# Patient Record
Sex: FEMALE | Race: WHITE | NOT HISPANIC OR LATINO | Employment: OTHER | URBAN - METROPOLITAN AREA
[De-identification: names, ages, dates, MRNs, and addresses within clinical notes are randomized per-mention and may not be internally consistent; named-entity substitution may affect disease eponyms.]

---

## 2021-01-13 ENCOUNTER — HOSPITAL ENCOUNTER (EMERGENCY)
Facility: CLINIC | Age: 61
Discharge: HOME | End: 2021-01-13
Attending: EMERGENCY MEDICINE | Admitting: EMERGENCY MEDICINE
Payer: MEDICARE

## 2021-01-13 VITALS
HEIGHT: 62 IN | RESPIRATION RATE: 15 BRPM | HEART RATE: 95 BPM | OXYGEN SATURATION: 97 % | WEIGHT: 180 LBS | BODY MASS INDEX: 33.13 KG/M2 | SYSTOLIC BLOOD PRESSURE: 119 MMHG | DIASTOLIC BLOOD PRESSURE: 113 MMHG

## 2021-01-13 DIAGNOSIS — M54.12 CERVICAL RADICULOPATHY: ICD-10-CM

## 2021-01-13 DIAGNOSIS — G89.29 OTHER CHRONIC PAIN: ICD-10-CM

## 2021-01-13 DIAGNOSIS — M47.22 OSTEOARTHRITIS OF SPINE WITH RADICULOPATHY, CERVICAL REGION: ICD-10-CM

## 2021-01-13 PROCEDURE — 99283 EMERGENCY DEPT VISIT LOW MDM: CPT | Performed by: EMERGENCY MEDICINE

## 2021-01-13 PROCEDURE — 99284 EMERGENCY DEPT VISIT MOD MDM: CPT | Performed by: EMERGENCY MEDICINE

## 2021-01-13 ASSESSMENT — MIFFLIN-ST. JEOR: SCORE: 1349.72

## 2021-01-13 NOTE — DISCHARGE INSTRUCTIONS
Orders been placed to restart physical therapy.  This should include traction.  The order was placed electronically.  Physical therapy staff should be contacting you shortly to arrange for your first appointment    Increase Neurontin to 300 mg 3 times daily    Arrange for clinic follow-up in 2 weeks

## 2021-01-13 NOTE — ED AVS SNAPSHOT
[facility] Emergency Dept  [address] [place] [address]  Phone: [phone]  Fax: [phone]                                    Audrey Ricks   MRN: [id number]    Department: [facility] Emergency Dept   Date of Visit: 1/13/2021           After Visit Summary Signature Page    I have received my discharge instructions, and my questions have been answered. I have discussed any challenges I see with this plan with the nurse or doctor.    ..........................................................................................................................................  Patient/Patient Representative Signature      ..........................................................................................................................................  Patient Representative Print Name and Relationship to Patient    ..................................................               ................................................  Date                                   Time    ..........................................................................................................................................  Reviewed by Signature/Title    ...................................................              ..............................................  Date                                               Time          22EPIC Rev 08/18

## 2021-01-13 NOTE — ED TRIAGE NOTES
Has been using neurontin since December but not helping 2-100mg tablets 3x a day.    Left shoulder/neck pain from chronic disk issues and recently moved in 11/2020 and has been lifting heavy things and over doing things and flared her disc issues up.  Recently moved from [facility]. Did take neurontin in the past and recently restarted. Does have tingling and pain radiating from the left neck down to the finger tips

## 2021-01-14 NOTE — ED PROVIDER NOTES
"  History     Chief Complaint   Patient presents with     Shoulder Pain     HPI     Audrey Ricks is a 60 year old female who presents with worsening symptoms of cervical radiculopathy.  Patient reports that she has degenerative arthritis in her cervical spine for last few years has been followed by a chronic pain specialist through [facility] in [facility].  Her last MRI available for review dates back to 2016.  She had osteophytic formations at the C4-5, C5-6, C6-7 was causing foraminal stenosis.  The patient has benefited by physical therapy in the past with traction when she has acute flares of her radicular symptoms are going to the left periscapular area.  She also was noted over the last 2 years that if she tucks her chin down in the flexed position and rotates her cervical spine to the right it will alleviate the radicular symptoms.  She has recently moved from [place] to the [facility] and is looking for a new primary care doctor.  She is here today to discuss her dosing of Neurontin and to receive authorization to restart physical therapy.      Allergies:  No Known Allergies    Problem List:    There are no active problems to display for this patient.       Past Medical History:    No past medical history on file.    Past Surgical History:    No past surgical history on file.    Family History:    No family history on file.    Social History:  Marital Status:  Single [1]  Social History     Tobacco Use     Smoking status: Not on file   Substance Use Topics     Alcohol use: Not on file     Drug use: Not on file        Medications:    No current outpatient medications on file.        Review of Systems   All other systems reviewed and are negative.      Physical Exam   BP: (!) 119/113  Pulse: 95  Resp: 15  Height: 157.5 cm (5' 2\")  Weight: 81.6 kg (180 lb)  SpO2: 97 %      Physical Exam  Vitals signs and nursing note reviewed.   Constitutional:       Appearance: Normal appearance.   HENT: "      Head: Normocephalic and atraumatic.   Eyes:      Conjunctiva/sclera: Conjunctivae normal.   Neck:      Comments: Patient shows some restriction in extension and rotation of the left.  Prefers to find her point of comfort by flexion of cervical spine slightly rotated to the right.  Cardiovascular:      Rate and Rhythm: Normal rate.   Pulmonary:      Effort: Pulmonary effort is normal.   Neurological:      Mental Status: She is alert.     Patient equal grasp strength bilateral.  She had no muscle atrophy and weakness in either arm.  DTRs were symmetric    ED Course        Procedures                       Assessments & Plan (with Medical Decision Making)  60-year-old female.  Recent move from [facility] to [place].  Looking for primary care doctor.  Was advised on to how to adjust her Neurontin dosing today.  She has degenerative change in the cervical spine at C 4-5, C5-C6, C6-C7.  We discussed Neurontin.  Since she is having increasing symptoms we elected to increase her Neurontin from 200 mg 3 times daily to 300 mg 3 times daily.  She may also use over-the-counter ibuprofen or naproxen.  Referred to physical therapy given that she has benefited from traction the past.  She has never tried home cervical traction.     I have reviewed the nursing notes.    I have reviewed the findings, diagnosis, plan and need for follow up with the patient.      There are no discharge medications for this patient.      Final diagnoses:   Cervical radiculopathy   Osteoarthritis of spine with radiculopathy, cervical region   Other chronic pain       1/13/2021   [facility] EMERGENCY DEPT     Mariano Montoya,   01/14/21 0734

## 2021-01-15 ENCOUNTER — HOSPITAL ENCOUNTER (OUTPATIENT)
Dept: PHYSICAL THERAPY | Facility: CLINIC | Age: 61
Setting detail: THERAPIES SERIES
End: 2021-01-15
Attending: EMERGENCY MEDICINE
Payer: MEDICARE

## 2021-01-15 DIAGNOSIS — M54.12 CERVICAL RADICULOPATHY: ICD-10-CM

## 2021-01-15 DIAGNOSIS — G89.29 OTHER CHRONIC PAIN: ICD-10-CM

## 2021-01-15 DIAGNOSIS — M47.22 OSTEOARTHRITIS OF SPINE WITH RADICULOPATHY, CERVICAL REGION: ICD-10-CM

## 2021-01-15 PROCEDURE — 97110 THERAPEUTIC EXERCISES: CPT | Mod: GP | Performed by: PHYSICAL THERAPIST

## 2021-01-15 PROCEDURE — 97161 PT EVAL LOW COMPLEX 20 MIN: CPT | Mod: GP | Performed by: PHYSICAL THERAPIST

## 2021-01-15 NOTE — PROGRESS NOTES
[facility]          OUTPATIENT PHYSICAL THERAPY ORTHOPEDIC EVALUATION  PLAN OF TREATMENT FOR OUTPATIENT REHABILITATION  (COMPLETE FOR INITIAL CLAIMS ONLY)  Patient's Last Name, First Name, M.I.  YOB: 1960  Audrey Ricks    Provider s Name:  [facility]   Medical Record No.  [id number]   Start of Care Date:  01/15/21   Onset Date:  11/01/20   Type:     _X__PT   ___OT   ___SLP Medical Diagnosis:  Cervical Radiculopathy M54.12, OA of spine with radiculopathy cervical region M47.22, chronic pain G89.29     PT Diagnosis:  Decreased cervical ROM, Decreased strength and endurance in upper quarter, altered neurodynamics in L arm, and poor posture consistent.   Visits from SOC:  1      _________________________________________________________________________________  Plan of Treatment/Functional Goals:  joint mobilization, manual therapy, neuromuscular re-education, strengthening, stretching     Cryotherapy, Hot packs, Traction     Goals  Goal Identifier: Driving  Goal Description: Patient will be able to  Turn head to check blind spots and elevate arm to steering wheel  while driving  20 minutes with shoulder pain and neck pain <2/10 in order to drive to the grocery store and appointments.   Target Date: 04/15/21    Goal Identifier: Washing hair   Goal Description: Pt will be able to flex shoulders to 130 degrees for greater than 30 seconds with no increase in symptoms in order perform grooming ADLs   Target Date: 04/15/21    Goal Identifier: Cooking   Goal Description: Pt will be able to cook to 30 minutes with no increase neck or arm symptoms in order to prepare her and her spouse meals.   Target Date: 04/15/21                 Therapy Frequency:  2 times/Week  Predicted Duration of Therapy Intervention:  4    Neha Posadas, PT                 I CERTIFY THE NEED FOR THESE SERVICES FURNISHED UNDER        THIS PLAN OF TREATMENT AND WHILE UNDER MY CARE      (Physician co-signature of this document indicates review and certification of the therapy plan).                       Certification Date From:  01/15/21   Certification Date To:  04/15/21    Referring Provider:  Mariano Montoya, DO     Initial Assessment        See Epic Evaluation Start of Care Date: 01/15/21

## 2021-01-15 NOTE — PROGRESS NOTES
"   01/15/21 3903   General Information   Type of Visit Initial OP Ortho PT Evaluation   Start of Care Date 01/15/21   Referring Physician Mariano Montoya, DO    Patient/Family Goals Statement PT states she \"wants to decrease pain meds and neurontin\"     Orders Evaluate and Treat   Date of Order 01/13/21   Certification Required? Yes   Medical Diagnosis Cervical Radiculopathy M54.12, OA of spine with radiculopathy cervical region M47.22, chronic pain G89.29   Surgical/Medical history reviewed Yes   Precautions/Limitations no known precautions/limitations   Weight-Bearing Status - LUE full weight-bearing   Weight-Bearing Status - RUE full weight-bearing   Weight-Bearing Status - LLE full weight-bearing   Weight-Bearing Status - RLE full weight-bearing       Present No   Body Part(s)   Body Part(s) Cervical Spine   Presentation and Etiology   Pertinent history of current problem (include personal factors and/or comorbidities that impact the POC) Pt reports she went to the ED on 1/13/21 with increase in neck and left arm pain that increased after packing and moving 2 months prior.  Per ED report last MRI in 2016 shows ostephytic formations at C4-C5, C5-C6, C6-C7 causing foraminal stenosis. Pt reports she has had similar symptoms intermittently for past 10 years with decreased symptoms for past few years. Pt reports neck, L shoulder and periscapular pain that decreases with rest, lying down, and cervical flexion and right rotation and increases with shoulder flexion, lifting more than 4 lbs, driving, washing her hair, cooking, using her cellphone and sitting. Pt reports Neurontin greatly decreases pain greatly but makes her feel \"floaty\". Pt reports having decreased symptoms with previous PT especially with traction. PMH remarkable for HTN and prior hospitalization for SVT. Medications include metaprolol and Neurontin.    Impairments A. Pain;D. Decreased ROM;E. Decreased flexibility;F. " Decreased strength and endurance   Functional Limitations perform activities of daily living;perform desired leisure / sports activities   Symptom Location Pt reports pain in L posterior neck, L shoulder/arm. inferior to L inferior angle of scapula   How/Where did it occur From Degenerative Joint Disease   Onset date of current episode/exacerbation 11/01/20   Chronicity Chronic   Pain rating (0-10 point scale) Denies pain  (denies pain any pain currently )   Pain quality A. Sharp;B. Dull;C. Aching  (searing )   Frequency of pain/symptoms B. Intermittent   Pain/symptoms are: The same all the time   Pain/symptoms exacerbated by D. Carrying;C. Lifting;H. Overhead reach;G. Certain positions;K. Home tasks;J. ADL   Pain/symptoms eased by C. Rest;E. Changing positions;F. Certain positions;I. OTC medication(s)  (Cervical flexion and R rotation )   Progression of symptoms since onset: Improved  (improved due to neurontin)   Prior Level of Function   Prior Level of Function-Mobility IND   Prior Level of Function-ADLs IND   Functional Level Prior Comment Pt reports being active including swimming and yoga   Current Level of Function   Current Community Support Family/friend caregiver   Patient role/employment history F. Retired   [facility]   Home/community accessibility no concerns   Current equipment-Gait/Locomotion None   Current equipment-ADL None   Cervical Spine   Observation NAD   Integumentary  Grossly intact with no noted impairments   Posture Forward head posture and protracted shoulders    Cervical Flexion ROM 40   Cervical Extension ROM 48   Cervical Right Side Bending ROM 30   Cervical Left Side Bending ROM 28   Cervical Right Rotation ROM 53   Cervical Left Rotation ROM 57   Thoracic Flexion ROM WFL   Thoracic Extension ROM 60% decreased compared to functional norms   Thoracic Right Side Bending ROM WFL   Thoracic Left Sidebending ROM  WFL    Thoracic Right Rotation WFL   Thoracic Left  Rotation WFL   Shoulder AROM Screen 90 degrees bilateral flexion with pain    Shoulder Abd (C5) Strength 5/5 B    Shoulder ER (C5, C6) Strength 4/5 B    Shoulder IR (C5, C6) Strength 4/5 B    Elbow Flexion (C5, C6) Strength 4+/5 B    Elbow Extension (C7) Strength 5/5    Shoulder/Wrist/Hand Strength Comments  26kg on R,  18KG on L R hand dominance   Spurling Test positive on L   Cervical Distraction Test positive    Segmental Mobility-Cervical Stiff at C4-5 especially but all through out C spine   Palpation Tender to palpation in bilateral upper trapezius and levator scapulae   UE Neural Tension UE Neural Tension Tests   ULTT I (Median and Anterior Interosseous) Positive  (siffness on L in wrist)   Planned Therapy Interventions   Planned Therapy Interventions joint mobilization;manual therapy;neuromuscular re-education;strengthening;stretching   Planned Modality Interventions   Planned Modality Interventions Cryotherapy;Hot packs;Traction   Clinical Impression   Criteria for Skilled Therapeutic Interventions Met yes, treatment indicated   PT Diagnosis Decreased cervical ROM, Decreased strength and endurance in upper quarter, altered neurodynamics in L arm, and poor posture consistent.   Influenced by the following impairments Decreased cervical AROM and PROM, Decreased strength and endurance in upper quarter, and protracted shoulders and forward head posture    Functional limitations due to impairments Difficulty cooking, washing hair, driving, carrying objects >4lbs    Clinical Presentation Stable/Uncomplicated   Clinical Presentation Rationale Assessment and Clinical opinion    Clinical Decision Making (Complexity) Low complexity   Therapy Frequency 2 times/Week   Predicted Duration of Therapy Intervention (days/wks) 4   Risk & Benefits of therapy have been explained Yes   Patient, Family & other staff in agreement with plan of care Yes   Clinical Impression Comments PPW Decreased cervical ROM, Decreased  strength and endurance in upper quarter, altered neurodynamics in L arm, and poor posture consistent with cervical radiculopathy. Pt will benefit from skilled PT for instruction in HEP for cervical stabilization, manual techniques and modalities as need.     ORTHO GOALS   PT Ortho Eval Goals 1;2;3   Ortho Goal 1   Goal Identifier Driving   Goal Description Patient will be able to  Turn head to check blind spots and elevate arm to steering wheel  while driving  20 minutes with shoulder pain and neck pain <2/10 in order to drive to the grocery store and appointments.    Target Date 04/15/21   Ortho Goal 2   Goal Identifier Washing hair    Goal Description Pt will be able to flex shoulders to 130 degrees for greater than 30 seconds with no increase in symptoms in order perform grooming ADLs    Target Date 04/15/21   Ortho Goal 3   Goal Identifier Cooking    Goal Description Pt will be able to cook to 30 minutes with no increase neck or arm symptoms in order to prepare her and her spouse meals.    Target Date 04/15/21   Total Evaluation Time   PT Alyssa Low Complexity Minutes (51686) 50

## 2021-02-05 ENCOUNTER — HOSPITAL ENCOUNTER (OUTPATIENT)
Dept: PHYSICAL THERAPY | Facility: CLINIC | Age: 61
Setting detail: THERAPIES SERIES
End: 2021-02-05
Attending: EMERGENCY MEDICINE
Payer: MEDICARE

## 2021-02-05 PROCEDURE — 97110 THERAPEUTIC EXERCISES: CPT | Mod: GP | Performed by: PHYSICAL THERAPIST

## 2021-02-08 ENCOUNTER — HOSPITAL ENCOUNTER (OUTPATIENT)
Dept: PHYSICAL THERAPY | Facility: CLINIC | Age: 61
Setting detail: THERAPIES SERIES
End: 2021-02-08
Attending: EMERGENCY MEDICINE
Payer: MEDICARE

## 2021-02-08 PROCEDURE — 97110 THERAPEUTIC EXERCISES: CPT | Mod: GP | Performed by: PHYSICAL THERAPIST

## 2021-02-10 NOTE — PROGRESS NOTES
"  Damian Ventura is a 61 year old who presents for the following health issues    HPI       ED/UC Followup:    Facility:  [facility]  Date of visit: 1/13/21  Reason for visit: cervical radiculopathy, osteoarthritis  Current Status: doing better     Patient presents today for ER follow-up and medication recheck. Patient is new to the area from [place]. She tells me today that she had a recurrance of cervical neck pain with radicular symptoms. She reports she lived with this pain for 3 years before anyone could give her a diagnosis. She reports PT has been very beneficial in the past along with Gabapentin as needed. She reports she is doing better today. She was very upset when the pain recurred but is relieved it is starting to feel better.     She has diagnosis of Bipolar disorder as well. She reports that she was doing well without medication but states that the move and recurrance of pain had her feeling very teary so she restarted Lamictal 25 mg about 3 days ago. Can all ready tell this has helped with her moods.     Blood pressure has been elevated today as well as in the ER. She has been taking Metoprolol as prescribed. Has noticed readings elevated at home. Otherwise feeling well. Denies headaches, vision changes, chest pain or shortness of breath.     Review of Systems   Constitutional, HEENT, cardiovascular, pulmonary, GI, , musculoskeletal, neuro, skin, endocrine and psych systems are negative, except as otherwise noted.      Objective    BP (!) 164/110   Pulse 80   Temp 97.9  F (36.6  C) (Temporal)   Resp 16   Ht 1.575 m (5' 2\")   Wt 87.1 kg (192 lb)   LMP 01/14/2021   SpO2 96%   BMI 35.12 kg/m    Body mass index is 35.12 kg/m .  Physical Exam   GENERAL: healthy, alert and no distress  EYES: Eyes grossly normal to inspection, PERRL and conjunctivae and sclerae normal  HENT: ear canals and TM's normal, nose and mouth without ulcers or lesions  NECK: no adenopathy, no asymmetry, masses, or " scars and thyroid normal to palpation  RESP: lungs clear to auscultation - no rales, rhonchi or wheezes  CV: regular rate and rhythm, normal S1 S2, no S3 or S4, no murmur, click or rub, no peripheral edema and peripheral pulses strong  ABDOMEN: soft, nontender, no hepatosplenomegaly, no masses and bowel sounds normal  NEURO: Normal strength and tone, mentation intact and speech normal  PSYCH: mentation appears normal, affect normal/bright        Assessment & Plan     Essential hypertension  Patient with elevated blood pressure despite taking 25 mg daily. Will have patient increase this to 50 mg daily. She will follow-up in 2-4 weeks for a blood pressure check. Has been making dietary changes.   - metoprolol succinate ER (TOPROL-XL) 25 MG 24 hr tablet; Take 2 tablets (50 mg) by mouth daily    Cervical radiculopathy at C7  Symptoms are improving. She reports Gabapentin has been helpful for pain and will be starting PT.    Bipolar 2 disorder (H)  Patient recently started Lamictal 25 mg daily and this has been helpful. Will continue at current dose and notify me if symptoms become more bothersome. No refills needed at this time.     Morbid obesity (H)  Encouraged ongoing diet and exercise modifications.       The patient indicates understanding of these issues and agrees with the plan.    Alie De La Cruz PA-C  [facility]

## 2021-02-11 ENCOUNTER — OFFICE VISIT (OUTPATIENT)
Dept: FAMILY MEDICINE | Facility: CLINIC | Age: 61
End: 2021-02-11
Payer: MEDICARE

## 2021-02-11 VITALS
TEMPERATURE: 97.9 F | WEIGHT: 192 LBS | DIASTOLIC BLOOD PRESSURE: 110 MMHG | OXYGEN SATURATION: 96 % | HEIGHT: 62 IN | RESPIRATION RATE: 16 BRPM | HEART RATE: 80 BPM | BODY MASS INDEX: 35.33 KG/M2 | SYSTOLIC BLOOD PRESSURE: 164 MMHG

## 2021-02-11 DIAGNOSIS — E66.01 MORBID OBESITY (H): ICD-10-CM

## 2021-02-11 DIAGNOSIS — F31.81 BIPOLAR 2 DISORDER (H): ICD-10-CM

## 2021-02-11 DIAGNOSIS — M54.12 CERVICAL RADICULOPATHY AT C7: ICD-10-CM

## 2021-02-11 DIAGNOSIS — I10 ESSENTIAL HYPERTENSION: Primary | ICD-10-CM

## 2021-02-11 PROCEDURE — 99204 OFFICE O/P NEW MOD 45 MIN: CPT | Performed by: PHYSICIAN ASSISTANT

## 2021-02-11 RX ORDER — LAMOTRIGINE 25 MG/1
25 TABLET ORAL
COMMUNITY
Start: 2020-07-13 | End: 2021-04-05

## 2021-02-11 RX ORDER — METOPROLOL SUCCINATE 25 MG/1
25 TABLET, EXTENDED RELEASE ORAL
COMMUNITY
Start: 2020-12-12 | End: 2021-02-11

## 2021-02-11 RX ORDER — GABAPENTIN 100 MG/1
CAPSULE ORAL
COMMUNITY
Start: 2020-12-28 | End: 2022-06-30

## 2021-02-11 RX ORDER — METRONIDAZOLE 10 MG/G
GEL TOPICAL
COMMUNITY
Start: 2020-06-16 | End: 2021-08-12

## 2021-02-11 RX ORDER — ZOLPIDEM TARTRATE 5 MG/1
TABLET ORAL
COMMUNITY
Start: 2020-09-30 | End: 2021-03-29

## 2021-02-11 RX ORDER — TRIAMCINOLONE ACETONIDE 1 MG/G
CREAM TOPICAL
COMMUNITY
Start: 2020-09-30 | End: 2021-04-28

## 2021-02-11 RX ORDER — ESTRADIOL/NORETHINDRONE ACETATE TRANSDERMAL SYSTEM .05; .14 MG/D; MG/D
1 PATCH, EXTENDED RELEASE TRANSDERMAL
COMMUNITY
Start: 2020-04-16 | End: 2021-04-05

## 2021-02-11 RX ORDER — METOPROLOL SUCCINATE 25 MG/1
50 TABLET, EXTENDED RELEASE ORAL DAILY
Qty: 60 TABLET | Refills: 0
Start: 2021-02-11 | End: 2021-03-11

## 2021-02-11 SDOH — HEALTH STABILITY: MENTAL HEALTH: HOW MANY STANDARD DRINKS CONTAINING ALCOHOL DO YOU HAVE ON A TYPICAL DAY?: NOT ASKED

## 2021-02-11 SDOH — HEALTH STABILITY: MENTAL HEALTH: HOW OFTEN DO YOU HAVE 6 OR MORE DRINKS ON ONE OCCASION?: NOT ASKED

## 2021-02-11 SDOH — HEALTH STABILITY: MENTAL HEALTH: HOW OFTEN DO YOU HAVE A DRINK CONTAINING ALCOHOL?: NOT ASKED

## 2021-02-11 ASSESSMENT — MIFFLIN-ST. JEOR: SCORE: 1399.16

## 2021-02-16 PROBLEM — E66.01 MORBID OBESITY (H): Status: ACTIVE | Noted: 2021-02-16

## 2021-03-01 ENCOUNTER — HOSPITAL ENCOUNTER (OUTPATIENT)
Dept: PHYSICAL THERAPY | Facility: CLINIC | Age: 61
Setting detail: THERAPIES SERIES
End: 2021-03-01
Attending: EMERGENCY MEDICINE
Payer: MEDICARE

## 2021-03-01 PROCEDURE — 97110 THERAPEUTIC EXERCISES: CPT | Mod: GP | Performed by: PHYSICAL THERAPIST

## 2021-03-04 NOTE — PATIENT INSTRUCTIONS
Preventive Health Recommendations  Female Ages 50 - 64    Yearly exam: See your health care provider every year in order to  o Review health changes.   o Discuss preventive care.    o Review your medicines if your doctor has prescribed any.      Get a Pap test every three years (unless you have an abnormal result and your provider advises testing more often).    If you get Pap tests with HPV test, you only need to test every 5 years, unless you have an abnormal result.     You do not need a Pap test if your uterus was removed (hysterectomy) and you have not had cancer.    You should be tested each year for STDs (sexually transmitted diseases) if you're at risk.     Have a mammogram every 1 to 2 years.    Have a colonoscopy at age 50, or have a yearly FIT test (stool test). These exams screen for colon cancer.      Have a cholesterol test every 5 years, or more often if advised.    Have a diabetes test (fasting glucose) every three years. If you are at risk for diabetes, you should have this test more often.     If you are at risk for osteoporosis (brittle bone disease), think about having a bone density scan (DEXA).    Shots: Get a flu shot each year. Get a tetanus shot every 10 years.    Nutrition:     Eat at least 5 servings of fruits and vegetables each day.    Eat whole-grain bread, whole-wheat pasta and brown rice instead of white grains and rice.    Get adequate Calcium and Vitamin D.     Lifestyle    Exercise at least 150 minutes a week (30 minutes a day, 5 days a week). This will help you control your weight and prevent disease.    Limit alcohol to one drink per day.    No smoking.     Wear sunscreen to prevent skin cancer.     See your dentist every six months for an exam and cleaning.    See your eye doctor every 1 to 2 years.    Patient Education   Personalized Prevention Plan  You are due for the preventive services outlined below.  Your care team is available to assist you in scheduling these  services.  If you have already completed any of these items, please share that information with your care team to update in your medical record.  Health Maintenance Due   Topic Date Due     Mammogram  Never done     Colorectal Cancer Screening  Never done     Annual Wellness Visit  Never done

## 2021-03-07 ENCOUNTER — HEALTH MAINTENANCE LETTER (OUTPATIENT)
Age: 61
End: 2021-03-07

## 2021-03-08 DIAGNOSIS — I10 ESSENTIAL HYPERTENSION: ICD-10-CM

## 2021-03-08 DIAGNOSIS — Z13.220 LIPID SCREENING: ICD-10-CM

## 2021-03-08 LAB
ANION GAP SERPL CALCULATED.3IONS-SCNC: 5 MMOL/L (ref 3–14)
BUN SERPL-MCNC: 17 MG/DL (ref 7–30)
CALCIUM SERPL-MCNC: 8.7 MG/DL (ref 8.5–10.1)
CHLORIDE SERPL-SCNC: 108 MMOL/L (ref 94–109)
CHOLEST SERPL-MCNC: 227 MG/DL
CO2 SERPL-SCNC: 27 MMOL/L (ref 20–32)
CREAT SERPL-MCNC: 0.99 MG/DL (ref 0.52–1.04)
GFR SERPL CREATININE-BSD FRML MDRD: 62 ML/MIN/{1.73_M2}
GLUCOSE SERPL-MCNC: 89 MG/DL (ref 70–99)
HDLC SERPL-MCNC: 53 MG/DL
LDLC SERPL CALC-MCNC: 153 MG/DL
NONHDLC SERPL-MCNC: 174 MG/DL
POTASSIUM SERPL-SCNC: 4.1 MMOL/L (ref 3.4–5.3)
SODIUM SERPL-SCNC: 140 MMOL/L (ref 133–144)
TRIGL SERPL-MCNC: 103 MG/DL

## 2021-03-08 PROCEDURE — 80048 BASIC METABOLIC PNL TOTAL CA: CPT | Performed by: PHYSICIAN ASSISTANT

## 2021-03-08 PROCEDURE — 36415 COLL VENOUS BLD VENIPUNCTURE: CPT | Performed by: PHYSICIAN ASSISTANT

## 2021-03-08 PROCEDURE — 80061 LIPID PANEL: CPT | Performed by: PHYSICIAN ASSISTANT

## 2021-03-09 ENCOUNTER — HOSPITAL ENCOUNTER (OUTPATIENT)
Dept: PHYSICAL THERAPY | Facility: CLINIC | Age: 61
Setting detail: THERAPIES SERIES
End: 2021-03-09
Attending: EMERGENCY MEDICINE
Payer: MEDICARE

## 2021-03-09 PROCEDURE — 97110 THERAPEUTIC EXERCISES: CPT | Mod: GP

## 2021-03-11 ENCOUNTER — OFFICE VISIT (OUTPATIENT)
Dept: FAMILY MEDICINE | Facility: CLINIC | Age: 61
End: 2021-03-11
Payer: MEDICARE

## 2021-03-11 VITALS
RESPIRATION RATE: 16 BRPM | DIASTOLIC BLOOD PRESSURE: 94 MMHG | BODY MASS INDEX: 34.41 KG/M2 | WEIGHT: 187 LBS | TEMPERATURE: 98.7 F | HEIGHT: 62 IN | HEART RATE: 84 BPM | SYSTOLIC BLOOD PRESSURE: 162 MMHG

## 2021-03-11 DIAGNOSIS — Z00.00 ROUTINE GENERAL MEDICAL EXAMINATION AT A HEALTH CARE FACILITY: Primary | ICD-10-CM

## 2021-03-11 DIAGNOSIS — Z12.31 VISIT FOR SCREENING MAMMOGRAM: ICD-10-CM

## 2021-03-11 DIAGNOSIS — F31.81 BIPOLAR 2 DISORDER (H): ICD-10-CM

## 2021-03-11 DIAGNOSIS — E78.5 HYPERLIPIDEMIA LDL GOAL <130: ICD-10-CM

## 2021-03-11 DIAGNOSIS — I10 ESSENTIAL HYPERTENSION: ICD-10-CM

## 2021-03-11 PROCEDURE — 99396 PREV VISIT EST AGE 40-64: CPT | Performed by: PHYSICIAN ASSISTANT

## 2021-03-11 PROCEDURE — 99213 OFFICE O/P EST LOW 20 MIN: CPT | Mod: 25 | Performed by: PHYSICIAN ASSISTANT

## 2021-03-11 RX ORDER — METOPROLOL SUCCINATE 25 MG/1
25 TABLET, EXTENDED RELEASE ORAL DAILY
Qty: 90 TABLET | Refills: 1 | Status: SHIPPED | OUTPATIENT
Start: 2021-03-11 | End: 2022-04-27

## 2021-03-11 ASSESSMENT — ENCOUNTER SYMPTOMS
PALPITATIONS: 1
FEVER: 0
SORE THROAT: 0
CONSTIPATION: 0
DIARRHEA: 0
COUGH: 0
FREQUENCY: 0
SHORTNESS OF BREATH: 1
ABDOMINAL PAIN: 0
HEADACHES: 0
ARTHRALGIAS: 0
MYALGIAS: 0
NAUSEA: 0
EYE PAIN: 0
HEARTBURN: 1
CHILLS: 0
PARESTHESIAS: 1
JOINT SWELLING: 0
DIZZINESS: 0
DYSURIA: 0
BREAST MASS: 0
WEAKNESS: 0
NERVOUS/ANXIOUS: 1
HEMATURIA: 0
HEMATOCHEZIA: 0

## 2021-03-11 ASSESSMENT — ACTIVITIES OF DAILY LIVING (ADL)
CURRENT_FUNCTION: PREPARING MEALS REQUIRES ASSISTANCE
CURRENT_FUNCTION: LAUNDRY REQUIRES ASSISTANCE

## 2021-03-11 ASSESSMENT — MIFFLIN-ST. JEOR: SCORE: 1376.48

## 2021-03-18 PROBLEM — E78.5 HYPERLIPIDEMIA LDL GOAL <130: Status: ACTIVE | Noted: 2021-03-18

## 2021-04-05 ENCOUNTER — HOSPITAL ENCOUNTER (OUTPATIENT)
Dept: MAMMOGRAPHY | Facility: CLINIC | Age: 61
Discharge: HOME | End: 2021-04-05
Attending: PHYSICIAN ASSISTANT | Admitting: PHYSICIAN ASSISTANT
Payer: MEDICARE

## 2021-04-05 DIAGNOSIS — N95.1 MENOPAUSAL SYNDROME (HOT FLASHES): Primary | ICD-10-CM

## 2021-04-05 DIAGNOSIS — Z12.31 VISIT FOR SCREENING MAMMOGRAM: ICD-10-CM

## 2021-04-05 PROCEDURE — 77067 SCR MAMMO BI INCL CAD: CPT

## 2021-04-06 RX ORDER — ESTRADIOL/NORETHINDRONE ACETATE TRANSDERMAL SYSTEM .05; .14 MG/D; MG/D
1 PATCH, EXTENDED RELEASE TRANSDERMAL
Qty: 24 PATCH | Refills: 3 | Status: SHIPPED | OUTPATIENT
Start: 2021-04-08 | End: 2022-04-27

## 2021-04-06 NOTE — TELEPHONE ENCOUNTER
Requested Prescriptions   Pending Prescriptions Disp Refills    estradiol-norethindrone (COMBIPATCH) 0.05-0.14 MG/DAY bi-weekly patch 24 patch 3     Sig: Place 1 patch onto the skin       There is no refill protocol information for this order        Last Written Prescription Date:  Historical   Last Fill Quantity: ,  # refills:    Last office visit: 3/11/2021 with prescribing provider:  3/11/2021 Jono   Future Office Visit:          Routing refill request to provider for review/approval because:  Medication is reported/historical        Jaqueline Ly RN  [facility]

## 2021-04-23 NOTE — PROGRESS NOTES
"  Damian Ventura is a 61 year old who presents for the following health issues    HPI     Rash on the crease of left inner thigh x 3 months  Was flat in the beginning now has become bumpy  Getting worse    Patient presents today for evaluation of a rash in her left inner thigh/groin area. She reports at first this was very small. It has more recently started to spread and looks more bumpy. It is not itchy or painful.     Review of Systems   Constitutional, HEENT, cardiovascular, pulmonary, gi and gu systems are negative, except as otherwise noted.      Objective    BP (!) 130/96   Pulse 80   Temp 97.6  F (36.4  C) (Temporal)   Resp 16   Ht 1.588 m (5' 2.5\")   Wt 83.9 kg (185 lb)   LMP 01/01/2021 (LMP Unknown)   BMI 33.30 kg/m    Body mass index is 33.3 kg/m .  Physical Exam   GENERAL: healthy, alert and no distress  SKIN: erythematous rash with central clearing and a few satellite lesions in the left groin fold consistent with candida  PSYCH: mentation appears normal, affect normal/bright      Assessment & Plan     Candidiasis of skin  Discussed with patient that rash consistent with yeast. Encouraged patient to keep the affected area clean and dry. She will apply Nystatin twice daily as needed. Patient will follow-up in clinic if new symptoms develop or current symptoms fail to improve.  - nystatin (MYCOSTATIN) 711951 UNIT/GM external cream; Apply topically 2 times daily    Eczema, unspecified type  Refill given today for as needed use.   - triamcinolone (KENALOG) 0.1 % external cream; Apply topically 2 times daily as needed for irritation    The patient indicates understanding of these issues and agrees with the plan.    GALO Fowler [facility]  "

## 2021-04-28 ENCOUNTER — OFFICE VISIT (OUTPATIENT)
Dept: FAMILY MEDICINE | Facility: CLINIC | Age: 61
End: 2021-04-28
Payer: MEDICARE

## 2021-04-28 VITALS
DIASTOLIC BLOOD PRESSURE: 96 MMHG | BODY MASS INDEX: 32.78 KG/M2 | HEART RATE: 80 BPM | WEIGHT: 185 LBS | HEIGHT: 63 IN | RESPIRATION RATE: 16 BRPM | TEMPERATURE: 97.6 F | SYSTOLIC BLOOD PRESSURE: 130 MMHG

## 2021-04-28 DIAGNOSIS — B37.2 CANDIDIASIS OF SKIN: Primary | ICD-10-CM

## 2021-04-28 DIAGNOSIS — L30.9 ECZEMA, UNSPECIFIED TYPE: ICD-10-CM

## 2021-04-28 PROCEDURE — 99213 OFFICE O/P EST LOW 20 MIN: CPT | Performed by: PHYSICIAN ASSISTANT

## 2021-04-28 RX ORDER — TRIAMCINOLONE ACETONIDE 1 MG/G
CREAM TOPICAL 2 TIMES DAILY PRN
Qty: 30 G | Refills: 1 | Status: SHIPPED | OUTPATIENT
Start: 2021-04-28 | End: 2023-05-05

## 2021-04-28 RX ORDER — NYSTATIN 100000 U/G
CREAM TOPICAL 2 TIMES DAILY
Qty: 30 G | Refills: 1 | Status: SHIPPED | OUTPATIENT
Start: 2021-04-28 | End: 2022-06-30

## 2021-04-28 ASSESSMENT — MIFFLIN-ST. JEOR: SCORE: 1375.34

## 2021-07-19 ENCOUNTER — MYC MEDICAL ADVICE (OUTPATIENT)
Dept: FAMILY MEDICINE | Facility: CLINIC | Age: 61
End: 2021-07-19

## 2021-07-19 NOTE — TELEPHONE ENCOUNTER
My Chart message sent to patient to schedule visit with Alie De La Cruz when she returns to the clinic next week.    Dacia Gupta RN

## 2021-09-09 ENCOUNTER — MYC MEDICAL ADVICE (OUTPATIENT)
Dept: FAMILY MEDICINE | Facility: CLINIC | Age: 61
End: 2021-09-09

## 2021-09-09 NOTE — TELEPHONE ENCOUNTER
Please recommend patient send in E-visit with pictures if able through her [employer] lida. She otherwise does have a visit scheduled tomorrow.    Alie De La Cruz PA-C

## 2021-09-09 NOTE — TELEPHONE ENCOUNTER
Called pt and she is going to try to down load pics.  Otherwise she stated she might go to the ED.  It is getting worse.  Denies any trouble breathing. KH

## 2021-09-13 DIAGNOSIS — F31.81 BIPOLAR 2 DISORDER (H): ICD-10-CM

## 2021-09-14 RX ORDER — ZOLPIDEM TARTRATE 5 MG/1
TABLET ORAL
Qty: 30 TABLET | Refills: 1 | Status: SHIPPED | OUTPATIENT
Start: 2021-09-14 | End: 2022-03-09

## 2021-09-29 ENCOUNTER — OFFICE VISIT (OUTPATIENT)
Dept: FAMILY MEDICINE | Facility: CLINIC | Age: 61
End: 2021-09-29
Payer: MEDICARE

## 2021-09-29 VITALS
HEIGHT: 62 IN | DIASTOLIC BLOOD PRESSURE: 72 MMHG | TEMPERATURE: 98.2 F | OXYGEN SATURATION: 98 % | RESPIRATION RATE: 18 BRPM | WEIGHT: 179 LBS | BODY MASS INDEX: 32.94 KG/M2 | SYSTOLIC BLOOD PRESSURE: 134 MMHG | HEART RATE: 110 BPM

## 2021-09-29 DIAGNOSIS — F31.81 BIPOLAR 2 DISORDER (H): Primary | ICD-10-CM

## 2021-09-29 DIAGNOSIS — K21.9 GASTROESOPHAGEAL REFLUX DISEASE WITHOUT ESOPHAGITIS: ICD-10-CM

## 2021-09-29 DIAGNOSIS — Z23 ENCOUNTER FOR IMMUNIZATION: ICD-10-CM

## 2021-09-29 PROCEDURE — 90686 IIV4 VACC NO PRSV 0.5 ML IM: CPT | Performed by: PHYSICIAN ASSISTANT

## 2021-09-29 PROCEDURE — G0008 ADMIN INFLUENZA VIRUS VAC: HCPCS | Performed by: PHYSICIAN ASSISTANT

## 2021-09-29 PROCEDURE — 99214 OFFICE O/P EST MOD 30 MIN: CPT | Mod: 25 | Performed by: PHYSICIAN ASSISTANT

## 2021-09-29 RX ORDER — BUSPIRONE HYDROCHLORIDE 5 MG/1
5 TABLET ORAL 3 TIMES DAILY
Qty: 90 TABLET | Refills: 0 | Status: SHIPPED | OUTPATIENT
Start: 2021-09-29 | End: 2021-12-31

## 2021-09-29 ASSESSMENT — PAIN SCALES - GENERAL: PAINLEVEL: NO PAIN (0)

## 2021-09-29 ASSESSMENT — MIFFLIN-ST. JEOR: SCORE: 1340.19

## 2021-09-29 NOTE — PROGRESS NOTES
"  Damian Ventura is a 61 year old who presents for the following health issues    HPI     Medication Followup / refills     Taking Medication as prescribed: yes    Side Effects:  None    Medication Helping Symptoms:  yes     Patient presents today for follow-up of moods. She has known bipolar 2 disorder. She reports she overall had been doing really well. She reports that not long ago she went on a camping trip with her . She reports they got set up and she wanted to go check things out. He said he was just going to run into town and be right back. They did not have cell service. She reports he was gone 1.5 hours. For some reason this really set her off. She was having a severe panic attack that lasted several hours. She could not settle herself down. She reports now she feels panicked anytime she has to be alone. She did restart with therapy and this has been helpful. Wonders about using something as needed for the anxiety.     Review of Systems   Constitutional, HEENT, cardiovascular, pulmonary, GI, , musculoskeletal, neuro, skin, endocrine and psych systems are negative, except as otherwise noted.      Objective    /72   Pulse 110   Temp 98.2  F (36.8  C) (Temporal)   Resp 18   Ht 1.575 m (5' 2\")   Wt 81.2 kg (179 lb)   LMP 01/29/2021   SpO2 98%   Breastfeeding No   BMI 32.74 kg/m    Body mass index is 32.74 kg/m .  Physical Exam   GENERAL: healthy, alert and no distress  HENT: ear canals and TM's normal, nose and mouth without ulcers or lesions  NECK: no adenopathy, no asymmetry, masses, or scars and thyroid normal to palpation  RESP: lungs clear to auscultation - no rales, rhonchi or wheezes  CV: regular rate and rhythm, normal S1 S2, no S3 or S4, no murmur, click or rub, no peripheral edema and peripheral pulses strong  SKIN: no suspicious lesions or rashes  PSYCH: mentation appears normal, affect normal/bright, tearful, anxious, judgement and insight intact and appearance well " balbina    Assessment & Plan     Bipolar 2 disorder (H)  Discussed moods with patient today. She has been working with a therapist which has been helpful. Would like to try something for her anxiety. Will start Buspar as below. Appropriate use and side effects discussed. Close follow-up encouraged.   - busPIRone (BUSPAR) 5 MG tablet; Take 1 tablet (5 mg) by mouth 3 times daily For Anxiety    Gastroesophageal reflux disease without esophagitis  - omeprazole (PRILOSEC) 20 MG DR capsule; TAKE 1 CAPSULE BY MOUTH ONE TIME A DAY. TAKES AS NEEDED FOR HEARTBURN    Encounter for immunization  - FLU VACCINE, 3 YRS +, IM (FLUZONE)    The patient indicates understanding of these issues and agrees with the plan.    Alie De La Cruz PA-C  [facility]

## 2021-10-11 NOTE — PROGRESS NOTES
Outpatient Physical Therapy Discharge Note     Patient: Audrey Ricks  : 1960    Beginning/End Dates of Reporting Period:  1/15/21 to 3/9/21    Referring Provider: Mariano Montoya DO     Therapy Diagnosis: Decreased cervical ROM, Decreased strength and endurance in upper quarter, altered neurodynamics in L arm, and poor posture consistent with cervical radiculopathy.     Client Self Report: Pt. states her sx are still there but not all the time. Pt. states she notices her pain the most whenever she turns her head to the left. Pt. states her arm movements don't seem to affect her as much as her neck. Pt. states her new goal is to be able to carry her new kayak.    Objective Measurements:      Objective Measure: SPADI   Details: 25.53        Goals:  Goal Identifier Driving   Goal Description Patient will be able to turn head to check blind spots and elevate arm to steering wheel  while driving  20 minutes with shoulder pain and neck pain <2/10 in order to drive to the grocery store and appointments.    Target Date 04/15/21   Date Met      Progress (detail required for progress note):       Goal Identifier Washing hair    Goal Description Pt will be able to flex shoulders to 130 degrees for greater than 30 seconds with no increase in symptoms in order perform grooming ADLs    Target Date 04/15/21   Date Met      Progress (detail required for progress note):       Goal Identifier Cooking    Goal Description Pt will be able to cook to 30 minutes with no increase neck or arm symptoms in order to prepare her and her spouse meals.    Target Date 04/15/21   Date Met      Progress (detail required for progress note):         Plan:  Discharge from therapy.    Discharge:    Reason for Discharge: Patient chooses to discontinue therapy.    Equipment Issued: none    Discharge Plan: Patient to continue home program.

## 2021-12-03 ENCOUNTER — IMMUNIZATION (OUTPATIENT)
Dept: FAMILY MEDICINE | Facility: CLINIC | Age: 61
End: 2021-12-03
Payer: MEDICARE

## 2021-12-03 PROCEDURE — 91306 COVID-19,PF,MODERNA (18+ YRS BOOSTER .25ML): CPT

## 2021-12-03 PROCEDURE — 0064A COVID-19,PF,MODERNA (18+ YRS BOOSTER .25ML): CPT

## 2021-12-31 DIAGNOSIS — F31.81 BIPOLAR 2 DISORDER (H): ICD-10-CM

## 2021-12-31 RX ORDER — BUSPIRONE HYDROCHLORIDE 5 MG/1
5 TABLET ORAL 3 TIMES DAILY
Qty: 90 TABLET | Refills: 0 | Status: SHIPPED | OUTPATIENT
Start: 2021-12-31 | End: 2022-03-09

## 2022-01-10 ENCOUNTER — TELEPHONE (OUTPATIENT)
Dept: FAMILY MEDICINE | Facility: CLINIC | Age: 62
End: 2022-01-10
Payer: MEDICARE

## 2022-01-10 DIAGNOSIS — G89.29 CHRONIC PAIN OF BOTH KNEES: Primary | ICD-10-CM

## 2022-01-10 DIAGNOSIS — M25.561 CHRONIC PAIN OF BOTH KNEES: Primary | ICD-10-CM

## 2022-01-10 DIAGNOSIS — M25.562 CHRONIC PAIN OF BOTH KNEES: Primary | ICD-10-CM

## 2022-01-10 DIAGNOSIS — M25.551 HIP PAIN, RIGHT: ICD-10-CM

## 2022-01-10 NOTE — TELEPHONE ENCOUNTER
Patient is wanting to go to physical therapy for bilateral knee and right hip pain.  She states that this is chronic, from previous injury and now has started working again and having increased pain flare ups.    Can an order be placed for her to start this as soon as possible?     Patient wanted a referral prior to an appointment with you, appointment scheduled for February 3rd, 2022.    Libertad Lindsey XRO/

## 2022-01-14 ENCOUNTER — HOSPITAL ENCOUNTER (OUTPATIENT)
Dept: PHYSICAL THERAPY | Facility: CLINIC | Age: 62
Setting detail: THERAPIES SERIES
End: 2022-01-14
Attending: PHYSICIAN ASSISTANT
Payer: MEDICARE

## 2022-01-14 DIAGNOSIS — M25.561 CHRONIC PAIN OF BOTH KNEES: ICD-10-CM

## 2022-01-14 DIAGNOSIS — G89.29 CHRONIC PAIN OF BOTH KNEES: ICD-10-CM

## 2022-01-14 DIAGNOSIS — M25.551 HIP PAIN, RIGHT: ICD-10-CM

## 2022-01-14 DIAGNOSIS — M25.562 CHRONIC PAIN OF BOTH KNEES: ICD-10-CM

## 2022-01-14 PROCEDURE — 97112 NEUROMUSCULAR REEDUCATION: CPT | Mod: GP | Performed by: PHYSICAL THERAPIST

## 2022-01-14 PROCEDURE — 97110 THERAPEUTIC EXERCISES: CPT | Mod: GP | Performed by: PHYSICAL THERAPIST

## 2022-01-14 PROCEDURE — 97161 PT EVAL LOW COMPLEX 20 MIN: CPT | Mod: GP | Performed by: PHYSICAL THERAPIST

## 2022-01-14 NOTE — PROGRESS NOTES
01/14/22 1400   General Information   Type of Visit Initial OP Ortho PT Evaluation   Start of Care Date 01/14/22   Referring Physician Alie De La Cruz PA-C     Patient/Family Goals Statement understand the knee and hip pains   Orders Evaluate and Treat   Date of Order 01/10/22   Certification Required? Yes   Medical Diagnosis Chronic pain of both knees; Hip pain, right   Surgical/Medical history reviewed Yes   Precautions/Limitations no known precautions/limitations   Weight-Bearing Status - LLE full weight-bearing   Weight-Bearing Status - RLE full weight-bearing   Body Part(s)   Body Part(s) Knee;Hip   Presentation and Etiology   Pertinent history of current problem (include personal factors and/or comorbidities that impact the POC) Pt reports she has always been able to walk, hike and garden with no hip, knee or leg pain.  She got a job at [employer] that did not invovle her UE (to rpevent problems with her pain up there) in Oct 2022 and after about 2 weeks she had bilat knee and hip pain.  She had the last 5 days off and it got so much better.  She even stopped the neurontin and it keeps getting better.  She is fearful of pain that might come back if she goes back to work tomorrow.  She watches the self checkout area.  Pain when lying on right hip (SI) and when knees are not in straight alignment.  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  I think it was all gone because I wouldn't have gotten the job.  It seems like it would catch and then something in it would flip some of the times.  Also a h/o of going down bleachers and something would catch.     Impairments A. Pain;H. Impaired gait   Functional Limitations perform activities of daily living;perform required work activities;perform desired leisure / sports activities   Symptom Location ant bilat around each patellae; right SI area indicated.    How/Where did it occur  "From insidious onset   Onset date of current episode/exacerbation 01/10/22   Pain quality A. Sharp   Frequency of pain/symptoms B. Intermittent  (\"very random\")   Pain/symptoms exacerbated by G. Certain positions   Pain/symptoms eased by F. Certain positions   Prior Level of Function   Prior Level of Function-Mobility independent   Prior Level of Function-ADLs independent   Functional Level Prior Comment swims 'very regularly\"   Current Level of Function   Patient role/employment history A. 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 No   Have you fallen and had an injury in the past year? No   Is patient a fall risk? No   Abuse Screen (yes response referral indicated)   Feels Unsafe at Home or Work/School no   Feels Threatened by Someone no   Does Anyone Try to Keep You From Having Contact with Others or Doing Things Outside Your Home? no   Physical Signs of Abuse Present no   Functional Scales   Functional Scales Other   Other Scales  LEFS 31/80   Knee Objective Findings   Side (if bilateral, select both right and left) Right;Left   Posture holding knees together moderately with tensio in adductors/IR   Gait/Locomotion no AD, mildy tense in adductors and hip trnaslation   Anterior Drawer Test WNL   Posterior Drawer Test WNL   Varus Stress Test normal/mild softness bilat   Valgus Stress Test firm endfeel bilat--WNL   Shahnaz's Test negative bilat   Apley's Test negative bilat   Apprehension Test bilat quads already tight and difficulty with patella mobiltization in any direction--only able to get mild mild first trials in any direction--veronica sup-inf   Right Knee Extension AROM full   Right Knee Flexion AROM full   R VMO Strength bulk soft past patella   Left Knee Extension AROM full   Left Knee Flexion AROM full   L VMO Strength bulk " soft past patella   Hip Objective Findings   Side (if bilateral, select both right and left) Right   Scour Test neg   Palpation bilat gluts equal tension, pelvis level   Right Hip Flexion PROM full   Right Hip Adduction PROM full   Right Hip ER PROM half+   Right Hip IR PROM half+   Planned Therapy Interventions   Planned Therapy Interventions neuromuscular re-education;ROM;strengthening;manual therapy;stretching   Planned Modality Interventions   Planned Modality Interventions Comments possible US to hip or knee mm to prep for better release as needed, but not expected   Clinical Impression   Criteria for Skilled Therapeutic Interventions Met yes, treatment indicated   PT Diagnosis mm tension/guarding   Influenced by the following impairments pain in knees and R hip   Functional limitations due to impairments standing, walking on concrete   Clinical Presentation Stable/Uncomplicated   Clinical Decision Making (Complexity) Low complexity   Therapy Frequency 1 time/week   Predicted Duration of Therapy Intervention (days/wks) 6 weeks   Risk & Benefits of therapy have been explained Yes   Patient, Family & other staff in agreement with plan of care Yes   Education Assessment   Preferred Learning Style Listening   Barriers to Learning No barriers   ORTHO GOALS   PT Ortho Eval Goals 1;2;3   Ortho Goal 1   Goal Identifier walking   Goal Description April will be able to walk during work activities without increased knee or hip pain more than 2 points.    Target Date 02/25/22   Ortho Goal 2   Goal Identifier standing   Goal Description April will be able to stand for an hour for kitchen and work activities without knee and hip pain.    Target Date 02/25/22   Ortho Goal 3   Goal Identifier pain control   Goal Description April has control of her knee pain to function normally with work and home.   Target Date 02/25/22   Total Evaluation Time   PT Alyssa, Low Complexity Minutes (06021) 30   Therapy Certification    Certification date from 01/14/22   Certification date to 03/18/22   Medical Diagnosis Chronic pain of both knees; Hip pain, right      No

## 2022-01-14 NOTE — PROGRESS NOTES
[facility]    OUTPATIENT PHYSICAL THERAPY ORTHOPEDIC EVALUATION  PLAN OF TREATMENT FOR OUTPATIENT REHABILITATION  (COMPLETE FOR INITIAL CLAIMS ONLY)  Patient's Last Name, First Name, M.I.  YOB: 1960  Audrey Ricks    Provider s Name:  [facility]   Medical Record No.  [id number]   Start of Care Date:  01/14/22   Onset Date:  01/10/22   Type:     _X__PT   ___OT   ___SLP Medical Diagnosis:  Chronic pain of both knees; Hip pain, right     PT Diagnosis:  mm tension/guarding   Visits from SOC:  1      _________________________________________________________________________________  Plan of Treatment/Functional Goals:  neuromuscular re-education,ROM,strengthening,manual therapy,stretching        possible US to hip or knee mm to prep for better release as needed, but not expected  Goals  Goal Identifier: walking  Goal Description: April will be able to walk during work activities without increased knee or hip pain more than 2 points.   Target Date: 02/25/22    Goal Identifier: standing  Goal Description: April will be able to stand for an hour for kitchen and work activities without knee and hip pain.   Target Date: 02/25/22    Goal Identifier: pain control  Goal Description: April has control of her knee pain to function normally with work and home.  Target Date: 02/25/22       Therapy Frequency:  1 time/week  Predicted Duration of Therapy Intervention:  6 weeks    Libertad Garcia PT                 I CERTIFY THE NEED FOR THESE SERVICES FURNISHED UNDER        THIS PLAN OF TREATMENT AND WHILE UNDER MY CARE     (Physician co-signature of this document indicates review and certification of the therapy plan).                       Certification Date From:  01/14/22   Certification Date To:  03/18/22    Referring Provider:  Alie De La Cruz PA-C      Initial  Assessment        See Epic Evaluation Start of Care Date: 01/14/22

## 2022-01-20 ENCOUNTER — HOSPITAL ENCOUNTER (OUTPATIENT)
Dept: PHYSICAL THERAPY | Facility: CLINIC | Age: 62
Setting detail: THERAPIES SERIES
End: 2022-01-20
Attending: PHYSICIAN ASSISTANT
Payer: MEDICARE

## 2022-01-20 PROCEDURE — 97110 THERAPEUTIC EXERCISES: CPT | Mod: GP | Performed by: PHYSICAL THERAPIST

## 2022-01-20 PROCEDURE — 97140 MANUAL THERAPY 1/> REGIONS: CPT | Mod: GP | Performed by: PHYSICAL THERAPIST

## 2022-01-28 ENCOUNTER — HOSPITAL ENCOUNTER (OUTPATIENT)
Dept: PHYSICAL THERAPY | Facility: CLINIC | Age: 62
Setting detail: THERAPIES SERIES
End: 2022-01-28
Attending: PHYSICIAN ASSISTANT
Payer: MEDICARE

## 2022-01-28 PROCEDURE — 97112 NEUROMUSCULAR REEDUCATION: CPT | Mod: GP | Performed by: PHYSICAL THERAPIST

## 2022-01-28 PROCEDURE — 97110 THERAPEUTIC EXERCISES: CPT | Mod: GP | Performed by: PHYSICAL THERAPIST

## 2022-01-28 PROCEDURE — 97140 MANUAL THERAPY 1/> REGIONS: CPT | Mod: GP | Performed by: PHYSICAL THERAPIST

## 2022-02-04 DIAGNOSIS — L71.9 ROSACEA: ICD-10-CM

## 2022-02-07 RX ORDER — METRONIDAZOLE 10 MG/G
GEL TOPICAL DAILY
Qty: 60 G | Refills: 3 | Status: SHIPPED | OUTPATIENT
Start: 2022-02-07 | End: 2023-02-15

## 2022-02-07 NOTE — TELEPHONE ENCOUNTER
Metrogel    Prescription approved per [facility] Refill Protocol.  Dacia Gupta RN

## 2022-02-21 ENCOUNTER — HOSPITAL ENCOUNTER (EMERGENCY)
Facility: CLINIC | Age: 62
Discharge: HOME | End: 2022-02-21
Attending: EMERGENCY MEDICINE | Admitting: EMERGENCY MEDICINE
Payer: MEDICARE

## 2022-02-21 VITALS
TEMPERATURE: 97.9 F | SYSTOLIC BLOOD PRESSURE: 144 MMHG | BODY MASS INDEX: 32.92 KG/M2 | WEIGHT: 180 LBS | OXYGEN SATURATION: 91 % | DIASTOLIC BLOOD PRESSURE: 106 MMHG | RESPIRATION RATE: 18 BRPM | HEART RATE: 97 BPM

## 2022-02-21 DIAGNOSIS — E87.6 HYPOKALEMIA: ICD-10-CM

## 2022-02-21 DIAGNOSIS — I47.10 SVT (SUPRAVENTRICULAR TACHYCARDIA) (H): ICD-10-CM

## 2022-02-21 LAB
ANION GAP SERPL CALCULATED.3IONS-SCNC: 9 MMOL/L (ref 3–14)
BASOPHILS # BLD AUTO: 0.1 10E3/UL (ref 0–0.2)
BASOPHILS NFR BLD AUTO: 1 %
BUN SERPL-MCNC: 14 MG/DL (ref 7–30)
CALCIUM SERPL-MCNC: 9 MG/DL (ref 8.5–10.1)
CHLORIDE BLD-SCNC: 103 MMOL/L (ref 94–109)
CO2 SERPL-SCNC: 27 MMOL/L (ref 20–32)
CREAT SERPL-MCNC: 0.93 MG/DL (ref 0.52–1.04)
EOSINOPHIL # BLD AUTO: 0.1 10E3/UL (ref 0–0.7)
EOSINOPHIL NFR BLD AUTO: 1 %
ERYTHROCYTE [DISTWIDTH] IN BLOOD BY AUTOMATED COUNT: 13.4 % (ref 10–15)
GFR SERPL CREATININE-BSD FRML MDRD: 70 ML/MIN/1.73M2
GLUCOSE BLD-MCNC: 127 MG/DL (ref 70–99)
HCT VFR BLD AUTO: 41.3 % (ref 35–47)
HGB BLD-MCNC: 14.2 G/DL (ref 11.7–15.7)
HOLD SPECIMEN: NORMAL
IMM GRANULOCYTES # BLD: 0 10E3/UL
IMM GRANULOCYTES NFR BLD: 0 %
LYMPHOCYTES # BLD AUTO: 4.3 10E3/UL (ref 0.8–5.3)
LYMPHOCYTES NFR BLD AUTO: 43 %
MCH RBC QN AUTO: 31 PG (ref 26.5–33)
MCHC RBC AUTO-ENTMCNC: 34.4 G/DL (ref 31.5–36.5)
MCV RBC AUTO: 90 FL (ref 78–100)
MONOCYTES # BLD AUTO: 0.8 10E3/UL (ref 0–1.3)
MONOCYTES NFR BLD AUTO: 9 %
NEUTROPHILS # BLD AUTO: 4.5 10E3/UL (ref 1.6–8.3)
NEUTROPHILS NFR BLD AUTO: 46 %
NRBC # BLD AUTO: 0 10E3/UL
NRBC BLD AUTO-RTO: 0 /100
PLATELET # BLD AUTO: 294 10E3/UL (ref 150–450)
POTASSIUM BLD-SCNC: 3.1 MMOL/L (ref 3.4–5.3)
RBC # BLD AUTO: 4.58 10E6/UL (ref 3.8–5.2)
SODIUM SERPL-SCNC: 139 MMOL/L (ref 133–144)
WBC # BLD AUTO: 9.8 10E3/UL (ref 4–11)

## 2022-02-21 PROCEDURE — 99284 EMERGENCY DEPT VISIT MOD MDM: CPT | Mod: 25

## 2022-02-21 PROCEDURE — 96374 THER/PROPH/DIAG INJ IV PUSH: CPT

## 2022-02-21 PROCEDURE — 93010 ELECTROCARDIOGRAM REPORT: CPT | Performed by: EMERGENCY MEDICINE

## 2022-02-21 PROCEDURE — 250N000011 HC RX IP 250 OP 636

## 2022-02-21 PROCEDURE — 250N000011 HC RX IP 250 OP 636: Performed by: EMERGENCY MEDICINE

## 2022-02-21 PROCEDURE — 85025 COMPLETE CBC W/AUTO DIFF WBC: CPT | Performed by: EMERGENCY MEDICINE

## 2022-02-21 PROCEDURE — 36415 COLL VENOUS BLD VENIPUNCTURE: CPT | Performed by: EMERGENCY MEDICINE

## 2022-02-21 PROCEDURE — 258N000003 HC RX IP 258 OP 636: Performed by: EMERGENCY MEDICINE

## 2022-02-21 PROCEDURE — 96361 HYDRATE IV INFUSION ADD-ON: CPT

## 2022-02-21 PROCEDURE — 93005 ELECTROCARDIOGRAM TRACING: CPT

## 2022-02-21 PROCEDURE — 82310 ASSAY OF CALCIUM: CPT | Performed by: EMERGENCY MEDICINE

## 2022-02-21 PROCEDURE — 99284 EMERGENCY DEPT VISIT MOD MDM: CPT | Mod: 25 | Performed by: EMERGENCY MEDICINE

## 2022-02-21 RX ORDER — ADENOSINE 3 MG/ML
6 INJECTION, SOLUTION INTRAVENOUS ONCE
Status: COMPLETED | OUTPATIENT
Start: 2022-02-21 | End: 2022-02-21

## 2022-02-21 RX ORDER — ADENOSINE 3 MG/ML
INJECTION, SOLUTION INTRAVENOUS
Status: DISCONTINUED
Start: 2022-02-21 | End: 2022-02-21 | Stop reason: HOSPADM

## 2022-02-21 RX ORDER — ADENOSINE 3 MG/ML
INJECTION, SOLUTION INTRAVENOUS
Status: COMPLETED
Start: 2022-02-21 | End: 2022-02-21

## 2022-02-21 RX ORDER — POTASSIUM CHLORIDE 1500 MG/1
20 TABLET, EXTENDED RELEASE ORAL 2 TIMES DAILY
Qty: 20 TABLET | Refills: 0 | Status: SHIPPED | OUTPATIENT
Start: 2022-02-21 | End: 2022-03-03

## 2022-02-21 RX ADMIN — SODIUM CHLORIDE 1000 ML: 9 INJECTION, SOLUTION INTRAVENOUS at 16:17

## 2022-02-21 RX ADMIN — ADENOSINE 6 MG: 3 INJECTION, SOLUTION INTRAVENOUS at 16:15

## 2022-02-21 RX ADMIN — ADENOSINE 6 MG: 3 INJECTION, SOLUTION INTRAVENOUS at 16:18

## 2022-02-21 NOTE — ED PROVIDER NOTES
History     Chief Complaint   Patient presents with     Irregular Heart Beat     HPI  Audrey Ricks is a 62 year old female who presents with palpitations.  This began 25 minutes ago.  She feels her heart racing.  She has had this before and seen cardiology for SVT.  She has metoprolol 5 mg XL but only takes this as needed and has not taken it recently.  No recent illness.  She tried walking around at home as this will sometimes break the cycle.  This happens about twice a year.    Allergies:  Allergies   Allergen Reactions     Amlodipine Shortness Of Breath and Swelling       Problem List:    Patient Active Problem List    Diagnosis Date Noted     Hyperlipidemia LDL goal <130 03/18/2021     Priority: Medium     Morbid obesity (H) 02/16/2021     Priority: Medium     BMI 33.0-33.9,adult 01/14/2020     Priority: Medium     Cervical radiculopathy at C7 05/18/2016     Priority: Medium     Glaucoma 08/19/2015     Priority: Medium     Bipolar 2 disorder (H) 01/12/2012     Priority: Medium     IMO Update 10/11       GERD (gastroesophageal reflux disease) 01/12/2012     Priority: Medium        Past Medical History:    History reviewed. No pertinent past medical history.    Past Surgical History:    History reviewed. No pertinent surgical history.    Family History:    History reviewed. No pertinent family history.    Social History:  Marital Status:  Single [1]  Social History     Tobacco Use     Smoking status: Never Smoker     Smokeless tobacco: Never Used   Substance Use Topics     Alcohol use: Yes     Drug use: Never        Medications:    potassium chloride ER (KLOR-CON M) 20 MEQ CR tablet  busPIRone (BUSPAR) 5 MG tablet  estradiol-norethindrone (COMBIPATCH) 0.05-0.14 MG/DAY bi-weekly patch  gabapentin (NEURONTIN) 100 MG capsule  lamoTRIgine (LAMICTAL) 25 MG tablet  metoprolol succinate ER (TOPROL-XL) 25 MG 24 hr tablet  metroNIDAZOLE (METROGEL) 1 % external gel  nystatin (MYCOSTATIN) 576450 UNIT/GM external  cream  omeprazole (PRILOSEC) 20 MG DR capsule  triamcinolone (KENALOG) 0.1 % external cream  zolpidem (AMBIEN) 5 MG tablet          Review of Systems  All other systems are reviewed and are negative    Physical Exam   BP: (!) 173/142  Pulse: (!) 191  Temp: 97.9  F (36.6  C)  Resp: 18  Weight: 81.6 kg (180 lb)  SpO2: 99 %      Physical Exam  Vitals and nursing note reviewed.   Constitutional:       General: She is not in acute distress.     Appearance: She is well-developed. She is not diaphoretic.   HENT:      Head: Normocephalic and atraumatic.   Eyes:      General: No scleral icterus.     Pupils: Pupils are equal, round, and reactive to light.   Cardiovascular:      Rate and Rhythm: Regular rhythm. Tachycardia present.      Heart sounds: Normal heart sounds. No murmur heard.  Pulmonary:      Effort: No respiratory distress.      Breath sounds: No stridor. No wheezing or rales.   Abdominal:      Palpations: Abdomen is soft.      Tenderness: There is no abdominal tenderness.   Musculoskeletal:         General: No tenderness.      Cervical back: Normal range of motion and neck supple.   Skin:     General: Skin is warm and dry.      Coloration: Skin is not pale.      Findings: No erythema or rash.   Neurological:      Mental Status: She is alert.         ED Course                 Procedures         EKG consistent with supraventricular tachycardia 187 bpm.  Some ST depression noted laterally.  Interpreted by myself.  Repeat EKG at 1635 reveals sinus tachycardia at 107 bpm.  No significant ST segment or T wave changes noted.  Interpreted by myself    Critical Care time:  was 30 minutes for this patient excluding procedures.               Results for orders placed or performed during the hospital encounter of 02/21/22 (from the past 24 hour(s))   Basic metabolic panel   Result Value Ref Range    Sodium 139 133 - 144 mmol/L    Potassium 3.1 (L) 3.4 - 5.3 mmol/L    Chloride 103 94 - 109 mmol/L    Carbon Dioxide (CO2) 27 20  - 32 mmol/L    Anion Gap 9 3 - 14 mmol/L    Urea Nitrogen 14 7 - 30 mg/dL    Creatinine 0.93 0.52 - 1.04 mg/dL    Calcium 9.0 8.5 - 10.1 mg/dL    Glucose 127 (H) 70 - 99 mg/dL    GFR Estimate 70 >60 mL/min/1.73m2   CBC with platelets differential    Narrative    The following orders were created for panel order CBC with platelets differential.  Procedure                               Abnormality         Status                     ---------                               -----------         ------                     CBC with platelets and d...[[id number]]                      Final result                 Please view results for these tests on the individual orders.   CBC with platelets and differential   Result Value Ref Range    WBC Count 9.8 4.0 - 11.0 10e3/uL    RBC Count 4.58 3.80 - 5.20 10e6/uL    Hemoglobin 14.2 11.7 - 15.7 g/dL    Hematocrit 41.3 35.0 - 47.0 %    MCV 90 78 - 100 fL    MCH 31.0 26.5 - 33.0 pg    MCHC 34.4 31.5 - 36.5 g/dL    RDW 13.4 10.0 - 15.0 %    Platelet Count 294 150 - 450 10e3/uL    % Neutrophils 46 %    % Lymphocytes 43 %    % Monocytes 9 %    % Eosinophils 1 %    % Basophils 1 %    % Immature Granulocytes 0 %    NRBCs per 100 WBC 0 <1 /100    Absolute Neutrophils 4.5 1.6 - 8.3 10e3/uL    Absolute Lymphocytes 4.3 0.8 - 5.3 10e3/uL    Absolute Monocytes 0.8 0.0 - 1.3 10e3/uL    Absolute Eosinophils 0.1 0.0 - 0.7 10e3/uL    Absolute Basophils 0.1 0.0 - 0.2 10e3/uL    Absolute Immature Granulocytes 0.0 <=0.4 10e3/uL    Absolute NRBCs 0.0 10e3/uL   Extra Tube ([facility] Draw)    Narrative    The following orders were created for panel order Extra Tube ([facility] Draw).  Procedure                               Abnormality         Status                     ---------                               -----------         ------                     Extra Blue Top Tube[[id number]]                              In process                   Please view results for these tests on the individual orders.        Medications   0.9% sodium chloride BOLUS (1,000 mLs Intravenous New Bag 2/21/22 1617)   adenosine (ADENOCARD) injection 6 mg (6 mg Intravenous Given 2/21/22 1618)   adenosine (ADENOCARD) injection 6 mg (6 mg Intravenous Given 2/21/22 1615)       Assessments & Plan (with Medical Decision Making)  62-year-old female with recurrent SVT who presented tachycardic at 190 bpm.  This broke with 2 doses of adenosine.  Symptoms resolved.  Mild hypokalemia noted.  Have recommended potassium twice a day.  Also, previously she had been prescribed metoprolol XL 25 mg daily.  Have recommended she resume this and see how she does with symptoms.  In the past she felt a little sluggish and blood pressure became low.  She is going to resume this and see how things go.  Follow-up in clinic next week     I have reviewed the nursing notes.    I have reviewed the findings, diagnosis, plan and need for follow up with the patient.       New Prescriptions    POTASSIUM CHLORIDE ER (KLOR-CON M) 20 MEQ CR TABLET    Take 1 tablet (20 mEq) by mouth 2 times daily for 10 days       Final diagnoses:   SVT (supraventricular tachycardia) (H)   Hypokalemia       2/21/2022   [facility] EMERGENCY DEPT     Luis Angel Barney MD  02/21/22 [id number]

## 2022-02-21 NOTE — DISCHARGE INSTRUCTIONS
Recommend potassium, 20 mEq twice a day for the next 10 days.  Also recommend resuming your metoprolol until seen in follow-up.

## 2022-03-09 DIAGNOSIS — F31.81 BIPOLAR 2 DISORDER (H): ICD-10-CM

## 2022-03-09 RX ORDER — ZOLPIDEM TARTRATE 5 MG/1
TABLET ORAL
Qty: 30 TABLET | Refills: 1 | Status: SHIPPED | OUTPATIENT
Start: 2022-03-09 | End: 2022-04-27

## 2022-03-09 RX ORDER — BUSPIRONE HYDROCHLORIDE 5 MG/1
5 TABLET ORAL 3 TIMES DAILY
Qty: 90 TABLET | Refills: 0 | Status: ON HOLD | OUTPATIENT
Start: 2022-03-09 | End: 2022-08-07

## 2022-03-09 NOTE — TELEPHONE ENCOUNTER
"Pending Prescriptions:                       Disp   Refills    zolpidem (AMBIEN) 5 MG tablet [Pharmacy Me*30 tab*1        Sig: TAKE 1/2 TABLET BY MOUTH AT BEDTIME AS NEEDED FOR           SLEEP.    busPIRone (BUSPAR) 5 MG tablet [Pharmacy M*90 tab*0        Sig: TAKE 1 TABLET (5 MG) BY MOUTH 3 TIMES DAILY FOR           ANXIETY    Routing refill request to provider for review/approval because:  Drug not on the [facility] refill protocol   A break in medication    Requested Prescriptions   Pending Prescriptions Disp Refills    zolpidem (AMBIEN) 5 MG tablet [Pharmacy Med Name: ZOLPIDEM TARTRATE 5MG TABS] 30 tablet 1     Sig: TAKE 1/2 TABLET BY MOUTH AT BEDTIME AS NEEDED FOR SLEEP.        There is no refill protocol information for this order        busPIRone (BUSPAR) 5 MG tablet [Pharmacy Med Name: BUSPIRONE HCL 5MG TABS] 90 tablet 0     Sig: TAKE 1 TABLET (5 MG) BY MOUTH 3 TIMES DAILY FOR ANXIETY        Atypical Antidepressants Protocol Passed - 3/9/2022 12:01 PM        Passed - Recent (12 mo) or future (30 days) visit within the authorizing provider's specialty     Patient has had an office visit with the authorizing provider or a provider within the authorizing providers department within the previous 12 mos or has a future within next 30 days. See \"Patient Info\" tab in inbasket, or \"Choose Columns\" in Meds & Orders section of the refill encounter.              Passed - Medication active on med list        Passed - Patient is age 18 or older        Passed - No active pregnancy on record        Passed - No positive pregnancy test in past 12 mos                  "

## 2022-04-27 ENCOUNTER — OFFICE VISIT (OUTPATIENT)
Dept: FAMILY MEDICINE | Facility: CLINIC | Age: 62
End: 2022-04-27
Payer: MEDICARE

## 2022-04-27 VITALS
OXYGEN SATURATION: 99 % | BODY MASS INDEX: 34.57 KG/M2 | TEMPERATURE: 98.2 F | SYSTOLIC BLOOD PRESSURE: 144 MMHG | WEIGHT: 189 LBS | DIASTOLIC BLOOD PRESSURE: 100 MMHG | HEART RATE: 103 BPM

## 2022-04-27 DIAGNOSIS — F31.81 BIPOLAR 2 DISORDER (H): ICD-10-CM

## 2022-04-27 DIAGNOSIS — E87.6 HYPOKALEMIA: ICD-10-CM

## 2022-04-27 DIAGNOSIS — E66.01 MORBID OBESITY (H): ICD-10-CM

## 2022-04-27 DIAGNOSIS — Z13.220 LIPID SCREENING: ICD-10-CM

## 2022-04-27 DIAGNOSIS — K21.9 GASTROESOPHAGEAL REFLUX DISEASE WITHOUT ESOPHAGITIS: ICD-10-CM

## 2022-04-27 DIAGNOSIS — I47.10 SVT (SUPRAVENTRICULAR TACHYCARDIA) (H): ICD-10-CM

## 2022-04-27 DIAGNOSIS — Z12.11 SCREEN FOR COLON CANCER: ICD-10-CM

## 2022-04-27 DIAGNOSIS — I10 ESSENTIAL HYPERTENSION: Primary | ICD-10-CM

## 2022-04-27 DIAGNOSIS — N95.1 MENOPAUSAL SYNDROME (HOT FLASHES): ICD-10-CM

## 2022-04-27 LAB
ANION GAP SERPL CALCULATED.3IONS-SCNC: 6 MMOL/L (ref 3–14)
BUN SERPL-MCNC: 12 MG/DL (ref 7–30)
CALCIUM SERPL-MCNC: 8.5 MG/DL (ref 8.5–10.1)
CHLORIDE BLD-SCNC: 107 MMOL/L (ref 94–109)
CHOLEST SERPL-MCNC: 237 MG/DL
CO2 SERPL-SCNC: 27 MMOL/L (ref 20–32)
CREAT SERPL-MCNC: 0.99 MG/DL (ref 0.52–1.04)
FASTING STATUS PATIENT QL REPORTED: NO
GFR SERPL CREATININE-BSD FRML MDRD: 65 ML/MIN/1.73M2
GLUCOSE BLD-MCNC: 110 MG/DL (ref 70–99)
HDLC SERPL-MCNC: 43 MG/DL
LDLC SERPL CALC-MCNC: 148 MG/DL
NONHDLC SERPL-MCNC: 194 MG/DL
POTASSIUM BLD-SCNC: 3.6 MMOL/L (ref 3.4–5.3)
SODIUM SERPL-SCNC: 140 MMOL/L (ref 133–144)
TRIGL SERPL-MCNC: 228 MG/DL

## 2022-04-27 PROCEDURE — 80048 BASIC METABOLIC PNL TOTAL CA: CPT | Performed by: PHYSICIAN ASSISTANT

## 2022-04-27 PROCEDURE — 36415 COLL VENOUS BLD VENIPUNCTURE: CPT | Performed by: PHYSICIAN ASSISTANT

## 2022-04-27 PROCEDURE — 99214 OFFICE O/P EST MOD 30 MIN: CPT | Performed by: PHYSICIAN ASSISTANT

## 2022-04-27 PROCEDURE — 80061 LIPID PANEL: CPT | Performed by: PHYSICIAN ASSISTANT

## 2022-04-27 RX ORDER — ZOLPIDEM TARTRATE 5 MG/1
5 TABLET ORAL
Qty: 30 TABLET | Refills: 1 | Status: SHIPPED | OUTPATIENT
Start: 2022-04-27 | End: 2022-07-26

## 2022-04-27 RX ORDER — ESTRADIOL/NORETHINDRONE ACETATE TRANSDERMAL SYSTEM .05; .14 MG/D; MG/D
1 PATCH, EXTENDED RELEASE TRANSDERMAL
Qty: 24 PATCH | Refills: 3 | Status: SHIPPED | OUTPATIENT
Start: 2022-04-28 | End: 2023-04-25

## 2022-04-27 RX ORDER — METOPROLOL SUCCINATE 25 MG/1
25 TABLET, EXTENDED RELEASE ORAL DAILY
Qty: 90 TABLET | Refills: 1 | Status: SHIPPED | OUTPATIENT
Start: 2022-04-27 | End: 2022-06-30

## 2022-04-27 ASSESSMENT — PAIN SCALES - GENERAL: PAINLEVEL: MODERATE PAIN (4)

## 2022-04-27 NOTE — PROGRESS NOTES
Damian Ventura is a 62 year old who presents for the following health issues    HPI     ED/UC Followup:    Facility:  [facility]  Date of visit: 02/21/2022  Reason for visit: SVT (supraventricular tachycardia) (H) Hypokalemia  Current Status: Has had 2 heart racing episodes     Patient presents today for ER follow-up regarding SVT. This something patient has had since at least 2016. She does have Metoprolol for this but cannot tolerate taking this daily. She tried taking it for 2 weeks and rotated timing but just felt way too sedated regardless. She prefers to keep this on an as needed basis. Blood pressure is still a bit elevated today. She reports this is something she has tried numerous medications for. She was seen by cardiology and reports having every test done and they said things are fine with her heart. She knows if she works on diet and exercise changes this improves. She reports that pain and stress are the most common culprits for her SVT to occur. Thankfully this has only occurred sporadically.     Has been having pain in her hip and low back. Was seen by PT for this. This did help. Continues to go to the gym and swim routinely and this helps. She does have some sciatica off and on. Discussed we may want to consider further imaging if this persists.     Moods have overall been quite stable. Takes 1/2 Buspar once daily and this helps for anxiety levels.     Review of Systems   Constitutional, HEENT, cardiovascular, pulmonary, GI, , musculoskeletal, neuro, skin, endocrine and psych systems are negative, except as otherwise noted.      Objective    BP (!) 144/100   Pulse 103   Temp 98.2  F (36.8  C) (Temporal)   Wt 85.7 kg (189 lb)   SpO2 99%   BMI 34.57 kg/m    Body mass index is 34.57 kg/m .  Physical Exam   GENERAL: healthy, alert and no distress  HENT: ear canals and TM's normal, nose and mouth without ulcers or lesions  NECK: no adenopathy, no asymmetry, masses, or scars  and thyroid normal to palpation  RESP: lungs clear to auscultation - no rales, rhonchi or wheezes  CV: regular rate and rhythm, normal S1 S2, no S3 or S4, no murmur, click or rub, no peripheral edema and peripheral pulses strong  MS: no gross musculoskeletal defects noted, no edema  SKIN: no suspicious lesions or rashes  PSYCH: mentation appears normal, affect normal/bright      Assessment & Plan     Essential hypertension  Blood pressure is still a bit elevated but patient does not wish to try any new medications and takes her Metoprolol only as needed for SVT as this makes her feel much too tired. She would like to continue working on diet and exercise modifications. Knows this is something she can manage on her own without medications. Risks associated with chronically elevated blood pressure again reviewed today.  - Basic metabolic panel  (Ca, Cl, CO2, Creat, Gluc, K, Na, BUN); Future  - metoprolol succinate ER (TOPROL-XL) 25 MG 24 hr tablet; Take 1 tablet (25 mg) by mouth daily  - Basic metabolic panel  (Ca, Cl, CO2, Creat, Gluc, K, Na, BUN)    Bipolar 2 disorder (H)  Overall this has been stable.   - zolpidem (AMBIEN) 5 MG tablet; Take 1 tablet (5 mg) by mouth nightly as needed for sleep    SVT (supraventricular tachycardia) (H)  Patient uses Metoprolol on an as needed basis for this. Pain and stress tend to be the cause when symptoms occur. Does not wish to see cardiology or have a daily medication.   - Basic metabolic panel  (Ca, Cl, CO2, Creat, Gluc, K, Na, BUN); Future  - Basic metabolic panel  (Ca, Cl, CO2, Creat, Gluc, K, Na, BUN)    Hypokalemia  Due for recheck.   - Basic metabolic panel  (Ca, Cl, CO2, Creat, Gluc, K, Na, BUN); Future  - Basic metabolic panel  (Ca, Cl, CO2, Creat, Gluc, K, Na, BUN)    Screen for colon cancer  - Fecal colorectal cancer screen (FIT); Future  - Fecal colorectal cancer screen (FIT)    Morbid obesity (H)  Encouraged ongoing diet and exercise modifications.     Menopausal  syndrome (hot flashes)  - estradiol-norethindrone (COMBIPATCH) 0.05-0.14 MG/DAY bi-weekly patch; Place 1 patch onto the skin twice a week    Gastroesophageal reflux disease without esophagitis  - omeprazole (PRILOSEC) 20 MG DR capsule; TAKE 1 CAPSULE BY MOUTH ONE TIME A DAY. TAKES AS NEEDED FOR HEARTBURN    Lipid screening  - Lipid panel reflex to direct LDL Fasting; Future  - Lipid panel reflex to direct LDL Fasting    The patient indicates understanding of these issues and agrees with the plan.    GALO Fowler [facility]

## 2022-04-28 PROCEDURE — 82274 ASSAY TEST FOR BLOOD FECAL: CPT | Performed by: PHYSICIAN ASSISTANT

## 2022-04-30 LAB — HEMOCCULT STL QL IA: NEGATIVE

## 2022-05-02 ENCOUNTER — NURSE TRIAGE (OUTPATIENT)
Dept: NURSING | Facility: CLINIC | Age: 62
End: 2022-05-02
Payer: COMMERCIAL

## 2022-05-02 NOTE — TELEPHONE ENCOUNTER
Patient is called and informed of this message.  Patient understands and agrees to this plan.  Closing this encounter.    MIESHA MooreN, RN

## 2022-05-02 NOTE — TELEPHONE ENCOUNTER
Provider Response to 2nd Level Triage Request    I have reviewed the RN documentation. My recommendation is:  Refer to ED given more progressive symptoms -- especially with elevated blood pressure readings.    Alie De La Cruz PA-C

## 2022-05-02 NOTE — TELEPHONE ENCOUNTER
"Nurse Triage SBAR    Is this a 2nd Level Triage? YES, LICENSED PRACTITIONER REVIEW IS REQUIRED    Situation: Patient complaining of dizziness and trouble with balance beginning yesterday (Sunday)  Consent: not needed    Background: Patient with new onset waves of dizziness, trouble with balance.      Assessment:     - Patient reports she was slow to wake up yesterday, reported it was \"hard to wake up\". She stated it was similar today, but maybe slightly better. She did wake up on her own, but reported difficulty.   - New dizziness, \"waves of dizziness\" and trouble with balance beginning yesterday. At times, she felt a rush of nausea for just a few seconds.   - Patient struggled to move around normally and reported dizziness even upon resting.   - Patient stated even \"moving my eyes\" causes dizziness.   - Dizziness and difficulty with balance continues today. Pt states it might be slightly better, but difficult to tell so early in the day.   - Patient stated she takes her Metoprolol on an \"as needed\" basis. She stated she took it for 3 days (Wednesday, Thurs, Friday), and stopped taking it on Saturday night.   - Patients blood pressure yesterday 180/120, then 140/111 later in the day. She reported her heart rate was 72.  - Pt confirmed she was recently in ED for SVT, but denied her heart was racing now or yesterday.   - She did not check BP today and patient was in the car with her  at time of call (unable to check her BP).   - Denies headache, but did report that she felt \"pressure\" near the back of her head yesterday. Reported pressure by left temple today.       Protocol Recommended Disposition: Go to ED/UCC Now (Or to office with PCP approval)      Recommendation: Told patient a note will be routed to her physician for second level triage. Advised patient to call back if any new or worsening symptoms. Patient verbalized understanding and agrees with plan.      Routed to provider     Patient CALL BACK " NUMBER: [phone].   OK to leave detailed message: YES    Does the patient meet one of the following criteria for ADS visit consideration? 16+ years old, with an [facility] PCP     TIP  Providers, please consider if this condition is appropriate for management at one of our Acute and Diagnostic Services sites.     If patient is a good candidate, please use dotphrase <dot>triageresponse and select Refer to ADS to document.      Roym Herrera RN, BSN Nurse Triage Advisor 7:28 AM 5/2/2022    Reason for Disposition    Spinning or tilting sensation (vertigo) present now and one or more stroke risk factors (i.e., hypertension, diabetes, prior stroke/TIA, heart attack, age over 60) (Exception: prior physician evaluation for this AND no different/worse than usual)     Feels a wave that sends her off balance, tilting to one side or backward.    Additional Information    Negative: Shock suspected (e.g., cold/pale/clammy skin, too weak to stand, low BP, rapid pulse)    Negative: Difficult to awaken or acting confused (e.g., disoriented, slurred speech)    Negative: Fainted, and still feels dizzy afterwards    Negative: Severe difficulty breathing (e.g., struggling for each breath, speaks in single words)    Negative: Overdose (accidental or intentional) of medications    Negative: Heart beating < 50 beats per minute OR > 140 beats per minute    Negative: New neurologic deficit that is present now: * Weakness of the face, arm, or leg on one side of the body * Numbness of the face, arm, or leg on one side of the body * Loss of speech or garbled speech    Negative: Sounds like a life-threatening emergency to the triager    Negative: Chest pain    Negative: Rectal bleeding, bloody stool, or tarry-black stool    Negative: Vomiting is the main symptom    Negative: Diarrhea is the main symptom    Negative: Headache is the main symptom    Negative: Heat exhaustion suspected (i.e., dehydration from heat exposure)    Negative:  Patient states that he/she is having an anxiety/panic attack    Negative: SEVERE dizziness (e.g., unable to stand, requires support to walk, feels like passing out now)    Negative: SEVERE headache or neck pain    Protocols used: DIZZINESS-A-OH    COVID 19 Nurse Triage Plan/Patient Instructions    Please be aware that novel coronavirus (COVID-19) may be circulating in the community. If you develop symptoms such as fever, cough, or SOB or if you have concerns about the presence of another infection including coronavirus (COVID-19), please contact your health care provider or visit [email or web address].     Disposition/Instructions    In-Person Visit with provider recommended. Reference Visit Selection Guide.    Thank you for taking steps to prevent the spread of this virus.  o Limit your contact with others.  o Wear a simple mask to cover your cough.  o Wash your hands well and often.    Resources    [facility]: About COVID-19: [email or web address]    CDC: What to Do If You're Sick: www.cdc.gov/coronavirus/2019-ncov/about/steps-when-sick.html    CDC: Ending Home Isolation: www.cdc.gov/coronavirus/2019-ncov/hcp/disposition-in-home-patients.html     CDC: Caring for Someone: www.cdc.gov/coronavirus/2019-ncov/if-you-are-sick/care-for-someone.html     [facility]: Interim Guidance for Hospital Discharge to Home: www.health.[facility].[place].us/diseases/coronavirus/hcp/hospdischarge.pdf    [facility] clinical trials (COVID-19 research studies): [email or web address].[facility]/n-clinical-trials     Below are the COVID-19 hotlines at the [place] Department of Health ([facility]). Interpreters are available.   o For health questions: Call 935-357-0223 or 1-502.807.1441 (7 a.m. to 7 p.m.)  o For questions about schools and childcare: Call 210-763-4527 or 1-435.662.3810 (7 a.m. to 7 p.m.)

## 2022-05-22 ENCOUNTER — HEALTH MAINTENANCE LETTER (OUTPATIENT)
Age: 62
End: 2022-05-22

## 2022-06-27 DIAGNOSIS — M25.561 CHRONIC PAIN OF BOTH KNEES: ICD-10-CM

## 2022-06-27 DIAGNOSIS — G89.29 CHRONIC PAIN OF BOTH KNEES: ICD-10-CM

## 2022-06-27 DIAGNOSIS — M25.562 CHRONIC PAIN OF BOTH KNEES: ICD-10-CM

## 2022-06-27 RX ORDER — NAPROXEN 500 MG/1
500 TABLET ORAL 2 TIMES DAILY WITH MEALS
Qty: 60 TABLET | Refills: 1 | Status: SHIPPED | OUTPATIENT
Start: 2022-06-27 | End: 2022-08-01

## 2022-06-30 ENCOUNTER — OFFICE VISIT (OUTPATIENT)
Dept: CARDIOLOGY | Facility: CLINIC | Age: 62
End: 2022-06-30
Payer: MEDICARE

## 2022-06-30 VITALS
DIASTOLIC BLOOD PRESSURE: 118 MMHG | OXYGEN SATURATION: 97 % | HEART RATE: 91 BPM | HEIGHT: 62 IN | BODY MASS INDEX: 35.41 KG/M2 | SYSTOLIC BLOOD PRESSURE: 155 MMHG | WEIGHT: 192.4 LBS

## 2022-06-30 DIAGNOSIS — I10 ESSENTIAL HYPERTENSION: ICD-10-CM

## 2022-06-30 DIAGNOSIS — I47.10 SVT (SUPRAVENTRICULAR TACHYCARDIA) (H): ICD-10-CM

## 2022-06-30 PROCEDURE — 99204 OFFICE O/P NEW MOD 45 MIN: CPT | Performed by: INTERNAL MEDICINE

## 2022-06-30 NOTE — PROGRESS NOTES
Service Date: 06/30/2022    HISTORY OF PRESENT ILLNESS:  I had the opportunity to see Ms. Audrey Ricks in Cardiology Clinic today at [facility] in [place] for consultation regarding recurrent episodes of SVT.    Ms. Ricks is a 62-year-old woman, who had an episode of SVT more than 10 years ago requiring emergency room evaluation and treatment.  She believes that she was probably treated with adenosine at that time, which converted the rhythm back to normal.  Since then, she has had intermittent episodes of palpitations and racing heartbeat spells briefly, but no prolonged episodes requiring emergency room evaluation and treatment until 02/2022.  At that time, she was suffering with severe low back pain and sciatica, when she developed prolonged episode of rapid heart racing.  She came into the Emergency Room here in [place] and an ECG showed supraventricular tachycardia with a heart rate of 191 beats per minute.  She was treated with adenosine, initially 6 mg, which failed, and then 12 mg, which converted the patient back to sinus rhythm promptly.  She was prescribed metoprolol XL 25 mg a day to use continuously to suppress further episodes of this arrhythmia.  However, over the next several weeks of taking it, she became severely lethargic and a bit short of breath.  She eventually discontinued it and felt better.  However, she has continued to have some brief rapid heart beating spells from time to time, different from her normal heart rate variation.  She has not required any further Emergency Room visits.    She believes her blood pressure is well-controlled at home, typically in the 130s over 80s.  However, I see a number of blood pressure readings here in the clinical settings, which are high, ranging from 130/96 to 162/94.  She has had a CT scan for coronary calcification performed in the past, which showed a calcium score of 0.  For that reason, her mild to moderate dyslipidemia has  not been treated with medical therapy.  She has a normal basic metabolic panel, normal hemoglobin, normal platelets and a normal ECG when she is in sinus rhythm.    PHYSICAL EXAMINATION:  Her blood pressure was 155/118, heart rate 91 and weight 192 pounds.  Her lungs are clear.  Heart rhythm is regular.  She has no cardiac murmurs or carotid bruits.    IMPRESSIONS:  Ms. Audrey Ricks is a 62-year-old woman with episodes of palpitations with rapid heart beating, including 2 episodes of documented SVT requiring treatment with adenosine in the Emergency Room.  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  She may be having a number of brief episodes of SVT as well that have not been documented.    We talked about the options for treatment of this arrhythmia.  I offered her the opportunity to undergo ablation of SVT with a low risk of complications and a high rate of success.  We talked about the possibility of bleeding, infection, pacemaker and cardiac perforation requiring emergency surgery during this procedure.    I also mentioned the alternative treatment of medical therapy including calcium channel blocker, treatment with diltiazem for arrhythmia suppression.  She does not tolerate metoprolol due to severe fatigue.    She will consider these options.  I set her up for an Electrophysiology consultation in a few weeks to continue discussion regarding ablation if she wishes to pursue that.  At this time, I did not start her on medical therapy, but if she has persistent hypertension, she may benefit from using diltiazem for that reason as well.    She will contact me if she wishes to pursue any of these options I have recommended for treatment.    Jose aRfael Cuellar MD    cc:  Alie De [place] [address]    Jose Rafael Cuellar MD, [facility]        D: 06/30/2022   T: 06/30/2022   MT: al    Name:     AUDREY RICKS  MRN:      [id number]         Account:      [id number]   :      1960           Service Date: 2022       Document: [id number]

## 2022-06-30 NOTE — LETTER
6/30/2022    Physician No Ref-Primary  No address on file    RE: Audrey LIV Ricks       Dear Colleague,     I had the pleasure of seeing Audrey LIV Ricks in the [facility].  HPI and Plan:   See dictation    Today's clinic visit entailed:  Review of the result(s) of each unique test - ECG, bmp, flp, alt  The following tests were independently interpreted by me as noted in my documentation: ECG  Ordering of each unique test  Prescription drug management  30 minutes spent on the date of the encounter doing chart review, review of test results, interpretation of tests, patient visit and documentation   Provider  Link to [facility] Help Grid     The level of medical decision making during this visit was of moderate complexity.      Orders Placed This Encounter   Procedures     Follow-Up with Cardiology EP       Orders Placed This Encounter   Medications     medical cannabis (Patient's own supply)     Sig: See Admin Instructions (The purpose of this order is to document that the patient reports taking medical cannabis.  This is not a prescription, and is not used to certify that the patient has a qualifying medical condition.)       Medications Discontinued During This Encounter   Medication Reason     gabapentin (NEURONTIN) 100 MG capsule Therapy completed     nystatin (MYCOSTATIN) 365644 UNIT/GM external cream Therapy completed     metoprolol succinate ER (TOPROL-XL) 25 MG 24 hr tablet          Encounter Diagnoses   Name Primary?     Essential hypertension      SVT (supraventricular tachycardia) (H)        CURRENT MEDICATIONS:  Current Outpatient Medications   Medication Sig Dispense Refill     estradiol-norethindrone (COMBIPATCH) 0.05-0.14 MG/DAY bi-weekly patch Place 1 patch onto the skin twice a week 24 patch 3     medical cannabis (Patient's own supply) See Admin Instructions (The purpose of this order is to document that the patient reports taking medical cannabis.  This is not a prescription, and is not  used to certify that the patient has a qualifying medical condition.)       metroNIDAZOLE (METROGEL) 1 % external gel APPLY TOPICALLY DAILY 60 g 3     naproxen (NAPROSYN) 500 MG tablet TAKE 1 TABLET (500 MG) BY MOUTH 2 TIMES DAILY (WITH MEALS) 60 tablet 1     omeprazole (PRILOSEC) 20 MG DR capsule TAKE 1 CAPSULE BY MOUTH ONE TIME A DAY. TAKES AS NEEDED FOR HEARTBURN 90 capsule 3     triamcinolone (KENALOG) 0.1 % external cream Apply topically 2 times daily as needed for irritation 30 g 1     zolpidem (AMBIEN) 5 MG tablet Take 1 tablet (5 mg) by mouth nightly as needed for sleep 30 tablet 1     busPIRone (BUSPAR) 5 MG tablet TAKE 1 TABLET (5 MG) BY MOUTH 3 TIMES DAILY FOR ANXIETY (Patient not taking: Reported on 6/30/2022) 90 tablet 0       ALLERGIES     Allergies   Allergen Reactions     Amlodipine Shortness Of Breath and Swelling       PAST MEDICAL HISTORY:  Past Medical History:   Diagnosis Date     Depressive disorder 2011     Hypertension        PAST SURGICAL HISTORY:  No past surgical history on file.    FAMILY HISTORY:  Family History   Problem Relation Age of Onset     Diabetes Mother        SOCIAL HISTORY:  Social History     Socioeconomic History     Marital status: Single     Spouse name: None     Number of children: None     Years of education: None     Highest education level: None   Tobacco Use     Smoking status: Never Smoker     Smokeless tobacco: Never Used   Substance and Sexual Activity     Alcohol use: Yes     Drug use: Never   Other Topics Concern     Parent/sibling w/ CABG, MI or angioplasty before 65F 55M? No       Review of Systems:  Skin:          Eyes:         ENT:         Respiratory:  Negative shortness of breath;dyspnea on exertion;cough     Cardiovascular:  Negative;chest pain;syncope or near-syncope Positive for;palpitations;edema;dizziness;lightheadedness    Gastroenterology:        Genitourinary:         Musculoskeletal:         Neurologic:  Negative numbness or tingling of  "hands;numbness or tingling of feet    Psychiatric:         Heme/Lymph/Imm:  Positive for allergies    Endocrine:           Physical Exam:  Vitals: BP (!) 155/118 (BP Location: Left arm, Patient Position: Sitting, Cuff Size: Adult Large)   Pulse 91   Ht 1.575 m (5' 2\")   Wt 87.3 kg (192 lb 6.4 oz)   SpO2 97%   BMI 35.19 kg/m      Constitutional:           Skin:             Head:           Eyes:           Lymph:      ENT:           Neck:           Respiratory:            Cardiac:                                                           GI:           Extremities and Muscular Skeletal:                 Neurological:           Psych:           CC  Referred Self, MD  No address on file                  Thank you for allowing me to participate in the care of your patient.      Sincerely,     LIDIA CA MD     [facility]  cc:   Mercedes Cheng MD  No address on file        "

## 2022-06-30 NOTE — PATIENT INSTRUCTIONS
Consider medication treatment with Diltiazem  mg daily    Versus    Ablation for SVT, specifically AV Node Re-entry tachycardia (AVNRT)

## 2022-06-30 NOTE — LETTER
Date:July 1, 2022      Patient was self referred, no letter generated. Do not send.        [facility] Health Information

## 2022-06-30 NOTE — PROGRESS NOTES
HPI and Plan:   See dictation    Today's clinic visit entailed:  Review of the result(s) of each unique test - ECG, bmp, flp, alt  The following tests were independently interpreted by me as noted in my documentation: ECG  Ordering of each unique test  Prescription drug management  30 minutes spent on the date of the encounter doing chart review, review of test results, interpretation of tests, patient visit and documentation   Provider  Link to [facility] Help Grid     The level of medical decision making during this visit was of moderate complexity.      Orders Placed This Encounter   Procedures     Follow-Up with Cardiology EP       Orders Placed This Encounter   Medications     medical cannabis (Patient's own supply)     Sig: See Admin Instructions (The purpose of this order is to document that the patient reports taking medical cannabis.  This is not a prescription, and is not used to certify that the patient has a qualifying medical condition.)       Medications Discontinued During This Encounter   Medication Reason     gabapentin (NEURONTIN) 100 MG capsule Therapy completed     nystatin (MYCOSTATIN) 667166 UNIT/GM external cream Therapy completed     metoprolol succinate ER (TOPROL-XL) 25 MG 24 hr tablet          Encounter Diagnoses   Name Primary?     Essential hypertension      SVT (supraventricular tachycardia) (H)        CURRENT MEDICATIONS:  Current Outpatient Medications   Medication Sig Dispense Refill     estradiol-norethindrone (COMBIPATCH) 0.05-0.14 MG/DAY bi-weekly patch Place 1 patch onto the skin twice a week 24 patch 3     medical cannabis (Patient's own supply) See Admin Instructions (The purpose of this order is to document that the patient reports taking medical cannabis.  This is not a prescription, and is not used to certify that the patient has a qualifying medical condition.)       metroNIDAZOLE (METROGEL) 1 % external gel APPLY TOPICALLY DAILY 60 g 3     naproxen (NAPROSYN) 500 MG tablet TAKE  1 TABLET (500 MG) BY MOUTH 2 TIMES DAILY (WITH MEALS) 60 tablet 1     omeprazole (PRILOSEC) 20 MG DR capsule TAKE 1 CAPSULE BY MOUTH ONE TIME A DAY. TAKES AS NEEDED FOR HEARTBURN 90 capsule 3     triamcinolone (KENALOG) 0.1 % external cream Apply topically 2 times daily as needed for irritation 30 g 1     zolpidem (AMBIEN) 5 MG tablet Take 1 tablet (5 mg) by mouth nightly as needed for sleep 30 tablet 1     busPIRone (BUSPAR) 5 MG tablet TAKE 1 TABLET (5 MG) BY MOUTH 3 TIMES DAILY FOR ANXIETY (Patient not taking: Reported on 6/30/2022) 90 tablet 0       ALLERGIES     Allergies   Allergen Reactions     Amlodipine Shortness Of Breath and Swelling       PAST MEDICAL HISTORY:  Past Medical History:   Diagnosis Date     Depressive disorder 2011     Hypertension        PAST SURGICAL HISTORY:  No past surgical history on file.    FAMILY HISTORY:  Family History   Problem Relation Age of Onset     Diabetes Mother        SOCIAL HISTORY:  Social History     Socioeconomic History     Marital status: Single     Spouse name: None     Number of children: None     Years of education: None     Highest education level: None   Tobacco Use     Smoking status: Never Smoker     Smokeless tobacco: Never Used   Substance and Sexual Activity     Alcohol use: Yes     Drug use: Never   Other Topics Concern     Parent/sibling w/ CABG, MI or angioplasty before 65F 55M? No       Review of Systems:  Skin:          Eyes:         ENT:         Respiratory:  Negative shortness of breath;dyspnea on exertion;cough     Cardiovascular:  Negative;chest pain;syncope or near-syncope Positive for;palpitations;edema;dizziness;lightheadedness    Gastroenterology:        Genitourinary:         Musculoskeletal:         Neurologic:  Negative numbness or tingling of hands;numbness or tingling of feet    Psychiatric:         Heme/Lymph/Imm:  Positive for allergies    Endocrine:           Physical Exam:  Vitals: BP (!) 155/118 (BP Location: Left arm, Patient  "Position: Sitting, Cuff Size: Adult Large)   Pulse 91   Ht 1.575 m (5' 2\")   Wt 87.3 kg (192 lb 6.4 oz)   SpO2 97%   BMI 35.19 kg/m      Constitutional:           Skin:             Head:           Eyes:           Lymph:      ENT:           Neck:           Respiratory:            Cardiac:                                                           GI:           Extremities and Muscular Skeletal:                 Neurological:           Psych:           CC  Referred Self, MD  No address on file              "

## 2022-07-06 ENCOUNTER — TELEPHONE (OUTPATIENT)
Dept: FAMILY MEDICINE | Facility: CLINIC | Age: 62
End: 2022-07-06

## 2022-07-06 NOTE — TELEPHONE ENCOUNTER
Reason for Call:  Form, our goal is to have forms completed with 72 hours, however, some forms may require a visit or additional information.    Type of letter, form or note:  Insurance Reward    Who is the form from?: Insurance comp    Where did the form come from: Patient or family brought in       What clinic location was the form placed at?: [facility]     Where the form was placed: [employer] Box/Folder    What number is listed as a contact on the form?: [phone]       Additional comments: Its ok to leave a voicemail if needed.    Call taken on 7/6/2022 at 11:29 AM by Emily Cowden

## 2022-07-19 ENCOUNTER — MYC MEDICAL ADVICE (OUTPATIENT)
Dept: CARDIOLOGY | Facility: CLINIC | Age: 62
End: 2022-07-19

## 2022-07-19 DIAGNOSIS — I47.10 SVT (SUPRAVENTRICULAR TACHYCARDIA) (H): Primary | ICD-10-CM

## 2022-07-19 RX ORDER — DILTIAZEM HYDROCHLORIDE 120 MG/1
120 CAPSULE, COATED, EXTENDED RELEASE ORAL DAILY
Qty: 90 CAPSULE | Refills: 3 | Status: CANCELLED | OUTPATIENT
Start: 2022-07-19

## 2022-07-19 NOTE — TELEPHONE ENCOUNTER
"Pt is requesting rx for diltiazem  mg daily.    Dr. Cuellar documented recommended dose on pt's AVS if pt chooses to go medication route.    Noted pt has \"allergy\" to amlodipine listed in her chart. Pt is reporting she would like to try diltiazem anyway to see if she tolerates it. States she has upcoming minor surgery and would like to suppress her racing heart symptoms.     Sent Rx. Routed to Dr. Cuellar for final sign off/approval for diltiazem  mg capsule daily given reaction to amlodipine. Pt has EP follow up arranged on 8/18/22.  Mirta Crabtree RN on 7/19/2022 at 3:07 PM        Noted amlodipine allergy listed as of 1/13/2020 in pt's chart per [facility] [place] in Care Everywhere:    ALLERGIES: SHE REPORTS HISTORY OF INTOLERANCE TO AMLODIPINE, PRINIVIL AND HYDROCHLOROTHIAZIDE. It is unclear if she is actually allergic to these latter 2 medicines.     Amlodipine reaction: shortness of breath, significant swelling        "

## 2022-07-25 DIAGNOSIS — F31.81 BIPOLAR 2 DISORDER (H): ICD-10-CM

## 2022-07-26 RX ORDER — ZOLPIDEM TARTRATE 5 MG/1
TABLET ORAL
Qty: 30 TABLET | Refills: 1 | Status: SHIPPED | OUTPATIENT
Start: 2022-07-26 | End: 2023-05-05

## 2022-08-01 ENCOUNTER — MYC MEDICAL ADVICE (OUTPATIENT)
Dept: FAMILY MEDICINE | Facility: CLINIC | Age: 62
End: 2022-08-01

## 2022-08-01 DIAGNOSIS — M25.562 CHRONIC PAIN OF BOTH KNEES: ICD-10-CM

## 2022-08-01 DIAGNOSIS — G89.29 CHRONIC PAIN OF BOTH KNEES: ICD-10-CM

## 2022-08-01 DIAGNOSIS — M25.561 CHRONIC PAIN OF BOTH KNEES: ICD-10-CM

## 2022-08-01 RX ORDER — NAPROXEN 500 MG/1
500 TABLET ORAL 2 TIMES DAILY WITH MEALS
Qty: 60 TABLET | Refills: 1 | Status: SHIPPED | OUTPATIENT
Start: 2022-08-01 | End: 2023-03-08

## 2022-08-06 ENCOUNTER — HOSPITAL ENCOUNTER (EMERGENCY)
Facility: CLINIC | Age: 62
Discharge: ANOTHER HOSPITAL | End: 2022-08-06
Attending: EMERGENCY MEDICINE | Admitting: EMERGENCY MEDICINE
Payer: MEDICARE

## 2022-08-06 ENCOUNTER — HOSPITAL ENCOUNTER (INPATIENT)
Facility: CLINIC | Age: 62
LOS: 2 days | Discharge: HOME | DRG: 281 | End: 2022-08-08
Attending: HOSPITALIST | Admitting: HOSPITALIST
Payer: MEDICARE

## 2022-08-06 VITALS
SYSTOLIC BLOOD PRESSURE: 128 MMHG | DIASTOLIC BLOOD PRESSURE: 90 MMHG | TEMPERATURE: 97.7 F | HEART RATE: 67 BPM | OXYGEN SATURATION: 94 % | WEIGHT: 189.1 LBS | RESPIRATION RATE: 12 BRPM | BODY MASS INDEX: 34.8 KG/M2 | HEIGHT: 62 IN

## 2022-08-06 DIAGNOSIS — I21.4 NSTEMI (NON-ST ELEVATED MYOCARDIAL INFARCTION) (H): ICD-10-CM

## 2022-08-06 DIAGNOSIS — I47.10 SVT (SUPRAVENTRICULAR TACHYCARDIA) (H): Primary | ICD-10-CM

## 2022-08-06 LAB
ANION GAP SERPL CALCULATED.3IONS-SCNC: 5 MMOL/L (ref 3–14)
BASOPHILS # BLD AUTO: 0.1 10E3/UL (ref 0–0.2)
BASOPHILS NFR BLD AUTO: 1 %
BUN SERPL-MCNC: 18 MG/DL (ref 7–30)
CALCIUM SERPL-MCNC: 9.1 MG/DL (ref 8.5–10.1)
CHLORIDE BLD-SCNC: 109 MMOL/L (ref 94–109)
CO2 SERPL-SCNC: 24 MMOL/L (ref 20–32)
CREAT SERPL-MCNC: 1.18 MG/DL (ref 0.52–1.04)
EOSINOPHIL # BLD AUTO: 0.2 10E3/UL (ref 0–0.7)
EOSINOPHIL NFR BLD AUTO: 2 %
ERYTHROCYTE [DISTWIDTH] IN BLOOD BY AUTOMATED COUNT: 14.4 % (ref 10–15)
GFR SERPL CREATININE-BSD FRML MDRD: 53 ML/MIN/1.73M2
GLUCOSE BLD-MCNC: 168 MG/DL (ref 70–99)
HCT VFR BLD AUTO: 41.4 % (ref 35–47)
HGB BLD-MCNC: 13.2 G/DL (ref 11.7–15.7)
IMM GRANULOCYTES # BLD: 0 10E3/UL
IMM GRANULOCYTES NFR BLD: 0 %
LYMPHOCYTES # BLD AUTO: 3.5 10E3/UL (ref 0.8–5.3)
LYMPHOCYTES NFR BLD AUTO: 35 %
MAGNESIUM SERPL-MCNC: 2.3 MG/DL (ref 1.6–2.3)
MCH RBC QN AUTO: 27.9 PG (ref 26.5–33)
MCHC RBC AUTO-ENTMCNC: 31.9 G/DL (ref 31.5–36.5)
MCV RBC AUTO: 88 FL (ref 78–100)
MONOCYTES # BLD AUTO: 0.8 10E3/UL (ref 0–1.3)
MONOCYTES NFR BLD AUTO: 8 %
NEUTROPHILS # BLD AUTO: 5.6 10E3/UL (ref 1.6–8.3)
NEUTROPHILS NFR BLD AUTO: 54 %
NRBC # BLD AUTO: 0 10E3/UL
NRBC BLD AUTO-RTO: 0 /100
PLATELET # BLD AUTO: 377 10E3/UL (ref 150–450)
POTASSIUM BLD-SCNC: 4.3 MMOL/L (ref 3.4–5.3)
RBC # BLD AUTO: 4.73 10E6/UL (ref 3.8–5.2)
SARS-COV-2 RNA RESP QL NAA+PROBE: NEGATIVE
SODIUM SERPL-SCNC: 138 MMOL/L (ref 133–144)
TROPONIN I SERPL HS-MCNC: 287 NG/L
TROPONIN I SERPL HS-MCNC: 650 NG/L
TROPONIN I SERPL HS-MCNC: 78 NG/L
WBC # BLD AUTO: 10.3 10E3/UL (ref 4–11)

## 2022-08-06 PROCEDURE — C9803 HOPD COVID-19 SPEC COLLECT: HCPCS | Performed by: EMERGENCY MEDICINE

## 2022-08-06 PROCEDURE — 210N000002 HC R&B HEART CARE

## 2022-08-06 PROCEDURE — 36415 COLL VENOUS BLD VENIPUNCTURE: CPT | Performed by: EMERGENCY MEDICINE

## 2022-08-06 PROCEDURE — 96375 TX/PRO/DX INJ NEW DRUG ADDON: CPT | Performed by: EMERGENCY MEDICINE

## 2022-08-06 PROCEDURE — 96374 THER/PROPH/DIAG INJ IV PUSH: CPT | Performed by: EMERGENCY MEDICINE

## 2022-08-06 PROCEDURE — 87635 SARS-COV-2 COVID-19 AMP PRB: CPT | Performed by: EMERGENCY MEDICINE

## 2022-08-06 PROCEDURE — 80048 BASIC METABOLIC PNL TOTAL CA: CPT | Performed by: EMERGENCY MEDICINE

## 2022-08-06 PROCEDURE — 99223 1ST HOSP IP/OBS HIGH 75: CPT | Mod: AI | Performed by: HOSPITALIST

## 2022-08-06 PROCEDURE — 250N000011 HC RX IP 250 OP 636

## 2022-08-06 PROCEDURE — 250N000011 HC RX IP 250 OP 636: Performed by: EMERGENCY MEDICINE

## 2022-08-06 PROCEDURE — 83735 ASSAY OF MAGNESIUM: CPT | Performed by: EMERGENCY MEDICINE

## 2022-08-06 PROCEDURE — 99285 EMERGENCY DEPT VISIT HI MDM: CPT | Performed by: EMERGENCY MEDICINE

## 2022-08-06 PROCEDURE — 250N000013 HC RX MED GY IP 250 OP 250 PS 637: Performed by: EMERGENCY MEDICINE

## 2022-08-06 PROCEDURE — 99207 PR MOONLIGHTING INDICATOR: CPT | Performed by: HOSPITALIST

## 2022-08-06 PROCEDURE — 99285 EMERGENCY DEPT VISIT HI MDM: CPT | Mod: 25 | Performed by: EMERGENCY MEDICINE

## 2022-08-06 PROCEDURE — 93005 ELECTROCARDIOGRAM TRACING: CPT | Performed by: EMERGENCY MEDICINE

## 2022-08-06 PROCEDURE — 36415 COLL VENOUS BLD VENIPUNCTURE: CPT | Mod: 59 | Performed by: EMERGENCY MEDICINE

## 2022-08-06 PROCEDURE — 85025 COMPLETE CBC W/AUTO DIFF WBC: CPT | Performed by: EMERGENCY MEDICINE

## 2022-08-06 PROCEDURE — 84484 ASSAY OF TROPONIN QUANT: CPT | Performed by: EMERGENCY MEDICINE

## 2022-08-06 RX ORDER — ASPIRIN 81 MG/1
325 TABLET, CHEWABLE ORAL ONCE
Status: DISCONTINUED | OUTPATIENT
Start: 2022-08-06 | End: 2022-08-06 | Stop reason: CLARIF

## 2022-08-06 RX ORDER — HEPARIN SODIUM 10000 [USP'U]/100ML
0-5000 INJECTION, SOLUTION INTRAVENOUS CONTINUOUS
Status: DISCONTINUED | OUTPATIENT
Start: 2022-08-07 | End: 2022-08-08 | Stop reason: HOSPADM

## 2022-08-06 RX ORDER — ONDANSETRON 4 MG/1
4 TABLET, ORALLY DISINTEGRATING ORAL EVERY 6 HOURS PRN
Status: DISCONTINUED | OUTPATIENT
Start: 2022-08-06 | End: 2022-08-08 | Stop reason: HOSPADM

## 2022-08-06 RX ORDER — BISACODYL 10 MG
10 SUPPOSITORY, RECTAL RECTAL DAILY PRN
Status: DISCONTINUED | OUTPATIENT
Start: 2022-08-06 | End: 2022-08-08 | Stop reason: HOSPADM

## 2022-08-06 RX ORDER — ASPIRIN 81 MG/1
81 TABLET, CHEWABLE ORAL DAILY
Status: DISCONTINUED | OUTPATIENT
Start: 2022-08-07 | End: 2022-08-08

## 2022-08-06 RX ORDER — PROCHLORPERAZINE MALEATE 5 MG
10 TABLET ORAL EVERY 6 HOURS PRN
Status: DISCONTINUED | OUTPATIENT
Start: 2022-08-06 | End: 2022-08-08 | Stop reason: HOSPADM

## 2022-08-06 RX ORDER — ONDANSETRON 2 MG/ML
4 INJECTION INTRAMUSCULAR; INTRAVENOUS EVERY 6 HOURS PRN
Status: DISCONTINUED | OUTPATIENT
Start: 2022-08-06 | End: 2022-08-08 | Stop reason: HOSPADM

## 2022-08-06 RX ORDER — ADENOSINE 3 MG/ML
12 INJECTION, SOLUTION INTRAVENOUS ONCE
Status: COMPLETED | OUTPATIENT
Start: 2022-08-06 | End: 2022-08-06

## 2022-08-06 RX ORDER — HEPARIN SODIUM 10000 [USP'U]/100ML
0-5000 INJECTION, SOLUTION INTRAVENOUS CONTINUOUS
Status: DISCONTINUED | OUTPATIENT
Start: 2022-08-06 | End: 2022-08-06 | Stop reason: HOSPADM

## 2022-08-06 RX ORDER — ACETAMINOPHEN 650 MG/1
650 SUPPOSITORY RECTAL EVERY 6 HOURS PRN
Status: DISCONTINUED | OUTPATIENT
Start: 2022-08-06 | End: 2022-08-08 | Stop reason: HOSPADM

## 2022-08-06 RX ORDER — AMOXICILLIN 250 MG
1 CAPSULE ORAL 2 TIMES DAILY PRN
Status: DISCONTINUED | OUTPATIENT
Start: 2022-08-06 | End: 2022-08-08 | Stop reason: HOSPADM

## 2022-08-06 RX ORDER — ACETAMINOPHEN 325 MG/1
650 TABLET ORAL EVERY 6 HOURS PRN
Status: DISCONTINUED | OUTPATIENT
Start: 2022-08-06 | End: 2022-08-08 | Stop reason: HOSPADM

## 2022-08-06 RX ORDER — AMOXICILLIN 250 MG
2 CAPSULE ORAL 2 TIMES DAILY PRN
Status: DISCONTINUED | OUTPATIENT
Start: 2022-08-06 | End: 2022-08-08 | Stop reason: HOSPADM

## 2022-08-06 RX ORDER — ASPIRIN 81 MG/1
324 TABLET, CHEWABLE ORAL ONCE
Status: COMPLETED | OUTPATIENT
Start: 2022-08-06 | End: 2022-08-06

## 2022-08-06 RX ORDER — PROCHLORPERAZINE 25 MG
25 SUPPOSITORY, RECTAL RECTAL EVERY 12 HOURS PRN
Status: DISCONTINUED | OUTPATIENT
Start: 2022-08-06 | End: 2022-08-08 | Stop reason: HOSPADM

## 2022-08-06 RX ORDER — ATORVASTATIN CALCIUM 40 MG/1
40 TABLET, FILM COATED ORAL DAILY
Status: DISCONTINUED | OUTPATIENT
Start: 2022-08-07 | End: 2022-08-08

## 2022-08-06 RX ORDER — LIDOCAINE 40 MG/G
CREAM TOPICAL
Status: DISCONTINUED | OUTPATIENT
Start: 2022-08-06 | End: 2022-08-08 | Stop reason: HOSPADM

## 2022-08-06 RX ORDER — POLYETHYLENE GLYCOL 3350 17 G/17G
17 POWDER, FOR SOLUTION ORAL DAILY PRN
Status: DISCONTINUED | OUTPATIENT
Start: 2022-08-06 | End: 2022-08-08 | Stop reason: HOSPADM

## 2022-08-06 RX ADMIN — HEPARIN SODIUM 1050 UNITS/HR: 10000 INJECTION, SOLUTION INTRAVENOUS at 18:17

## 2022-08-06 RX ADMIN — ADENOSINE 12 MG: 3 INJECTION, SOLUTION INTRAVENOUS at 10:59

## 2022-08-06 RX ADMIN — ASPIRIN 81 MG 324 MG: 81 TABLET ORAL at 18:17

## 2022-08-06 ASSESSMENT — ENCOUNTER SYMPTOMS
LIGHT-HEADEDNESS: 0
DIARRHEA: 0
CHILLS: 0
PALPITATIONS: 1
FATIGUE: 0
HEADACHES: 0
NAUSEA: 0
FEVER: 0
CHEST TIGHTNESS: 1
APPETITE CHANGE: 0
SHORTNESS OF BREATH: 1
DYSURIA: 0
WHEEZING: 0
ABDOMINAL PAIN: 0
VOMITING: 0
STRIDOR: 0

## 2022-08-06 NOTE — ED PROVIDER NOTES
History     Chief Complaint   Patient presents with     Chest Pain     HPI  Audrey Ricks is a 62 year old female who has a history significant for SVT arriving today to the emergency room due to concern of chest pain and racing heart.  Patient reports she was in her normal state of health this morning eating breakfast when she noted abrupt onset of rapid heart rate associated with chest pain and difficulty breathing.  Similar symptoms in the past have been diagnosed with SVT.  She has had this x2.  He has followed up with cardiology.  Previously she is attempted amlodipine and metoprolol but did not tolerate these medications.  She is on no medication at this time to suppress or minimize dysrhythmia.  She has attempted to follow-up with an EP physician in 2 months.  Patient reports no recent medical illness such as fever, chills, nausea, vomiting, cough.  No recent dysuria, melena or hematochezia.  No other recent medication change.  Patient reports no significant alcohol or caffeine intake.    Allergies:  Allergies   Allergen Reactions     Amlodipine Shortness Of Breath and Swelling       Problem List:    Patient Active Problem List    Diagnosis Date Noted     Hyperlipidemia LDL goal <130 03/18/2021     Priority: Medium     Morbid obesity (H) 02/16/2021     Priority: Medium     BMI 33.0-33.9,adult 01/14/2020     Priority: Medium     Cervical radiculopathy at C7 05/18/2016     Priority: Medium     Glaucoma 08/19/2015     Priority: Medium     Bipolar 2 disorder (H) 01/12/2012     Priority: Medium     IMO Update 10/11       GERD (gastroesophageal reflux disease) 01/12/2012     Priority: Medium        Past Medical History:    Past Medical History:   Diagnosis Date     Depressive disorder 2011     Hypertension        Past Surgical History:    No past surgical history on file.    Family History:    Family History   Problem Relation Age of Onset     Diabetes Mother        Social History:  Marital Status:  Single  "[1]  Social History     Tobacco Use     Smoking status: Never Smoker     Smokeless tobacco: Never Used   Substance Use Topics     Alcohol use: Yes     Drug use: Never        Medications:    busPIRone (BUSPAR) 5 MG tablet  estradiol-norethindrone (COMBIPATCH) 0.05-0.14 MG/DAY bi-weekly patch  medical cannabis (Patient's own supply)  metroNIDAZOLE (METROGEL) 1 % external gel  naproxen (NAPROSYN) 500 MG tablet  omeprazole (PRILOSEC) 20 MG DR capsule  triamcinolone (KENALOG) 0.1 % external cream  zolpidem (AMBIEN) 5 MG tablet          Review of Systems   Constitutional: Negative for appetite change, chills, fatigue and fever.   HENT: Negative.    Respiratory: Positive for chest tightness and shortness of breath. Negative for wheezing and stridor.    Cardiovascular: Positive for chest pain and palpitations. Negative for leg swelling.   Gastrointestinal: Negative for abdominal pain, diarrhea, nausea and vomiting.   Genitourinary: Negative.  Negative for dysuria.   Skin: Negative for rash.   Neurological: Negative for light-headedness and headaches.   All other systems reviewed and are negative.      Physical Exam   BP: 108/76  Pulse: (!) 192  Temp: 97.7  F (36.5  C)  Resp: 22  Height: 157.5 cm (5' 2\")  Weight: 85.8 kg (189 lb 1.6 oz)  SpO2: 100 %      Physical Exam  Vitals and nursing note reviewed.   Constitutional:       General: She is in acute distress.      Appearance: Normal appearance. She is not ill-appearing, toxic-appearing or diaphoretic.   HENT:      Head: Normocephalic and atraumatic.      Right Ear: External ear normal.      Left Ear: External ear normal.      Nose: Nose normal. No congestion.      Mouth/Throat:      Mouth: Mucous membranes are moist.      Pharynx: Oropharynx is clear. No oropharyngeal exudate.   Eyes:      Extraocular Movements: Extraocular movements intact.      Conjunctiva/sclera: Conjunctivae normal.      Pupils: Pupils are equal, round, and reactive to light.   Cardiovascular:      Rate " and Rhythm: Tachycardia present.      Pulses: Normal pulses.      Heart sounds: Normal heart sounds. No murmur heard.    No friction rub.   Pulmonary:      Effort: Pulmonary effort is normal. No respiratory distress.      Breath sounds: No stridor. No wheezing, rhonchi or rales.   Abdominal:      General: Abdomen is flat. There is no distension.      Tenderness: There is no abdominal tenderness. There is no guarding.   Musculoskeletal:         General: No deformity or signs of injury. Normal range of motion.      Cervical back: Normal range of motion.   Skin:     General: Skin is warm.      Capillary Refill: Capillary refill takes less than 2 seconds.      Coloration: Skin is not pale.      Findings: No bruising or erythema.   Neurological:      General: No focal deficit present.      Mental Status: She is alert.      Cranial Nerves: No cranial nerve deficit.      Motor: No weakness.   Psychiatric:         Mood and Affect: Mood is anxious.         Behavior: Behavior normal.         ED Course             Procedures           Results for orders placed or performed during the hospital encounter of 08/06/22 (from the past 24 hour(s))   CBC with Platelets & Differential    Narrative    The following orders were created for panel order CBC with Platelets & Differential.  Procedure                               Abnormality         Status                     ---------                               -----------         ------                     CBC with platelets and d...[[id number]]                      Final result                 Please view results for these tests on the individual orders.   Basic metabolic panel   Result Value Ref Range    Sodium 138 133 - 144 mmol/L    Potassium 4.3 3.4 - 5.3 mmol/L    Chloride 109 94 - 109 mmol/L    Carbon Dioxide (CO2) 24 20 - 32 mmol/L    Anion Gap 5 3 - 14 mmol/L    Urea Nitrogen 18 7 - 30 mg/dL    Creatinine 1.18 (H) 0.52 - 1.04 mg/dL    Calcium 9.1 8.5 - 10.1 mg/dL    Glucose 168  (H) 70 - 99 mg/dL    GFR Estimate 53 (L) >60 mL/min/1.73m2   Troponin I   Result Value Ref Range    Troponin I High Sensitivity 78 (H) <54 ng/L   CBC with platelets and differential   Result Value Ref Range    WBC Count 10.3 4.0 - 11.0 10e3/uL    RBC Count 4.73 3.80 - 5.20 10e6/uL    Hemoglobin 13.2 11.7 - 15.7 g/dL    Hematocrit 41.4 35.0 - 47.0 %    MCV 88 78 - 100 fL    MCH 27.9 26.5 - 33.0 pg    MCHC 31.9 31.5 - 36.5 g/dL    RDW 14.4 10.0 - 15.0 %    Platelet Count 377 150 - 450 10e3/uL    % Neutrophils 54 %    % Lymphocytes 35 %    % Monocytes 8 %    % Eosinophils 2 %    % Basophils 1 %    % Immature Granulocytes 0 %    NRBCs per 100 WBC 0 <1 /100    Absolute Neutrophils 5.6 1.6 - 8.3 10e3/uL    Absolute Lymphocytes 3.5 0.8 - 5.3 10e3/uL    Absolute Monocytes 0.8 0.0 - 1.3 10e3/uL    Absolute Eosinophils 0.2 0.0 - 0.7 10e3/uL    Absolute Basophils 0.1 0.0 - 0.2 10e3/uL    Absolute Immature Granulocytes 0.0 <=0.4 10e3/uL    Absolute NRBCs 0.0 10e3/uL   Magnesium   Result Value Ref Range    Magnesium 2.3 1.6 - 2.3 mg/dL   Troponin I (now)   Result Value Ref Range    Troponin I High Sensitivity 287 (HH) <54 ng/L   Troponin I   Result Value Ref Range    Troponin I High Sensitivity 650 (HH) <54 ng/L       Medications   heparin 25,000 units in 0.45% NaCl 250 mL ANTICOAGULANT infusion (1,050 Units/hr Intravenous New Bag 8/6/22 1817)   adenosine (ADENOCARD) injection 12 mg (12 mg Intravenous Given 8/6/22 1059)   heparin loading dose for LOW INTENSITY TREATMENT * Give BEFORE starting heparin infusion (5,150 Units Intravenous Given 8/6/22 1816)   aspirin (ASA) chewable tablet 324 mg (324 mg Oral Given 8/6/22 1817)       Assessments & Plan (with Medical Decision Making)     I have reviewed the nursing notes.    I have reviewed the findings, diagnosis, plan and need for follow up with the patient.    Audrey PECK Rambo is a 62 year old female who has a history significant for SVT arriving today to the emergency room due to  concern of chest pain and racing heart.  On arrival to the emergency department this patient is noted be alert.  She appears quite uncomfortable.  Placed on a monitor and stat EKG obtained.  On my read this demonstrates a regular narrow rapid rhythm consistent with SVT.  ST depression noted predominantly in precordial leads consistent with subendocardial injury or strain.  This patient would benefit from immediate IV access I did attempt vagal maneuvers at bedside without success.  She was given adenosine 12 mg x 1 with successful conversion to normal sinus rhythm.  Patient's symptoms have resolved.  Triage laboratory studies were ordered including troponin.  My clinical suspicion for ACS is very low.  Troponin slightly elevated.  I would plan for delta level.    Delta troponin modestly elevated.  I still suspect this is secondary to rapid, tacky dysrhythmia for over 1 hour.  She may have underlying disease with lower suspicion for ACS.  However case discussed with cardiology with question of need for heparinization and acute admission for possible NSTEMI.  Patient has had no symptoms since time of cardioversion.  Recommendation provided per cardiology was for repeat troponin if same or downtrending outpatient follow-up in clinic with her cardiologist for consideration of addition of stress testing.    We did follow-up with a third troponin now approximately 7 hours after onset of symptoms.  This has continued to trend upwards.  I do not know if we can safely continue to attribute this to transient SVT versus underlying ischemia leading to dysrhythmia.  I would plan to heparinize her and initiate aspirin therapy.  She has not had any prior cardiac work-up in the past and likely would benefit from at minimum heparin, trend admission with consideration of stress testing versus intervention.    New Prescriptions    No medications on file       Final diagnoses:   NSTEMI (non-ST elevated myocardial infarction) (H)        8/6/2022   [facility] EMERGENCY DEPT     Francisco Hercules MD  08/06/22 2441

## 2022-08-06 NOTE — ED TRIAGE NOTES
Started having chest pain around  this am has a hx of SVT     Triage Assessment     Row Name 08/06/22 1049       Triage Assessment (Adult)    Airway WDL WDL       Respiratory WDL    Respiratory WDL WDL       Skin Circulation/Temperature WDL    Skin Circulation/Temperature WDL WDL       Cardiac WDL    Cardiac WDL chest pain;rhythm    Cardiac Rhythm tachycardic       Chest Pain Assessment    Chest Pain Location midsternal;anterior chest, right       Peripheral/Neurovascular WDL    Peripheral Neurovascular WDL WDL       Cognitive/Neuro/Behavioral WDL    Cognitive/Neuro/Behavioral WDL WDL

## 2022-08-07 ENCOUNTER — APPOINTMENT (OUTPATIENT)
Dept: CARDIOLOGY | Facility: CLINIC | Age: 62
DRG: 281 | End: 2022-08-07
Attending: PHYSICIAN ASSISTANT
Payer: MEDICARE

## 2022-08-07 LAB
ANION GAP SERPL CALCULATED.3IONS-SCNC: 4 MMOL/L (ref 3–14)
BUN SERPL-MCNC: 17 MG/DL (ref 7–30)
CALCIUM SERPL-MCNC: 8.3 MG/DL (ref 8.5–10.1)
CHLORIDE BLD-SCNC: 109 MMOL/L (ref 94–109)
CHOLEST SERPL-MCNC: 194 MG/DL
CO2 SERPL-SCNC: 26 MMOL/L (ref 20–32)
CREAT SERPL-MCNC: 1 MG/DL (ref 0.52–1.04)
GFR SERPL CREATININE-BSD FRML MDRD: 64 ML/MIN/1.73M2
GLUCOSE BLD-MCNC: 94 MG/DL (ref 70–99)
HBA1C MFR BLD: 5.9 % (ref 0–5.6)
HDLC SERPL-MCNC: 39 MG/DL
LDLC SERPL CALC-MCNC: 113 MG/DL
LVEF ECHO: NORMAL
NONHDLC SERPL-MCNC: 155 MG/DL
POTASSIUM BLD-SCNC: 3.5 MMOL/L (ref 3.4–5.3)
SODIUM SERPL-SCNC: 139 MMOL/L (ref 133–144)
TRIGL SERPL-MCNC: 212 MG/DL
TROPONIN I SERPL HS-MCNC: 337 NG/L
TROPONIN I SERPL HS-MCNC: 403 NG/L
TROPONIN I SERPL HS-MCNC: 438 NG/L
TSH SERPL DL<=0.005 MIU/L-ACNC: 3.15 MU/L (ref 0.4–4)
UFH PPP CHRO-ACNC: 0.18 IU/ML
UFH PPP CHRO-ACNC: 0.49 IU/ML
UFH PPP CHRO-ACNC: 0.52 IU/ML

## 2022-08-07 PROCEDURE — 210N000002 HC R&B HEART CARE

## 2022-08-07 PROCEDURE — 80061 LIPID PANEL: CPT | Performed by: INTERNAL MEDICINE

## 2022-08-07 PROCEDURE — 250N000013 HC RX MED GY IP 250 OP 250 PS 637: Performed by: PHYSICIAN ASSISTANT

## 2022-08-07 PROCEDURE — 82310 ASSAY OF CALCIUM: CPT | Performed by: PHYSICIAN ASSISTANT

## 2022-08-07 PROCEDURE — 93306 TTE W/DOPPLER COMPLETE: CPT | Mod: 26 | Performed by: INTERNAL MEDICINE

## 2022-08-07 PROCEDURE — 250N000013 HC RX MED GY IP 250 OP 250 PS 637: Performed by: INTERNAL MEDICINE

## 2022-08-07 PROCEDURE — 99233 SBSQ HOSP IP/OBS HIGH 50: CPT | Performed by: INTERNAL MEDICINE

## 2022-08-07 PROCEDURE — 250N000013 HC RX MED GY IP 250 OP 250 PS 637: Performed by: HOSPITALIST

## 2022-08-07 PROCEDURE — 36415 COLL VENOUS BLD VENIPUNCTURE: CPT | Performed by: HOSPITALIST

## 2022-08-07 PROCEDURE — 36415 COLL VENOUS BLD VENIPUNCTURE: CPT | Performed by: PHYSICIAN ASSISTANT

## 2022-08-07 PROCEDURE — 85520 HEPARIN ASSAY: CPT | Performed by: PHYSICIAN ASSISTANT

## 2022-08-07 PROCEDURE — 250N000011 HC RX IP 250 OP 636: Performed by: PHYSICIAN ASSISTANT

## 2022-08-07 PROCEDURE — 99222 1ST HOSP IP/OBS MODERATE 55: CPT | Performed by: STUDENT IN AN ORGANIZED HEALTH CARE EDUCATION/TRAINING PROGRAM

## 2022-08-07 PROCEDURE — 84484 ASSAY OF TROPONIN QUANT: CPT | Performed by: HOSPITALIST

## 2022-08-07 PROCEDURE — 85520 HEPARIN ASSAY: CPT | Performed by: HOSPITALIST

## 2022-08-07 PROCEDURE — 84443 ASSAY THYROID STIM HORMONE: CPT | Performed by: INTERNAL MEDICINE

## 2022-08-07 PROCEDURE — 83036 HEMOGLOBIN GLYCOSYLATED A1C: CPT | Performed by: PHYSICIAN ASSISTANT

## 2022-08-07 PROCEDURE — 250N000013 HC RX MED GY IP 250 OP 250 PS 637: Performed by: STUDENT IN AN ORGANIZED HEALTH CARE EDUCATION/TRAINING PROGRAM

## 2022-08-07 PROCEDURE — 93306 TTE W/DOPPLER COMPLETE: CPT

## 2022-08-07 RX ORDER — METRONIDAZOLE 7.5 MG/G
GEL TOPICAL DAILY
Status: DISCONTINUED | OUTPATIENT
Start: 2022-08-07 | End: 2022-08-07

## 2022-08-07 RX ORDER — POTASSIUM CHLORIDE 1500 MG/1
20 TABLET, EXTENDED RELEASE ORAL ONCE
Status: COMPLETED | OUTPATIENT
Start: 2022-08-07 | End: 2022-08-07

## 2022-08-07 RX ORDER — BISOPROLOL FUMARATE 5 MG/1
2.5 TABLET, FILM COATED ORAL DAILY
Status: DISCONTINUED | OUTPATIENT
Start: 2022-08-07 | End: 2022-08-08 | Stop reason: HOSPADM

## 2022-08-07 RX ORDER — ZOLPIDEM TARTRATE 5 MG/1
5 TABLET ORAL
Status: DISCONTINUED | OUTPATIENT
Start: 2022-08-07 | End: 2022-08-08 | Stop reason: HOSPADM

## 2022-08-07 RX ORDER — METRONIDAZOLE 10 MG/G
GEL TOPICAL DAILY
Status: DISCONTINUED | OUTPATIENT
Start: 2022-08-07 | End: 2022-08-08 | Stop reason: HOSPADM

## 2022-08-07 RX ORDER — PANTOPRAZOLE SODIUM 40 MG/1
40 TABLET, DELAYED RELEASE ORAL
Status: DISCONTINUED | OUTPATIENT
Start: 2022-08-07 | End: 2022-08-08 | Stop reason: HOSPADM

## 2022-08-07 RX ORDER — LISINOPRIL 2.5 MG/1
2.5 TABLET ORAL DAILY
Status: DISCONTINUED | OUTPATIENT
Start: 2022-08-07 | End: 2022-08-08

## 2022-08-07 RX ADMIN — PANTOPRAZOLE SODIUM 40 MG: 40 TABLET, DELAYED RELEASE ORAL at 12:05

## 2022-08-07 RX ADMIN — BISOPROLOL FUMARATE 2.5 MG: 5 TABLET ORAL at 13:07

## 2022-08-07 RX ADMIN — ASPIRIN 81 MG CHEWABLE TABLET 81 MG: 81 TABLET CHEWABLE at 12:05

## 2022-08-07 RX ADMIN — ATORVASTATIN CALCIUM 40 MG: 40 TABLET, FILM COATED ORAL at 12:05

## 2022-08-07 RX ADMIN — HEPARIN SODIUM 850 UNITS/HR: 10000 INJECTION, SOLUTION INTRAVENOUS at 13:04

## 2022-08-07 RX ADMIN — ZOLPIDEM TARTRATE 5 MG: 5 TABLET ORAL at 02:27

## 2022-08-07 RX ADMIN — LISINOPRIL 2.5 MG: 2.5 TABLET ORAL at 12:05

## 2022-08-07 RX ADMIN — POTASSIUM CHLORIDE 20 MEQ: 1500 TABLET, EXTENDED RELEASE ORAL at 12:07

## 2022-08-07 RX ADMIN — ZOLPIDEM TARTRATE 5 MG: 5 TABLET ORAL at 23:40

## 2022-08-07 ASSESSMENT — ACTIVITIES OF DAILY LIVING (ADL)
ADLS_ACUITY_SCORE: 18
ADLS_ACUITY_SCORE: 35
ADLS_ACUITY_SCORE: 18

## 2022-08-07 NOTE — PHARMACY-ADMISSION MEDICATION HISTORY
Pharmacy Medication History  Admission medication history interview status for the 8/6/2022  admission is complete. See EPIC admission navigator for prior to admission medications     Location of Interview: Patient room  Medication history sources: Patient, Surescripts and Care Everywhere    Significant changes made to the medication list:  Added coQ10, omega-3, calcium, B complex, turmeric, folate, potassium  Removed buspirone 5 mg TID (prescribed and filled 3/2022 #90ds with no refills, patient stated trying this medication but found it ineffective, stopped taking >1 month ago)   Specified dosing/product of cannabis     In the past week, patient estimated taking medication this percent of the time: greater than 90%    Additional medication history information:   Patient is reliable historian with names of medications - did not know doses of OTC products. Patient states recently, naproxen use has decreased, and she finds she only needs evening dose scheduled, has been two weeks since needing daytime dose for sciatica pain.     Medication reconciliation completed by provider prior to medication history? partially    Time spent in this activity: 20 minutes    Prior to Admission medications    Medication Sig Last Dose Taking? Auth Provider Long Term End Date   B Complex Vitamins (VITAMIN-B COMPLEX PO) Take 1 tablet by mouth daily Unknown tablet strength 8/5/2022 at AM Yes Unknown, Entered By History     CALCIUM PO Take 1 tablet by mouth daily Unknown tablet strength 8/5/2022 at AM Yes Unknown, Entered By History     Coenzyme Q10 (CO Q 10 PO) Take 1 capsule by mouth daily Unknown capsule strength 8/5/2022 at AM Yes Unknown, Entered By History     estradiol-norethindrone (COMBIPATCH) 0.05-0.14 MG/DAY bi-weekly patch Place 1 patch onto the skin twice a week 8/4/2022 at applied, remains currently applied Yes Alie De La Cruz PA-C Yes    Folic Acid (FOLATE PO) Take 1 tablet by mouth daily Unknown tablet strength 8/5/2022 at  AM Yes Unknown, Entered By History     medical cannabis (Patient's own supply) See Admin Instructions (The purpose of this order is to document that the patient reports taking medical cannabis.  This is not a prescription, and is not used to certify that the patient has a qualifying medical condition.)    Patient uses an extended release liquid formulation overnight for sleep; takes if experiencing sciatica pain (does not take if taking Ambien PRN for sleep) Past Week at PRN Yes Reported, Patient     metroNIDAZOLE (METROGEL) 1 % external gel APPLY TOPICALLY DAILY 8/6/2022 at AM Yes Alie De La Cruz PA-C     naproxen (NAPROSYN) 500 MG tablet Take 1 tablet (500 mg) by mouth 2 times daily (with meals) 8/5/2022 at PM Yes Alie De La Cruz PA-C     OMEGA-3 FATTY ACIDS PO Take 1 capsule by mouth daily Unknown capsule strength 8/5/2022 at AM Yes Unknown, Entered By History     omeprazole (PRILOSEC) 20 MG DR capsule TAKE 1 CAPSULE BY MOUTH ONE TIME A DAY. TAKES AS NEEDED FOR HEARTBURN Past Month at 2 weeks ago Yes Alie De La Cruz PA-C     POTASSIUM PO Take 1 tablet by mouth daily Unknown tablet strength 8/5/2022 at AM Yes Unknown, Entered By History     triamcinolone (KENALOG) 0.1 % external cream Apply topically 2 times daily as needed for irritation Past Week at PRN Yes Alie De La Cruz PA-C     TURMERIC PO Take 1 tablet by mouth daily Unknown tablet strength 8/5/2022 at AM Yes Unknown, Entered By History     zolpidem (AMBIEN) 5 MG tablet TAKE ONE TABLET BY MOUTH NIGHTLY AS NEEDED FOR SLEEP 8/6/2022 at overnight Yes Alie De La Cruz PA-C         The information provided in this note is only as accurate as the sources available at the time of update(s)   Shy Sims, RonalD

## 2022-08-07 NOTE — PROGRESS NOTES
Date: August 7, 2022  Shift: [id number]  Name: Audrey Ricks  Age: 62 year old  YOB: 1960  Admit Date: 8/6/2022    Reason for Admission: NSTEMI (non-ST elevated myocardial infarction) (H) [I21.4]     Cognitive/Mentation: A/Ox4. Cooperative, pleasant  Neuros/CMS: WDL    VS: VSS on RA - BP's elevated upon admission and throughout beginning of shift, BP's improving, last 128/82  Cardiac: No chest pain, tele NSR  GI: WDL  : WDL  Pulmonary: RA - LS clear  Pain: No pain reported     Lines/Drains: PIVx1 - heparin dx  Skin: WDL  Activity: Independent    Diet: Low saturated fat; NPO at midnight    Discharge: pending - plan for angio tomorrow

## 2022-08-07 NOTE — H&P
"[facility]    History and Physical - Hospitalist Servuce       Date of Admission:  8/6/2022    Assessment & Plan   Audrey Ricks is a 62 year old female with pmhx GERD, bipolar 2 disorder, obesity, HLD, and hx SVT who presents as direct adm after presenting with chest pain and palpitations found to have SVT (since converted) and NSTEMI. Transferred to [facility] from [facility] for consideration of coronary angiogram.    1. NSTEMI. Initial troponin mildly elevated and thought likely demand ischemia from acute SVT, however significant delta trending troponin from 78 -> 287-> 650.  - Monitor tele, I&Os, daily weights.  - Cont heparin drip.  - Cont ASA.  - Obtain echo.  - Trend troponin  - Appreciate cardiology consult; defer next step in evaluation to their discretion.  - A1C, lipids were done in April - see below.  - Hold off on beta blocker d/t hx intolerance and ACE-I with mild bump in Cr and variable blood pressure prior to transfer    2. Acute recurrent SVT. Converted to NSR after adenosine 12mg x 1. Failed trial metoprolol succinate 25mg d/t severe lethargy and SOB, with symptoms resolving after discontinuing the medication. She tells me she intermittently has been able to stop her SVT with a PRN dose of metoprolol but that was not possible tonight.  - Check TSH; appreciate cardiology input    3. Increased renal insuffiencey.  Baseline Cr around 1. Cr on adm 1.18.  - Recheck in AM.    4. HLD.  Lipids 4/2022:   HDL 43 .  Not on statin as prior CT CA 2019 showed calcium score of 0.    5. Bipolar 2 disorder.  - PTA Buspar and PRN Abmien.    6. GERD.  - PTA PPI.    7. Anxiety.    Asymptomatic COVID-19. Negative PCR 8/6.    Clinically Significant Risk Factors Present on Admission                    # Obesity: Estimated body mass index is 34.45 kg/m  as calculated from the following:    Height as of this encounter: 1.575 m (5' 2\").    Weight as of this encounter: 85.4 kg (188 lb " 5.3 oz).        DVT Prophylaxis: heparin drip  Code Status: Full Code    Disposition: Expected discharge in 2-3 days once cardiac evaluation completed.    Nehemias Martinez MD, PhD  Hospitalist Service    Primary Care Physician   Physician No Ref-Primary    Chief Complaint   NSTEMI    History is obtained from the patient and chart review.    History of Present Illness   Audrey Ricks is a 62 year old female with pmhx GERD, bipolar 2 disorder, obesity, HLD, and recurrent SVT who presents as direct adm after presenting with chest pain and palpitations found to have SVT (since converted with adenosine 12mg x 1) and NSTEMI with rising troponin despite cessation of arrhythmia. Transferred to [facility] for consideration of coronary angiogram.    Evaluated at bedside. Patient tells me that she has been having intermittent symptoms of SVT for several years now. She has had 4 episodes in her life she had to present to the ER for adenosine but intermittenly has been able to control them with vagal maneuvers. She notes that she went into SVT this morning and today it was associated with chest pressure that was relieved when she had it broke in the ER. She did not get this chest pressure with her usual workouts including swimming. She notes that she has an outpatient consult for ablation but it is later this month. In the ER she was found to have elevated troponin prior to transfer and was started on heparin. She has no symptoms now aside from feeling restless. No fevers, chills, chest pain, SOB, abdominal pain, nausea, vomiting, or diarrhea.    PAST MEDICAL HISTORY  Past Medical History:   Diagnosis Date     Depressive disorder 2011     Hypertension      NSTEMI (non-ST elevated myocardial infarction) (H) 8/6/2022       PAST SURGICAL HISTORY  Labioplasty.  Blephroplasty.    HOME MEDICATIONS  Prior to Admission medications    Medication Sig Last Dose Taking? Auth Provider Long Term End Date   busPIRone (BUSPAR) 5 MG tablet TAKE 1  "TABLET (5 MG) BY MOUTH 3 TIMES DAILY FOR ANXIETY  Patient not taking: Reported on 6/30/2022   Alie De La Cruz PA-C Yes    estradiol-norethindrone (COMBIPATCH) 0.05-0.14 MG/DAY bi-weekly patch Place 1 patch onto the skin twice a week   Alie De La Cruz PA-C Yes    medical cannabis (Patient's own supply) See Admin Instructions (The purpose of this order is to document that the patient reports taking medical cannabis.  This is not a prescription, and is not used to certify that the patient has a qualifying medical condition.)   Reported, Patient     metroNIDAZOLE (METROGEL) 1 % external gel APPLY TOPICALLY DAILY   Alie De La Cruz PA-C     naproxen (NAPROSYN) 500 MG tablet Take 1 tablet (500 mg) by mouth 2 times daily (with meals)   Alie De La Cruz PA-C     omeprazole (PRILOSEC) 20 MG DR capsule TAKE 1 CAPSULE BY MOUTH ONE TIME A DAY. TAKES AS NEEDED FOR HEARTBURN   Alie De La Cruz PA-C     triamcinolone (KENALOG) 0.1 % external cream Apply topically 2 times daily as needed for irritation   Alie De La Cruz PA-C     zolpidem (AMBIEN) 5 MG tablet TAKE ONE TABLET BY MOUTH NIGHTLY AS NEEDED FOR SLEEP   Alie De La Cruz PA-C         ALLERGIES  Allergies   Allergen Reactions     Amlodipine Shortness Of Breath and Swelling       SOCIAL HISTORY  Former smoker, with less than 5 pack year history and quit in 1980.    FAMILY HISTORY  Mother: CV disease, diabetes.  Father: HTN, kidney disease.    REVIEW OF SYSTEMS  A 10 point ROS was negative other than the symptoms noted above in the HPI.    Physical Exam   Nursing Notes Reviewed.  BP (!) 169/119 (BP Location: Right arm)   Pulse 84   Temp 98.7  F (37.1  C) (Oral)   Resp 16   Ht 1.575 m (5' 2\")   Wt 85.4 kg (188 lb 5.3 oz)   SpO2 96%   BMI 34.45 kg/m       General Appearance: Well appearing, no distress  Eyes: JESSICA, EOMI  HEENT: NC, AT. MMM.   Respiratory: CTAB, normal work of breathing  Cardiovascular: Regular Rate and Rhythm, normal S1, S2. No murmurs, rubs, gallops  GI: " Soft, non-tender, non-distended  Lymph/Hematologic: No asymetric swelling, edema. No bruising.  Genitourinary: Deferred  Skin: No rashes, lesions, wounds.  Musculoskeletal: Warm, well perfused  Neurologic: AOx4, CNII-XII intact.  Psychiatric: Euthymic. Mood appropriate.     Data   Data reviewed today:  I reviewed all medications, new labs and imaging results over the last 24 hours. I personally reviewed the EKG tracing showing SVT with ST depression in V3-V6.  Recent Labs   Lab 08/06/22  1049   WBC 10.3   HGB 13.2   MCV 88         POTASSIUM 4.3   CHLORIDE 109   CO2 24   BUN 18   CR 1.18*   ANIONGAP 5   YAJAIRA 9.1   *       Imaging:  No results found for this or any previous visit (from the past 24 hour(s)).

## 2022-08-07 NOTE — PLAN OF CARE
Goal Outcome Evaluation:  Overnight admission transfer from [facility] ED with iv heparin 1050 units/hr to [facility].  A&O x4, VSS ex elevated BP, MD aware.  on RA.Tele SR. Trending trop 78->287->650->438, MD aware. Ambulates independently to bathroom. Denies SOB/CP. Plans for Echo  & cardiology consult.

## 2022-08-07 NOTE — CONSULTS
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 Ricks is a pleasant 62 years old female with known history of SVT with prior presentation to the emergency department in February 2022 with recurrent SVT requiring adenosine.  She was recently seen as an outpatient in June of this year and ablation was discussed.  Did not tolerate beta-blocker in the past.  She is now presenting with chest pain and palpitation.  She initially presented to [facility] from [facility] and was found to have SVT with a heart rate of 188 which converted after adenosine 12 mg x 1.  Her initial troponin was 78 -> 287-> 650 > 403.  He was transferred to [facility] for consideration of angiogram.  She was initiated on heparin drip.    Patient is currently chest pain-free, no indication for urgent angiography. Presentation could be demand 2/2 SVT, however, reports chest pain is worse this time and lasted longer, with degree of troponin elevation, needs ischemic evaluation.     Plan  -Continue IV heparin  -TTE  -Aspirin 81, high intensity statin with Lipitor 40  -She previously had intolerance to Toprol specifically, can consider alternative agent bisoprolol 2.5 mg daily  -Her creatinine is at baseline, remains hypertensive we will initiate low-dose lisinopril 2.5 mg  -N.p.o. after midnight for coronary angiogram in the morning  -Outpatient follow-up with EP for consideration of ablation, has follow up on the 18th of August.     Thank you for allowing our team to participate in the care of Audrey Ricks. Please do not hesitate to page me with any questions or concerns.    Rosalina Díaz MD on 8/7/2022 at 11:27 AM  Cardiology Fellow  PGY-[id number]    Voice recognition software utilized.          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    From a cardiovascular standpoint, she has known history of SVT with prior presentation to the emergency department in February 2022 with recurrent SVT requiring adenosine.  She was recently seen as an outpatient in June of this year and ablation was discussed.  Did not tolerate beta-blocker in the past.  She is now presenting with chest pain and palpitation.  She initially presented to [facility] from [facility] and was found to have SVT with a heart rate of 188 which converted after adenosine 12 mg x 1.  Her initial troponin was 78 -> 287-> 650 > 403.  He was transferred to [facility] for consideration of angiogram.  She was initiated on heparin drip.    She is currently chest pain-free, resting comfortably in bed.         Past Medical History:   I have reviewed this patient's past medical history  The ASCVD Risk score (Diamond RENEA Jr., et al., 2013) failed to calculate for the following reasons:    The patient has a prior MI or stroke diagnosis  Past Medical History:   Diagnosis Date     Depressive disorder 2011     Hypertension      NSTEMI (non-ST elevated myocardial infarction) (H) 8/6/2022           Past Surgical History:   I have reviewed this patient's past surgical history   No past surgical history on file.          Social History:   I have reviewed this patient's social history  Social History     Tobacco Use     Smoking status: Never Smoker     Smokeless tobacco: Never Used   Substance Use Topics     Alcohol use: Yes             Family History:   I have reviewed this patient's family history  Family History   Problem Relation Age of Onset     Diabetes Mother              Allergies:   I have reviewed this  patient's allergy history  Allergies   Allergen Reactions     Amlodipine Shortness Of Breath and Swelling             Medications reviewed:   Prior to admission medications:  Prior to Admission medications    Medication Sig Start Date End Date Taking? Authorizing Provider   B Complex Vitamins (VITAMIN-B COMPLEX PO) Take 1 tablet by mouth daily Unknown tablet strength   Yes Unknown, Entered By History   CALCIUM PO Take 1 tablet by mouth daily Unknown tablet strength   Yes Unknown, Entered By History   Coenzyme Q10 (CO Q 10 PO) Take 1 capsule by mouth daily Unknown capsule strength   Yes Unknown, Entered By History   estradiol-norethindrone (COMBIPATCH) 0.05-0.14 MG/DAY bi-weekly patch Place 1 patch onto the skin twice a week 4/28/22  Yes Alie De La Cruz PA-C   Folic Acid (FOLATE PO) Take 1 tablet by mouth daily Unknown tablet strength   Yes Unknown, Entered By History   medical cannabis (Patient's own supply) See Admin Instructions (The purpose of this order is to document that the patient reports taking medical cannabis.  This is not a prescription, and is not used to certify that the patient has a qualifying medical condition.)    Patient uses an extended release liquid formulation overnight for sleep; takes if experiencing sciatica pain (does not take if taking Ambien PRN for sleep)   Yes Reported, Patient   metroNIDAZOLE (METROGEL) 1 % external gel APPLY TOPICALLY DAILY 2/7/22  Yes Alie De La Cruz PA-C   naproxen (NAPROSYN) 500 MG tablet Take 1 tablet (500 mg) by mouth 2 times daily (with meals) 8/1/22  Yes Alie De La Cruz PA-C   OMEGA-3 FATTY ACIDS PO Take 1 capsule by mouth daily Unknown capsule strength   Yes Unknown, Entered By History   omeprazole (PRILOSEC) 20 MG DR capsule TAKE 1 CAPSULE BY MOUTH ONE TIME A DAY. TAKES AS NEEDED FOR HEARTBURN 4/27/22  Yes Alie De La Cruz PA-C   POTASSIUM PO Take 1 tablet by mouth daily Unknown tablet strength   Yes Unknown, Entered By History   triamcinolone (KENALOG) 0.1 %  external cream Apply topically 2 times daily as needed for irritation 4/28/21  Yes Alie De La Cruz PA-C   TURMERIC PO Take 1 tablet by mouth daily Unknown tablet strength   Yes Unknown, Entered By History   zolpidem (AMBIEN) 5 MG tablet TAKE ONE TABLET BY MOUTH NIGHTLY AS NEEDED FOR SLEEP 7/26/22  Yes Alie De La Cruz PA-C      Current medications:  Current Facility-Administered Medications Ordered in Epic   Medication Dose Route Frequency Last Rate Last Admin     acetaminophen (TYLENOL) tablet 650 mg  650 mg Oral Q6H PRN        Or     acetaminophen (TYLENOL) Suppository 650 mg  650 mg Rectal Q6H PRN         aspirin (ASA) chewable tablet 81 mg  81 mg Oral Daily         atorvastatin (LIPITOR) tablet 40 mg  40 mg Oral Daily         bisacodyl (DULCOLAX) suppository 10 mg  10 mg Rectal Daily PRN         heparin 25,000 units in 0.45% NaCl 250 mL ANTICOAGULANT infusion  0-5,000 Units/hr Intravenous Continuous 10.5 mL/hr at 08/07/22 0739 1,050 Units/hr at 08/07/22 0739     HOLD: nitroGLYcerin IF   Does not apply HOLD         lidocaine (LMX4) cream   Topical Q1H PRN         lidocaine 1 % 0.1-1 mL  0.1-1 mL Other Q1H PRN         medication instruction   Does not apply Continuous PRN         melatonin tablet 1 mg  1 mg Oral At Bedtime PRN         ondansetron (ZOFRAN ODT) ODT tab 4 mg  4 mg Oral Q6H PRN        Or     ondansetron (ZOFRAN) injection 4 mg  4 mg Intravenous Q6H PRN         pantoprazole (PROTONIX) EC tablet 40 mg  40 mg Oral QAM AC         Patient is already receiving anticoagulation with heparin, enoxaparin (LOVENOX), warfarin (COUMADIN)  or other anticoagulant medication   Does not apply Continuous PRN         polyethylene glycol (MIRALAX) Packet 17 g  17 g Oral Daily PRN         potassium chloride ER (KLOR-CON M) CR tablet 20 mEq  20 mEq Oral Once         prochlorperazine (COMPAZINE) injection 10 mg  10 mg Intravenous Q6H PRN        Or     prochlorperazine (COMPAZINE) tablet 10 mg  10 mg Oral Q6H PRN        Or      prochlorperazine (COMPAZINE) suppository 25 mg  25 mg Rectal Q12H PRN         Reason ACE/ARB/ARNI order not selected   Other DOES NOT GO TO MAR         Reason beta blocker not prescribed   Does not apply DOES NOT GO TO MAR         senna-docusate (SENOKOT-S/PERICOLACE) 8.6-50 MG per tablet 1 tablet  1 tablet Oral BID PRN        Or     senna-docusate (SENOKOT-S/PERICOLACE) 8.6-50 MG per tablet 2 tablet  2 tablet Oral BID PRN         sodium chloride (PF) 0.9% PF flush 3 mL  3 mL Intracatheter Q8H         sodium chloride (PF) 0.9% PF flush 3 mL  3 mL Intracatheter q1 min prn         zolpidem (AMBIEN) tablet 5 mg  5 mg Oral At Bedtime PRN   5 mg at 22 0227     No current [facility]-ordered outpatient medications on file.             Review of Systems:   A comprehensive 12 system review of systems was carried out.  Pertinent positives and negatives are noted above. Otherwise negative for contributory information.         Physical Exam:   Vital signs were personally reviewed:  Temperatures:  Current - Temp: 98.6  F (37  C); Max - Temp  Av.7  F (37.1  C)  Min: 98.6  F (37  C)  Max: 98.7  F (37.1  C)  Respiration range: Resp  Av.6  Min: 8  Max: 21  Pulse range: Pulse  Av.2  Min: 67  Max: 92  Blood pressure range: Systolic (24hrs), Av , Min:106 , Max:169   ; Diastolic (24hrs), Av, Min:89, Max:119    Pulse oximetry range: SpO2  Av.7 %  Min: 92 %  Max: 100 %    Intake/Output Summary (Last 24 hours) at 2022 1119  Last data filed at 2022 0700  Gross per 24 hour   Intake 66 ml   Output --   Net 66 ml     188 lbs 5.28 oz  Body mass index is 34.45 kg/m .   Body surface area is 1.93 meters squared.    Constitutional: appears stated age, in no apparent distress, appears to be well nourished  Head: normocephalic, atraumatic  Neck: supple, trachea midline, no bruit bilaterally   Pulmonary: clear to auscultation bilaterally, no wheezes, no rales, no increased work of breathing  Cardiovascular: JVP  normal, regular rate, regular rhythm, normal S1 and S2, no S3, S4, no murmur appreciated, no lower extremity edema  Gastrointestinal: no guarding, non-rigid   Neurologic: awake, alert, moves all extremities  Skin: no jaundice, warm on limited exam  Psychiatric: affect is normal, answers questions appropriately, oriented to self and place         Laboratory tests:   Laboratory tests personally reviewed:   CMP  Recent Labs   Lab 08/07/22  0601 08/06/22  1049    138   POTASSIUM 3.5 4.3   CHLORIDE 109 109   CO2 26 24   ANIONGAP 4 5   GLC 94 168*   BUN 17 18   CR 1.00 1.18*   GFRESTIMATED 64 53*   YAJAIRA 8.3* 9.1   MAG  --  2.3     CBC  Recent Labs   Lab 08/06/22  1049   WBC 10.3   RBC 4.73   HGB 13.2   HCT 41.4   MCV 88   MCH 27.9   MCHC 31.9   RDW 14.4        INRNo lab results found in last 7 days.  No results found for: TROPI, TROPONIN, TROPR, TROPN  Recent Labs   Lab Test 08/07/22  0601 04/27/22  1056   CHOL 194 237*   HDL 39* 43*   * 148*   TRIG 212* 228*     Lab Results   Component Value Date    A1C 5.9 08/07/2022     TSH   Date Value Ref Range Status   08/07/2022 3.15 0.40 - 4.00 mU/L Final            Imaging and Additional Data:   Additional data personally reviewed:  ECG 8/6/2022, AVNRT      AST/ALT 97/50 ALT: 184 slightly trending. GI consult?  2/18 Per primary/CCU team

## 2022-08-07 NOTE — PROGRESS NOTES
[facility]    Hospitalist Progress Note    Assessment & Plan   Audrey Ricks is a 62 year old female with pmhx GERD, bipolar 2 disorder, obesity, HLD, and hx SVT who presents as direct adm after presenting with chest pain and palpitations found to have SVT (since converted) and NSTEMI. Transferred to [facility] from [facility] for consideration of coronary angiogram.     1. NSTEMI. Initial troponin mildly elevated and thought likely demand ischemia from acute SVT, however significant delta trending troponin from 78 -> 287-> 650.  Troponin 287--> 650--> 403.   Nl bmp, nl vitrals. Cbc with platelet count nl  Lipids: LdL 113, , HDL 39  HbA1C 5.9%  Feels well. No cardiopulmonary sxs when not having SVT  Was scheduled to have svt ablation with [facility] cardiology soon   No complaints this am. Nl exam.   nstemi type II secondary to demand ischemia from svt vs ACS        Plan;   - Monitor tele, I&Os, daily weights.  - Cont heparin drip.  - Cont ASA.  - Obtain echo. ? Role for stress testing at some point  -cardiology consult; defer next step in evaluation to their discretion.  - Hold off on beta blocker d/t hx intolerance and ACE-I with mild bump in Cr and variable blood pressure prior to transfer  -likely can stop heparin, wait on cardiology consult     2. Acute recurrent SVT. Converted to NSR after adenosine 12mg x 1. Failed trial metoprolol succinate 25mg d/t severe lethargy and SOB, with symptoms resolving after discontinuing the medication. She tells me she intermittently has been able to stop her SVT with a PRN dose of metoprolol but that was not possible tonight.  - Check TSH; appreciate cardiology input  -likely ablation this hospital stay. Npo for now     3. Increased renal insuffiencey.  Baseline Cr around 1. Cr on adm 1.18.  Nl bmp today.      4. HLD.  Lipids 4/2022:   HDL 43 .  Not on statin as prior CT CA 2019 showed calcium score of 0.  See am lipids from today  "above     5. Bipolar 2 disorder.  - PTA Buspar and PRN Abmien.     6. GERD.  - PTA PPI.     7. Anxiety.    8. PreDM  HbA1C 5.9% this stay. Needs pcp follow up for recheck in 3-6 months, nutrtion consult      Asymptomatic COVID-19. Negative PCR 8/6.           Clinically Significant Risk Factors Present on Admission                       # Obesity: Estimated body mass index is 34.45 kg/m  as calculated from the following:    Height as of this encounter: 1.575 m (5' 2\").    Weight as of this encounter: 85.4 kg (188 lb 5.3 oz).        DVT Prophylaxis: heparin drip  Code Status: Full Code     Disposition: Expected discharge in 2-3 days once cardiac evaluation completed.         Dann Shetty MD, MD  Text Page  (7am to 6pm)  Interval History   troponins downtrending  Feels fine. No sob or cp  No hx of cardiopulmonary sxs when not in svt.   No focal neuro sxs.       -Data reviewed today: I reviewed all new labs and imaging results over the last 24 hours. I personally reviewed labs and ekg last 24 hours.     Physical Exam   Temp: 98.7  F (37.1  C) Temp src: Oral BP: (!) 169/119 Pulse: 84   Resp: 16 SpO2: 96 % O2 Device: None (Room air)    Vitals:    08/06/22 2353   Weight: 85.4 kg (188 lb 5.3 oz)     Vital Signs with Ranges  Temp:  [97.7  F (36.5  C)-98.7  F (37.1  C)] 98.7  F (37.1  C)  Pulse:  [] 84  Resp:  [8-22] 16  BP: (106-169)/() 169/119  SpO2:  [92 %-100 %] 96 %  No intake/output data recorded.    Constitutional: In bed, nad  Respiratory: CTAB  Cardiovascular: RRR no r/g/m  GI: soft, nt, nd  Skin/Integumen: no rash or edema  Neuro; nl speech and mentation  Psych:nl affect       Medications     heparin 1,050 Units/hr (08/07/22 0020)     - MEDICATION INSTRUCTIONS -       - MEDICATION INSTRUCTIONS -       ACE/ARB/ARNI NOT PRESCRIBED       BETA BLOCKER NOT PRESCRIBED         aspirin  81 mg Oral Daily     atorvastatin  40 mg Oral Daily     omeprazole  20 mg Oral QAM AC     potassium chloride  20 mEq Oral " Once     sodium chloride (PF)  3 mL Intracatheter Q8H       Data   Recent Labs   Lab 08/07/22  0601 08/06/22  1049   WBC  --  10.3   HGB  --  13.2   MCV  --  88   PLT  --  377    138   POTASSIUM 3.5 4.3   CHLORIDE 109 109   CO2 26 24   BUN 17 18   CR 1.00 1.18*   ANIONGAP 4 5   YAJAIRA 8.3* 9.1   GLC 94 168*       Imaging:   No results found for this or any previous visit (from the past 24 hour(s)).

## 2022-08-08 ENCOUNTER — APPOINTMENT (OUTPATIENT)
Dept: CARDIOLOGY | Facility: CLINIC | Age: 62
DRG: 281 | End: 2022-08-08
Attending: PHYSICIAN ASSISTANT
Payer: MEDICARE

## 2022-08-08 VITALS
TEMPERATURE: 98.1 F | RESPIRATION RATE: 20 BRPM | BODY MASS INDEX: 34.34 KG/M2 | SYSTOLIC BLOOD PRESSURE: 110 MMHG | HEART RATE: 72 BPM | HEIGHT: 62 IN | OXYGEN SATURATION: 100 % | WEIGHT: 186.6 LBS | DIASTOLIC BLOOD PRESSURE: 79 MMHG

## 2022-08-08 LAB
CREAT SERPL-MCNC: 1.14 MG/DL (ref 0.52–1.04)
GFR SERPL CREATININE-BSD FRML MDRD: 55 ML/MIN/1.73M2
UFH PPP CHRO-ACNC: 0.43 IU/ML
UFH PPP CHRO-ACNC: 0.46 IU/ML

## 2022-08-08 PROCEDURE — 250N000013 HC RX MED GY IP 250 OP 250 PS 637: Performed by: PHYSICIAN ASSISTANT

## 2022-08-08 PROCEDURE — 99239 HOSP IP/OBS DSCHRG MGMT >30: CPT | Performed by: HOSPITALIST

## 2022-08-08 PROCEDURE — 85520 HEPARIN ASSAY: CPT | Performed by: HOSPITALIST

## 2022-08-08 PROCEDURE — 250N000013 HC RX MED GY IP 250 OP 250 PS 637: Performed by: STUDENT IN AN ORGANIZED HEALTH CARE EDUCATION/TRAINING PROGRAM

## 2022-08-08 PROCEDURE — 250N000011 HC RX IP 250 OP 636: Performed by: HOSPITALIST

## 2022-08-08 PROCEDURE — 75574 CT ANGIO HRT W/3D IMAGE: CPT | Mod: MG

## 2022-08-08 PROCEDURE — 36415 COLL VENOUS BLD VENIPUNCTURE: CPT | Performed by: HOSPITALIST

## 2022-08-08 PROCEDURE — 82565 ASSAY OF CREATININE: CPT | Performed by: PHYSICIAN ASSISTANT

## 2022-08-08 PROCEDURE — 99233 SBSQ HOSP IP/OBS HIGH 50: CPT | Mod: 25 | Performed by: INTERNAL MEDICINE

## 2022-08-08 PROCEDURE — G1010 CDSM STANSON: HCPCS | Performed by: INTERNAL MEDICINE

## 2022-08-08 PROCEDURE — 250N000013 HC RX MED GY IP 250 OP 250 PS 637: Performed by: HOSPITALIST

## 2022-08-08 PROCEDURE — B2111ZZ FLUOROSCOPY OF MULTIPLE CORONARY ARTERIES USING LOW OSMOLAR CONTRAST: ICD-10-PCS | Performed by: INTERNAL MEDICINE

## 2022-08-08 PROCEDURE — G1010 CDSM STANSON: HCPCS

## 2022-08-08 PROCEDURE — 75574 CT ANGIO HRT W/3D IMAGE: CPT | Mod: 26 | Performed by: INTERNAL MEDICINE

## 2022-08-08 PROCEDURE — 36415 COLL VENOUS BLD VENIPUNCTURE: CPT | Performed by: PHYSICIAN ASSISTANT

## 2022-08-08 RX ORDER — METOPROLOL TARTRATE 50 MG
50-100 TABLET ORAL
Status: DISCONTINUED | OUTPATIENT
Start: 2022-08-08 | End: 2022-08-08 | Stop reason: HOSPADM

## 2022-08-08 RX ORDER — NITROGLYCERIN 0.4 MG/1
0.4 TABLET SUBLINGUAL
Status: DISCONTINUED | OUTPATIENT
Start: 2022-08-08 | End: 2022-08-08 | Stop reason: HOSPADM

## 2022-08-08 RX ORDER — BISOPROLOL FUMARATE 5 MG/1
2.5 TABLET, FILM COATED ORAL DAILY
Qty: 30 TABLET | Refills: 0 | Status: SHIPPED | OUTPATIENT
Start: 2022-08-09 | End: 2023-05-05

## 2022-08-08 RX ORDER — IOPAMIDOL 755 MG/ML
110 INJECTION, SOLUTION INTRAVASCULAR ONCE
Status: COMPLETED | OUTPATIENT
Start: 2022-08-08 | End: 2022-08-08

## 2022-08-08 RX ADMIN — ATORVASTATIN CALCIUM 40 MG: 40 TABLET, FILM COATED ORAL at 09:59

## 2022-08-08 RX ADMIN — ASPIRIN 81 MG CHEWABLE TABLET 81 MG: 81 TABLET CHEWABLE at 09:59

## 2022-08-08 RX ADMIN — PANTOPRAZOLE SODIUM 40 MG: 40 TABLET, DELAYED RELEASE ORAL at 09:59

## 2022-08-08 RX ADMIN — IOPAMIDOL 110 ML: 755 INJECTION, SOLUTION INTRAVENOUS at 13:05

## 2022-08-08 RX ADMIN — METRONIDAZOLE: 10 GEL TOPICAL at 09:56

## 2022-08-08 RX ADMIN — NITROGLYCERIN 0.4 MG: 0.4 TABLET SUBLINGUAL at 12:47

## 2022-08-08 RX ADMIN — BISOPROLOL FUMARATE 2.5 MG: 5 TABLET ORAL at 09:59

## 2022-08-08 ASSESSMENT — ACTIVITIES OF DAILY LIVING (ADL)
ADLS_ACUITY_SCORE: 18

## 2022-08-08 NOTE — PROGRESS NOTES
[facility]  Cardiology Progress Note    Outpatient cardiologist: Dr. Cuellar  Date of Service (when I saw the patient): 08/08/2022  Summary: Audrey Ricks is a 62 year old female with history of SVT who was admitted on 8/6/2022 with SVT and chest discomfort.  Interval History   No further chest pain since admission. She is very active typically riding her bike, doing yoga, and walking without any symptoms of chest discomfort or shortness of breath. Recently, she has noticed more frequent palpitations (1x week) although she tells me she is typically able to breath through these episodes with improvement.  Assessment & Plan   1.  Recurrent SVT. Hx SVT, likely AVNRT.   -  Treated with Adenosine in the ED with conversion to NSR.   -  Has been evaluated as an outpatient with EP consultation scheduled on 8/18/22 to discuss ablation.   2.  Chest pain, mild troponin elevation. Developed chest pain during SVT episode. No further pain since admission and no anginal symptoms PTA.   -  Troponin initially 78, peaked at 650.    -  Started on IV heparin, aspirin, and statin.  -  Lipid panel with , , HDL 39, total cholesterol 194.    Plan:  1.  Discussed options for further ischemic evaluation today given her elevated troponin - will proceed with CT coronary angiogram today.  2.  Started on low dose Bisoprolol daily given intolerance to Metoprolol (fatigue).  3.  Low dose lisinopril started yesterday for hypertension - BPs are well controlled this morning. Will stop and monitor.   4.  EP follow up as scheduled to discuss ablation given recurrent SVT.  5.  Further recommendations pending results of her CT coronary angiogram.     Addendum:  CT coronary angiogram with calcium score of 0 and no detectable stenosis or plaque. Okay to discharge from a cardiology perspective with close outpatient follow up. Please call with questions.     Oanh Coy PA-C, GALO    Physical Exam   Temp: 98.1  F  (36.7  C) Temp src: Oral BP: 110/79 Pulse: 72   Resp: 20 SpO2: 100 % O2 Device: None (Room air)    Vitals:    22 2353 22 0544   Weight: 85.4 kg (188 lb 5.3 oz) 84.6 kg (186 lb 9.6 oz)     Vital Signs with Ranges  Temp:  [98.1  F (36.7  C)-99  F (37.2  C)] 98.1  F (36.7  C)  Pulse:  [60-72] 72  Resp:  [16-20] 20  BP: (110-133)/(79-93) 110/79  SpO2:  [95 %-100 %] 100 %   0700 -  0659  In: 546 [P.O.:486; I.V.:60]  Out: -   Net: 546  Constitutional: NAD.   Respiratory: CTAB.   Cardiovascular: RRR, s1s2, no murmur  GI: soft, BS+  Skin: warm, no rashes  Musculoskeletal: Moving all extremities  Neurologic: Alert, oriented x 3  Neuropsychiatric: Normal affect   Data   Results for orders placed or performed during the hospital encounter of 22 (from the past 24 hour(s))   Troponin I (STAT q4h x3)   Result Value Ref Range    Troponin I High Sensitivity 337 (HH) <54 ng/L   Heparin Unfractionated Anti Xa Level   Result Value Ref Range    Anti Xa Unfractionated Heparin 0.52 For Reference Range, See Comment IU/mL    Narrative    Therapeutic Range: UFH: 0.25-0.50 IU/mL for low intensity dosing,  0.30-0.70 IU/mL for high intensity dosing DVT and PE.  This test is not validated for other direct factor X inhibitors (e.g. rivaroxaban, apixaban, edoxaban, betrixaban, fondaparinux) and should not be used for monitoring of other medications.   Echocardiogram Complete   Result Value Ref Range    LVEF  55-60%     Narrative    [id number]  [id number]  [id number]  [id number]^BARTHELL^TONO^EDWARD FIGUEROA     [facility]  Echocardiography Laboratory  [address] [place] [address]     Name: JULISA GUPTA  MRN: [id number]  : 1960  Study Date: 2022 01:41 PM  Age: 62 yrs  Gender: Female  Patient Location: [facility]  Reason For Study: Chest Pain, Chest Tightness, Chest Pressure, Paroxsysmal SVT  Ordering Physician: BARTHELL, JOANNA KERSTEN ULMEN  Referring Physician: NONE  Performed By:  Nicolasa Quezada RDCS     BSA: 1.9 m2  Height: 62 in  Weight: 188 lb  HR: 72  BP: 128/90 mmHg  ______________________________________________________________________________  Procedure  Complete Portable Echo Adult.  ______________________________________________________________________________  Interpretation Summary     Left ventricular size, global systolic function, and wall motion are normal,  estimated LVEF 55-60%.  Right ventricular global function is normal.  No significant valvular abnormalities.     There are no prior studies available for comparison.  ______________________________________________________________________________  Left Ventricle  The left ventricle is normal in size. There is normal left ventricular wall  thickness. Left ventricular systolic function is normal. The visual ejection  fraction is 55-60%. Left ventricular diastolic function is normal. No regional  wall motion abnormalities noted.     Right Ventricle  The right ventricle is normal in structure, function and size.     Atria  Normal left atrial size. Right atrial size is normal. Cannot exclude small  interatrial shunt on color Doppler interrogation of the interatrial septum.     Mitral Valve  There is mild mitral annular calcification. There is trace mitral  regurgitation.     Tricuspid Valve  The tricuspid valve is not well visualized, but is grossly normal. There is  trace tricuspid regurgitation.     Aortic Valve  There is mild trileaflet aortic sclerosis. No aortic regurgitation is present.  No hemodynamically significant valvular aortic stenosis.     Pulmonic Valve  The pulmonic valve is not well seen, but is grossly normal.     Vessels  The aortic root is normal size. Normal size ascending aorta. The inferior vena  cava was normal in size with preserved respiratory variability.     Pericardium  There is no pericardial effusion. Prominent pericardial/epicardial fat  present.      ______________________________________________________________________________  MMode/2D Measurements & Calculations  IVSd: 1.1 cm  LVIDd: 3.7 cm  LVIDs: 1.8 cm  LVPWd: 1.1 cm  FS: 51.5 %  LV mass(C)d: 128.4 grams  LV mass(C)dI: 69.0 grams/m2     Ao root diam: 2.9 cm  LA dimension: 3.4 cm  asc Aorta Diam: 3.2 cm  LA/Ao: 1.2  LA Volume (BP): 29.3 ml  LA Volume Index (BP): 15.8 ml/m2  RWT: 0.62     Doppler Measurements & Calculations  MV E max derrick: 61.1 cm/sec  MV A max derrick: 99.9 cm/sec  MV E/A: 0.61  MV dec time: 0.25 sec     PA acc time: 0.12 sec  E/E' av.3  Lateral E/e': 7.8  Medial E/e': 10.7     ______________________________________________________________________________  Report approved by: Leroy Nova 2022 03:41 PM         Heparin Unfractionated Anti Xa Level   Result Value Ref Range    Anti Xa Unfractionated Heparin 0.18 For Reference Range, See Comment IU/mL    Narrative    Therapeutic Range: UFH: 0.25-0.50 IU/mL for low intensity dosing,  0.30-0.70 IU/mL for high intensity dosing DVT and PE.  This test is not validated for other direct factor X inhibitors (e.g. rivaroxaban, apixaban, edoxaban, betrixaban, fondaparinux) and should not be used for monitoring of other medications.   Heparin Unfractionated Anti Xa Level   Result Value Ref Range    Anti Xa Unfractionated Heparin 0.43 For Reference Range, See Comment IU/mL    Narrative    Therapeutic Range: UFH: 0.25-0.50 IU/mL for low intensity dosing,  0.30-0.70 IU/mL for high intensity dosing DVT and PE.  This test is not validated for other direct factor X inhibitors (e.g. rivaroxaban, apixaban, edoxaban, betrixaban, fondaparinux) and should not be used for monitoring of other medications.       Medications     heparin 1,000 Units/hr (22)     - MEDICATION INSTRUCTIONS -       - MEDICATION INSTRUCTIONS -       ACE/ARB/ARNI NOT PRESCRIBED       BETA BLOCKER NOT PRESCRIBED         aspirin  81 mg Oral Daily     atorvastatin  40 mg Oral  Daily     bisoprolol  2.5 mg Oral Daily     metroNIDAZOLE   Topical Daily     pantoprazole  40 mg Oral QAM AC     sodium chloride (PF)  3 mL Intracatheter Q8H

## 2022-08-08 NOTE — PROGRESS NOTES
[facility]  Hospitalist Progress Note    When I evaluated patient: 08/08/2022    Assessment & Plan   Audrey Ricks is a 62 year old female with pmhx GERD, bipolar 2 disorder, obesity, HLD, and hx SVT who presented as direct admission after presenting with chest pain and palpitations, found to have SVT (since converted) and elevated troponin. Transferred to [facility] from [facility] for cardiology evaluation and consideration of coronary angiogram.     NSTEMI. Initial troponin mildly elevated and thought likely demand ischemia from acute SVT, however significant delta trending troponin from 78 upto 650 and then trended down.  VSS, CBC BMP unremarkable.  - Lipids: LdL 113, , HDL 39. HbA1C 5.9%  -Remains on heparin drip  -Cardiology consulted, plan is coronary angiogram today.  -Continue aspirin.  On Lipitor 40 Mg daily  -TTE shows normal LVEF and no wall motion abnormality.   -Patient is intolerant to beta-blocker, reports when her blood pressure comes down to 60s-70s, she feels fatigued.  Bisoprolol and lisinopril was restarted desisted, monitoring off lisinopril now.     Recurrent SVT. Converted to NSR after adenosine 12mg x 1. Failed trial metoprolol succinate 25mg d/t severe lethargy and SOB, with symptoms resolving after discontinuing the medication. She stated she intermittently has been able to stop her SVT with a PRN dose of metoprolol but that was not possible tonight.  -TSH normal  -Echo with normal LVEF  -Bisoprolol per cardiology  -Has follow-up with EP (Dr Wolfe) for ablation 8/18      Hyperlipidemia  Lipids 4/2022:   HDL 43 .  Not on statin as prior CT CA 2019 showed calcium score of 0.  -On statin     Bipolar 2 disorder  Anxiety.  - PTA Buspar and PRN Abmien continued.     GERD.  - PTA PPI.     PreDM  HbA1C 5.9% this stay. Needs pcp follow up for recheck in 3-6 months, nutrtion consult       COVID-19 PCR Negative PCR 8/6.                       # Obesity:  "Estimated body mass index is 34.45 kg/m  as calculated from the following:    Height as of this encounter: 1.575 m (5' 2\").    Weight as of this encounter: 85.4 kg (188 lb 5.3 oz).        DVT Prophylaxis: heparin drip  Code Status: Full Code     Disposition: Expected discharge: pending coronary angiogram and finding, likely later today or tomorrow.  Care plan discussed with patient and nursing staff.         Vandana Sutton MD    Hospitalist    Interval History      Patient denies any chest pain or palpitation.  No dizziness.  No acute issues reported by nursing staff.      -Data reviewed today: I reviewed all new labs and imaging results over the last 24 hours. I personally reviewed labs and ekg last 24 hours.     Physical Exam   Temp: 98.1  F (36.7  C) Temp src: Oral BP: 110/79 Pulse: 72   Resp: 20 SpO2: 100 % O2 Device: None (Room air)    Vitals:    08/06/22 2353 08/08/22 0544   Weight: 85.4 kg (188 lb 5.3 oz) 84.6 kg (186 lb 9.6 oz)     Vital Signs with Ranges  Temp:  [98.1  F (36.7  C)-99  F (37.2  C)] 98.1  F (36.7  C)  Pulse:  [60-72] 72  Resp:  [16-20] 20  BP: (110-133)/(79-93) 110/79  SpO2:  [95 %-100 %] 100 %  I/O last 3 completed shifts:  In: 486 [P.O.:486]  Out: -     General: AAOx3, pleasant, appears comfortable.  HEENT: PERRLA EOMI. Mucosa moist.   Lungs: Bilateral equal air entry. Clear to auscultation, normal work of breathing.   CVS: S1S2 regular, no tachycardia or murmur.   Abdomen: Soft, NT, ND. BS heard.  MSK: No edema or deformities.  Neuro: AAOX3. CN 2-12 normal. Strength symmetrical.  Skin: No rash.             Medications     heparin 1,000 Units/hr (08/07/22 2042)     - MEDICATION INSTRUCTIONS -       - MEDICATION INSTRUCTIONS -       ACE/ARB/ARNI NOT PRESCRIBED       BETA BLOCKER NOT PRESCRIBED         aspirin  81 mg Oral Daily     atorvastatin  40 mg Oral Daily     bisoprolol  2.5 mg Oral Daily     estradiol-norethindrone   Transdermal Q8H [facility]     estradiol-norethindrone  1 patch " Transdermal Once per day on      metroNIDAZOLE   Topical Daily     pantoprazole  40 mg Oral QAM AC     sodium chloride (PF)  10 mL Intravenous Once     sodium chloride (PF)  3 mL Intracatheter Q8H       Data   Recent Labs   Lab 22  0601 22  1049   WBC  --  10.3   HGB  --  13.2   MCV  --  88   PLT  --  377    138   POTASSIUM 3.5 4.3   CHLORIDE 109 109   CO2 26 24   BUN 17 18   CR 1.00 1.18*   ANIONGAP 4 5   YAJAIRA 8.3* 9.1   GLC 94 168*       Imaging:   Recent Results (from the past 24 hour(s))   Echocardiogram Complete   Result Value    LVEF  55-60%    [facility]    [id number]  [id number]  [id number]  [id number]^BARTHELL^TONO^EDWARD FIGUEROA     [facility]  Echocardiography Laboratory  [address] [place] [id number]     Name: JULISA GUPTA  MRN: [id number]  : 1960  Study Date: 2022 01:41 PM  Age: 62 yrs  Gender: Female  Patient Location: [facility]  Reason For Study: Chest Pain, Chest Tightness, Chest Pressure, Paroxsysmal SVT  Ordering Physician: BARTHELL, JOANNA KERSTEN ULMEN  Referring Physician: NONE  Performed By: Nicolasa Quezada RDCS     BSA: 1.9 m2  Height: 62 in  Weight: 188 lb  HR: 72  BP: 128/90 mmHg  ______________________________________________________________________________  Procedure  Complete Portable Echo Adult.  ______________________________________________________________________________  Interpretation Summary     Left ventricular size, global systolic function, and wall motion are normal,  estimated LVEF 55-60%.  Right ventricular global function is normal.  No significant valvular abnormalities.     There are no prior studies available for comparison.  ______________________________________________________________________________  Left Ventricle  The left ventricle is normal in size. There is normal left ventricular wall  thickness. Left ventricular systolic function is normal. The visual ejection  fraction is 55-60%. Left ventricular  diastolic function is normal. No regional  wall motion abnormalities noted.     Right Ventricle  The right ventricle is normal in structure, function and size.     Atria  Normal left atrial size. Right atrial size is normal. Cannot exclude small  interatrial shunt on color Doppler interrogation of the interatrial septum.     Mitral Valve  There is mild mitral annular calcification. There is trace mitral  regurgitation.     Tricuspid Valve  The tricuspid valve is not well visualized, but is grossly normal. There is  trace tricuspid regurgitation.     Aortic Valve  There is mild trileaflet aortic sclerosis. No aortic regurgitation is present.  No hemodynamically significant valvular aortic stenosis.     Pulmonic Valve  The pulmonic valve is not well seen, but is grossly normal.     Vessels  The aortic root is normal size. Normal size ascending aorta. The inferior vena  cava was normal in size with preserved respiratory variability.     Pericardium  There is no pericardial effusion. Prominent pericardial/epicardial fat  present.     ______________________________________________________________________________  MMode/2D Measurements & Calculations  IVSd: 1.1 cm  LVIDd: 3.7 cm  LVIDs: 1.8 cm  LVPWd: 1.1 cm  FS: 51.5 %  LV mass(C)d: 128.4 grams  LV mass(C)dI: 69.0 grams/m2     Ao root diam: 2.9 cm  LA dimension: 3.4 cm  asc Aorta Diam: 3.2 cm  LA/Ao: 1.2  LA Volume (BP): 29.3 ml  LA Volume Index (BP): 15.8 ml/m2  RWT: 0.62     Doppler Measurements & Calculations  MV E max derrick: 61.1 cm/sec  MV A max derrick: 99.9 cm/sec  MV E/A: 0.61  MV dec time: 0.25 sec     PA acc time: 0.12 sec  E/E' av.3  Lateral E/e': 7.8  Medial E/e': 10.7     ______________________________________________________________________________  Report approved by: Leroy Nova 2022 03:41 PM

## 2022-08-08 NOTE — PLAN OF CARE
Goal Outcome Evaluation:   A&O x4, VSS  on RA. Tele SR. Heparin infusing at 1000 units/hr. Slept well overnight after receiving Ambien. NPO since midnight. Denies any pain. Ambulates independently. Plans for cor angio today.

## 2022-08-08 NOTE — PROGRESS NOTES
A&Ox4. VS stable on RA. Tele sinus alysia in the 50s-low60s. Ind in room. Denies chest pain/SOB. CTA completed this afternoon 8/8, discharge pending results. Plan for outpt ablation 8/18 w/ follow-up.

## 2022-08-08 NOTE — DISCHARGE SUMMARY
[facility]  Hospitalist Discharge Summary      Date of Admission:  8/6/2022  Date of Discharge:  8/8/2022  Discharging Provider: Vandana Sutton MD  Discharge Service: Hospitalist Service    Discharge Diagnoses     Please refer to hospital course below    Follow-ups Needed After Discharge   Follow-up Appointments     Follow-up and recommended labs and tests       Follow up with primary care provider, Physician No Ref-Primary, within 7   days to evaluate medication change and for hospital follow- up.  The   following labs/tests are recommended: BMP in a week.               Unresulted Labs Ordered in the Past 30 Days of this Admission     No orders found from 7/7/2022 to 8/7/2022.      These results will be followed up by none    Discharge Disposition   Discharged to home  Condition at discharge: Stable      Hospital Course   Audrey Ricks is a 62 year old female with pmhx GERD, bipolar 2 disorder, obesity, HLD, and hx SVT who presented as direct admission after presenting with chest pain and palpitations, found to have SVT (since converted) and elevated troponin. Transferred to [facility] from [facility] for cardiology evaluation and consideration of coronary angiogram.     NSTEMI. Initial troponin mildly elevated and thought likely demand ischemia from acute SVT, however significant delta trending troponin from 78 upto 650 and then trended down.  VSS, CBC BMP unremarkable.  - Lipids: LdL 113, , HDL 39. HbA1C 5.9%  - Placed on ASA and heparin drip and Cardiology consulted, plan is CT coronary completed, showed calcium score 0 and no stenosis so no further recommendation per cardiology. No ASA.   -  Lipitor 40 Mg daily started on admission, not continued at discharge as no CAD.   -TTE shows normal LVEF and no wall motion abnormality.   -Patient is intolerant to beta-blocker, reports when her blood pressure comes down to 60s-70s, she feels fatigued.  Bisoprolol and lisinopril was restarted.  "Per cardiology recs, continuing bisoprolol at discharge.      Recurrent SVT. Converted to NSR after adenosine 12mg x 1. Failed trial metoprolol succinate 25mg d/t severe lethargy and SOB, with symptoms resolving after discontinuing the medication. She stated she intermittently has been able to stop her SVT with a PRN dose of metoprolol but that was not possible tonight.  -TSH normal  -Echo with normal LVEF  -Bisoprolol per cardiology  -Has follow-up with EP (Dr Wolfe) for ablation 8/18      Hyperlipidemia  Lipids 4/2022:   HDL 43 .  Not on statin as prior CT CA 2019 showed calcium score of 0, and same this stay, and so statin not continued.       Bipolar 2 disorder  Anxiety.  - PTA Buspar and PRN Abmien continued.     GERD.  - PTA PPI.     PreDM  HbA1C 5.9% this stay. Needs pcp follow up for recheck in 3-6 months, nutrtion consult       COVID-19 PCR Negative PCR 8/6.       CKD stage 2  Cr 1.1. received contrast for CTA. Recommend BMP at follow up              # Obesity: Estimated body mass index is 34.45 kg/m  as calculated from the following:    Height as of this encounter: 1.575 m (5' 2\").    Weight as of this encounter: 85.4 kg (188 lb 5.3 oz).        Vandana Sutton MD    Hospitalist      Consultations This Hospital Stay   CARDIAC REHAB IP CONSULT  CARDIOLOGY IP CONSULT  PHARMACY IP CONSULT  PHARMACY IP CONSULT  SMOKING CESSATION PROGRAM IP CONSULT    Code Status   Full Code    Time Spent on this Encounter   I, Vandana Sutton MD, personally saw the patient today and spent greater than 30 minutes discharging this patient.       Vandana Sutton MD  [facility]  [facility] [address]  [facility] [address]  Phone: [phone]  ______________________________________________________________________    Physical Exam   Vital Signs: Temp: 98.1  F (36.7  C) Temp src: Oral BP: 110/79 Pulse: 72   Resp: 20 SpO2: 100 % O2 Device: None (Room air)    Weight: 186 " lbs 9.6 oz    General: AAOx3, appears comfortable.  HEENT: PERRLA EOMI. Mucosa moist.   Lungs: Bilateral equal air entry. Clear to auscultation, normal work of breathing.   CVS: S1S2 regular, no tachycardia or murmur.   Abdomen: Soft, NT, ND. BS heard.  MSK: No edema or deformities.  Neuro: AAOX3. CN 2-12 normal. Strength symmetrical.  Skin: No rash.          Primary Care Physician   Physician No Ref-Primary    Discharge Orders      Reason for your hospital stay    SVT     Follow-up and recommended labs and tests     Follow up with primary care provider, Physician No Ref-Primary, within 7 days to evaluate medication change and for hospital follow- up.  The following labs/tests are recommended: BMP in a week.     Activity    Your activity upon discharge: activity as tolerated     Diet    Follow this diet upon discharge: Orders Placed This Encounter      Regular Diet Adult       Significant Results and Procedures   Most Recent 3 CBC's:Recent Labs   Lab Test 08/06/22  1049 02/21/22  1616   WBC 10.3 9.8   HGB 13.2 14.2   MCV 88 90    294     Most Recent 3 BMP's:Recent Labs   Lab Test 08/08/22  1148 08/07/22  0601 08/06/22  1049 04/27/22  1056   NA  --  139 138 140   POTASSIUM  --  3.5 4.3 3.6   CHLORIDE  --  109 109 107   CO2  --  26 24 27   BUN  --  17 18 12   CR 1.14* 1.00 1.18* 0.99   ANIONGAP  --  4 5 6   YAJAIRA  --  8.3* 9.1 8.5   GLC  --  94 168* 110*     Most Recent 3 Troponin's:No lab results found.  Most Recent 3 BNP's:No lab results found.  Most Recent 6 Bacteria Isolates From Any Culture (See EPIC Reports for Culture Details):No lab results found.  Most Recent TSH and T4:Recent Labs   Lab Test 08/07/22  0601   TSH 3.15     Most Recent 6 glucoses:Recent Labs   Lab Test 08/07/22  0601 08/06/22  1049 04/27/22  1056 02/21/22  1615 03/08/21  0955   GLC 94 168* 110* 127* 89     Most Recent Urinalysis:No lab results found.,   Results for orders placed or performed during the hospital encounter of 08/06/22    Radiologist Consult For Cardiology    Narrative    RADIOLOGIST CONSULT FOR CARDIOLOGY 2022 1:05 PM    HISTORY: Tachycardia, elevated troponin.    COMPARISON: None.      Impression    IMPRESSION: 4 mm pulmonary nodule left lower lobe, image 47.  Visualized lungs are otherwise clear.    REFERENCE:  Guidelines for Management of Incidental Pulmonary Nodules Detected on  CT Images: From the Fleischner Society 2017.   Guidelines apply to incidental nodules in patients who are 35 years or  older.  Guidelines do not apply to lung cancer screening, patients with  immunosuppression, or patients with known primary cancer.    SINGLE NODULE  Nodule size <6 mm  Low-risk patients: No follow-up needed.  High-risk patients: Optional follow-up at 12 months.   Echocardiogram Complete     Value    LVEF  55-60%    Narrative    [id number]  [id number]  [id number]  [id number]^BARTHELL^TONO^EDWARD FIGUEROA     [facility]  Echocardiography Laboratory  [address] [place] [address]     Name: JULISA GUPTA  MRN: [id number]  : 1960  Study Date: 2022 01:41 PM  Age: 62 yrs  Gender: Female  Patient Location: [facility]  Reason For Study: Chest Pain, Chest Tightness, Chest Pressure, Paroxsysmal SVT  Ordering Physician: BARTHELL, JOANNA KERSTEN ULMEN  Referring Physician: NONE  Performed By: Nicolasa Quezada RDCS     BSA: 1.9 m2  Height: 62 in  Weight: 188 lb  HR: 72  BP: 128/90 mmHg  ______________________________________________________________________________  Procedure  Complete Portable Echo Adult.  ______________________________________________________________________________  Interpretation Summary     Left ventricular size, global systolic function, and wall motion are normal,  estimated LVEF 55-60%.  Right ventricular global function is normal.  No significant valvular abnormalities.     There are no prior studies available for  comparison.  ______________________________________________________________________________  Left Ventricle  The left ventricle is normal in size. There is normal left ventricular wall  thickness. Left ventricular systolic function is normal. The visual ejection  fraction is 55-60%. Left ventricular diastolic function is normal. No regional  wall motion abnormalities noted.     Right Ventricle  The right ventricle is normal in structure, function and size.     Atria  Normal left atrial size. Right atrial size is normal. Cannot exclude small  interatrial shunt on color Doppler interrogation of the interatrial septum.     Mitral Valve  There is mild mitral annular calcification. There is trace mitral  regurgitation.     Tricuspid Valve  The tricuspid valve is not well visualized, but is grossly normal. There is  trace tricuspid regurgitation.     Aortic Valve  There is mild trileaflet aortic sclerosis. No aortic regurgitation is present.  No hemodynamically significant valvular aortic stenosis.     Pulmonic Valve  The pulmonic valve is not well seen, but is grossly normal.     Vessels  The aortic root is normal size. Normal size ascending aorta. The inferior vena  cava was normal in size with preserved respiratory variability.     Pericardium  There is no pericardial effusion. Prominent pericardial/epicardial fat  present.     ______________________________________________________________________________  MMode/2D Measurements & Calculations  IVSd: 1.1 cm  LVIDd: 3.7 cm  LVIDs: 1.8 cm  LVPWd: 1.1 cm  FS: 51.5 %  LV mass(C)d: 128.4 grams  LV mass(C)dI: 69.0 grams/m2     Ao root diam: 2.9 cm  LA dimension: 3.4 cm  asc Aorta Diam: 3.2 cm  LA/Ao: 1.2  LA Volume (BP): 29.3 ml  LA Volume Index (BP): 15.8 ml/m2  RWT: 0.62     Doppler Measurements & Calculations  MV E max derrick: 61.1 cm/sec  MV A max derrick: 99.9 cm/sec  MV E/A: 0.61  MV dec time: 0.25 sec     PA acc time: 0.12 sec  E/E' av.3  Lateral E/e': 7.8  Medial E/e':  10.7     ______________________________________________________________________________  Report approved by: Leroy Nova 2022 03:41 PM         CTA  ANGIOGRAM CORONARY ARTERY    Narrative    Procedure: CTA ANGIOGRAM CORONARY ARTERY   Examination Date: 2022 1:05 PM     Indication:  Positive troponin.     Clinical Information: Resident troponin   Ordering Physician: Oanh Coy     Overall quality of the study: Good.     PROCEDURE:The patient was positioned in the scanner gantry and an IV  was started using an 18 gauge IV in the right antecubital fossa.   Utilizing 110 cc  Isovue 370, wasted 0 cc, multi-slice computed  tomography was performed with a Siemens Dual Source Flash scanner  without incident. Beta-blockers were required to optimize heart rate,  patient was given Metoprolol 0 mg Oral, Metoprolol 0 mg  IV. The  patient was given pre-medication of sublingual Nitrostat 0.4 mg prior  to scanning. Coronary artery calcium score was performed using the  Flash scanner protocol. CTA was performed in the sequential mode at a  heart rate of 54 bpm with 100 kVp. Images were reconstructed and  analyzed on a Indigo[employer] workstation. Scan protocol was optimized to  minimize radiation exposure. The total radiation exposure including  calcium score was calculated to be 213 DLP, and 2.9 mSv.      Impression    IMPRESSION:    1. Total Agatston score 0.     2. Normal coronary anatomy with no detectable stenosis or plaque.    3.  Please review Radiology report for incidental noncardiac findings  that  will follow separately.        FINDINGS:    CORONARY CALCIUM SCORE: The total Agatston calcium score is 0, Left  main: 0, left anterior descendin,  circumflex: 0, right coronary  artery: 0.     CORONARY CT ANGIOGRAPHY    DOMINANCE: Right dominant system.     LEFT MAIN: The left main arises normally from the left coronary cusp  and is widely patent without any stenosis or plaque.    LEFT ANTERIOR DESCENDING: The left  anterior descending and its major  diagonal branches are widely patent without any detectable stenosis or  plaque.      CIRCUMFLEX: The circumflex and its major branches are widely patent  without any detectable stenosis or plaque.    RIGHT CORONARY ARTERY: The right coronary artery and its major  branches are widely patent without any detectable stenosis or plaque.    ADDITIONAL FINDINGS:     The aortic root is normal in dimension measuring 3.11 x 2.94 cm. The  aortic valve is trileaflet. The ascending aorta at the level of the  right pulmonary artery is normal dimension measuring 3.20 x 3.28 cm.    Normal pulmonary venous anatomy with all four pulmonary veins draining  into the left atrium.      There is no left ventricular mass or thrombus. The visualized left  atrial appendage appears to be free of thrombus.    Normal pericardial thickness. There is no pericardial effusion.    The proximal pulmonary arteries are not well opacified.    Please review Radiology report for incidental noncardiac findings that  will follow separately.    GAYLE WEISS MD         SYSTEM ID:  [id number]       Discharge Medications   Current Discharge Medication List      START taking these medications    Details   bisoprolol (ZEBETA) 5 MG tablet Take 0.5 tablets (2.5 mg) by mouth daily  Qty: 30 tablet, Refills: 0    Comments: Future refills by PCP  Physician  Associated Diagnoses: SVT (supraventricular tachycardia) (H)         CONTINUE these medications which have NOT CHANGED    Details   B Complex Vitamins (VITAMIN-B COMPLEX PO) Take 1 tablet by mouth daily Unknown tablet strength      CALCIUM PO Take 1 tablet by mouth daily Unknown tablet strength      Coenzyme Q10 (CO Q 10 PO) Take 1 capsule by mouth daily Unknown capsule strength      estradiol-norethindrone (COMBIPATCH) 0.05-0.14 MG/DAY bi-weekly patch Place 1 patch onto the skin twice a week  Qty: 24 patch, Refills: 3    Associated Diagnoses: Menopausal syndrome (hot  flashes)      Folic Acid (FOLATE PO) Take 1 tablet by mouth daily Unknown tablet strength      medical cannabis (Patient's own supply) See Admin Instructions (The purpose of this order is to document that the patient reports taking medical cannabis.  This is not a prescription, and is not used to certify that the patient has a qualifying medical condition.)    Patient uses an extended release liquid formulation overnight for sleep; takes if experiencing sciatica pain (does not take if taking Ambien PRN for sleep)      metroNIDAZOLE (METROGEL) 1 % external gel APPLY TOPICALLY DAILY  Qty: 60 g, Refills: 3    Associated Diagnoses: Rosacea      naproxen (NAPROSYN) 500 MG tablet Take 1 tablet (500 mg) by mouth 2 times daily (with meals)  Qty: 60 tablet, Refills: 1    Associated Diagnoses: Chronic pain of both knees      OMEGA-3 FATTY ACIDS PO Take 1 capsule by mouth daily Unknown capsule strength      omeprazole (PRILOSEC) 20 MG DR capsule TAKE 1 CAPSULE BY MOUTH ONE TIME A DAY. TAKES AS NEEDED FOR HEARTBURN  Qty: 90 capsule, Refills: 3    Associated Diagnoses: Gastroesophageal reflux disease without esophagitis      POTASSIUM PO Take 1 tablet by mouth daily Unknown tablet strength      triamcinolone (KENALOG) 0.1 % external cream Apply topically 2 times daily as needed for irritation  Qty: 30 g, Refills: 1    Associated Diagnoses: Eczema, unspecified type      TURMERIC PO Take 1 tablet by mouth daily Unknown tablet strength      zolpidem (AMBIEN) 5 MG tablet TAKE ONE TABLET BY MOUTH NIGHTLY AS NEEDED FOR SLEEP  Qty: 30 tablet, Refills: 1    Associated Diagnoses: Bipolar 2 disorder (H)           Allergies   Allergies   Allergen Reactions     Amlodipine Shortness Of Breath and Swelling

## 2022-08-09 ENCOUNTER — PATIENT OUTREACH (OUTPATIENT)
Dept: CARE COORDINATION | Facility: CLINIC | Age: 62
End: 2022-08-09

## 2022-08-09 DIAGNOSIS — Z71.89 OTHER SPECIFIED COUNSELING: ICD-10-CM

## 2022-08-09 NOTE — PROGRESS NOTES
"Clinic Care Coordination Contact  [facility]: Post-Discharge Note  SITUATION                                                      Admission:    Admission Date: 08/06/22   Reason for Admission: NTEMI transfer from outside hospital  Discharge:   Discharge Date: 08/08/22  Discharge Diagnosis: NSTEMI    BACKGROUND                                                      Per hospital discharge summary and inpatient provider notes:    62 year old female with pmhx GERD, bipolar 2 disorder, obesity, HLD, and hx SVT who presented as direct admission after presenting with chest pain and palpitations, found to have SVT (since converted) and elevated troponin. Transferred to [facility] from [facility] for cardiology evaluation and consideration of coronary angiogram.    ASSESSMENT      Enrollment  Primary Care Care Coordination Status: Not a Candidate    Discharge Assessment  How are you doing now that you are home?: \"Great- I took a nice long walk today and I am having no issues at all\".  How are your symptoms? (Red Flag symptoms escalate to triage hotline per guidelines): Improved  Do you feel your condition is stable enough to be safe at home until your provider visit?: Yes  Does the patient have their discharge instructions? : Yes  Does the patient have questions regarding their discharge instructions? : Yes (see comment) (I reviewed her need to repeat the BMP test in one week. She will arrange it with her primary.)  Were you started on any new medications or were there changes to any of your previous medications? : No  Does the patient have all of their medications?: Yes  Do you have questions regarding any of your medications? : No  Do you have all of your needed medical supplies or equipment (DME)?  (i.e. oxygen tank, CPAP, cane, etc.):  (n/a)  Discharge follow-up appointment scheduled within 14 calendar days? : No (She will message PCP today on [employer] for appointment)  Is patient agreeable to assistance with scheduling? : " No                PLAN                                                      Outpatient Plan:      Primary, within 7   days to evaluate medication change and for hospital follow- up.  The following labs/tests are recommended: BMP in a week.         Future Appointments   Date Time Provider Department Center   8/18/2022 10:30 AM Donna Wolef MD [facility]         For any urgent concerns, please contact our 24 hour nurse triage line: [phone] ([phone])         Kezia Randolph RN

## 2022-08-10 ENCOUNTER — TELEPHONE (OUTPATIENT)
Dept: CARDIOLOGY | Facility: CLINIC | Age: 62
End: 2022-08-10

## 2022-08-10 NOTE — TELEPHONE ENCOUNTER
Patient was directly admitted to [facility] on 8/6/22, from [facility], after presenting with chest pain and SVT. Converted to NSR after Adenosine given in ED. Transferred to [facility] as continued to have episodes of SVT after converted and elevated troponin.    PMH: GERD, bipolar 2 disorder, obesity, HLD, and hx SVT who presented as direct admission after presenting with chest pain and palpitations.    Echo showed EF of 55% without WMA's.    8/8/22: CTA showed calcium score of 0 and no stenosis so no further recommendation per cardiology.     Pt was started on Zebeta at time of discharge. Plan for follow up with Dr. Wolfe for SVT ablation.    Writer attempted to call patient to discuss any post hospital d/c questions she may have, review medication changes, and confirm f/u appts, but no answer. VM left to call back with any non emergent cardiac related or medication questions.     RN left reminder for patient that she has an OV scheduled on 8/18/22 at 1030 with Dr. Wolfe at our [facility]. Patient advised to call clinic with any cardiac related questions or concerns prior to this lida't. YESENIA Sharma RN.

## 2022-08-18 ENCOUNTER — OFFICE VISIT (OUTPATIENT)
Dept: CARDIOLOGY | Facility: CLINIC | Age: 62
End: 2022-08-18
Attending: INTERNAL MEDICINE
Payer: MEDICARE

## 2022-08-18 ENCOUNTER — LAB (OUTPATIENT)
Dept: LAB | Facility: CLINIC | Age: 62
End: 2022-08-18
Payer: MEDICARE

## 2022-08-18 VITALS
SYSTOLIC BLOOD PRESSURE: 128 MMHG | BODY MASS INDEX: 35.7 KG/M2 | OXYGEN SATURATION: 96 % | DIASTOLIC BLOOD PRESSURE: 88 MMHG | HEART RATE: 76 BPM | WEIGHT: 194 LBS | HEIGHT: 62 IN

## 2022-08-18 DIAGNOSIS — I10 ESSENTIAL HYPERTENSION: ICD-10-CM

## 2022-08-18 DIAGNOSIS — I47.10 SVT (SUPRAVENTRICULAR TACHYCARDIA) (H): ICD-10-CM

## 2022-08-18 LAB
ANION GAP SERPL CALCULATED.3IONS-SCNC: 3 MMOL/L (ref 3–14)
BUN SERPL-MCNC: 20 MG/DL (ref 7–30)
CALCIUM SERPL-MCNC: 8.5 MG/DL (ref 8.5–10.1)
CHLORIDE BLD-SCNC: 110 MMOL/L (ref 94–109)
CO2 SERPL-SCNC: 27 MMOL/L (ref 20–32)
CREAT SERPL-MCNC: 1.05 MG/DL (ref 0.52–1.04)
GFR SERPL CREATININE-BSD FRML MDRD: 61 ML/MIN/1.73M2
GLUCOSE BLD-MCNC: 90 MG/DL (ref 70–99)
POTASSIUM BLD-SCNC: 4.4 MMOL/L (ref 3.4–5.3)
SODIUM SERPL-SCNC: 140 MMOL/L (ref 133–144)

## 2022-08-18 PROCEDURE — 36415 COLL VENOUS BLD VENIPUNCTURE: CPT

## 2022-08-18 PROCEDURE — 80048 BASIC METABOLIC PNL TOTAL CA: CPT

## 2022-08-18 PROCEDURE — 99214 OFFICE O/P EST MOD 30 MIN: CPT | Performed by: INTERNAL MEDICINE

## 2022-08-18 NOTE — LETTER
8/18/2022    Alie De La Cruz PA-C  [address] [place] [address]    RE: Audrey Ricks       Dear Colleague,     I had the pleasure of seeing Audrey Ricks in the [facility].  HPI and Plan:   See dictation        Orders Placed This Encounter   Procedures     Case Request EP: Ablation Supraventricular Tachycardia       No orders of the defined types were placed in this encounter.      There are no discontinued medications.      Encounter Diagnosis   Name Primary?     SVT (supraventricular tachycardia) (H)        CURRENT MEDICATIONS:  Current Outpatient Medications   Medication Sig Dispense Refill     B Complex Vitamins (VITAMIN-B COMPLEX PO) Take 1 tablet by mouth daily Unknown tablet strength       bisoprolol (ZEBETA) 5 MG tablet Take 0.5 tablets (2.5 mg) by mouth daily 30 tablet 0     CALCIUM PO Take 1 tablet by mouth daily Unknown tablet strength       Coenzyme Q10 (CO Q 10 PO) Take 1 capsule by mouth daily Unknown capsule strength       estradiol-norethindrone (COMBIPATCH) 0.05-0.14 MG/DAY bi-weekly patch Place 1 patch onto the skin twice a week 24 patch 3     Folic Acid (FOLATE PO) Take 1 tablet by mouth daily Unknown tablet strength       medical cannabis (Patient's own supply) See Admin Instructions (The purpose of this order is to document that the patient reports taking medical cannabis.  This is not a prescription, and is not used to certify that the patient has a qualifying medical condition.)    Patient uses an extended release liquid formulation overnight for sleep; takes if experiencing sciatica pain (does not take if taking Ambien PRN for sleep)       metroNIDAZOLE (METROGEL) 1 % external gel APPLY TOPICALLY DAILY 60 g 3     naproxen (NAPROSYN) 500 MG tablet Take 1 tablet (500 mg) by mouth 2 times daily (with meals) 60 tablet 1     OMEGA-3 FATTY ACIDS PO Take 1 capsule by mouth daily Unknown capsule strength       omeprazole (PRILOSEC) 20 MG DR capsule TAKE 1 CAPSULE BY  "MOUTH ONE TIME A DAY. TAKES AS NEEDED FOR HEARTBURN 90 capsule 3     POTASSIUM PO Take 1 tablet by mouth daily Unknown tablet strength       triamcinolone (KENALOG) 0.1 % external cream Apply topically 2 times daily as needed for irritation 30 g 1     TURMERIC PO Take 1 tablet by mouth daily Unknown tablet strength       zolpidem (AMBIEN) 5 MG tablet TAKE ONE TABLET BY MOUTH NIGHTLY AS NEEDED FOR SLEEP 30 tablet 1       ALLERGIES     Allergies   Allergen Reactions     Amlodipine Shortness Of Breath and Swelling       PAST MEDICAL HISTORY:  Past Medical History:   Diagnosis Date     Depressive disorder 2011     Hypertension      NSTEMI (non-ST elevated myocardial infarction) (H) 08/06/2022     SVT (supraventricular tachycardia) (H)        PAST SURGICAL HISTORY:  No past surgical history on file.    FAMILY HISTORY:  Family History   Problem Relation Age of Onset     Diabetes Mother        SOCIAL HISTORY:  Social History     Socioeconomic History     Marital status: Single   Tobacco Use     Smoking status: Never Smoker     Smokeless tobacco: Never Used   Substance and Sexual Activity     Alcohol use: Yes     Drug use: Never   Other Topics Concern     Parent/sibling w/ CABG, MI or angioplasty before 65F 55M? No       Review of Systems:  Skin:  Negative       Eyes:  Negative      ENT:  Negative      Respiratory:  Positive for dyspnea on exertion     Cardiovascular:  Negative for;palpitations;chest pain Positive for;fatigue;edema;lightheadedness;dizziness    Gastroenterology: Negative      Genitourinary:  Negative      Musculoskeletal:  Negative      Neurologic:  Positive for numbness or tingling of hands right hand  Psychiatric:  Negative      Heme/Lymph/Imm:  Positive for allergies    Endocrine:  Negative        Physical Exam:  Vitals: /88 (BP Location: Left arm, Patient Position: Sitting, Cuff Size: Adult Regular)   Pulse 76   Ht 1.575 m (5' 2\")   Wt 88 kg (194 lb)   SpO2 96%   BMI 35.48 kg/m  "     Constitutional:  cooperative, alert and oriented, well developed, well nourished, in no acute distress        Skin:  warm and dry to the touch, no apparent skin lesions or masses noted          Head:  normocephalic, no masses or lesions        Eyes:  pupils equal and round, conjunctivae and lids unremarkable, sclera white, no xanthalasma, EOMS intact, no nystagmus        Lymph:No Cervical lymphadenopathy present     ENT:  no pallor or cyanosis, dentition good        Neck:  carotid pulses are full and equal bilaterally, JVP normal, no carotid bruit        Respiratory:  normal breath sounds, clear to auscultation, normal A-P diameter, normal symmetry, normal respiratory excursion, no use of accessory muscles         Cardiac: regular rhythm, normal S1/S2, no S3 or S4, apical impulse not displaced, no murmurs, gallops or rubs                                                         GI:  abdomen soft, non-tender, BS normoactive, no mass, no HSM, no bruits        Extremities and Muscular Skeletal:  no deformities, clubbing, cyanosis, erythema observed              Neurological:  no gross motor deficits        Psych:  Alert and Oriented x 3        CC  Jose Rafael Cuellar MD  [address]  [place] [address]                Service Date: 08/18/2022    HISTORY OF PRESENT ILLNESS:  I saw Ms. Ricks for evaluation of recurrent SVT.  She is a 62-year-old white female who had the first episode of SVT about 10 years ago.  Over the years, she has had minor episodes that could stop spontaneously.  However, this year, she has had 2 spells that required ER visits.  The first one was in February and the second one was in August.  Each time she required adenosine for termination.  There is no apparent trigger of her symptoms.  She felt very symptomatic with chest pressure, shortness of breath, dizziness and extremely rapid heart racing.    PAST MEDICAL HISTORY:  Remarkable for hypertension, depression.    In August when she was  in the ER for SVT, there was troponin elevation.    Her echocardiography reported normal cardiac structure and function.    PHYSICAL EXAMINATION:    VITAL SIGNS:  Blood pressure was 128/88, heart rate 76 beats per minute, body weight 194 pounds.  HEENT:  The eyes and ENT were unremarkable.  LUNGS:  Clear.  CARDIAC:  Rhythm was regular and the heart sounds were normal without murmur.  ABDOMEN:  Moderate obesity.  EXTREMITIES:  There is no pedal edema.    I reviewed her EKG tracing of SVT.  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    ASSESSMENT AND RECOMMENDATIONS:  Ms. Gupta is a 62-year-old white female with recurrent symptomatic SVT.  She has no apparent structural heart disease.    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  The patient has side effect of fatigue from metoprolol and she does not feel well on current Zebeta.  I have recommended EP study and ablation for SVT.  The risks and benefits of the EP study and ablation were explained to the patient and her  today.  They expressed understanding and consented for the procedure.  She is asked to stop Zebeta 3 days prior to the EP procedure.  We will consider different high blood pressure medications if it is necessary after the ablation.    cc:  Alie De La Cruz PA-C  [facility]  [address]  [place] [address]    Donna Wolfe MD        D: 2022   T: 2022   MT: al    Name:     JULISA GUPTA  MRN:      [id number]        Account:      [id number]   :      1960           Service Date: 2022       Document: [id number]      Thank you for allowing me to participate in the care of your patient.      Sincerely,     Donna Wolfe MD     [facility]  cc:   Jose Rafael Cuellar MD  [address]  [place] [address]

## 2022-08-18 NOTE — NURSING NOTE
SVT Ablation   Scheduled: TBD - message sent to Audrey Cortez  Location: [place]  Check-in time: TBD  Procedure time: TBD    Prep instructions were reviewed with patient in clinic. Patient had no questions.    See instructions below    NPO after midnight, the night before the procedure.     Patient is to hold her bisoprolol 3 days prior to procedure.    All other medications can be taken on the morning of the procedure (with small sips of water).    Patient is aware they will need a ride home, and a person to stay with her for 24 hours after the procedure.     Patient aware she will need to take a home COVID test 1-2 days prior to procedure.    Clarissa Goldstein RN

## 2022-08-18 NOTE — PROGRESS NOTES
Service Date: 08/18/2022    HISTORY OF PRESENT ILLNESS:  I saw Ms. Ricks for evaluation of recurrent SVT.  She is a 62-year-old white female who had the first episode of SVT about 10 years ago.  Over the years, she has had minor episodes that could stop spontaneously.  However, this year, she has had 2 spells that required ER visits.  The first one was in February and the second one was in August.  Each time she required adenosine for termination.  There is no apparent trigger of her symptoms.  She felt very symptomatic with chest pressure, shortness of breath, dizziness and extremely rapid heart racing.    PAST MEDICAL HISTORY:  Remarkable for hypertension, depression.    In August when she was in the ER for SVT, there was troponin elevation.    Her echocardiography reported normal cardiac structure and function.    PHYSICAL EXAMINATION:    VITAL SIGNS:  Blood pressure was 128/88, heart rate 76 beats per minute, body weight 194 pounds.  HEENT:  The eyes and ENT were unremarkable.  LUNGS:  Clear.  CARDIAC:  Rhythm was regular and the heart sounds were normal without murmur.  ABDOMEN:  Moderate obesity.  EXTREMITIES:  There is no pedal edema.    I reviewed her EKG tracing of SVT.  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    ASSESSMENT AND RECOMMENDATIONS:  Ms. Ricks is a 62-year-old white female with recurrent symptomatic SVT.  She has no apparent structural heart disease.    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  The patient has side effect of fatigue from metoprolol and she does not feel well on current Zebeta.  I have recommended EP study and ablation for SVT.  The risks and benefits of the EP study and ablation were explained to the patient and her  today.  They expressed understanding and consented for the procedure.  She is asked to stop Zebeta 3 days prior to the EP procedure.  We will consider different high blood pressure medications if it is necessary after the  ablation.    cc:  Alie De La Cruz PA-C  [facility]  [address]  [place] [address]    Donna Wolfe MD        D: 2022   T: 2022   MT: al    Name:     JULISA GUPTA  MRN:      [id number]        Account:      [id number]   :      1960           Service Date: 2022       Document: [id number]

## 2022-08-18 NOTE — PROGRESS NOTES
HPI and Plan:   See dictation        Orders Placed This Encounter   Procedures     Case Request EP: Ablation Supraventricular Tachycardia       No orders of the defined types were placed in this encounter.      There are no discontinued medications.      Encounter Diagnosis   Name Primary?     SVT (supraventricular tachycardia) (H)        CURRENT MEDICATIONS:  Current Outpatient Medications   Medication Sig Dispense Refill     B Complex Vitamins (VITAMIN-B COMPLEX PO) Take 1 tablet by mouth daily Unknown tablet strength       bisoprolol (ZEBETA) 5 MG tablet Take 0.5 tablets (2.5 mg) by mouth daily 30 tablet 0     CALCIUM PO Take 1 tablet by mouth daily Unknown tablet strength       Coenzyme Q10 (CO Q 10 PO) Take 1 capsule by mouth daily Unknown capsule strength       estradiol-norethindrone (COMBIPATCH) 0.05-0.14 MG/DAY bi-weekly patch Place 1 patch onto the skin twice a week 24 patch 3     Folic Acid (FOLATE PO) Take 1 tablet by mouth daily Unknown tablet strength       medical cannabis (Patient's own supply) See Admin Instructions (The purpose of this order is to document that the patient reports taking medical cannabis.  This is not a prescription, and is not used to certify that the patient has a qualifying medical condition.)    Patient uses an extended release liquid formulation overnight for sleep; takes if experiencing sciatica pain (does not take if taking Ambien PRN for sleep)       metroNIDAZOLE (METROGEL) 1 % external gel APPLY TOPICALLY DAILY 60 g 3     naproxen (NAPROSYN) 500 MG tablet Take 1 tablet (500 mg) by mouth 2 times daily (with meals) 60 tablet 1     OMEGA-3 FATTY ACIDS PO Take 1 capsule by mouth daily Unknown capsule strength       omeprazole (PRILOSEC) 20 MG DR capsule TAKE 1 CAPSULE BY MOUTH ONE TIME A DAY. TAKES AS NEEDED FOR HEARTBURN 90 capsule 3     POTASSIUM PO Take 1 tablet by mouth daily Unknown tablet strength       triamcinolone (KENALOG) 0.1 % external cream Apply topically 2  "times daily as needed for irritation 30 g 1     TURMERIC PO Take 1 tablet by mouth daily Unknown tablet strength       zolpidem (AMBIEN) 5 MG tablet TAKE ONE TABLET BY MOUTH NIGHTLY AS NEEDED FOR SLEEP 30 tablet 1       ALLERGIES     Allergies   Allergen Reactions     Amlodipine Shortness Of Breath and Swelling       PAST MEDICAL HISTORY:  Past Medical History:   Diagnosis Date     Depressive disorder 2011     Hypertension      NSTEMI (non-ST elevated myocardial infarction) (H) 08/06/2022     SVT (supraventricular tachycardia) (H)        PAST SURGICAL HISTORY:  No past surgical history on file.    FAMILY HISTORY:  Family History   Problem Relation Age of Onset     Diabetes Mother        SOCIAL HISTORY:  Social History     Socioeconomic History     Marital status: Single   Tobacco Use     Smoking status: Never Smoker     Smokeless tobacco: Never Used   Substance and Sexual Activity     Alcohol use: Yes     Drug use: Never   Other Topics Concern     Parent/sibling w/ CABG, MI or angioplasty before 65F 55M? No       Review of Systems:  Skin:  Negative       Eyes:  Negative      ENT:  Negative      Respiratory:  Positive for dyspnea on exertion     Cardiovascular:  Negative for;palpitations;chest pain Positive for;fatigue;edema;lightheadedness;dizziness    Gastroenterology: Negative      Genitourinary:  Negative      Musculoskeletal:  Negative      Neurologic:  Positive for numbness or tingling of hands right hand  Psychiatric:  Negative      Heme/Lymph/Imm:  Positive for allergies    Endocrine:  Negative        Physical Exam:  Vitals: /88 (BP Location: Left arm, Patient Position: Sitting, Cuff Size: Adult Regular)   Pulse 76   Ht 1.575 m (5' 2\")   Wt 88 kg (194 lb)   SpO2 96%   BMI 35.48 kg/m      Constitutional:  cooperative, alert and oriented, well developed, well nourished, in no acute distress        Skin:  warm and dry to the touch, no apparent skin lesions or masses noted          Head:  " normocephalic, no masses or lesions        Eyes:  pupils equal and round, conjunctivae and lids unremarkable, sclera white, no xanthalasma, EOMS intact, no nystagmus        Lymph:No Cervical lymphadenopathy present     ENT:  no pallor or cyanosis, dentition good        Neck:  carotid pulses are full and equal bilaterally, JVP normal, no carotid bruit        Respiratory:  normal breath sounds, clear to auscultation, normal A-P diameter, normal symmetry, normal respiratory excursion, no use of accessory muscles         Cardiac: regular rhythm, normal S1/S2, no S3 or S4, apical impulse not displaced, no murmurs, gallops or rubs                                                         GI:  abdomen soft, non-tender, BS normoactive, no mass, no HSM, no bruits        Extremities and Muscular Skeletal:  no deformities, clubbing, cyanosis, erythema observed              Neurological:  no gross motor deficits        Psych:  Alert and Oriented x 3        CC  Jose Rafael Cuellar MD  [address]  [place] [address]

## 2022-09-22 ENCOUNTER — TELEPHONE (OUTPATIENT)
Dept: CARDIOLOGY | Facility: CLINIC | Age: 62
End: 2022-09-22

## 2022-09-22 DIAGNOSIS — I47.10 SVT (SUPRAVENTRICULAR TACHYCARDIA) (H): Primary | ICD-10-CM

## 2022-09-22 RX ORDER — LIDOCAINE 40 MG/G
CREAM TOPICAL
Status: CANCELLED | OUTPATIENT
Start: 2022-09-22

## 2022-09-22 RX ORDER — SODIUM CHLORIDE, SODIUM LACTATE, POTASSIUM CHLORIDE, CALCIUM CHLORIDE 600; 310; 30; 20 MG/100ML; MG/100ML; MG/100ML; MG/100ML
INJECTION, SOLUTION INTRAVENOUS CONTINUOUS
Status: CANCELLED | OUTPATIENT
Start: 2022-09-22

## 2022-09-22 NOTE — TELEPHONE ENCOUNTER
Spoke to pt who I aware of check in time of 0830 and procedure at 1030. Pt  will be  and care provider on discharge. Pt aware to be NPO after midnight with sips of water with her AM medications. Pt is not on a diuretic or diabetic medication.  Pt will do her COVID home test today. Discussed that pt will discharge with an AVS and this will include what she can do over the weekend, such as no lifting, pushing or pulling of more then 10 pounds for 3 days. Discussed that it also tells what can be taken for pain. Pt already takes Naproxen daily. Pt made aware that she will get a call on Monday, but if any issue over the weekend there is an On Call cardiology number in the AVS. Pt states understanding and no other questions this time. Liana

## 2022-09-23 ENCOUNTER — HOSPITAL ENCOUNTER (OUTPATIENT)
Facility: CLINIC | Age: 62
Discharge: HOME | End: 2022-09-23
Admitting: INTERNAL MEDICINE
Payer: MEDICARE

## 2022-09-23 VITALS
SYSTOLIC BLOOD PRESSURE: 137 MMHG | HEIGHT: 62 IN | WEIGHT: 189 LBS | OXYGEN SATURATION: 97 % | HEART RATE: 79 BPM | TEMPERATURE: 97 F | BODY MASS INDEX: 34.78 KG/M2 | DIASTOLIC BLOOD PRESSURE: 90 MMHG | RESPIRATION RATE: 16 BRPM

## 2022-09-23 DIAGNOSIS — I47.10 SVT (SUPRAVENTRICULAR TACHYCARDIA) (H): ICD-10-CM

## 2022-09-23 LAB
ANION GAP SERPL CALCULATED.3IONS-SCNC: 2 MMOL/L (ref 3–14)
BUN SERPL-MCNC: 20 MG/DL (ref 7–30)
CALCIUM SERPL-MCNC: 8.9 MG/DL (ref 8.5–10.1)
CHLORIDE BLD-SCNC: 109 MMOL/L (ref 94–109)
CO2 SERPL-SCNC: 27 MMOL/L (ref 20–32)
CREAT SERPL-MCNC: 1.13 MG/DL (ref 0.52–1.04)
ERYTHROCYTE [DISTWIDTH] IN BLOOD BY AUTOMATED COUNT: 15.5 % (ref 10–15)
GFR SERPL CREATININE-BSD FRML MDRD: 55 ML/MIN/1.73M2
GLUCOSE BLD-MCNC: 105 MG/DL (ref 70–99)
HCT VFR BLD AUTO: 36 % (ref 35–47)
HGB BLD-MCNC: 11.6 G/DL (ref 11.7–15.7)
MCH RBC QN AUTO: 27.3 PG (ref 26.5–33)
MCHC RBC AUTO-ENTMCNC: 32.2 G/DL (ref 31.5–36.5)
MCV RBC AUTO: 85 FL (ref 78–100)
PLATELET # BLD AUTO: 245 10E3/UL (ref 150–450)
POTASSIUM BLD-SCNC: 4.1 MMOL/L (ref 3.4–5.3)
RBC # BLD AUTO: 4.25 10E6/UL (ref 3.8–5.2)
SODIUM SERPL-SCNC: 138 MMOL/L (ref 133–144)
WBC # BLD AUTO: 5.2 10E3/UL (ref 4–11)

## 2022-09-23 PROCEDURE — 999N000071 HC STATISTIC HEART CATH LAB OR EP LAB

## 2022-09-23 PROCEDURE — 250N000011 HC RX IP 250 OP 636: Performed by: INTERNAL MEDICINE

## 2022-09-23 PROCEDURE — 93653 COMPRE EP EVAL TX SVT: CPT | Performed by: INTERNAL MEDICINE

## 2022-09-23 PROCEDURE — 80048 BASIC METABOLIC PNL TOTAL CA: CPT | Performed by: INTERNAL MEDICINE

## 2022-09-23 PROCEDURE — 36591 DRAW BLOOD OFF VENOUS DEVICE: CPT

## 2022-09-23 PROCEDURE — 272N000001 HC OR GENERAL SUPPLY STERILE: Performed by: INTERNAL MEDICINE

## 2022-09-23 PROCEDURE — 85027 COMPLETE CBC AUTOMATED: CPT | Performed by: INTERNAL MEDICINE

## 2022-09-23 PROCEDURE — 99152 MOD SED SAME PHYS/QHP 5/>YRS: CPT | Performed by: INTERNAL MEDICINE

## 2022-09-23 PROCEDURE — 93623 PRGRMD STIMJ&PACG IV RX NFS: CPT | Performed by: INTERNAL MEDICINE

## 2022-09-23 PROCEDURE — 99153 MOD SED SAME PHYS/QHP EA: CPT | Performed by: INTERNAL MEDICINE

## 2022-09-23 PROCEDURE — 999N000184 HC STATISTIC TELEMETRY

## 2022-09-23 PROCEDURE — 93010 ELECTROCARDIOGRAM REPORT: CPT | Mod: 76 | Performed by: INTERNAL MEDICINE

## 2022-09-23 PROCEDURE — 93005 ELECTROCARDIOGRAM TRACING: CPT

## 2022-09-23 PROCEDURE — 36415 COLL VENOUS BLD VENIPUNCTURE: CPT | Performed by: INTERNAL MEDICINE

## 2022-09-23 PROCEDURE — 258N000003 HC RX IP 258 OP 636: Performed by: INTERNAL MEDICINE

## 2022-09-23 PROCEDURE — 250N000009 HC RX 250: Performed by: INTERNAL MEDICINE

## 2022-09-23 PROCEDURE — 999N000054 HC STATISTIC EKG NON-CHARGEABLE

## 2022-09-23 PROCEDURE — 93623 PRGRMD STIMJ&PACG IV RX NFS: CPT | Mod: 26 | Performed by: INTERNAL MEDICINE

## 2022-09-23 PROCEDURE — C1730 CATH, EP, 19 OR FEW ELECT: HCPCS | Performed by: INTERNAL MEDICINE

## 2022-09-23 PROCEDURE — C1732 CATH, EP, DIAG/ABL, 3D/VECT: HCPCS | Performed by: INTERNAL MEDICINE

## 2022-09-23 RX ORDER — LIDOCAINE 40 MG/G
CREAM TOPICAL
Status: DISCONTINUED | OUTPATIENT
Start: 2022-09-23 | End: 2022-09-23 | Stop reason: HOSPADM

## 2022-09-23 RX ORDER — OXYCODONE AND ACETAMINOPHEN 5; 325 MG/1; MG/1
1 TABLET ORAL EVERY 4 HOURS PRN
Status: DISCONTINUED | OUTPATIENT
Start: 2022-09-23 | End: 2022-09-23 | Stop reason: HOSPADM

## 2022-09-23 RX ORDER — NALOXONE HYDROCHLORIDE 0.4 MG/ML
0.4 INJECTION, SOLUTION INTRAMUSCULAR; INTRAVENOUS; SUBCUTANEOUS
Status: DISCONTINUED | OUTPATIENT
Start: 2022-09-23 | End: 2022-09-23 | Stop reason: HOSPADM

## 2022-09-23 RX ORDER — FENTANYL CITRATE 50 UG/ML
INJECTION, SOLUTION INTRAMUSCULAR; INTRAVENOUS
Status: DISCONTINUED | OUTPATIENT
Start: 2022-09-23 | End: 2022-09-23 | Stop reason: HOSPADM

## 2022-09-23 RX ORDER — NALOXONE HYDROCHLORIDE 0.4 MG/ML
0.2 INJECTION, SOLUTION INTRAMUSCULAR; INTRAVENOUS; SUBCUTANEOUS
Status: DISCONTINUED | OUTPATIENT
Start: 2022-09-23 | End: 2022-09-23 | Stop reason: HOSPADM

## 2022-09-23 RX ORDER — SODIUM CHLORIDE, SODIUM LACTATE, POTASSIUM CHLORIDE, CALCIUM CHLORIDE 600; 310; 30; 20 MG/100ML; MG/100ML; MG/100ML; MG/100ML
INJECTION, SOLUTION INTRAVENOUS CONTINUOUS
Status: DISCONTINUED | OUTPATIENT
Start: 2022-09-23 | End: 2022-09-23 | Stop reason: HOSPADM

## 2022-09-23 RX ADMIN — SODIUM CHLORIDE, POTASSIUM CHLORIDE, SODIUM LACTATE AND CALCIUM CHLORIDE: 600; 310; 30; 20 INJECTION, SOLUTION INTRAVENOUS at 08:56

## 2022-09-23 ASSESSMENT — ACTIVITIES OF DAILY LIVING (ADL)
ADLS_ACUITY_SCORE: 35

## 2022-09-23 NOTE — DISCHARGE INSTRUCTIONS
SVT Ablation Discharge Instructions      After you go home:    Have an adult stay with you until tomorrow.  You may resume your normal diet.       For 24 hours - due to the sedation you received:  Relax and take it easy.  Do NOT make any important or legal decisions.  Do NOT drive or operate machines at home or at work.  Do NOT drink alcohol.    Care of Puncture Sites:    For the first 24 hrs - check the puncture site every 1-2 hours while awake.  For 2 days, when you cough, sneeze, laugh or move your bowels, hold your hand over the puncture site and press firmly.  Remove the dressing(s) after 24 hours. If there is minor oozing, apply a bandaid and remove it after 12 hours.  It is normal to have a small bruise, pea sized lump or soreness at the site.  You may shower tomorrow. Do NOT take a bath, or use a hot tub or pool for at least 3 days. Do NOT scrub the site. Do not use lotion or powder near the puncture site.      Activity - For 3 days:    No stooping or squatting  Do NOT do any heavy activity such as exercise, lifting, or straining.   Do NOT do housework, yard work or any activity that make you sweat  Do NOT lift more than 10 pounds      Bleeding:     If you start bleeding from the site in your groin/chest, lie down flat and press firmly on the site for 10 minutes.   Once bleeding stops, lay flat for 2 hours.  Call [facility] as soon as you can.       Call 911 right away if you have heavy bleeding or bleeding that does not stop.      Medicines:    Take your medications, including blood thinners, unless your provider tells you not to.  If you have stopped any medicines, check with your provider about when to restart them.  If you have pain or shortness of breath, you may take Advil (ibuprofen) or Tylenol (acetaminophen).      Follow Up Appointments:    Stefanie Kent on 10/26 at 10:15 with an EKG  You will receive a phone call the following day/Monday from an RN - Mellisa Law or Mercedes.    Call  the clinic if:    You have increased pain or a large or growing hard lump around the site.  The site is red, swollen, hot or tender.  Blood or fluid is draining from the site.  You have chills or a fever greater than 101 F (38 C).  Your leg feels numb, cool or changes color.  Increased pain in the chest and/or groin.  Increased shortness of breath  Chest pain not relieved by Tylenol or Advil  New pain in the back or belly that you cannot control with Tylenol.  Recurrent irregular or fast heart rate lasting over 2 hours.  Any questions or concerns.    Heart rhythms:  You may have some irregular heartbeats. These feel very strong. They may make you feel that the fast heart rhythm is going to start again.  Give it time. The irregular beats should occur less often.    REMINDER THAT YOU WILL GET A CALL ON Monday TO SEE HOW YOU ARE DOING AFTER YOUR ABLATION, BUT IF YOU HAVE PROBLEMS DURING THE WEEKEND PLEASE CALL THE ON CALL CARDIOLOGIST PHONE NUMBER LISTED BELOW.       [facility]:    [phone] ( 8am-5pm M-F)  Mellisa Law    [phone] option 2 (7 days a week)  on call Cardiologist

## 2022-09-23 NOTE — PROGRESS NOTES
Care Suites Post Procedure Note    Patient Information  Name: Audrey Ricks  Age: 62 year old    Post Procedure  Time patient returned to Care Suites: 1100  Concerns/abnormal assessment: no  If abnormal assessment, provider notified: N/A  Plan/Other: few hours of bedrest, stopcock in place     Brenda Patel RN

## 2022-09-23 NOTE — H&P
Patient Name: Audrey Ricks  Patient MRN: [id number]  Patient : 1960    Abbreviated H&P and Pre-sedation Assessment for SVT ablation (procedure name) with sedation    Chief complaint and/or reason for Procedure: SVT ablation    Planned level of sedation: Minimal - moderate sedation    History of problems with sedation: (patient or family hx): No    ASA Assessment Category: 3 - Severe systemic disease, but not incapacitating    History of sleep apnea: No    History of blood thinners: No     Appropriate NPO status: Yes    Current tobacco use: No    Any recent fever, cough, chest or sinus congestion, SOB, weight loss, chest pain, diarrhea or constipation. No      Medications   Currently Scheduled Medications     sodium chloride (PF)  3 mL Intracatheter Q8H       Home Med List)  Medications Prior to Admission   Medication Sig Dispense Refill Last Dose     B Complex Vitamins (VITAMIN-B COMPLEX PO) Take 1 tablet by mouth daily Unknown tablet strength   2022 at Unknown time     bisoprolol (ZEBETA) 5 MG tablet Take 0.5 tablets (2.5 mg) by mouth daily 30 tablet 0 Past Week at Unknown time     CALCIUM PO Take 1 tablet by mouth daily Unknown tablet strength   2022 at Unknown time     Coenzyme Q10 (CO Q 10 PO) Take 1 capsule by mouth daily Unknown capsule strength   Past Month at Unknown time     estradiol-norethindrone (COMBIPATCH) 0.05-0.14 MG/DAY bi-weekly patch Place 1 patch onto the skin twice a week 24 patch 3 2022 at Unknown time     Folic Acid (FOLATE PO) Take 1 tablet by mouth daily Unknown tablet strength   Past Week at Unknown time     medical cannabis (Patient's own supply) See Admin Instructions (The purpose of this order is to document that the patient reports taking medical cannabis.  This is not a prescription, and is not used to certify that the patient has a qualifying medical condition.)    Patient uses an extended release liquid formulation overnight for sleep; takes if experiencing  "sciatica pain (does not take if taking Ambien PRN for sleep)   9/22/2022 at Unknown time     metroNIDAZOLE (METROGEL) 1 % external gel APPLY TOPICALLY DAILY 60 g 3 9/23/2022 at Unknown time     naproxen (NAPROSYN) 500 MG tablet Take 1 tablet (500 mg) by mouth 2 times daily (with meals) 60 tablet 1 9/22/2022 at Unknown time     OMEGA-3 FATTY ACIDS PO Take 1 capsule by mouth daily Unknown capsule strength   9/22/2022 at Unknown time     omeprazole (PRILOSEC) 20 MG DR capsule TAKE 1 CAPSULE BY MOUTH ONE TIME A DAY. TAKES AS NEEDED FOR HEARTBURN 90 capsule 3 9/23/2022 at Unknown time     POTASSIUM PO Take 1 tablet by mouth daily Unknown tablet strength   Past Week at Unknown time     triamcinolone (KENALOG) 0.1 % external cream Apply topically 2 times daily as needed for irritation 30 g 1 Past Month at Unknown time     TURMERIC PO Take 1 tablet by mouth daily Unknown tablet strength   Past Month at Unknown time     zolpidem (AMBIEN) 5 MG tablet TAKE ONE TABLET BY MOUTH NIGHTLY AS NEEDED FOR SLEEP 30 tablet 1 9/22/2022 at Unknown time       Allergies  Amlodipine    PMH:  Past Medical History:   Diagnosis Date     Depressive disorder 2011     Hypertension      NSTEMI (non-ST elevated myocardial infarction) (H) 08/06/2022     SVT (supraventricular tachycardia) (H)        History reviewed. No pertinent surgical history.      Focused Physical exam pertinent to procedure:          (Details of heart, lung, ASA assessment and mallampati assessment in pre procedure assessment flowsheet)  General- alert and healthy,alert,no distress   Recent vital signs-  BP (!) 140/95 (BP Location: Left arm)   Pulse 77   Temp 97  F (36.1  C) (Oral)   Resp 16   Ht 1.575 m (5' 2\")   Wt 85.7 kg (189 lb)   SpO2 99%   BMI 34.57 kg/m    HEART-no murmurs, gallops, or rub  LUNGS-Clear to Ausculation  OROPHARYNGEAL - MALLAMPATTI- Class I (visualization of the soft palate, fauces, uvula, anterior and posterior pillars)      A/P:Reviewed history, " medications, allergies, clinical information and pre procedure assessment. The patient was informed of the risks and benefits of the procedure.  They would like to proceed.  Audrey Ricks is approved for use of sedation during their procedure as noted above.      Signature  Donna Wofle MD

## 2022-09-23 NOTE — PROGRESS NOTES
Care Suites Admission Nursing Note    Patient Information  Name: Audrey Ricks  Age: 62 year old  Reason for admission: SVT Ablation  Care Suites arrival time: 0820    Visitor Information  Name: Nelson  Informed of visitor restrictions: Yes  1 visitor allowed per patient   Visitor must screen negative for COVID symptoms   Visitor must wear a mask  Waiting rooms closed to visitors    Patient Admission/Assessment   Pre-procedure assessment complete: Yes  If abnormal assessment/labs, provider notified: N/A  NPO: Yes  Medications held per instructions/orders: Yes  Consent: obtained  If applicable, pregnancy test status: deferred  Patient oriented to room: Yes  Education/questions answered: Yes  Plan/other: Getting prepped for their procedure    Discharge Planning  Discharge name/phone number: Nelson [phone]  Overnight post sedation caregiver: Nelson  Discharge location: [place]    Brenda Patel RN

## 2022-09-23 NOTE — PROGRESS NOTES
Typical AVNRT, inducible during isoproterenol infusion.  Successful ablation.  No complications.  May be discharged around 3-4 pm.      Please loose the right femoral stopcock in 1-2 hr and remove the suture in 4 hr.

## 2022-09-23 NOTE — Clinical Note
Ablation time 1mi 7sec
Conscious sedation is planned for this procedure.    Provider immediate assessment completed.   
Hemodynamic equipment used: 5 lead ECG, Machine BP Cuff and pulse oximeter probe.
Lab results reviewed and abnormals discussed with physician.  
Patient education provided.   
Procedure is scheduled in [facility].  
Procedure scheduled as Elective.  
Removed and  held with suture for 3 hours  
Sheath inserted in the right femoral vein. 
Sheath inserted in the vein. 
Sheath inserted in the vein. 
The ECG shows a sinus rhythm. 
ambulatory

## 2022-09-23 NOTE — PROGRESS NOTES
Care Suites Discharge Nursing Note    Patient Information  Name: Audrey Ricks  Age: 62 year old    Discharge Education:  Discharge instructions reviewed: Yes  Additional education/resources provided: no  Patient/patient representative verbalizes understanding: Yes  Patient discharging on new medications: No  Medication education completed: N/A    Discharge Plans:   Discharge location: home  Discharge ride contacted: Yes  Approximate discharge time:1545     Discharge Criteria:  Discharge criteria met and vital signs stable: Yes    Patient Belongs:  Patient belongings returned to patient: Yes    Janessa Irving RN

## 2022-09-25 ENCOUNTER — HEALTH MAINTENANCE LETTER (OUTPATIENT)
Age: 62
End: 2022-09-25

## 2022-09-26 ENCOUNTER — TELEPHONE (OUTPATIENT)
Dept: CARDIOLOGY | Facility: CLINIC | Age: 62
End: 2022-09-26

## 2022-09-26 NOTE — TELEPHONE ENCOUNTER
Spoke to pt who is doing very well.  NO pain in groin or chest.  States that she had a little heat skipping over the weekend, but other then that everything is good. Pt aware of her follow-up on 10/26 with Laurie and if any issues will call clinic. Liana

## 2022-10-01 LAB
ATRIAL RATE - MUSE: 67 BPM
ATRIAL RATE - MUSE: 67 BPM
DIASTOLIC BLOOD PRESSURE - MUSE: NORMAL MMHG
DIASTOLIC BLOOD PRESSURE - MUSE: NORMAL MMHG
INTERPRETATION ECG - MUSE: NORMAL
INTERPRETATION ECG - MUSE: NORMAL
P AXIS - MUSE: 61 DEGREES
P AXIS - MUSE: 76 DEGREES
PR INTERVAL - MUSE: 176 MS
PR INTERVAL - MUSE: 190 MS
QRS DURATION - MUSE: 72 MS
QRS DURATION - MUSE: 78 MS
QT - MUSE: 410 MS
QT - MUSE: 422 MS
QTC - MUSE: 433 MS
QTC - MUSE: 445 MS
R AXIS - MUSE: 30 DEGREES
R AXIS - MUSE: 70 DEGREES
SYSTOLIC BLOOD PRESSURE - MUSE: NORMAL MMHG
SYSTOLIC BLOOD PRESSURE - MUSE: NORMAL MMHG
T AXIS - MUSE: 53 DEGREES
T AXIS - MUSE: 61 DEGREES
VENTRICULAR RATE- MUSE: 67 BPM
VENTRICULAR RATE- MUSE: 67 BPM

## 2022-10-26 ENCOUNTER — OFFICE VISIT (OUTPATIENT)
Dept: CARDIOLOGY | Facility: CLINIC | Age: 62
End: 2022-10-26
Attending: INTERNAL MEDICINE
Payer: MEDICARE

## 2022-10-26 ENCOUNTER — HOSPITAL ENCOUNTER (OUTPATIENT)
Dept: CARDIOLOGY | Facility: CLINIC | Age: 62
Discharge: HOME | End: 2022-10-26
Attending: NURSE PRACTITIONER | Admitting: NURSE PRACTITIONER
Payer: MEDICARE

## 2022-10-26 VITALS
WEIGHT: 185.6 LBS | BODY MASS INDEX: 34.16 KG/M2 | OXYGEN SATURATION: 98 % | HEART RATE: 103 BPM | HEIGHT: 62 IN | SYSTOLIC BLOOD PRESSURE: 128 MMHG | DIASTOLIC BLOOD PRESSURE: 78 MMHG

## 2022-10-26 DIAGNOSIS — I47.10 SVT (SUPRAVENTRICULAR TACHYCARDIA) (H): ICD-10-CM

## 2022-10-26 PROCEDURE — 93005 ELECTROCARDIOGRAM TRACING: CPT | Performed by: REHABILITATION PRACTITIONER

## 2022-10-26 PROCEDURE — 93000 ELECTROCARDIOGRAM COMPLETE: CPT | Performed by: NURSE PRACTITIONER

## 2022-10-26 PROCEDURE — 99213 OFFICE O/P EST LOW 20 MIN: CPT | Mod: 25 | Performed by: NURSE PRACTITIONER

## 2022-10-26 NOTE — LETTER
10/26/2022    Alie De La Cruz PA-C  [address] [place] [address]    RE: Audrey Ricks       Dear Colleague,     I had the pleasure of seeing Audrey Ricks in the [facility].    Electrophysiology Clinic Progress Note  Audrey Ricks MRN# [id number]   YOB: 1960 Age: 62 year old     Primary cardiologist: Dr. Cuellar    Reason for visit: SVT ablation follow up    History of presenting illness:    Audrey Ricks is a pleasant 62 year old patient with past medical history significant for SVT, GERD, and hyperlipidemia.    The patient has a history of SVT dating back approximately 10 years ago for which she presented to the emergency department and received adenosine.  She was doing well up until February 2022 when she suffered severe back pain and sciatica with associated heart racing.  She presented to the emergency department at [facility] and was found to be back in SVT that was terminated with adenosine x2.    She was evaluated by Dr. Cuellar in June 2022 who recommended that she meet with electrophysiology to discuss a potential ablation.  Prior to meeting with the EP she presented to the emergency department with concerns of chest pain and palpitations.  Again she was found to be in SVT that was terminated with adenosine.  Due to troponin elevation in the setting of chest pain and SVT she was transferred to [facility] where she was evaluated by cardiology who recommended a CT coronary angiogram with a calcium score of 0 with normal coronary anatomy with no detectable stenosis or plaque.  An echocardiogram showed LVEF of 55 to 60% with no significant wall motion or valvular abnormalities.  During the admission she was started on bisoprolol.     She then met with Dr. Wolfe on 8/18/2022 and reported significant side effects from the bisoprolol as well as metoprolol.  He recommended EP study and ablation for SVT.  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    Today she presents with her partner Nelson.  She has had no recurrent arrhythmias post the ablation and is doing overall well.  She notes that her resting heart rate traditionally is in the 90s and on EKG today she was tachycardic at 103.  She has no concerns with the access site from the procedure and is feeling overall well.            Assessment and Plan:     ASSESSMENT:    1. SVT    History of recurrences prompting ED visits requiring adenosine for conversion    Status post AV chey reentrant tachycardia ablation on 9/23/2022 with Dr. Wolfe    No symptomatic recurrence    Previously did not tolerate beta blockaders due to fatigue    PLAN:     1. Return to clinic in 1 year or sooner if needed.     Orders this Visit:  Orders Placed This Encounter   Procedures     Follow-Up with Cardiology     EKG 12-lead complete w/read - Clinics     No orders of the defined types were placed in this encounter.    There are no discontinued medications.    Today's clinic visit entailed:  Review of the result(s) of each unique test - Echo, CT, ablation, EKG  Assessment requiring an independent historian(s) - significant other - Nelson  15 minutes spent on the date of the encounter doing chart review, history and exam, documentation and further activities per the note  Provider  Link to MDM Help Grid     The level of medical decision making during this visit was of moderate complexity.           Review of Systems:     Review of Systems:  Skin:        Eyes:       ENT:       Respiratory:  Negative shortness of breath;dyspnea on exertion;hemoptysis;cough  Cardiovascular:  Negative;palpitations;chest pain;dizziness;syncope or near-syncope;lightheadedness Positive for;edema  Gastroenterology:      Genitourinary:       Musculoskeletal:       Neurologic:  Negative numbness or tingling of feet;numbness or tingling of hands  Psychiatric:       Heme/Lymph/Imm:  Positive  "for allergies  Endocrine:                 Physical Exam:     Vitals: /78 (BP Location: Right arm, Patient Position: Sitting, Cuff Size: Adult Regular)   Pulse 103   Ht 1.575 m (5' 2\")   Wt 84.2 kg (185 lb 9.6 oz)   SpO2 98%   BMI 33.95 kg/m    Constitutional: Well nourished and in no apparent distress.  Eyes: Pupils equal, round. Sclerae anicteric.   HEENT: Normocephalic, atraumatic.   Neck: Supple. JVD   Respiratory: Breathing non-labored. Lungs clear to auscultation bilaterally. No crackles, wheezes, rhonchi, or rales.  Cardiovascular: Tachycardic rate and regular rhythm, normal S1 and S2. No murmur, rub, or gallop.  Skin: Warm, dry. No rashes, cyanosis, or xanthelasma.  Extremities: No edema.  No concerns at right femoral vein access site  Neurologic: No gross motor deficits. Alert, awake, and oriented to person, place and time.  Psychiatric: Affect appropriate.        CURRENT MEDICATIONS:  Current Outpatient Medications   Medication Sig Dispense Refill     B Complex Vitamins (VITAMIN-B COMPLEX PO) Take 1 tablet by mouth daily Unknown tablet strength       CALCIUM PO Take 1 tablet by mouth daily Unknown tablet strength       Coenzyme Q10 (CO Q 10 PO) Take 1 capsule by mouth daily Unknown capsule strength       estradiol-norethindrone (COMBIPATCH) 0.05-0.14 MG/DAY bi-weekly patch Place 1 patch onto the skin twice a week 24 patch 3     Folic Acid (FOLATE PO) Take 1 tablet by mouth daily Unknown tablet strength       medical cannabis (Patient's own supply) See Admin Instructions (The purpose of this order is to document that the patient reports taking medical cannabis.  This is not a prescription, and is not used to certify that the patient has a qualifying medical condition.)    Patient uses an extended release liquid formulation overnight for sleep; takes if experiencing sciatica pain (does not take if taking Ambien PRN for sleep)       metroNIDAZOLE (METROGEL) 1 % external gel APPLY TOPICALLY DAILY 60 " g 3     OMEGA-3 FATTY ACIDS PO Take 1 capsule by mouth daily Unknown capsule strength       omeprazole (PRILOSEC) 20 MG DR capsule TAKE 1 CAPSULE BY MOUTH ONE TIME A DAY. TAKES AS NEEDED FOR HEARTBURN 90 capsule 3     POTASSIUM PO Take 1 tablet by mouth daily Unknown tablet strength       triamcinolone (KENALOG) 0.1 % external cream Apply topically 2 times daily as needed for irritation 30 g 1     TURMERIC PO Take 1 tablet by mouth daily Unknown tablet strength       bisoprolol (ZEBETA) 5 MG tablet Take 0.5 tablets (2.5 mg) by mouth daily (Patient not taking: Reported on 10/26/2022) 30 tablet 0     naproxen (NAPROSYN) 500 MG tablet Take 1 tablet (500 mg) by mouth 2 times daily (with meals) (Patient not taking: Reported on 10/26/2022) 60 tablet 1     zolpidem (AMBIEN) 5 MG tablet TAKE ONE TABLET BY MOUTH NIGHTLY AS NEEDED FOR SLEEP (Patient not taking: Reported on 10/26/2022) 30 tablet 1       ALLERGIES  Allergies   Allergen Reactions     Amlodipine Shortness Of Breath and Swelling         PAST MEDICAL HISTORY:  Past Medical History:   Diagnosis Date     Depressive disorder 2011     Hypertension      NSTEMI (non-ST elevated myocardial infarction) (H) 08/06/2022     SVT (supraventricular tachycardia) (H)        PAST SURGICAL HISTORY:  Past Surgical History:   Procedure Laterality Date     EP ABLATION SVT Bilateral 9/23/2022    Procedure: Ablation Supraventricular Tachycardia;  Surgeon: Donna Wolfe MD;  Location: [facility] CARDIAC CATH LAB       FAMILY HISTORY:  Family History   Problem Relation Age of Onset     Diabetes Mother        SOCIAL HISTORY:  Social History     Socioeconomic History     Marital status: Single     Spouse name: None     Number of children: None     Years of education: None     Highest education level: None   Tobacco Use     Smoking status: Never     Smokeless tobacco: Never   Vaping Use     Vaping Use: Never used   Substance and Sexual Activity     Alcohol use: Yes     Comment: rare     Drug use:  Never   Other Topics Concern     Parent/sibling w/ CABG, MI or angioplasty before 65F 55M? No       Thank you for allowing me to participate in the care of your patient.      Sincerely,     GENOVEVA DIAZ [facility]  cc:   Donna Wolfe MD  [address]  [address]  [place] [address]

## 2022-10-26 NOTE — PROGRESS NOTES
Electrophysiology Clinic Progress Note  Audrey Ricks MRN# [id number]   YOB: 1960 Age: 62 year old     Primary cardiologist: Dr. Cuellar    Reason for visit: SVT ablation follow up    History of presenting illness:    Audrey Ricks is a pleasant 62 year old patient with past medical history significant for SVT, GERD, and hyperlipidemia.    The patient has a history of SVT dating back approximately 10 years ago for which she presented to the emergency department and received adenosine.  She was doing well up until February 2022 when she suffered severe back pain and sciatica with associated heart racing.  She presented to the emergency department at [facility] and was found to be back in SVT that was terminated with adenosine x2.    She was evaluated by Dr. Cuellar in June 2022 who recommended that she meet with electrophysiology to discuss a potential ablation.  Prior to meeting with the EP she presented to the emergency department with concerns of chest pain and palpitations.  Again she was found to be in SVT that was terminated with adenosine.  Due to troponin elevation in the setting of chest pain and SVT she was transferred to [facility] where she was evaluated by cardiology who recommended a CT coronary angiogram with a calcium score of 0 with normal coronary anatomy with no detectable stenosis or plaque.  An echocardiogram showed LVEF of 55 to 60% with no significant wall motion or valvular abnormalities.  During the admission she was started on bisoprolol.     She then met with Dr. Wolfe on 8/18/2022 and reported significant side effects from the bisoprolol as well as metoprolol.  He recommended EP study and ablation for SVT.  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    Today she presents with her partner Nelson.  She has had no recurrent arrhythmias post the ablation and is doing overall well.   "She notes that her resting heart rate traditionally is in the 90s and on EKG today she was tachycardic at 103.  She has no concerns with the access site from the procedure and is feeling overall well.            Assessment and Plan:     ASSESSMENT:    1. SVT    History of recurrences prompting ED visits requiring adenosine for conversion    Status post AV chey reentrant tachycardia ablation on 9/23/2022 with Dr. Wolfe    No symptomatic recurrence    Previously did not tolerate beta blockaders due to fatigue    PLAN:     1. Return to clinic in 1 year or sooner if needed.     Orders this Visit:  Orders Placed This Encounter   Procedures     Follow-Up with Cardiology     EKG 12-lead complete w/read - Clinics     No orders of the defined types were placed in this encounter.    There are no discontinued medications.    Today's clinic visit entailed:  Review of the result(s) of each unique test - Echo, CT, ablation, EKG  Assessment requiring an independent historian(s) - significant other - Nelson  15 minutes spent on the date of the encounter doing chart review, history and exam, documentation and further activities per the note  Provider  Link to [facility] Help Grid     The level of medical decision making during this visit was of moderate complexity.           Review of Systems:     Review of Systems:  Skin:        Eyes:       ENT:       Respiratory:  Negative shortness of breath;dyspnea on exertion;hemoptysis;cough  Cardiovascular:  Negative;palpitations;chest pain;dizziness;syncope or near-syncope;lightheadedness Positive for;edema  Gastroenterology:      Genitourinary:       Musculoskeletal:       Neurologic:  Negative numbness or tingling of feet;numbness or tingling of hands  Psychiatric:       Heme/Lymph/Imm:  Positive for allergies  Endocrine:                 Physical Exam:     Vitals: /78 (BP Location: Right arm, Patient Position: Sitting, Cuff Size: Adult Regular)   Pulse 103   Ht 1.575 m (5' 2\")   Wt 84.2 kg " (185 lb 9.6 oz)   SpO2 98%   BMI 33.95 kg/m    Constitutional: Well nourished and in no apparent distress.  Eyes: Pupils equal, round. Sclerae anicteric.   HEENT: Normocephalic, atraumatic.   Neck: Supple. JVD   Respiratory: Breathing non-labored. Lungs clear to auscultation bilaterally. No crackles, wheezes, rhonchi, or rales.  Cardiovascular: Tachycardic rate and regular rhythm, normal S1 and S2. No murmur, rub, or gallop.  Skin: Warm, dry. No rashes, cyanosis, or xanthelasma.  Extremities: No edema.  No concerns at right femoral vein access site  Neurologic: No gross motor deficits. Alert, awake, and oriented to person, place and time.  Psychiatric: Affect appropriate.        CURRENT MEDICATIONS:  Current Outpatient Medications   Medication Sig Dispense Refill     B Complex Vitamins (VITAMIN-B COMPLEX PO) Take 1 tablet by mouth daily Unknown tablet strength       CALCIUM PO Take 1 tablet by mouth daily Unknown tablet strength       Coenzyme Q10 (CO Q 10 PO) Take 1 capsule by mouth daily Unknown capsule strength       estradiol-norethindrone (COMBIPATCH) 0.05-0.14 MG/DAY bi-weekly patch Place 1 patch onto the skin twice a week 24 patch 3     Folic Acid (FOLATE PO) Take 1 tablet by mouth daily Unknown tablet strength       medical cannabis (Patient's own supply) See Admin Instructions (The purpose of this order is to document that the patient reports taking medical cannabis.  This is not a prescription, and is not used to certify that the patient has a qualifying medical condition.)    Patient uses an extended release liquid formulation overnight for sleep; takes if experiencing sciatica pain (does not take if taking Ambien PRN for sleep)       metroNIDAZOLE (METROGEL) 1 % external gel APPLY TOPICALLY DAILY 60 g 3     OMEGA-3 FATTY ACIDS PO Take 1 capsule by mouth daily Unknown capsule strength       omeprazole (PRILOSEC) 20 MG DR capsule TAKE 1 CAPSULE BY MOUTH ONE TIME A DAY. TAKES AS NEEDED FOR HEARTBURN 90  capsule 3     POTASSIUM PO Take 1 tablet by mouth daily Unknown tablet strength       triamcinolone (KENALOG) 0.1 % external cream Apply topically 2 times daily as needed for irritation 30 g 1     TURMERIC PO Take 1 tablet by mouth daily Unknown tablet strength       bisoprolol (ZEBETA) 5 MG tablet Take 0.5 tablets (2.5 mg) by mouth daily (Patient not taking: Reported on 10/26/2022) 30 tablet 0     naproxen (NAPROSYN) 500 MG tablet Take 1 tablet (500 mg) by mouth 2 times daily (with meals) (Patient not taking: Reported on 10/26/2022) 60 tablet 1     zolpidem (AMBIEN) 5 MG tablet TAKE ONE TABLET BY MOUTH NIGHTLY AS NEEDED FOR SLEEP (Patient not taking: Reported on 10/26/2022) 30 tablet 1       ALLERGIES  Allergies   Allergen Reactions     Amlodipine Shortness Of Breath and Swelling         PAST MEDICAL HISTORY:  Past Medical History:   Diagnosis Date     Depressive disorder 2011     Hypertension      NSTEMI (non-ST elevated myocardial infarction) (H) 08/06/2022     SVT (supraventricular tachycardia) (H)        PAST SURGICAL HISTORY:  Past Surgical History:   Procedure Laterality Date     EP ABLATION SVT Bilateral 9/23/2022    Procedure: Ablation Supraventricular Tachycardia;  Surgeon: Donna Wolfe MD;  Location:  HEART CARDIAC CATH LAB       FAMILY HISTORY:  Family History   Problem Relation Age of Onset     Diabetes Mother        SOCIAL HISTORY:  Social History     Socioeconomic History     Marital status: Single     Spouse name: None     Number of children: None     Years of education: None     Highest education level: None   Tobacco Use     Smoking status: Never     Smokeless tobacco: Never   Vaping Use     Vaping Use: Never used   Substance and Sexual Activity     Alcohol use: Yes     Comment: rare     Drug use: Never   Other Topics Concern     Parent/sibling w/ CABG, MI or angioplasty before 65F 55M? No

## 2022-10-26 NOTE — PATIENT INSTRUCTIONS
TODAY'S RECOMMENDATIONS:    Please follow up with cardiology in 1 year.    If you have questions or concerns please call clinic at [phone]    Please call [phone] for scheduling.      It was a pleasure seeing you today!

## 2022-11-02 ENCOUNTER — MYC MEDICAL ADVICE (OUTPATIENT)
Dept: FAMILY MEDICINE | Facility: CLINIC | Age: 62
End: 2022-11-02

## 2022-11-02 NOTE — LETTER
November 3, 2022      RE: Audrey Ricks  [address] [facility] [address]       To whom it may concern:    Audrey Ricks is a patient currently under my care. Please note that she has been diagnosed with Bipolar disorder with anxiety. Due to this serving on jury duty would likely be detrimental to her mental health. It is recommended she be excused from serving at this time.     Sincerely,      Alie De La Cruz PA-C

## 2022-11-17 ENCOUNTER — TELEPHONE (OUTPATIENT)
Dept: CARDIOLOGY | Facility: CLINIC | Age: 62
End: 2022-11-17

## 2022-11-17 NOTE — TELEPHONE ENCOUNTER
Per Dr Wolfe:  Observation only for now.   May consider a Zipotach if she has recurrent heart racing.   A 12-lead ECG may be beneficial if she has persistent heart racing for hours.

## 2022-11-17 NOTE — TELEPHONE ENCOUNTER
Patient called to report she gave blood last week and shortly after that her HR was elevated to 130 for approximately 90 minutes.  Patient s/p SVT ablation on 9/23.  She reports this has not happened before and she gives blood all the time.  Wanted to be sure it was ok to give blood again.  Will update Dr Tamara Hamm, RN

## 2022-12-12 ENCOUNTER — VIRTUAL VISIT (OUTPATIENT)
Dept: PSYCHOLOGY | Facility: CLINIC | Age: 62
End: 2022-12-12
Payer: MEDICARE

## 2022-12-12 DIAGNOSIS — F41.0 PANIC DISORDER: ICD-10-CM

## 2022-12-12 DIAGNOSIS — F31.81 BIPOLAR 2 DISORDER (H): Primary | ICD-10-CM

## 2022-12-12 PROCEDURE — 90834 PSYTX W PT 45 MINUTES: CPT | Mod: 95 | Performed by: PSYCHOLOGIST

## 2022-12-12 ASSESSMENT — ANXIETY QUESTIONNAIRES
1. FEELING NERVOUS, ANXIOUS, OR ON EDGE: SEVERAL DAYS
5. BEING SO RESTLESS THAT IT IS HARD TO SIT STILL: NOT AT ALL
IF YOU CHECKED OFF ANY PROBLEMS ON THIS QUESTIONNAIRE, HOW DIFFICULT HAVE THESE PROBLEMS MADE IT FOR YOU TO DO YOUR WORK, TAKE CARE OF THINGS AT HOME, OR GET ALONG WITH OTHER PEOPLE: SOMEWHAT DIFFICULT
6. BECOMING EASILY ANNOYED OR IRRITABLE: SEVERAL DAYS
2. NOT BEING ABLE TO STOP OR CONTROL WORRYING: NOT AT ALL
GAD7 TOTAL SCORE: 4
7. FEELING AFRAID AS IF SOMETHING AWFUL MIGHT HAPPEN: NOT AT ALL
4. TROUBLE RELAXING: SEVERAL DAYS
7. FEELING AFRAID AS IF SOMETHING AWFUL MIGHT HAPPEN: NOT AT ALL
3. WORRYING TOO MUCH ABOUT DIFFERENT THINGS: SEVERAL DAYS
GAD7 TOTAL SCORE: 4
8. IF YOU CHECKED OFF ANY PROBLEMS, HOW DIFFICULT HAVE THESE MADE IT FOR YOU TO DO YOUR WORK, TAKE CARE OF THINGS AT HOME, OR GET ALONG WITH OTHER PEOPLE?: SOMEWHAT DIFFICULT
GAD7 TOTAL SCORE: 4

## 2022-12-12 ASSESSMENT — PATIENT HEALTH QUESTIONNAIRE - PHQ9
SUM OF ALL RESPONSES TO PHQ QUESTIONS 1-9: 7
SUM OF ALL RESPONSES TO PHQ QUESTIONS 1-9: 7
10. IF YOU CHECKED OFF ANY PROBLEMS, HOW DIFFICULT HAVE THESE PROBLEMS MADE IT FOR YOU TO DO YOUR WORK, TAKE CARE OF THINGS AT HOME, OR GET ALONG WITH OTHER PEOPLE: VERY DIFFICULT

## 2022-12-12 NOTE — PROGRESS NOTES
[facility]   Mental Health & Addiction Services     Progress Note - Initial Visit  Disclaimer: Voice recognition software was used to generate this note. As a result, wrong word or 'sound-a-like' substitutions may have occurred due to the inherent limitations of voice recognition software. There may be errors in the script that have gone undetected. Please consider this when interpreting information found in this chart.     Patient  Name:  Audrey Ricks Date: 12/12/22         Service Type: Individual     Visit Start Time: 200PM:  Visit End Time: 2:45PM    Visit #: 1    Attendees: Client and Spouse / Significant Other    Service Modality:  Video Visit:      Provider verified identity through the following two step process.  Patient provided:  Patient photo    Telemedicine Visit: The patient's condition can be safely assessed and treated via synchronous audio and visual telemedicine encounter.      Reason for Telemedicine Visit: Services only offered telehealth    Originating Site (Patient Location): Patient's home    Distant Site (Provider Location): Provider Remote Setting- Home Office    Consent:  The patient/guardian has verbally consented to: the potential risks and benefits of telemedicine (video visit) versus in person care; bill my insurance or make self-payment for services provided; and responsibility for payment of non-covered services.     Patient would like the video invitation sent by:  My Chart    Mode of Communication:  [facility]    Distant Location (Provider):  Off-site    As the provider I attest to compliance with applicable laws and regulations related to telemedicine.       DATA:   Interactive Complexity: No   Crisis: No     Presenting Concerns/  Current Stressors:  Audrey and Nelson Partner, together 4 years.   2010 SVT, heart racing. Had been having a lot of PA's which were exacerbated by SVT. ardiac ablation largely reduced anxiety. Moved in with  partner, injured her back during move, had to go back on neurontin. Blood pressure medications send her in to a depression (beta blockers.   Just omeprazole right now. Lots of improvements.   Did pastoral counseling through [facility]  last year. No other counseling. Nothing since surgery.   Had 2 bulged disks at same time. PT is helping manage back pain,   Feels great she can travel now to see friends. Has moved further away from friends. [place] very isolated for some time.       once before 3 grown children. All live in [place]. He was visiting siter in [place], volunteering at a camp. Ran into [employer] at [employer] a couple of times, [place] at same camp.       ASSESSMENT:  Mental Status Assessment:  Appearance:   Appropriate   Eye Contact:   Fair   Psychomotor Behavior: Normal   Attitude:   Cooperative   Orientation:   All  Speech   Rate / Production: Normal/ Responsive   Volume:  Normal   Mood:    Sad   Affect:    Appropriate   Thought Content:  Clear   Thought Form:  Coherent   Insight:    Good       Safety Issues and Plan for Safety and Risk Management:     [place] Suicide Severity Rating Scale (Short Version)  [place] Suicide Severity Rating (Short Version) 1/13/2021 2/21/2022 8/6/2022 8/7/2022   Over the past 2 weeks have you felt down, depressed, or hopeless? no no yes yes   Over the past 2 weeks have you had thoughts of killing yourself? no no no no   Have you ever attempted to kill yourself? no no no no     Patient denies current fears or concerns for personal safety.  Patient denies current or recent suicidal ideation or behaviors.  Patient denies current or recent homicidal ideation or behaviors.  Patient denies current or recent self injurious behavior or ideation.  Patient denies other safety concerns.  Recommended that patient call 911 or go to the local ED should there be a change in any of these risk factors.  Patient reports there are no firearms in the house.     Diagnostic Criteria:  Major  Depressive Disorder   - Depressed mood. Note: In children and adolescents, can be irritable mood.     - Diminished interest or pleasure in all, or almost all, activities.    - Significant weight gainincrease in appetite.    - Decreased sleep.    - Fatigue or loss of energy.    - Feelings of worthlessness or inappropriate and excessive guilt.    - Diminished ability to think or concentrate, or indecisiveness.   B) The symptoms cause clinically significant distress or impairment in social, occupational, or other important areas of functioning  C) The episode is not attributable to the physiological effects of a substance or to another medical condition  D) The occurence of major depressive episode is not better explained by other thought / psychotic disorders  E) There has never been a manic episode or hypomanic episode      DSM5 Diagnoses: (Sustained by DSM5 Criteria Listed Above)  Diagnoses: 296.89 Bipolar II Disorder With anxious distress  296.31 (F33.0) Major Depressive Disorder, Recurrent Episode, Mild _ and With anxious distress  Psychosocial & Contextual Factors: medical complications  WHODAS 2.0 (12 item):   WHODAS 2.0 Total Score 12/12/2022   Total Score 12   Total Score MyChart 12     Intervention:   CBT- Patient was given cognitive distortions list to review and process at next session  Collateral Reports Completed:  Not Applicable      PLAN: (Homework, other):  1. Provider will continue Diagnostic Assessment.  Patient was given the following to do until next session:  Complete intake paperwork    2. Provider recommended the following referrals: N/A.      3.  Suicide Risk and Safety Concerns were assessed for Audrey Ricks.    Patient meets the following risk assessment and triage: Patient denied any current/recent/lifetime history of suicidal ideation and/or behaviors.  No safety plan indicated at this time.       Lan Carcamo, PhD  December 28, 2022

## 2023-01-06 ENCOUNTER — VIRTUAL VISIT (OUTPATIENT)
Dept: PSYCHOLOGY | Facility: CLINIC | Age: 63
End: 2023-01-06
Payer: MEDICARE

## 2023-01-06 DIAGNOSIS — F31.81 BIPOLAR 2 DISORDER (H): Primary | ICD-10-CM

## 2023-01-06 DIAGNOSIS — F41.0 PANIC DISORDER: ICD-10-CM

## 2023-01-06 PROCEDURE — 90834 PSYTX W PT 45 MINUTES: CPT | Mod: 95 | Performed by: PSYCHOLOGIST
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
## 2023-01-24 ENCOUNTER — VIRTUAL VISIT (OUTPATIENT)
Dept: PSYCHOLOGY | Facility: CLINIC | Age: 63
End: 2023-01-24
Payer: MEDICARE

## 2023-01-24 DIAGNOSIS — F41.0 PANIC DISORDER: ICD-10-CM

## 2023-01-24 DIAGNOSIS — F31.81 BIPOLAR 2 DISORDER (H): Primary | ICD-10-CM

## 2023-01-24 PROCEDURE — 90791 PSYCH DIAGNOSTIC EVALUATION: CPT | Mod: 95 | Performed by: PSYCHOLOGIST

## 2023-01-24 ASSESSMENT — COLUMBIA-SUICIDE SEVERITY RATING SCALE - C-SSRS
5. HAVE YOU STARTED TO WORK OUT OR WORKED OUT THE DETAILS OF HOW TO KILL YOURSELF? DO YOU INTEND TO CARRY OUT THIS PLAN?: NO
1. IN THE PAST MONTH, HAVE YOU WISHED YOU WERE DEAD OR WISHED YOU COULD GO TO SLEEP AND NOT WAKE UP?: NO
3. HAVE YOU BEEN THINKING ABOUT HOW YOU MIGHT KILL YOURSELF?: NO
4. HAVE YOU HAD THESE THOUGHTS AND HAD SOME INTENTION OF ACTING ON THEM?: NO
2. HAVE YOU ACTUALLY HAD ANY THOUGHTS OF KILLING YOURSELF IN THE PAST MONTH?: NO

## 2023-01-24 ASSESSMENT — PATIENT HEALTH QUESTIONNAIRE - PHQ9
SUM OF ALL RESPONSES TO PHQ QUESTIONS 1-9: 1
SUM OF ALL RESPONSES TO PHQ QUESTIONS 1-9: 1
10. IF YOU CHECKED OFF ANY PROBLEMS, HOW DIFFICULT HAVE THESE PROBLEMS MADE IT FOR YOU TO DO YOUR WORK, TAKE CARE OF THINGS AT HOME, OR GET ALONG WITH OTHER PEOPLE: NOT DIFFICULT AT ALL
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
## 2023-02-10 ENCOUNTER — VIRTUAL VISIT (OUTPATIENT)
Dept: PSYCHOLOGY | Facility: CLINIC | Age: 63
End: 2023-02-10
Payer: COMMERCIAL

## 2023-02-10 DIAGNOSIS — F41.0 PANIC DISORDER: Primary | ICD-10-CM

## 2023-02-10 PROCEDURE — 90834 PSYTX W PT 45 MINUTES: CPT | Mod: VID | Performed by: PSYCHOLOGIST

## 2023-02-10 ASSESSMENT — PATIENT HEALTH QUESTIONNAIRE - PHQ9
10. IF YOU CHECKED OFF ANY PROBLEMS, HOW DIFFICULT HAVE THESE PROBLEMS MADE IT FOR YOU TO DO YOUR WORK, TAKE CARE OF THINGS AT HOME, OR GET ALONG WITH OTHER PEOPLE: SOMEWHAT DIFFICULT
SUM OF ALL RESPONSES TO PHQ QUESTIONS 1-9: 4
SUM OF ALL RESPONSES TO PHQ QUESTIONS 1-9: 4

## 2023-02-10 NOTE — PROGRESS NOTES
[facility]                                     Progress Note    Disclaimer: Voice recognition software was used to generate this note. As a result, wrong word or 'sound-a-like' substitutions may have occurred due to the inherent limitations of voice recognition software. There may be errors in the script that have gone undetected. Please consider this when interpreting information found in this chart.     Patient Name: Audrey Ricks  Date: 2/10/23         Service Type: Individual      Session Start Time: 9:00AM  Session End Time: 9:45AM     Session Length: 45    Session #: 4    Attendees: Client attended alone    Service Modality:  Video Visit:      Provider verified identity through the following two step process.  Patient provided:  Patient is known previously to provider    Telemedicine Visit: The patient's condition can be safely assessed and treated via synchronous audio and visual telemedicine encounter.      Reason for Telemedicine Visit: Services only offered telehealth    Originating Site (Patient Location): Patient's home    Distant Site (Provider Location): Provider Remote Setting- Home Office    Consent:  The patient/guardian has verbally consented to: the potential risks and benefits of telemedicine (video visit) versus in person care; bill my insurance or make self-payment for services provided; and responsibility for payment of non-covered services.     Patient would like the video invitation sent by:  My Chart    Mode of Communication:  Video Conference via [facility]    Distant Location (Provider):  Off-site    As the provider I attest to compliance with applicable laws and regulations related to telemedicine.    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 Always feels bad if she has to leave (history of having to  leave dt PA's).    Homework: Achieved / completed to satisfaction      Episode of Care Goals: Satisfactory progress - ACTION (Actively working towards change); Intervened by reinforcing change plan / affirming steps taken     Current / Ongoing Stressors and Concerns:   Intermittent chronic back pain.  Worries how it can take over her life. Messes up her sleep schedule. Working regularly as a . Sometimes hard to complete a week DT pain. Better in that she is managing her pain and knowing how much to do.       Treatment Objective(s) Addressed in This Session:   use at least 2 coping skills for anxiety management in the next 2 weeks       Intervention:   CBT: behavioral activation    Assessments completed prior to visit:  The following assessments were completed by patient for this visit:  PHQ9:   PHQ-9 SCORE 12/12/2022 12/20/2022 1/24/2023 2/10/2023   PHQ-9 Total Score MyChart 7 (Mild depression) 4 (Minimal depression) 1 (Minimal depression) 4 (Minimal depression)   PHQ-9 Total Score 7 4 1 4     GAD7:   DAVID-7 SCORE 12/12/2022   Total Score 4 (minimal anxiety)   Total Score 4     CAGE-AID:   CAGE-AID Total Score 12/12/2022   Total Score 0   Total Score MyChart 0 (A total score of 2 or greater is considered clinically significant)     PROMIS 10-Global Health (all questions and answers displayed):   PROMIS 10 12/12/2022   In general, would you say your health is: Very good   In general, would you say your quality of life is: Poor   In general, how would you rate your physical health? Very good   In general, how would you rate your mental health, including your mood and your ability to think? Poor   In general, how would you rate your satisfaction with your social activities and relationships? Good   In general, please rate how well you carry out your usual social activities and roles Good   To what extent are you able to carry out your everyday physical activities such as walking, climbing stairs,  carrying groceries, or moving a chair? Completely   How often have you been bothered by emotional problems such as feeling anxious, depressed or irritable? Sometimes   How would you rate your fatigue on average? None   How would you rate your pain on average?   0 = No Pain  to  10 = Worst Imaginable Pain 1   In general, would you say your health is: 4   In general, would you say your quality of life is: 1   In general, how would you rate your physical health? 4   In general, how would you rate your mental health, including your mood and your ability to think? 1   In general, how would you rate your satisfaction with your social activities and relationships? 3   In general, please rate how well you carry out your usual social activities and roles. (This includes activities at home, at work and in your community, and responsibilities as a parent, child, spouse, employee, friend, etc.) 3   To what extent are you able to carry out your everyday physical activities such as walking, climbing stairs, carrying groceries, or moving a chair? 5   In the past 7 days, how often have you been bothered by emotional problems such as feeling anxious, depressed, or irritable? 3   In the past 7 days, how would you rate your fatigue on average? 1   In the past 7 days, how would you rate your pain on average, where 0 means no pain, and 10 means worst imaginable pain? 1   Global Mental Health Score 8   Global Physical Health Score 18   PROMIS TOTAL - SUBSCORES 26     [place] Suicide Severity Rating Scale (Lifetime/Recent)  [place] Suicide Severity Rating (Lifetime/Recent) 1/24/2023   Q1 Wished to be Dead (Past Month) no   Q2 Suicidal Thoughts (Past Month) no   Q3 Suicidal Thought Method no   Q4 Suicidal Intent without Specific Plan no   Q5 Suicide Intent with Specific Plan no   Level of Risk per Screen low risk         ASSESSMENT: Current Emotional / Mental Status (status of significant symptoms):   Risk status (Self / Other harm or  suicidal ideation)   Patient denies current fears or concerns for personal safety.   Patient denies current or recent suicidal ideation or behaviors.   Patient denies current or recent homicidal ideation or behaviors.   Patient denies current or recent self injurious behavior or ideation.   Patient denies other safety concerns.   Patient reports there has been no change in risk factors since their last session.     Patient reports there has been no change in protective factors since their last session.     Recommended that patient call 911 or go to the local ED should there be a change in any of these risk factors.     Appearance:   Appropriate    Eye Contact:   Good    Psychomotor Behavior: Normal    Attitude:   Cooperative    Orientation:   All   Speech    Rate / Production: Normal     Volume:  Normal    Mood:    Normal   Affect:    Appropriate    Thought Content:  Clear    Thought Form:  Coherent  Logical    Insight:    Good      Medication Review:   No current psychiatric medications prescribed     Medication Compliance:   NA     Changes in Health Issues:   None reported     Chemical Use Review:   Substance Use: Chemical use reviewed, no active concerns identified      Tobacco Use: No current tobacco use.      Diagnosis:  1. Bipolar 2 disorder (H)    2. Panic disorder        Collateral Reports Completed:   Not Applicable    PLAN: (Patient Tasks / Therapist Tasks / Other)  Client to advocate for herself as appropriate. Participate in at least two activities to promote relaxation per week.         Lan Carcamo, PhD                                                         ______________________________________________________________________    Individual Treatment Plan    Patient's Name: Audrey Ricks  YOB: 1960    Date of Creation: 2/10/23  Date Treatment Plan Last Reviewed/Revised: 2/10/23    DSM5 Diagnoses: 300.01 (F41.0) Panic Disorder  Psychosocial / Contextual Factors: Chronic,  intermittent pain  PROMIS (reviewed every 90 days):     Referral / Collaboration:  Referral to another professional/service is not indicated at this time..    Anticipated number of session for this episode of care: 20  Anticipation frequency of session: Every other week  Anticipated Duration of each session: 38-52 minutes  Treatment plan will be reviewed in 90 days or when goals have been changed.     Anxiety Treatment plan:  1. Education- the Biopsychosocial model of anxiety  a. Client will be able to describe how anxiety is effecting them physically, emotionally and socially  2. Distraction  a. Client will learn 2 techniques to distract themselves when becoming anxious  3. Diaphragmatic breathing  a. Client will learn to breath using their diaphragm  b. Client will learn 2 breathing patterns to use to reduce anxiety  4. Visualization  a. Client will learn to establish a safe, calm place in their mind utilizing all their senses  b. Client will practice using visualization at times when they are not anxious so they will be able to use it when anxiety occurs  5. Progressive muscle relaxation  a. Client will learn progressive muscle relaxation techniques and practice them 4-5 times per week.  b. Client will learn to use mental images of relaxation to relax muscle groups and do this on a daily basis  6. Self-care  a. Client will identify 3 things they can do just for themselves  b. Client will take time for quiet, reflection, meditation 3 times per week  c. Client will Learn to set boundaries when appropriate  d. Client will Identify 3 individuals they can call on for support, distraction  7. Assessment of progress  a. Client will engage in assessment of their progress on a regular basis    Patient has reviewed and agreed to the above plan.      Lan Carcamo, PhD  February 10, 2023    Answers for HPI/ROS submitted by the patient on 2/10/2023  If you checked off any problems, how difficult have these problems made it  for you to do your work, take care of things at home, or get along with other people?: Somewhat difficult  PHQ9 TOTAL SCORE: 4

## 2023-02-15 DIAGNOSIS — L71.9 ROSACEA: ICD-10-CM

## 2023-02-15 RX ORDER — METRONIDAZOLE 10 MG/G
GEL TOPICAL DAILY
Qty: 60 G | Refills: 1 | Status: SHIPPED | OUTPATIENT
Start: 2023-02-15 | End: 2023-05-05

## 2023-02-15 NOTE — TELEPHONE ENCOUNTER
Prescription approved per [facility] Refill Protocol.    Janessa Marie, BSN, RN

## 2023-02-24 ENCOUNTER — VIRTUAL VISIT (OUTPATIENT)
Dept: PSYCHOLOGY | Facility: CLINIC | Age: 63
End: 2023-02-24
Payer: COMMERCIAL

## 2023-02-24 DIAGNOSIS — F41.0 PANIC DISORDER: Primary | ICD-10-CM

## 2023-02-24 PROCEDURE — 90834 PSYTX W PT 45 MINUTES: CPT | Mod: VID | Performed by: PSYCHOLOGIST

## 2023-02-24 NOTE — PROGRESS NOTES
[facility]                                     Progress Note    Disclaimer: Voice recognition software was used to generate this note. As a result, wrong word or 'sound-a-like' substitutions may have occurred due to the inherent limitations of voice recognition software. There may be errors in the script that have gone undetected. Please consider this when interpreting information found in this chart.     Patient Name: Audrey Ricks  Date: 2/10/23         Service Type: Individual      Session Start Time: 4:00PM  Session End Time: 4:45PM     Session Length: 45    Session #: 5    Attendees: Client attended alone    Service Modality:  Video Visit:      Provider verified identity through the following two step process.  Patient provided:  Patient is known previously to provider    Telemedicine Visit: The patient's condition can be safely assessed and treated via synchronous audio and visual telemedicine encounter.      Reason for Telemedicine Visit: Services only offered telehealth    Originating Site (Patient Location): Patient's home    Distant Site (Provider Location): Provider Remote Setting- Home Office    Consent:  The patient/guardian has verbally consented to: the potential risks and benefits of telemedicine (video visit) versus in person care; bill my insurance or make self-payment for services provided; and responsibility for payment of non-covered services.     Patient would like the video invitation sent by:  My Chart    Mode of Communication:  Video Conference via [facility]    Distant Location (Provider):  Off-site    As the provider I attest to compliance with applicable laws and regulations related to telemedicine.    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 Always feels bad if she has to leave (history of having to  leave dt PA's).    Homework: Achieved / completed to satisfaction      Episode of Care Goals: Satisfactory progress - ACTION (Actively working towards change); Intervened by reinforcing change plan / affirming steps taken     Current / Ongoing Stressors and Concerns:  Sleeping a little better. 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 Pain seems to be down right now.  Relationship is largely over, communicate still, but does not need much help from him.      Treatment Objective(s) Addressed in This Session:   use at least 2 coping skills for anxiety management in the next 2 weeks       Intervention:   CBT: behavioral activation    Assessments completed prior to visit:  The following assessments were completed by patient for this visit:  PHQ9:   PHQ-9 SCORE 12/12/2022 12/20/2022 1/24/2023 2/10/2023 2/24/2023   PHQ-9 Total Score MyChart 7 (Mild depression) 4 (Minimal depression) 1 (Minimal depression) 4 (Minimal depression) 1 (Minimal depression)   PHQ-9 Total Score 7 4 1 4 1     GAD7:   DAVID-7 SCORE 12/12/2022   Total Score 4 (minimal anxiety)   Total Score 4     CAGE-AID:   CAGE-AID Total Score 12/12/2022   Total Score 0   Total Score MyChart 0 (A total score of 2 or greater is considered clinically significant)     PROMIS 10-Global Health (all questions and answers displayed):   PROMIS 10 12/12/2022   In general, would you say your health is: Very good   In general, would you say your quality of life is: Poor   In general, how would you rate your physical health? Very good   In general, how would you rate your mental health, including your mood and your ability to think? Poor   In general, how would you rate your satisfaction with your social activities and relationships? Good   In general, please rate how well you carry out your usual social activities and roles Good   To what extent are you able to carry out your everyday physical activities such as  walking, climbing stairs, carrying groceries, or moving a chair? Completely   How often have you been bothered by emotional problems such as feeling anxious, depressed or irritable? Sometimes   How would you rate your fatigue on average? None   How would you rate your pain on average?   0 = No Pain  to  10 = Worst Imaginable Pain 1   In general, would you say your health is: 4   In general, would you say your quality of life is: 1   In general, how would you rate your physical health? 4   In general, how would you rate your mental health, including your mood and your ability to think? 1   In general, how would you rate your satisfaction with your social activities and relationships? 3   In general, please rate how well you carry out your usual social activities and roles. (This includes activities at home, at work and in your community, and responsibilities as a parent, child, spouse, employee, friend, etc.) 3   To what extent are you able to carry out your everyday physical activities such as walking, climbing stairs, carrying groceries, or moving a chair? 5   In the past 7 days, how often have you been bothered by emotional problems such as feeling anxious, depressed, or irritable? 3   In the past 7 days, how would you rate your fatigue on average? 1   In the past 7 days, how would you rate your pain on average, where 0 means no pain, and 10 means worst imaginable pain? 1   Global Mental Health Score 8   Global Physical Health Score 18   PROMIS TOTAL - SUBSCORES 26     [place] Suicide Severity Rating Scale (Lifetime/Recent)  [place] Suicide Severity Rating (Lifetime/Recent) 1/24/2023   Q1 Wished to be Dead (Past Month) no   Q2 Suicidal Thoughts (Past Month) no   Q3 Suicidal Thought Method no   Q4 Suicidal Intent without Specific Plan no   Q5 Suicide Intent with Specific Plan no   Level of Risk per Screen low risk         ASSESSMENT: Current Emotional / Mental Status (status of significant symptoms):   Risk  status (Self / Other harm or suicidal ideation)   Patient denies current fears or concerns for personal safety.   Patient denies current or recent suicidal ideation or behaviors.   Patient denies current or recent homicidal ideation or behaviors.   Patient denies current or recent self injurious behavior or ideation.   Patient denies other safety concerns.   Patient reports there has been no change in risk factors since their last session.     Patient reports there has been no change in protective factors since their last session.     Recommended that patient call 911 or go to the local ED should there be a change in any of these risk factors.     Appearance:   Appropriate    Eye Contact:   Good    Psychomotor Behavior: Normal    Attitude:   Cooperative    Orientation:   All   Speech    Rate / Production: Normal     Volume:  Normal    Mood:    Normal content, happy   Affect:    Appropriate    Thought Content:  Clear    Thought Form:  Coherent  Logical    Insight:    Good      Medication Review:   No current psychiatric medications prescribed     Medication Compliance:   NA     Changes in Health Issues:   None reported     Chemical Use Review:   Substance Use: Chemical use reviewed, no active concerns identified      Tobacco Use: No current tobacco use.      Diagnosis:  1. Panic disorder        Collateral Reports Completed:   Not Applicable    PLAN: (Patient Tasks / Therapist Tasks / Other)  Client to advocate for herself as appropriate. Participate in at least two activities to promote relaxation per week.         Lan Carcamo, PhD                                                         ______________________________________________________________________    Individual Treatment Plan    Patient's Name: Audrey Ricks  YOB: 1960    Date of Creation: 2/10/23  Date Treatment Plan Last Reviewed/Revised: 2/10/23    DSM5 Diagnoses: 300.01 (F41.0) Panic Disorder  Psychosocial / Contextual Factors:  Chronic, intermittent pain  PROMIS (reviewed every 90 days):     Referral / Collaboration:  Referral to another professional/service is not indicated at this time..    Anticipated number of session for this episode of care: 20  Anticipation frequency of session: Every other week  Anticipated Duration of each session: 38-52 minutes  Treatment plan will be reviewed in 90 days or when goals have been changed.     Anxiety Treatment plan:  1. Education- the Biopsychosocial model of anxiety  a. Client will be able to describe how anxiety is effecting them physically, emotionally and socially  2. Distraction  a. Client will learn 2 techniques to distract themselves when becoming anxious  3. Diaphragmatic breathing  a. Client will learn to breath using their diaphragm  b. Client will learn 2 breathing patterns to use to reduce anxiety  4. Visualization  a. Client will learn to establish a safe, calm place in their mind utilizing all their senses  b. Client will practice using visualization at times when they are not anxious so they will be able to use it when anxiety occurs  5. Progressive muscle relaxation  a. Client will learn progressive muscle relaxation techniques and practice them 4-5 times per week.  b. Client will learn to use mental images of relaxation to relax muscle groups and do this on a daily basis  6. Self-care  a. Client will identify 3 things they can do just for themselves  b. Client will take time for quiet, reflection, meditation 3 times per week  c. Client will Learn to set boundaries when appropriate  d. Client will Identify 3 individuals they can call on for support, distraction  7. Assessment of progress  a. Client will engage in assessment of their progress on a regular basis    Patient has reviewed and agreed to the above plan.      Lan Carcamo, PhD  February 10, 2023    Answers for HPI/ROS submitted by the patient on 2/10/2023  If you checked off any problems, how difficult have these  problems made it for you to do your work, take care of things at home, or get along with other people?: Somewhat difficult  PHQ9 TOTAL SCORE: 4    Answers for HPI/ROS submitted by the patient on 2/24/2023  If you checked off any problems, how difficult have these problems made it for you to do your work, take care of things at home, or get along with other people?: Not difficult at all  PHQ9 TOTAL SCORE: 1

## 2023-03-06 DIAGNOSIS — G89.29 CHRONIC PAIN OF BOTH KNEES: ICD-10-CM

## 2023-03-06 DIAGNOSIS — M25.561 CHRONIC PAIN OF BOTH KNEES: ICD-10-CM

## 2023-03-06 DIAGNOSIS — M25.562 CHRONIC PAIN OF BOTH KNEES: ICD-10-CM

## 2023-03-07 NOTE — TELEPHONE ENCOUNTER
"Requested Prescriptions   Pending Prescriptions Disp Refills    naproxen (NAPROSYN) 500 MG tablet [Pharmacy Med Name: NAPROXEN 500MG TABS] 60 tablet 1     Sig: Take 1 tablet (500 mg) by mouth 2 times daily (with meals)       NSAID Medications Failed - 3/6/2023  4:00 PM        Failed - Normal ALT on file in past 12 months     No lab results found.          Failed - Normal AST on file in past 12 months     No lab results found.          Failed - Normal CBC on file in past 12 months     Recent Labs   Lab Test 09/23/22  0855   WBC 5.2   RBC 4.25   HGB 11.6*   HCT 36.0                    Failed - Normal serum creatinine on file in past 12 months     Recent Labs   Lab Test 09/23/22  0855   CR 1.13*       Ok to refill medication if creatinine is low          Passed - Blood pressure under 140/90 in past 12 months     BP Readings from Last 3 Encounters:   10/26/22 128/78   09/23/22 (!) 137/90   08/18/22 128/88                 Passed - Recent (12 mo) or future (30 days) visit within the authorizing provider's specialty     Patient has had an office visit with the authorizing provider or a provider within the authorizing providers department within the previous 12 mos or has a future within next 30 days. See \"Patient Info\" tab in inbasket, or \"Choose Columns\" in Meds & Orders section of the refill encounter.              Passed - Patient is age 6-64 years        Passed - Medication is active on med list        Passed - No active pregnancy on record        Passed - No positive pregnancy test in past 12 months             "

## 2023-03-08 RX ORDER — NAPROXEN 500 MG/1
500 TABLET ORAL 2 TIMES DAILY WITH MEALS
Qty: 60 TABLET | Refills: 1 | Status: SHIPPED | OUTPATIENT
Start: 2023-03-08 | End: 2023-05-05

## 2023-03-10 ENCOUNTER — VIRTUAL VISIT (OUTPATIENT)
Dept: PSYCHOLOGY | Facility: CLINIC | Age: 63
End: 2023-03-10
Payer: COMMERCIAL

## 2023-03-10 DIAGNOSIS — F41.0 PANIC DISORDER: Primary | ICD-10-CM

## 2023-03-10 PROCEDURE — 90834 PSYTX W PT 45 MINUTES: CPT | Mod: VID | Performed by: PSYCHOLOGIST

## 2023-03-10 NOTE — PROGRESS NOTES
[facility]                                     Progress Note    Disclaimer: Voice recognition software was used to generate this note. As a result, wrong word or 'sound-a-like' substitutions may have occurred due to the inherent limitations of voice recognition software. There may be errors in the script that have gone undetected. Please consider this when interpreting information found in this chart.     Patient Name: Audrey Ricks  Date: 3/10/23         Service Type: Individual      Session Start Time: 9:00AM  Session End Time: 9:45AM     Session Length: 45    Session #: 6    Attendees: Client attended alone    Service Modality:  Video Visit:      Provider verified identity through the following two step process.  Patient provided:  Patient is known previously to provider    Telemedicine Visit: The patient's condition can be safely assessed and treated via synchronous audio and visual telemedicine encounter.      Reason for Telemedicine Visit: Services only offered telehealth    Originating Site (Patient Location): Patient's home    Distant Site (Provider Location): Provider Remote Setting- Home Office    Consent:  The patient/guardian has verbally consented to: the potential risks and benefits of telemedicine (video visit) versus in person care; bill my insurance or make self-payment for services provided; and responsibility for payment of non-covered services.     Patient would like the video invitation sent by:  My Chart    Mode of Communication:  Video Conference via [facility]    Distant Location (Provider):  Off-site    As the provider I attest to compliance with applicable laws and regulations related to telemedicine.    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 Always feels bad if she has to leave (history of having to  leave dt PA's).    Homework: Achieved / completed to satisfaction      Episode of Care Goals: Satisfactory progress - ACTION (Actively working towards change); Intervened by reinforcing change plan / affirming steps taken     Current / Ongoing Stressors and Concerns:    Has been battling a cold for a week.   Dealing with weird behavior of customers at [employer].   Triggered some of her previous trauma.   Granddaughter has relapsed on fentanyl. Got arrested.   Reminded her of her own struggles with addiction, grieving her loss.     Treatment Objective(s) Addressed in This Session:   use at least 2 coping skills for anxiety management in the next 2 weeks       Intervention:   CBT: behavioral activation    Assessments completed prior to visit:  The following assessments were completed by patient for this visit:  PHQ9:   PHQ-9 SCORE 12/12/2022 12/20/2022 1/24/2023 2/10/2023 2/24/2023   PHQ-9 Total Score MyChart 7 (Mild depression) 4 (Minimal depression) 1 (Minimal depression) 4 (Minimal depression) 1 (Minimal depression)   PHQ-9 Total Score 7 4 1 4 1     GAD7:   DAVID-7 SCORE 12/12/2022   Total Score 4 (minimal anxiety)   Total Score 4     CAGE-AID:   CAGE-AID Total Score 12/12/2022   Total Score 0   Total Score MyChart 0 (A total score of 2 or greater is considered clinically significant)     PROMIS 10-Global Health (all questions and answers displayed):   PROMIS 10 12/12/2022   In general, would you say your health is: Very good   In general, would you say your quality of life is: Poor   In general, how would you rate your physical health? Very good   In general, how would you rate your mental health, including your mood and your ability to think? Poor   In general, how would you rate your satisfaction with your social activities and relationships? Good   In general, please rate how well you carry out your usual social activities and roles Good   To what extent are you able to carry out your everyday physical  activities such as walking, climbing stairs, carrying groceries, or moving a chair? Completely   How often have you been bothered by emotional problems such as feeling anxious, depressed or irritable? Sometimes   How would you rate your fatigue on average? None   How would you rate your pain on average?   0 = No Pain  to  10 = Worst Imaginable Pain 1   In general, would you say your health is: 4   In general, would you say your quality of life is: 1   In general, how would you rate your physical health? 4   In general, how would you rate your mental health, including your mood and your ability to think? 1   In general, how would you rate your satisfaction with your social activities and relationships? 3   In general, please rate how well you carry out your usual social activities and roles. (This includes activities at home, at work and in your community, and responsibilities as a parent, child, spouse, employee, friend, etc.) 3   To what extent are you able to carry out your everyday physical activities such as walking, climbing stairs, carrying groceries, or moving a chair? 5   In the past 7 days, how often have you been bothered by emotional problems such as feeling anxious, depressed, or irritable? 3   In the past 7 days, how would you rate your fatigue on average? 1   In the past 7 days, how would you rate your pain on average, where 0 means no pain, and 10 means worst imaginable pain? 1   Global Mental Health Score 8   Global Physical Health Score 18   PROMIS TOTAL - SUBSCORES 26     [place] Suicide Severity Rating Scale (Lifetime/Recent)  [place] Suicide Severity Rating (Lifetime/Recent) 1/24/2023   Q1 Wished to be Dead (Past Month) no   Q2 Suicidal Thoughts (Past Month) no   Q3 Suicidal Thought Method no   Q4 Suicidal Intent without Specific Plan no   Q5 Suicide Intent with Specific Plan no   Level of Risk per Screen low risk         ASSESSMENT: Current Emotional / Mental Status (status of significant  symptoms):   Risk status (Self / Other harm or suicidal ideation)   Patient denies current fears or concerns for personal safety.   Patient denies current or recent suicidal ideation or behaviors.   Patient denies current or recent homicidal ideation or behaviors.   Patient denies current or recent self injurious behavior or ideation.   Patient denies other safety concerns.   Patient reports there has been no change in risk factors since their last session.     Patient reports there has been no change in protective factors since their last session.     Recommended that patient call 911 or go to the local ED should there be a change in any of these risk factors.     Appearance:   Appropriate    Eye Contact:   Good    Psychomotor Behavior: Normal    Attitude:   Cooperative    Orientation:   All   Speech    Rate / Production: Normal     Volume:  Normal    Mood:    Normal content, happy   Affect:    Appropriate    Thought Content:  Clear    Thought Form:  Coherent  Logical    Insight:    Good      Medication Review:   No current psychiatric medications prescribed     Medication Compliance:   NA     Changes in Health Issues:   None reported     Chemical Use Review:   Substance Use: Chemical use reviewed, no active concerns identified      Tobacco Use: No current tobacco use.      Diagnosis:  1. Panic disorder        Collateral Reports Completed:   Not Applicable    PLAN: (Patient Tasks / Therapist Tasks / Other)  Client to advocate for herself as appropriate. Participate in at least two activities to promote relaxation per week.         Lan Carcamo, PhD                                                         ______________________________________________________________________    Individual Treatment Plan    Patient's Name: Audrey Ricks  YOB: 1960    Date of Creation: 2/10/23  Date Treatment Plan Last Reviewed/Revised: 2/10/23    DSM5 Diagnoses: 300.01 (F41.0) Panic Disorder  Psychosocial /  Contextual Factors: Chronic, intermittent pain  PROMIS (reviewed every 90 days):     Referral / Collaboration:  Referral to another professional/service is not indicated at this time..    Anticipated number of session for this episode of care: 20  Anticipation frequency of session: Every other week  Anticipated Duration of each session: 38-52 minutes  Treatment plan will be reviewed in 90 days or when goals have been changed.     Anxiety Treatment plan:  1. Education- the Biopsychosocial model of anxiety  a. Client will be able to describe how anxiety is effecting them physically, emotionally and socially  2. Distraction  a. Client will learn 2 techniques to distract themselves when becoming anxious  3. Diaphragmatic breathing  a. Client will learn to breath using their diaphragm  b. Client will learn 2 breathing patterns to use to reduce anxiety  4. Visualization  a. Client will learn to establish a safe, calm place in their mind utilizing all their senses  b. Client will practice using visualization at times when they are not anxious so they will be able to use it when anxiety occurs  5. Progressive muscle relaxation  a. Client will learn progressive muscle relaxation techniques and practice them 4-5 times per week.  b. Client will learn to use mental images of relaxation to relax muscle groups and do this on a daily basis  6. Self-care  a. Client will identify 3 things they can do just for themselves  b. Client will take time for quiet, reflection, meditation 3 times per week  c. Client will Learn to set boundaries when appropriate  d. Client will Identify 3 individuals they can call on for support, distraction  7. Assessment of progress  a. Client will engage in assessment of their progress on a regular basis    Patient has reviewed and agreed to the above plan.      Lan Carcamo, PhD  February 10, 2023    Answers for HPI/ROS submitted by the patient on 2/10/2023  If you checked off any problems, how  difficult have these problems made it for you to do your work, take care of things at home, or get along with other people?: Somewhat difficult  PHQ9 TOTAL SCORE: 4    Answers for HPI/ROS submitted by the patient on 2/24/2023  If you checked off any problems, how difficult have these problems made it for you to do your work, take care of things at home, or get along with other people?: Not difficult at all  PHQ9 TOTAL SCORE: 1

## 2023-03-23 ENCOUNTER — HOSPITAL ENCOUNTER (OUTPATIENT)
Dept: MAMMOGRAPHY | Facility: CLINIC | Age: 63
Discharge: HOME | End: 2023-03-23
Attending: PHYSICIAN ASSISTANT | Admitting: PHYSICIAN ASSISTANT
Payer: COMMERCIAL

## 2023-03-23 DIAGNOSIS — Z12.31 VISIT FOR SCREENING MAMMOGRAM: ICD-10-CM

## 2023-03-23 PROCEDURE — 77067 SCR MAMMO BI INCL CAD: CPT

## 2023-04-14 ENCOUNTER — VIRTUAL VISIT (OUTPATIENT)
Dept: PSYCHOLOGY | Facility: CLINIC | Age: 63
End: 2023-04-14
Payer: COMMERCIAL

## 2023-04-14 DIAGNOSIS — F31.81 BIPOLAR 2 DISORDER (H): Primary | ICD-10-CM

## 2023-04-14 PROCEDURE — 90834 PSYTX W PT 45 MINUTES: CPT | Mod: VID | Performed by: PSYCHOLOGIST

## 2023-04-14 ASSESSMENT — PATIENT HEALTH QUESTIONNAIRE - PHQ9
SUM OF ALL RESPONSES TO PHQ QUESTIONS 1-9: 2
SUM OF ALL RESPONSES TO PHQ QUESTIONS 1-9: 2
10. IF YOU CHECKED OFF ANY PROBLEMS, HOW DIFFICULT HAVE THESE PROBLEMS MADE IT FOR YOU TO DO YOUR WORK, TAKE CARE OF THINGS AT HOME, OR GET ALONG WITH OTHER PEOPLE: NOT DIFFICULT AT ALL

## 2023-04-14 NOTE — PROGRESS NOTES
[facility]                                     Progress Note    Disclaimer: Voice recognition software was used to generate this note. As a result, wrong word or 'sound-a-like' substitutions may have occurred due to the inherent limitations of voice recognition software. There may be errors in the script that have gone undetected. Please consider this when interpreting information found in this chart.     Patient Name: Audrey Ricks  Date: 4/14/23         Service Type: Individual      Session Start Time: 9:00AM  Session End Time: 9:45AM     Session Length: 45    Session #: 7    Attendees: Client attended alone    Service Modality:  Video Visit:      Provider verified identity through the following two step process.  Patient provided:  Patient is known previously to provider    Telemedicine Visit: The patient's condition can be safely assessed and treated via synchronous audio and visual telemedicine encounter.      Reason for Telemedicine Visit: Services only offered telehealth    Originating Site (Patient Location): Patient's home    Distant Site (Provider Location): Provider Remote Setting- Home Office    Consent:  The patient/guardian has verbally consented to: the potential risks and benefits of telemedicine (video visit) versus in person care; bill my insurance or make self-payment for services provided; and responsibility for payment of non-covered services.     Patient would like the video invitation sent by:  My Chart    Mode of Communication:  Video Conference via [facility]    Distant Location (Provider):  Off-site    As the provider I attest to compliance with applicable laws and regulations related to telemedicine.    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 Always feels bad if she has to leave (history of having to  leave dt PA's).    Homework: Achieved / completed to satisfaction      Episode of Care Goals: Satisfactory progress - ACTION (Actively working towards change); Intervened by reinforcing change plan / affirming steps taken     Current / Ongoing Stressors and Concerns:  Granddaughter appears to have eloped from court ordered inpatient opiate treatment in [facility].    Struggling with her own self-confidence given virtually everyone else in her family is an addict or mentally ill.     Treatment Objective(s) Addressed in This Session:   use at least 2 coping skills for anxiety management in the next 2 weeks       Intervention:   CBT: behavioral activation    Assessments completed prior to visit:  The following assessments were completed by patient for this visit:  PHQ9:       12/12/2022     1:28 PM 12/20/2022     1:58 PM 1/24/2023     3:42 PM 2/10/2023     8:44 AM 2/24/2023     3:50 PM 4/14/2023     8:52 AM   PHQ-9 SCORE   PHQ-9 Total Score MyChart 7 (Mild depression) 4 (Minimal depression) 1 (Minimal depression) 4 (Minimal depression) 1 (Minimal depression) 2 (Minimal depression)   PHQ-9 Total Score 7 4 1 4 1 2     GAD7:       12/12/2022     1:54 PM   DAVID-7 SCORE   Total Score 4 (minimal anxiety)   Total Score 4     CAGE-AID:       12/12/2022     2:00 PM   CAGE-AID Total Score   Total Score 0   Total Score MyChart 0 (A total score of 2 or greater is considered clinically significant)     PROMIS 10-Global Health (all questions and answers displayed):       12/12/2022     1:59 PM 4/14/2023     8:55 AM   PROMIS 10   In general, would you say your health is: Very good Good   In general, would you say your quality of life is: Poor Good   In general, how would you rate your physical health? Very good Good   In general, how would you rate your mental health, including your mood and your ability to think? Poor Fair   In general, how would you rate your satisfaction with your social activities and relationships? Good Good   In  general, please rate how well you carry out your usual social activities and roles Good Fair   To what extent are you able to carry out your everyday physical activities such as walking, climbing stairs, carrying groceries, or moving a chair? Completely Completely   In the past 7 days, how often have you been bothered by emotional problems such as feeling anxious, depressed, or irritable? Sometimes Sometimes   In the past 7 days, how would you rate your fatigue on average? None Mild   In the past 7 days, how would you rate your pain on average, where 0 means no pain, and 10 means worst imaginable pain? 1 6   In general, would you say your health is: 4 3   In general, would you say your quality of life is: 1 3   In general, how would you rate your physical health? 4 3   In general, how would you rate your mental health, including your mood and your ability to think? 1 2   In general, how would you rate your satisfaction with your social activities and relationships? 3 3   In general, please rate how well you carry out your usual social activities and roles. (This includes activities at home, at work and in your community, and responsibilities as a parent, child, spouse, employee, friend, etc.) 3 2   To what extent are you able to carry out your everyday physical activities such as walking, climbing stairs, carrying groceries, or moving a chair? 5 5   In the past 7 days, how often have you been bothered by emotional problems such as feeling anxious, depressed, or irritable? 3 3   In the past 7 days, how would you rate your fatigue on average? 1 2   In the past 7 days, how would you rate your pain on average, where 0 means no pain, and 10 means worst imaginable pain? 1 6   Global Mental Health Score 8 11   Global Physical Health Score 18 15   PROMIS TOTAL - SUBSCORES 26 26     [place] Suicide Severity Rating Scale (Lifetime/Recent)      1/24/2023     4:00 PM   [place] Suicide Severity Rating (Lifetime/Recent)   Q1  Wished to be Dead (Past Month) no   Q2 Suicidal Thoughts (Past Month) no   Q3 Suicidal Thought Method no   Q4 Suicidal Intent without Specific Plan no   Q5 Suicide Intent with Specific Plan no   Level of Risk per Screen low risk         ASSESSMENT: Current Emotional / Mental Status (status of significant symptoms):   Risk status (Self / Other harm or suicidal ideation)   Patient denies current fears or concerns for personal safety.   Patient denies current or recent suicidal ideation or behaviors.   Patient denies current or recent homicidal ideation or behaviors.   Patient denies current or recent self injurious behavior or ideation.   Patient denies other safety concerns.   Patient reports there has been no change in risk factors since their last session.     Patient reports there has been no change in protective factors since their last session.     Recommended that patient call 911 or go to the local ED should there be a change in any of these risk factors.     Appearance:   Appropriate    Eye Contact:   Good    Psychomotor Behavior: Normal    Attitude:   Cooperative    Orientation:   All   Speech    Rate / Production: Normal     Volume:  Normal    Mood:    Normal worried   Affect:    Appropriate    Thought Content:  Clear    Thought Form:  Coherent  Logical    Insight:    Good      Medication Review:   No current psychiatric medications prescribed     Medication Compliance:   NA     Changes in Health Issues:   None reported     Chemical Use Review:   Substance Use: Chemical use reviewed, no active concerns identified      Tobacco Use: No current tobacco use.      Diagnosis:  Diagnosis:  1. Bipolar 2 disorder (H)          Collateral Reports Completed:   Not Applicable    PLAN: (Patient Tasks / Therapist Tasks / Other)  Client to advocate for herself as appropriate. Participate in at least two activities to promote relaxation per week.         Lan Carcamo, PhD                                                          ______________________________________________________________________    Individual Treatment Plan    Patient's Name: Audrey Ricks  YOB: 1960    Date of Creation: 2/10/23  Date Treatment Plan Last Reviewed/Revised: 2/10/23    DSM5 Diagnoses: 300.01 (F41.0) Panic Disorder  Psychosocial / Contextual Factors: Chronic, intermittent pain  PROMIS (reviewed every 90 days):     Referral / Collaboration:  Referral to another professional/service is not indicated at this time..    Anticipated number of session for this episode of care: 20  Anticipation frequency of session: Every other week  Anticipated Duration of each session: 38-52 minutes  Treatment plan will be reviewed in 90 days or when goals have been changed.     Anxiety Treatment plan:  1. Education- the Biopsychosocial model of anxiety  a. Client will be able to describe how anxiety is effecting them physically, emotionally and socially  2. Distraction  a. Client will learn 2 techniques to distract themselves when becoming anxious  3. Diaphragmatic breathing  a. Client will learn to breath using their diaphragm  b. Client will learn 2 breathing patterns to use to reduce anxiety  4. Visualization  a. Client will learn to establish a safe, calm place in their mind utilizing all their senses  b. Client will practice using visualization at times when they are not anxious so they will be able to use it when anxiety occurs  5. Progressive muscle relaxation  a. Client will learn progressive muscle relaxation techniques and practice them 4-5 times per week.  b. Client will learn to use mental images of relaxation to relax muscle groups and do this on a daily basis  6. Self-care  a. Client will identify 3 things they can do just for themselves  b. Client will take time for quiet, reflection, meditation 3 times per week  c. Client will Learn to set boundaries when appropriate  d. Client will Identify 3 individuals they can call on for support,  distraction  7. Assessment of progress  a. Client will engage in assessment of their progress on a regular basis    Patient has reviewed and agreed to the above plan.      Lan Carcamo, PhD  February 10, 2023    Answers for HPI/ROS submitted by the patient on 2/10/2023  If you checked off any problems, how difficult have these problems made it for you to do your work, take care of things at home, or get along with other people?: Somewhat difficult  PHQ9 TOTAL SCORE: 4    Answers for HPI/ROS submitted by the patient on 2/24/2023  If you checked off any problems, how difficult have these problems made it for you to do your work, take care of things at home, or get along with other people?: Not difficult at all  PHQ9 TOTAL SCORE: 1  Answers for HPI/ROS submitted by the patient on 4/14/2023  If you checked off any problems, how difficult have these problems made it for you to do your work, take care of things at home, or get along with other people?: Not difficult at all  PHQ9 TOTAL SCORE: 2

## 2023-04-24 DIAGNOSIS — N95.1 MENOPAUSAL SYNDROME (HOT FLASHES): ICD-10-CM

## 2023-04-25 RX ORDER — ESTRADIOL/NORETHINDRONE ACETATE TRANSDERMAL SYSTEM .05; .14 MG/D; MG/D
PATCH, EXTENDED RELEASE TRANSDERMAL
Qty: 24 PATCH | Refills: 0 | Status: SHIPPED | OUTPATIENT
Start: 2023-04-25 | End: 2023-05-05

## 2023-04-25 NOTE — TELEPHONE ENCOUNTER
Patient informed via mychart to schedule. 4/28 reminder if not read to send letter.     Closing encounter.   Trudy Modi MA

## 2023-04-28 ENCOUNTER — VIRTUAL VISIT (OUTPATIENT)
Dept: PSYCHOLOGY | Facility: CLINIC | Age: 63
End: 2023-04-28
Payer: COMMERCIAL

## 2023-04-28 DIAGNOSIS — F31.81 BIPOLAR 2 DISORDER (H): Primary | ICD-10-CM

## 2023-04-28 DIAGNOSIS — F41.0 PANIC DISORDER: ICD-10-CM

## 2023-04-28 PROCEDURE — 90834 PSYTX W PT 45 MINUTES: CPT | Mod: VID | Performed by: PSYCHOLOGIST

## 2023-04-28 ASSESSMENT — PATIENT HEALTH QUESTIONNAIRE - PHQ9
SUM OF ALL RESPONSES TO PHQ QUESTIONS 1-9: 6
SUM OF ALL RESPONSES TO PHQ QUESTIONS 1-9: 6
10. IF YOU CHECKED OFF ANY PROBLEMS, HOW DIFFICULT HAVE THESE PROBLEMS MADE IT FOR YOU TO DO YOUR WORK, TAKE CARE OF THINGS AT HOME, OR GET ALONG WITH OTHER PEOPLE: SOMEWHAT DIFFICULT

## 2023-04-28 NOTE — PROGRESS NOTES
[facility]                                     Progress Note    Disclaimer: Voice recognition software was used to generate this note. As a result, wrong word or 'sound-a-like' substitutions may have occurred due to the inherent limitations of voice recognition software. There may be errors in the script that have gone undetected. Please consider this when interpreting information found in this chart.     Patient Name: Audrey Ricks  Date: 4/28/23         Service Type: Individual      Session Start Time: 11:00AM  Session End Time: 11:45AM     Session Length: 45    Session #: 8    Attendees: Client attended alone    Service Modality:  Video Visit:      Provider verified identity through the following two step process.  Patient provided:  Patient is known previously to provider    Telemedicine Visit: The patient's condition can be safely assessed and treated via synchronous audio and visual telemedicine encounter.      Reason for Telemedicine Visit: Services only offered telehealth    Originating Site (Patient Location): Patient's home    Distant Site (Provider Location): Provider Remote Setting- Home Office    Consent:  The patient/guardian has verbally consented to: the potential risks and benefits of telemedicine (video visit) versus in person care; bill my insurance or make self-payment for services provided; and responsibility for payment of non-covered services.     Patient would like the video invitation sent by:  My Chart    Mode of Communication:  Video Conference via [facility]    Distant Location (Provider):  Off-site    As the provider I attest to compliance with applicable laws and regulations related to telemedicine.    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 Always feels bad if she has to leave (history of having to  leave dt PA's).    Homework: Achieved / completed to satisfaction      Episode of Care Goals: Satisfactory progress - ACTION (Actively working towards change); Intervened by reinforcing change plan / affirming steps taken     Current / Ongoing Stressors and Concerns:  Granddaughter appears to have eloped from court ordered inpatient opiate treatment in [facility].    Struggling with her own self-confidence given virtually everyone else in her family is an addict or mentally ill.    A little lost in worries today. Primarily about her missing granddaughter.   Used to provoke heart palpitations, not now.   Getting better at distracting herself, finding people to engage with.   Oversleeping some.   Feeling like life is a little thick. Hard to get through.   Shared her succeses with sobriety.     Treatment Objective(s) Addressed in This Session:   use at least 2 coping skills for anxiety management in the next 2 weeks     Intervention:   CBT: behavioral activation    Assessments completed prior to visit:  The following assessments were completed by patient for this visit:  PHQ9:       12/12/2022     1:28 PM 12/20/2022     1:58 PM 1/24/2023     3:42 PM 2/10/2023     8:44 AM 2/24/2023     3:50 PM 4/14/2023     8:52 AM 4/28/2023    10:53 AM   PHQ-9 SCORE   PHQ-9 Total Score MyChart 7 (Mild depression) 4 (Minimal depression) 1 (Minimal depression) 4 (Minimal depression) 1 (Minimal depression) 2 (Minimal depression) 6 (Mild depression)   PHQ-9 Total Score 7 4 1 4 1 2 6     GAD7:       12/12/2022     1:54 PM   DAVID-7 SCORE   Total Score 4 (minimal anxiety)   Total Score 4     CAGE-AID:       12/12/2022     2:00 PM   CAGE-AID Total Score   Total Score 0   Total Score MyChart 0 (A total score of 2 or greater is considered clinically significant)     PROMIS 10-Global Health (all questions and answers displayed):       12/12/2022     1:59 PM 4/14/2023     8:55 AM 4/28/2023    10:55 AM   PROMIS 10   In general, would you say your  health is: Very good Good Very good   In general, would you say your quality of life is: Poor Good Very good   In general, how would you rate your physical health? Very good Good Very good   In general, how would you rate your mental health, including your mood and your ability to think? Poor Fair Fair   In general, how would you rate your satisfaction with your social activities and relationships? Good Good Very good   In general, please rate how well you carry out your usual social activities and roles Good Fair Fair   To what extent are you able to carry out your everyday physical activities such as walking, climbing stairs, carrying groceries, or moving a chair? Completely Completely Completely   In the past 7 days, how often have you been bothered by emotional problems such as feeling anxious, depressed, or irritable? Sometimes Sometimes Sometimes   In the past 7 days, how would you rate your fatigue on average? None Mild Mild   In the past 7 days, how would you rate your pain on average, where 0 means no pain, and 10 means worst imaginable pain? 1 6 4   In general, would you say your health is: 4 3 4   In general, would you say your quality of life is: 1 3 4   In general, how would you rate your physical health? 4 3 4   In general, how would you rate your mental health, including your mood and your ability to think? 1 2 2   In general, how would you rate your satisfaction with your social activities and relationships? 3 3 4   In general, please rate how well you carry out your usual social activities and roles. (This includes activities at home, at work and in your community, and responsibilities as a parent, child, spouse, employee, friend, etc.) 3 2 2   To what extent are you able to carry out your everyday physical activities such as walking, climbing stairs, carrying groceries, or moving a chair? 5 5 5   In the past 7 days, how often have you been bothered by emotional problems such as feeling anxious,  depressed, or irritable? 3 3 3   In the past 7 days, how would you rate your fatigue on average? 1 2 2   In the past 7 days, how would you rate your pain on average, where 0 means no pain, and 10 means worst imaginable pain? 1 6 4   Global Mental Health Score 8 11 13   Global Physical Health Score 18 15 16   PROMIS TOTAL - SUBSCORES 26 26 29     [place] Suicide Severity Rating Scale (Lifetime/Recent)      1/24/2023     4:00 PM   [place] Suicide Severity Rating (Lifetime/Recent)   Q1 Wished to be Dead (Past Month) no   Q2 Suicidal Thoughts (Past Month) no   Q3 Suicidal Thought Method no   Q4 Suicidal Intent without Specific Plan no   Q5 Suicide Intent with Specific Plan no   Level of Risk per Screen low risk         ASSESSMENT: Current Emotional / Mental Status (status of significant symptoms):   Risk status (Self / Other harm or suicidal ideation)   Patient denies current fears or concerns for personal safety.   Patient denies current or recent suicidal ideation or behaviors.   Patient denies current or recent homicidal ideation or behaviors.   Patient denies current or recent self injurious behavior or ideation.   Patient denies other safety concerns.   Patient reports there has been no change in risk factors since their last session.     Patient reports there has been no change in protective factors since their last session.     Recommended that patient call 911 or go to the local ED should there be a change in any of these risk factors.     Appearance:   Appropriate    Eye Contact:   Good    Psychomotor Behavior: Normal    Attitude:   Cooperative    Orientation:   All   Speech    Rate / Production: Normal     Volume:  Normal    Mood:    Normal worried   Affect:    Appropriate    Thought Content:  Clear    Thought Form:  Coherent  Logical    Insight:    Good      Medication Review:   No current psychiatric medications prescribed     Medication Compliance:   NA     Changes in Health Issues:   None  reported     Chemical Use Review:   Substance Use: Chemical use reviewed, no active concerns identified      Tobacco Use: No current tobacco use.      Diagnosis:  Diagnosis:  1. Bipolar 2 disorder (H)    2. Panic disorder          Collateral Reports Completed:   Not Applicable    PLAN: (Patient Tasks / Therapist Tasks / Other)  Client to advocate for herself as appropriate. Participate in at least two activities to promote relaxation per week.         Lan Carcamo, PhD                                                         ______________________________________________________________________    Individual Treatment Plan    Patient's Name: Audrey Ricks  YOB: 1960    Date of Creation: 2/10/23  Date Treatment Plan Last Reviewed/Revised: 4/28/23    DSM5 Diagnoses: 300.01 (F41.0) Panic Disorder  Psychosocial / Contextual Factors: Chronic, intermittent pain  PROMIS (reviewed every 90 days):     Referral / Collaboration:  Referral to another professional/service is not indicated at this time..    Anticipated number of session for this episode of care: 20  Anticipation frequency of session: Every other week  Anticipated Duration of each session: 38-52 minutes  Treatment plan will be reviewed in 90 days or when goals have been changed.     Anxiety Treatment plan:  1. Education- the Biopsychosocial model of anxiety  a. Client will be able to describe how anxiety is effecting them physically, emotionally and socially  2. Distraction  a. Client will learn 2 techniques to distract themselves when becoming anxious  3. Diaphragmatic breathing  a. Client will learn to breath using their diaphragm  b. Client will learn 2 breathing patterns to use to reduce anxiety  4. Visualization  a. Client will learn to establish a safe, calm place in their mind utilizing all their senses  b. Client will practice using visualization at times when they are not anxious so they will be able to use it when anxiety  occurs  5. Progressive muscle relaxation  a. Client will learn progressive muscle relaxation techniques and practice them 4-5 times per week.  b. Client will learn to use mental images of relaxation to relax muscle groups and do this on a daily basis  6. Self-care  a. Client will identify 3 things they can do just for themselves  b. Client will take time for quiet, reflection, meditation 3 times per week  c. Client will Learn to set boundaries when appropriate  d. Client will Identify 3 individuals they can call on for support, distraction  7. Assessment of progress  a. Client will engage in assessment of their progress on a regular basis    Patient has reviewed and agreed to the above plan.      Lan Carcamo, PhD  February 10, 2023    Answers for HPI/ROS submitted by the patient on 2/10/2023  If you checked off any problems, how difficult have these problems made it for you to do your work, take care of things at home, or get along with other people?: Somewhat difficult  PHQ9 TOTAL SCORE: 4    Answers for HPI/ROS submitted by the patient on 2/24/2023  If you checked off any problems, how difficult have these problems made it for you to do your work, take care of things at home, or get along with other people?: Not difficult at all  PHQ9 TOTAL SCORE: 1  Answers for HPI/ROS submitted by the patient on 4/14/2023  If you checked off any problems, how difficult have these problems made it for you to do your work, take care of things at home, or get along with other people?: Not difficult at all  PHQ9 TOTAL SCORE: 2    Answers for HPI/ROS submitted by the patient on 4/28/2023  If you checked off any problems, how difficult have these problems made it for you to do your work, take care of things at home, or get along with other people?: Somewhat difficult  PHQ9 TOTAL SCORE: 6

## 2023-05-05 ENCOUNTER — OFFICE VISIT (OUTPATIENT)
Dept: FAMILY MEDICINE | Facility: CLINIC | Age: 63
End: 2023-05-05
Payer: COMMERCIAL

## 2023-05-05 ENCOUNTER — HOSPITAL ENCOUNTER (OUTPATIENT)
Dept: GENERAL RADIOLOGY | Facility: CLINIC | Age: 63
Discharge: HOME | End: 2023-05-05
Attending: PHYSICIAN ASSISTANT | Admitting: PHYSICIAN ASSISTANT
Payer: COMMERCIAL

## 2023-05-05 ENCOUNTER — TELEPHONE (OUTPATIENT)
Dept: FAMILY MEDICINE | Facility: CLINIC | Age: 63
End: 2023-05-05

## 2023-05-05 VITALS
TEMPERATURE: 98 F | OXYGEN SATURATION: 98 % | WEIGHT: 182.2 LBS | BODY MASS INDEX: 33.32 KG/M2 | RESPIRATION RATE: 16 BRPM | HEART RATE: 88 BPM | SYSTOLIC BLOOD PRESSURE: 138 MMHG | DIASTOLIC BLOOD PRESSURE: 80 MMHG

## 2023-05-05 DIAGNOSIS — F31.81 BIPOLAR 2 DISORDER (H): ICD-10-CM

## 2023-05-05 DIAGNOSIS — R22.2 MASS ON BACK: ICD-10-CM

## 2023-05-05 DIAGNOSIS — N95.1 MENOPAUSAL SYNDROME (HOT FLASHES): ICD-10-CM

## 2023-05-05 DIAGNOSIS — Z12.11 SCREEN FOR COLON CANCER: ICD-10-CM

## 2023-05-05 DIAGNOSIS — L30.9 ECZEMA, UNSPECIFIED TYPE: ICD-10-CM

## 2023-05-05 DIAGNOSIS — Z23 NEED FOR VACCINATION: ICD-10-CM

## 2023-05-05 DIAGNOSIS — L71.9 ROSACEA: ICD-10-CM

## 2023-05-05 DIAGNOSIS — I47.10 SVT (SUPRAVENTRICULAR TACHYCARDIA) (H): Primary | ICD-10-CM

## 2023-05-05 DIAGNOSIS — K21.9 GASTROESOPHAGEAL REFLUX DISEASE WITHOUT ESOPHAGITIS: ICD-10-CM

## 2023-05-05 PROCEDURE — 90677 PCV20 VACCINE IM: CPT | Performed by: PHYSICIAN ASSISTANT

## 2023-05-05 PROCEDURE — 96127 BRIEF EMOTIONAL/BEHAV ASSMT: CPT | Performed by: PHYSICIAN ASSISTANT

## 2023-05-05 PROCEDURE — G0009 ADMIN PNEUMOCOCCAL VACCINE: HCPCS | Performed by: PHYSICIAN ASSISTANT

## 2023-05-05 PROCEDURE — 72100 X-RAY EXAM L-S SPINE 2/3 VWS: CPT

## 2023-05-05 PROCEDURE — 99214 OFFICE O/P EST MOD 30 MIN: CPT | Mod: 25 | Performed by: PHYSICIAN ASSISTANT

## 2023-05-05 RX ORDER — TRIAMCINOLONE ACETONIDE 1 MG/G
CREAM TOPICAL 2 TIMES DAILY PRN
Qty: 30 G | Refills: 1 | Status: SHIPPED | OUTPATIENT
Start: 2023-05-05

## 2023-05-05 RX ORDER — ESTRADIOL/NORETHINDRONE ACETATE TRANSDERMAL SYSTEM .05; .14 MG/D; MG/D
PATCH, EXTENDED RELEASE TRANSDERMAL
Qty: 24 PATCH | Refills: 3 | Status: SHIPPED | OUTPATIENT
Start: 2023-05-05 | End: 2024-02-07

## 2023-05-05 RX ORDER — METRONIDAZOLE 10 MG/G
GEL TOPICAL DAILY
Qty: 60 G | Refills: 3 | Status: SHIPPED | OUTPATIENT
Start: 2023-05-05 | End: 2024-01-22

## 2023-05-05 ASSESSMENT — ANXIETY QUESTIONNAIRES
6. BECOMING EASILY ANNOYED OR IRRITABLE: NOT AT ALL
2. NOT BEING ABLE TO STOP OR CONTROL WORRYING: SEVERAL DAYS
7. FEELING AFRAID AS IF SOMETHING AWFUL MIGHT HAPPEN: SEVERAL DAYS
IF YOU CHECKED OFF ANY PROBLEMS ON THIS QUESTIONNAIRE, HOW DIFFICULT HAVE THESE PROBLEMS MADE IT FOR YOU TO DO YOUR WORK, TAKE CARE OF THINGS AT HOME, OR GET ALONG WITH OTHER PEOPLE: SOMEWHAT DIFFICULT
GAD7 TOTAL SCORE: 6
4. TROUBLE RELAXING: SEVERAL DAYS
3. WORRYING TOO MUCH ABOUT DIFFERENT THINGS: SEVERAL DAYS
8. IF YOU CHECKED OFF ANY PROBLEMS, HOW DIFFICULT HAVE THESE MADE IT FOR YOU TO DO YOUR WORK, TAKE CARE OF THINGS AT HOME, OR GET ALONG WITH OTHER PEOPLE?: SOMEWHAT DIFFICULT
5. BEING SO RESTLESS THAT IT IS HARD TO SIT STILL: SEVERAL DAYS
GAD7 TOTAL SCORE: 6
GAD7 TOTAL SCORE: 6
7. FEELING AFRAID AS IF SOMETHING AWFUL MIGHT HAPPEN: SEVERAL DAYS
1. FEELING NERVOUS, ANXIOUS, OR ON EDGE: SEVERAL DAYS

## 2023-05-05 ASSESSMENT — PATIENT HEALTH QUESTIONNAIRE - PHQ9
SUM OF ALL RESPONSES TO PHQ QUESTIONS 1-9: 6
10. IF YOU CHECKED OFF ANY PROBLEMS, HOW DIFFICULT HAVE THESE PROBLEMS MADE IT FOR YOU TO DO YOUR WORK, TAKE CARE OF THINGS AT HOME, OR GET ALONG WITH OTHER PEOPLE: SOMEWHAT DIFFICULT
SUM OF ALL RESPONSES TO PHQ QUESTIONS 1-9: 6

## 2023-05-05 ASSESSMENT — PAIN SCALES - GENERAL: PAINLEVEL: NO PAIN (0)

## 2023-05-05 NOTE — PROGRESS NOTES
Prior to immunization administration, verified patients identity using patient s name and date of birth. Please see Immunization Activity for additional information.     Screening Questionnaire for Adult Immunization    Are you sick today?   No   Do you have allergies to medications, food, a vaccine component or latex?   No   Have you ever had a serious reaction after receiving a vaccination?   No   Do you have a long-term health problem with heart, lung, kidney, or metabolic disease (e.g., diabetes), asthma, a blood disorder, no spleen, complement component deficiency, a cochlear implant, or a spinal fluid leak?  Are you on long-term aspirin therapy?   No   Do you have cancer, leukemia, HIV/AIDS, or any other immune system problem?   No   Do you have a parent, brother, or sister with an immune system problem?   No   In the past 3 months, have you taken medications that affect  your immune system, such as prednisone, other steroids, or anticancer drugs; drugs for the treatment of rheumatoid arthritis, Crohn s disease, or psoriasis; or have you had radiation treatments?   No   Have you had a seizure, or a brain or other nervous system problem?   No   During the past year, have you received a transfusion of blood or blood    products, or been given immune (gamma) globulin or antiviral drug?   No   For women: Are you pregnant or is there a chance you could become       pregnant during the next month?   No   Have you received any vaccinations in the past 4 weeks?   No     Immunization questionnaire answers were all negative.      Injection of prevnar 20 given by Ariadne Dong MA. Patient instructed to remain in clinic for 15 minutes afterwards, and to report any adverse reactions.     Screening performed by Ariadne Dong MA on 5/5/2023 at 8:44 AM.

## 2023-05-05 NOTE — PROGRESS NOTES
Damian Ventura is a 63 year old, presenting for the following health issues:  Recheck Medication        5/5/2023     8:06 AM   Additional Questions   Roomed by Linnea GERONIMO     History of Present Illness       Back Pain:  She presents for follow up of back pain. Patient's back pain is a new problem.    Original cause of back pain: not sure  First noticed back pain: more than 1 month ago  Patient feels back pain: constantlyLocation of back pain:  Other  Description of back pain: sharp  Back pain spreads: nowhere    Since patient first noticed back pain, pain is: gradually improving  Does back pain interfere with her job:  No  On a scale of 1-10 (10 being the worst), patient describes pain as:  6  What makes back pain worse: certain positions  Acupuncture: not tried  Acetaminophen: not tried  Activity or exercise: not helpful  Chiropractor:  Not tried  Cold: not tried  Heat: not tried  Massage: not tried  NSAIDS: helpful  Opioids: not tried  Physical Therapy: not helpful  Rest: not helpful  Steroid Injection: not tried  Stretching: not helpful  Surgery: not tried  TENS unit: not tried  Topical pain relievers: not tried  Other healthcare providers patient is seeing for back pain: None    She eats 4 or more servings of fruits and vegetables daily.She consumes 0 sweetened beverage(s) daily.She exercises with enough effort to increase her heart rate 30 to 60 minutes per day.  She exercises with enough effort to increase her heart rate 6 days per week.   She is taking medications regularly.    Today's PHQ-9         PHQ-9 Total Score: 6    PHQ-9 Q9 Thoughts of better off dead/self-harm past 2 weeks :   Not at all    How difficult have these problems made it for you to do your work, take care of things at home, or get along with other people: Somewhat difficult  Today's DAVID-7 Score: 6     Patient presents today for follow-up. She reports overall she has been feeling really well. She has having worsening SVT symptoms  despite medications. She had an ablation and has been feeling so much better ever since. She does continue to follow with cardiology. She tobias been exercising routinely and now working part time at her gym. Really enjoys this.     Moods have been doing very well. Having routine exercise as been very beneficial. Had an apartment with pool access this winter which was very beneficial.     Has a lump on her back. Mid-lower back but does have pain at this site and close to her spine so worries about this. Also has a cyst on her face. Would like these evaluated/removed.      Review of Systems   Constitutional, HEENT, cardiovascular, pulmonary, GI, , musculoskeletal, neuro, skin, endocrine and psych systems are negative, except as otherwise noted.      Objective    /80   Pulse 88   Temp 98  F (36.7  C) (Tympanic)   Resp 16   Wt 82.6 kg (182 lb 3.2 oz)   SpO2 98%   BMI 33.32 kg/m    Body mass index is 33.32 kg/m .  Physical Exam   GENERAL: healthy, alert and no distress  HENT: ear canals and TM's normal, nose and mouth without ulcers or lesions  NECK: no adenopathy, no asymmetry, masses, or scars and thyroid normal to palpation  RESP: lungs clear to auscultation - no rales, rhonchi or wheezes  CV: regular rate and rhythm, normal S1 S2, no S3 or S4, no murmur, click or rub, no peripheral edema and peripheral pulses strong  ABDOMEN: soft, nontender, no hepatosplenomegaly, no masses and bowel sounds normal  MS: no gross musculoskeletal defects noted, no edema  SKIN: small suspected cyst on right side of her forehead. On mid-back on the right side of her spine there is a firm mass.   NEURO: Normal strength and tone, mentation intact and speech normal  PSYCH: mentation appears normal, affect normal/bright    Assessment & Plan     SVT (supraventricular tachycardia) (H)  Much improved since ablation. Does continue to follow with cardiology. Has not noticed any recurrent symptoms.     Bipolar 2 disorder (H)  Doing very  well - continues routine exercise which has been most beneficial for her mental health.     Mass on back  Suspect cyst but given close proximity to her spine and associated pain will obtain xray first. If this is negative will connect with general surgery for removal.   - XR Lumbar Spine 2/3 Views; Future  - Adult General Surg Referral; Future    Menopausal syndrome (hot flashes)  - estradiol-norethindrone (COMBIPATCH) 0.05-0.14 MG/DAY bi-weekly patch; REMOVE OLD PATCH AND PLACE 1 PATCH ONTO THE SKIN TWICE A WEEK    Gastroesophageal reflux disease without esophagitis  - omeprazole (PRILOSEC) 20 MG DR capsule; TAKE 1 CAPSULE BY MOUTH ONE TIME A DAY. TAKES AS NEEDED FOR HEARTBURN    Eczema, unspecified type  - triamcinolone (KENALOG) 0.1 % external cream; Apply topically 2 times daily as needed for irritation    Rosacea  - metroNIDAZOLE (METROGEL) 1 % external gel; Apply topically daily    Screen for colon cancer  - Fecal colorectal cancer screen FIT - Future (S+30); Future    Need for vaccination  - PNEUMOCOCCAL 20 VALENT CONJUGATE (PREVNAR 20)    The patient indicates understanding of these issues and agrees with the plan.    Alie De La Cruz PA-C  [facility]

## 2023-05-05 NOTE — TELEPHONE ENCOUNTER
PA needed on:  combipatch   Insurance: [facility] dual  Ins. Phone: [phone]  Patient ID: [id number]  PCN:  [id number]  BIN: [id number]  Group: [id number]    Please let us know when PA is granted/denied. Thank You      [facility], [place]

## 2023-05-13 NOTE — TELEPHONE ENCOUNTER
PA Initiation    Medication: Combipatch 0.05-0.14mg/Day  Insurance Company: [facility] - Phone [phone] Fax [phone]  Pharmacy Filling the Rx: [address]   Filling Pharmacy Phone: [phone]  Filling Pharmacy Fax: [phone]  Start Date: 5/13/2023    Key: [id number]

## 2023-05-15 NOTE — TELEPHONE ENCOUNTER
Prior Authorization Approval    Authorization Effective Date: 4/13/2023  Authorization Expiration Date: 5/14/2024  Medication: Combipatch 0.05-0.14mg/Day  Approved Dose/Quantity: #24 for 84 days  Reference #: Key: [id number]   Insurance Company: [employer] - Phone [phone] Fax [phone]  Expected CoPay: $4.30     CoPay Card Available: No    Foundation Assistance Needed:    Which Pharmacy is filling the prescription (Not needed for infusion/clinic administered): [facility], [address]   Pharmacy Notified: Yes  Patient Notified:  Not yet, RFTS til 6/26

## 2023-05-17 ENCOUNTER — OFFICE VISIT (OUTPATIENT)
Dept: SURGERY | Facility: CLINIC | Age: 63
End: 2023-05-17
Attending: PHYSICIAN ASSISTANT
Payer: COMMERCIAL

## 2023-05-17 VITALS
TEMPERATURE: 97.1 F | DIASTOLIC BLOOD PRESSURE: 76 MMHG | SYSTOLIC BLOOD PRESSURE: 132 MMHG | WEIGHT: 182 LBS | BODY MASS INDEX: 33.49 KG/M2 | HEIGHT: 62 IN

## 2023-05-17 DIAGNOSIS — R22.2 MASS ON BACK: ICD-10-CM

## 2023-05-17 PROCEDURE — 99203 OFFICE O/P NEW LOW 30 MIN: CPT | Performed by: SURGERY

## 2023-05-17 ASSESSMENT — PAIN SCALES - GENERAL: PAINLEVEL: MILD PAIN (2)

## 2023-05-17 NOTE — PROGRESS NOTES
Patient seen in consultation for epidermal inclusion cysts by Alie De La Cruz    HPI:  Patient is a 63 year old female with several year history of epidermal inclusion cyst on the right forehead and mid lower back.  She states that the one on her face swells and she expresses material almost every day.  The one on the back occasionally swells and drains and is painful.  Currently not inflamed.  She does not take blood thinning medication    Review Of Systems    Skin: as above  Ears/Nose/Throat: negative  Respiratory: No shortness of breath, dyspnea on exertion, cough, or hemoptysis  Cardiovascular: negative  Gastrointestinal: negative  Genitourinary: negative  Musculoskeletal: negative  Neurologic: negative  Hematologic/Lymphatic/Immunologic: negative  Endocrine: negative      Past Medical History:   Diagnosis Date     Depressive disorder 2011     Hypertension      NSTEMI (non-ST elevated myocardial infarction) (H) 08/06/2022     SVT (supraventricular tachycardia) (H)        Past Surgical History:   Procedure Laterality Date     EP ABLATION SVT Bilateral 9/23/2022    Procedure: Ablation Supraventricular Tachycardia;  Surgeon: Donna Wolfe MD;  Location: [facility] CARDIAC CATH [facility]       Family History   Problem Relation Age of Onset     Diabetes Mother        Social History     Socioeconomic History     Marital status: Single     Spouse name: Not on file     Number of children: Not on file     Years of education: Not on file     Highest education level: Not on file   Occupational History     Not on file   Tobacco Use     Smoking status: Never     Smokeless tobacco: Never   Vaping Use     Vaping status: Never Used   Substance and Sexual Activity     Alcohol use: Yes     Comment: rare     Drug use: Never     Sexual activity: Not on file   Other Topics Concern     Parent/sibling w/ CABG, MI or angioplasty before 65F 55M? No   Social History Narrative     Not on file     Social Determinants of Health     Financial Resource  "Strain: Not on file   Food Insecurity: Not on file   Transportation Needs: Not on file   Physical Activity: Not on file   Stress: Not on file   Social Connections: Not on file   Intimate Partner Violence: Not on file   Housing Stability: Not on file       Current Outpatient Medications   Medication Sig Dispense Refill     B Complex Vitamins (VITAMIN-B COMPLEX PO) Take 1 tablet by mouth daily Unknown tablet strength       CALCIUM PO Take 1 tablet by mouth daily Unknown tablet strength       estradiol-norethindrone (COMBIPATCH) 0.05-0.14 MG/DAY bi-weekly patch REMOVE OLD PATCH AND PLACE 1 PATCH ONTO THE SKIN TWICE A WEEK 24 patch 3     metroNIDAZOLE (METROGEL) 1 % external gel Apply topically daily 60 g 3     OMEGA-3 FATTY ACIDS PO Take 1 capsule by mouth daily Unknown capsule strength       omeprazole (PRILOSEC) 20 MG DR capsule TAKE 1 CAPSULE BY MOUTH ONE TIME A DAY. TAKES AS NEEDED FOR HEARTBURN 90 capsule 3     POTASSIUM PO Take 1 tablet by mouth daily Unknown tablet strength       triamcinolone (KENALOG) 0.1 % external cream Apply topically 2 times daily as needed for irritation 30 g 1       Medications and history reviewed    Physical exam:  Vitals: /76 (BP Location: Right arm, Patient Position: Sitting, Cuff Size: Adult Regular)   Temp 97.1  F (36.2  C) (Temporal)   Ht 1.575 m (5' 2\")   Wt 82.6 kg (182 lb)   BMI 33.29 kg/m    BMI= Body mass index is 33.29 kg/m .    Constitutional: Healthy, alert, non-distressed   Head: Normo-cephalic, atraumatic, no lesions, masses or tenderness   Cardiovascular: RRR, no new murmurs, +S1, +S2 heart sounds, no clicks, rubs or gallops   Respiratory: CTAB, no rales, rhonchi or wheezing, equal chest rise, good respiratory effort   Gastrointestinal: Soft, non-tender, non distended, no rebound rigidity or guarding, no masses or hernias palpated   : Deferred  Musculoskeletal: Moves all extremities, normal  strength, no deformities noted   Skin: Small apparent cyst " right forehead without evidence of infection.  Mid low back just to the right of midline there is a firm apparent cyst without active inflammation or drainage  Psychiatric: Mentation appears normal, affect appropriate   Hematologic/Lymphatic/Immunologic: Normal cervical and supraclavicular lymph nodes   Patient able to get up on table without difficulty.    Labs show:  No results found for this or any previous visit (from the past 24 hour(s)).    Imaging shows:  Recent Results (from the past 744 hour(s))   XR Lumbar Spine 2/3 Views    Narrative    LUMBAR SPINE 2/3 VIEWS   5/5/2023 9:00 AM     HISTORY: Mass on back.    COMPARISON: None.      Impression    IMPRESSION: A tiny (2 mm) spherical hyperattenuating object is present  within the subcutaneous soft tissues at the presumed palpable area of  concern, suggestive of BB marker which could have been placed by the  x-ray technologist or represent a foreign body (no technologist note  provided). No other associated gross soft tissue abnormalities are  appreciated by x-ray. If there is concern for soft tissue mass, MRI  without and with contrast would be typically recommended. No lumbar  spine fracture is identified. Multilevel mild to moderate degenerative  disc disease. Moderate to severe lower lumbar spine facet arthropathy.  Multilevel grade 1 spinal listhesis. Levoconvex curvature.    LIDIA BLACK MD         SYSTEM ID:  [id number]        Assessment:     ICD-10-CM    1. Mass on back  R22.2 Adult General Surg Referral        Plan: These are likely epidermal inclusion cysts given the drainage.  They are becoming more symptomatic with pain and drainage therefore the removal is indicated.  We discussed her options but she prefers to have these done in the office if possible.  I think these are amenable for in office excision.  She will return later this week for removal.    30 minutes spent by me on the date of the encounter doing chart review, history and exam,  documentation and further activities per the note    Shahid Macdonald, DO

## 2023-05-17 NOTE — LETTER
5/17/2023         RE: [address]  [address] [facility] [address]        Dear Colleague,    Thank you for referring your patient, Audrey Ricks, to the [facility]. Please see a copy of my visit note below.    Patient seen in consultation for epidermal inclusion cysts by lAie De La Cruz    HPI:  Patient is a 63 year old female with several year history of epidermal inclusion cyst on the right forehead and mid lower back.  She states that the one on her face swells and she expresses material almost every day.  The one on the back occasionally swells and drains and is painful.  Currently not inflamed.  She does not take blood thinning medication    Review Of Systems    Skin: as above  Ears/Nose/Throat: negative  Respiratory: No shortness of breath, dyspnea on exertion, cough, or hemoptysis  Cardiovascular: negative  Gastrointestinal: negative  Genitourinary: negative  Musculoskeletal: negative  Neurologic: negative  Hematologic/Lymphatic/Immunologic: negative  Endocrine: negative      Past Medical History:   Diagnosis Date     Depressive disorder 2011     Hypertension      NSTEMI (non-ST elevated myocardial infarction) (H) 08/06/2022     SVT (supraventricular tachycardia) (H)        Past Surgical History:   Procedure Laterality Date     EP ABLATION SVT Bilateral 9/23/2022    Procedure: Ablation Supraventricular Tachycardia;  Surgeon: Donna Wolfe MD;  Location:  [facility]       Family History   Problem Relation Age of Onset     Diabetes Mother        Social History     Socioeconomic History     Marital status: Single     Spouse name: Not on file     Number of children: Not on file     Years of education: Not on file     Highest education level: Not on file   Occupational History     Not on file   Tobacco Use     Smoking status: Never     Smokeless tobacco: Never   Vaping Use     Vaping status: Never Used   Substance and Sexual Activity     Alcohol use: Yes     Comment:  "rare     Drug use: Never     Sexual activity: Not on file   Other Topics Concern     Parent/sibling w/ CABG, MI or angioplasty before 65F 55M? No   Social History Narrative     Not on file     Social Determinants of Health     Financial Resource Strain: Not on file   Food Insecurity: Not on file   Transportation Needs: Not on file   Physical Activity: Not on file   Stress: Not on file   Social Connections: Not on file   Intimate Partner Violence: Not on file   Housing Stability: Not on file       Current Outpatient Medications   Medication Sig Dispense Refill     B Complex Vitamins (VITAMIN-B COMPLEX PO) Take 1 tablet by mouth daily Unknown tablet strength       CALCIUM PO Take 1 tablet by mouth daily Unknown tablet strength       estradiol-norethindrone (COMBIPATCH) 0.05-0.14 MG/DAY bi-weekly patch REMOVE OLD PATCH AND PLACE 1 PATCH ONTO THE SKIN TWICE A WEEK 24 patch 3     metroNIDAZOLE (METROGEL) 1 % external gel Apply topically daily 60 g 3     OMEGA-3 FATTY ACIDS PO Take 1 capsule by mouth daily Unknown capsule strength       omeprazole (PRILOSEC) 20 MG DR capsule TAKE 1 CAPSULE BY MOUTH ONE TIME A DAY. TAKES AS NEEDED FOR HEARTBURN 90 capsule 3     POTASSIUM PO Take 1 tablet by mouth daily Unknown tablet strength       triamcinolone (KENALOG) 0.1 % external cream Apply topically 2 times daily as needed for irritation 30 g 1       Medications and history reviewed    Physical exam:  Vitals: /76 (BP Location: Right arm, Patient Position: Sitting, Cuff Size: Adult Regular)   Temp 97.1  F (36.2  C) (Temporal)   Ht 1.575 m (5' 2\")   Wt 82.6 kg (182 lb)   BMI 33.29 kg/m    BMI= Body mass index is 33.29 kg/m .    Constitutional: Healthy, alert, non-distressed   Head: Normo-cephalic, atraumatic, no lesions, masses or tenderness   Cardiovascular: RRR, no new murmurs, +S1, +S2 heart sounds, no clicks, rubs or gallops   Respiratory: CTAB, no rales, rhonchi or wheezing, equal chest rise, good respiratory effort "   Gastrointestinal: Soft, non-tender, non distended, no rebound rigidity or guarding, no masses or hernias palpated   : Deferred  Musculoskeletal: Moves all extremities, normal  strength, no deformities noted   Skin: Small apparent cyst right forehead without evidence of infection.  Mid low back just to the right of midline there is a firm apparent cyst without active inflammation or drainage  Psychiatric: Mentation appears normal, affect appropriate   Hematologic/Lymphatic/Immunologic: Normal cervical and supraclavicular lymph nodes   Patient able to get up on table without difficulty.    Labs show:  No results found for this or any previous visit (from the past 24 hour(s)).    Imaging shows:  Recent Results (from the past 744 hour(s))   XR Lumbar Spine 2/3 Views    Narrative    LUMBAR SPINE 2/3 VIEWS   5/5/2023 9:00 AM     HISTORY: Mass on back.    COMPARISON: None.      Impression    IMPRESSION: A tiny (2 mm) spherical hyperattenuating object is present  within the subcutaneous soft tissues at the presumed palpable area of  concern, suggestive of BB marker which could have been placed by the  x-ray technologist or represent a foreign body (no technologist note  provided). No other associated gross soft tissue abnormalities are  appreciated by x-ray. If there is concern for soft tissue mass, MRI  without and with contrast would be typically recommended. No lumbar  spine fracture is identified. Multilevel mild to moderate degenerative  disc disease. Moderate to severe lower lumbar spine facet arthropathy.  Multilevel grade 1 spinal listhesis. Levoconvex curvature.    LIDIA BLACK MD         SYSTEM ID:  [id number]        Assessment:     ICD-10-CM    1. Mass on back  R22.2 Adult General Surg Referral        Plan: These are likely epidermal inclusion cysts given the drainage.  They are becoming more symptomatic with pain and drainage therefore the removal is indicated.  We discussed her options but she prefers  to have these done in the office if possible.  I think these are amenable for in office excision.  She will return later this week for removal.    30 minutes spent by me on the date of the encounter doing chart review, history and exam, documentation and further activities per the note    Shahid Macdonald, DO        Again, thank you for allowing me to participate in the care of your patient.        Sincerely,        Shahid Macdonald, DO

## 2023-05-19 ENCOUNTER — OFFICE VISIT (OUTPATIENT)
Dept: SURGERY | Facility: CLINIC | Age: 63
End: 2023-05-19
Payer: COMMERCIAL

## 2023-05-19 VITALS
TEMPERATURE: 97.2 F | BODY MASS INDEX: 33.49 KG/M2 | DIASTOLIC BLOOD PRESSURE: 80 MMHG | HEIGHT: 62 IN | SYSTOLIC BLOOD PRESSURE: 128 MMHG | WEIGHT: 182 LBS

## 2023-05-19 DIAGNOSIS — D23.39 DERMOID CYST OF FOREHEAD: Primary | ICD-10-CM

## 2023-05-19 DIAGNOSIS — D23.5 DERMOID CYST OF SKIN OF BACK: ICD-10-CM

## 2023-05-19 PROCEDURE — 88304 TISSUE EXAM BY PATHOLOGIST: CPT | Performed by: DERMATOLOGY

## 2023-05-19 PROCEDURE — 11440 EXC FACE-MM B9+MARG 0.5 CM/<: CPT | Mod: 51 | Performed by: SURGERY

## 2023-05-19 PROCEDURE — 11401 EXC TR-EXT B9+MARG 0.6-1 CM: CPT | Performed by: SURGERY

## 2023-05-19 PROCEDURE — 88305 TISSUE EXAM BY PATHOLOGIST: CPT | Performed by: DERMATOLOGY

## 2023-05-19 ASSESSMENT — PAIN SCALES - GENERAL: PAINLEVEL: NO PAIN (0)

## 2023-05-19 NOTE — LETTER
5/19/2023         RE: Audrey Ricks  [address] [facility] [address]        Dear Colleague,    Thank you for referring your patient, Audrey Ricks, to the [facility]. Please see a copy of my visit note below.    PROCEDURE: Excision cyst of the right forehead and right lower back     Written consent was obtained    The right forehead and right lower back area was prepped and appropriately anesthetized with 1% lidocaine with epinephrine. Using the usual technique, ellipse excision of skin and subcutaneous tissue was performed at both sites.  On the forehead, the total area excised, including lesion and margins was 1.0 x 0.4 x 0.3 cm.  Closure was accomplished with interrupted 3-0 vicryl for the dermal layer and running subcuticular 4-0 monocryl for the skin. Total length of the incision after closure was 1.3 cm. On the back, the total area excised, including lesion and margins was 2.5 x 0.8 x 0.5 cm.  Closure was accomplished with interrupted 3-0 vicryl for the dermal layer and running subcuticular 4-0 monocryl for the skin. Total length of the incision after closure was 2.6 cm. An appropriate dressing was applied.  The procedure was well tolerated and without complications. Specimen was sent to pathology.    Dr. Renae DO, FACS        Again, thank you for allowing me to participate in the care of your patient.        Sincerely,        Shahid Macdonald DO

## 2023-05-19 NOTE — PROGRESS NOTES
PROCEDURE: Excision cyst of the right forehead and right lower back     Written consent was obtained    The right forehead and right lower back area was prepped and appropriately anesthetized with 1% lidocaine with epinephrine. Using the usual technique, ellipse excision of skin and subcutaneous tissue was performed at both sites.  On the forehead, the total area excised, including lesion and margins was 1.0 x 0.4 x 0.3 cm.  Closure was accomplished with interrupted 3-0 vicryl for the dermal layer and running subcuticular 4-0 monocryl for the skin. Total length of the incision after closure was 1.3 cm. On the back, the total area excised, including lesion and margins was 2.5 x 0.8 x 0.5 cm.  Closure was accomplished with interrupted 3-0 vicryl for the dermal layer and running subcuticular 4-0 monocryl for the skin. Total length of the incision after closure was 2.6 cm. An appropriate dressing was applied.  The procedure was well tolerated and without complications. Specimen was sent to pathology.    Dr. Renae DO, FACS

## 2023-05-22 LAB
PATH REPORT.COMMENTS IMP SPEC: NORMAL
PATH REPORT.COMMENTS IMP SPEC: NORMAL
PATH REPORT.FINAL DX SPEC: NORMAL
PATH REPORT.GROSS SPEC: NORMAL
PATH REPORT.MICROSCOPIC SPEC OTHER STN: NORMAL
PATH REPORT.RELEVANT HX SPEC: NORMAL

## 2023-06-01 ENCOUNTER — VIRTUAL VISIT (OUTPATIENT)
Dept: PSYCHOLOGY | Facility: CLINIC | Age: 63
End: 2023-06-01
Payer: COMMERCIAL

## 2023-06-01 DIAGNOSIS — F33.0 MILD EPISODE OF RECURRENT MAJOR DEPRESSIVE DISORDER (H): Primary | ICD-10-CM

## 2023-06-01 PROCEDURE — 90834 PSYTX W PT 45 MINUTES: CPT | Mod: VID | Performed by: PSYCHOLOGIST

## 2023-06-01 NOTE — PROGRESS NOTES
[facility]                                     Progress Note    Disclaimer: Voice recognition software was used to generate this note. As a result, wrong word or 'sound-a-like' substitutions may have occurred due to the inherent limitations of voice recognition software. There may be errors in the script that have gone undetected. Please consider this when interpreting information found in this chart.     Patient Name: Audrey Ricks  Date: 6/1/23         Service Type: Individual      Session Start Time: 9:00AM  Session End Time: 9:45AM     Session Length: 45    Session #: 9    Attendees: Client attended alone    Service Modality:  Video Visit:      Provider verified identity through the following two step process.  Patient provided:  Patient is known previously to provider    Telemedicine Visit: The patient's condition can be safely assessed and treated via synchronous audio and visual telemedicine encounter.      Reason for Telemedicine Visit: Services only offered telehealth    Originating Site (Patient Location): Patient's home    Distant Site (Provider Location): Provider Remote Setting- Home Office    Consent:  The patient/guardian has verbally consented to: the potential risks and benefits of telemedicine (video visit) versus in person care; bill my insurance or make self-payment for services provided; and responsibility for payment of non-covered services.     Patient would like the video invitation sent by:  My Chart    Mode of Communication:  Video Conference via [facility]    Distant Location (Provider):  Off-site    As the provider I attest to compliance with applicable laws and regulations related to telemedicine.    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 Always feels bad if she has to leave (history of having to  leave dt PA's).    Homework: Achieved / completed to satisfaction      Episode of Care Goals: Satisfactory progress - ACTION (Actively working towards change); Intervened by reinforcing change plan / affirming steps taken     Current / Ongoing Stressors and Concerns:  Granddaughter has been found and is back in penitentiary. Tremendous relief.  Noted she has been having night terrors. Likely related. Can calm down when she wakes up.     Took time to process her trauma.        Treatment Objective(s) Addressed in This Session:   use at least 2 coping skills for anxiety management in the next 2 weeks     Intervention:   CBT: behavioral activation    Assessments completed prior to visit:  The following assessments were completed by patient for this visit:  PHQ9:       12/20/2022     1:58 PM 1/24/2023     3:42 PM 2/10/2023     8:44 AM 2/24/2023     3:50 PM 4/14/2023     8:52 AM 4/28/2023    10:53 AM 5/5/2023     8:06 AM   PHQ-9 SCORE   PHQ-9 Total Score MyChart 4 (Minimal depression) 1 (Minimal depression) 4 (Minimal depression) 1 (Minimal depression) 2 (Minimal depression) 6 (Mild depression) 6 (Mild depression)   PHQ-9 Total Score 4 1 4 1 2 6 6     GAD7:       12/12/2022     1:54 PM 5/5/2023     8:08 AM   DAVID-7 SCORE   Total Score 4 (minimal anxiety) 6 (mild anxiety)   Total Score 4 6     CAGE-AID:       12/12/2022     2:00 PM   CAGE-AID Total Score   Total Score 0   Total Score MyChart 0 (A total score of 2 or greater is considered clinically significant)     PROMIS 10-Global Health (all questions and answers displayed):       12/12/2022     1:59 PM 4/14/2023     8:55 AM 4/28/2023    10:55 AM   PROMIS 10   In general, would you say your health is: Very good Good Very good   In general, would you say your quality of life is: Poor Good Very good   In general, how would you rate your physical health? Very good Good Very good   In general, how would you rate your mental health, including your mood and your ability to think? Poor  Fair Fair   In general, how would you rate your satisfaction with your social activities and relationships? Good Good Very good   In general, please rate how well you carry out your usual social activities and roles Good Fair Fair   To what extent are you able to carry out your everyday physical activities such as walking, climbing stairs, carrying groceries, or moving a chair? Completely Completely Completely   In the past 7 days, how often have you been bothered by emotional problems such as feeling anxious, depressed, or irritable? Sometimes Sometimes Sometimes   In the past 7 days, how would you rate your fatigue on average? None Mild Mild   In the past 7 days, how would you rate your pain on average, where 0 means no pain, and 10 means worst imaginable pain? 1 6 4   In general, would you say your health is: 4 3 4   In general, would you say your quality of life is: 1 3 4   In general, how would you rate your physical health? 4 3 4   In general, how would you rate your mental health, including your mood and your ability to think? 1 2 2   In general, how would you rate your satisfaction with your social activities and relationships? 3 3 4   In general, please rate how well you carry out your usual social activities and roles. (This includes activities at home, at work and in your community, and responsibilities as a parent, child, spouse, employee, friend, etc.) 3 2 2   To what extent are you able to carry out your everyday physical activities such as walking, climbing stairs, carrying groceries, or moving a chair? 5 5 5   In the past 7 days, how often have you been bothered by emotional problems such as feeling anxious, depressed, or irritable? 3 3 3   In the past 7 days, how would you rate your fatigue on average? 1 2 2   In the past 7 days, how would you rate your pain on average, where 0 means no pain, and 10 means worst imaginable pain? 1 6 4   Global Mental Health Score 8 11 13   Global Physical Health  Score 18 15 16   PROMIS TOTAL - SUBSCORES 26 26 29     [place] Suicide Severity Rating Scale (Lifetime/Recent)      1/24/2023     4:00 PM   [place] Suicide Severity Rating (Lifetime/Recent)   Q1 Wished to be Dead (Past Month) no   Q2 Suicidal Thoughts (Past Month) no   Q3 Suicidal Thought Method no   Q4 Suicidal Intent without Specific Plan no   Q5 Suicide Intent with Specific Plan no   Level of Risk per Screen low risk         ASSESSMENT: Current Emotional / Mental Status (status of significant symptoms):   Risk status (Self / Other harm or suicidal ideation)   Patient denies current fears or concerns for personal safety.   Patient denies current or recent suicidal ideation or behaviors.   Patient denies current or recent homicidal ideation or behaviors.   Patient denies current or recent self injurious behavior or ideation.   Patient denies other safety concerns.   Patient reports there has been no change in risk factors since their last session.     Patient reports there has been no change in protective factors since their last session.     Recommended that patient call 911 or go to the local ED should there be a change in any of these risk factors.     Appearance:   Appropriate    Eye Contact:   Good    Psychomotor Behavior: Normal    Attitude:   Cooperative    Orientation:   All   Speech    Rate / Production: Normal     Volume:  Normal    Mood:    Normal worried   Affect:    Appropriate    Thought Content:  Clear    Thought Form:  Coherent  Logical    Insight:    Good      Medication Review:   No current psychiatric medications prescribed     Medication Compliance:   NA     Changes in Health Issues:   None reported     Chemical Use Review:   Substance Use: Chemical use reviewed, no active concerns identified      Tobacco Use: No current tobacco use.      Diagnosis:  Diagnosis:  1. Panic disorder          Collateral Reports Completed:   Not Applicable    PLAN: (Patient Tasks / Therapist Tasks / Other)  Client  to advocate for herself as appropriate. Participate in at least two activities to promote relaxation per week.         Lan Carcamo, PhD                                                         ______________________________________________________________________    Individual Treatment Plan    Patient's Name: Audrey Ricks  YOB: 1960    Date of Creation: 2/10/23  Date Treatment Plan Last Reviewed/Revised: 4/28/23    DSM5 Diagnoses: 300.01 (F41.0) Panic Disorder  Psychosocial / Contextual Factors: Chronic, intermittent pain  PROMIS (reviewed every 90 days):     Referral / Collaboration:  Referral to another professional/service is not indicated at this time..    Anticipated number of session for this episode of care: 20  Anticipation frequency of session: Every other week  Anticipated Duration of each session: 38-52 minutes  Treatment plan will be reviewed in 90 days or when goals have been changed.     Anxiety Treatment plan:  1. Education- the Biopsychosocial model of anxiety  a. Client will be able to describe how anxiety is effecting them physically, emotionally and socially  2. Distraction  a. Client will learn 2 techniques to distract themselves when becoming anxious  3. Diaphragmatic breathing  a. Client will learn to breath using their diaphragm  b. Client will learn 2 breathing patterns to use to reduce anxiety  4. Visualization  a. Client will learn to establish a safe, calm place in their mind utilizing all their senses  b. Client will practice using visualization at times when they are not anxious so they will be able to use it when anxiety occurs  5. Progressive muscle relaxation  a. Client will learn progressive muscle relaxation techniques and practice them 4-5 times per week.  b. Client will learn to use mental images of relaxation to relax muscle groups and do this on a daily basis  6. Self-care  a. Client will identify 3 things they can do just for themselves  b. Client will  take time for quiet, reflection, meditation 3 times per week  c. Client will Learn to set boundaries when appropriate  d. Client will Identify 3 individuals they can call on for support, distraction  7. Assessment of progress  a. Client will engage in assessment of their progress on a regular basis    Patient has reviewed and agreed to the above plan.      Lan Carcamo, PhD  February 10, 2023    Answers for HPI/ROS submitted by the patient on 2/10/2023  If you checked off any problems, how difficult have these problems made it for you to do your work, take care of things at home, or get along with other people?: Somewhat difficult  PHQ9 TOTAL SCORE: 4    Answers for HPI/ROS submitted by the patient on 2/24/2023  If you checked off any problems, how difficult have these problems made it for you to do your work, take care of things at home, or get along with other people?: Not difficult at all  PHQ9 TOTAL SCORE: 1  Answers for HPI/ROS submitted by the patient on 4/14/2023  If you checked off any problems, how difficult have these problems made it for you to do your work, take care of things at home, or get along with other people?: Not difficult at all  PHQ9 TOTAL SCORE: 2    Answers for HPI/ROS submitted by the patient on 4/28/2023  If you checked off any problems, how difficult have these problems made it for you to do your work, take care of things at home, or get along with other people?: Somewhat difficult  PHQ9 TOTAL SCORE: 6

## 2023-06-04 ENCOUNTER — HEALTH MAINTENANCE LETTER (OUTPATIENT)
Age: 63
End: 2023-06-04

## 2023-06-22 ENCOUNTER — MYC MEDICAL ADVICE (OUTPATIENT)
Dept: FAMILY MEDICINE | Facility: CLINIC | Age: 63
End: 2023-06-22

## 2023-06-29 ENCOUNTER — VIRTUAL VISIT (OUTPATIENT)
Dept: PSYCHOLOGY | Facility: CLINIC | Age: 63
End: 2023-06-29
Payer: COMMERCIAL

## 2023-06-29 DIAGNOSIS — F33.0 MILD EPISODE OF RECURRENT MAJOR DEPRESSIVE DISORDER (H): Primary | ICD-10-CM

## 2023-06-29 PROCEDURE — 90834 PSYTX W PT 45 MINUTES: CPT | Mod: VID | Performed by: PSYCHOLOGIST

## 2023-06-29 ASSESSMENT — PATIENT HEALTH QUESTIONNAIRE - PHQ9
SUM OF ALL RESPONSES TO PHQ QUESTIONS 1-9: 5
10. IF YOU CHECKED OFF ANY PROBLEMS, HOW DIFFICULT HAVE THESE PROBLEMS MADE IT FOR YOU TO DO YOUR WORK, TAKE CARE OF THINGS AT HOME, OR GET ALONG WITH OTHER PEOPLE: SOMEWHAT DIFFICULT
SUM OF ALL RESPONSES TO PHQ QUESTIONS 1-9: 5

## 2023-06-29 NOTE — PROGRESS NOTES
[facility]                                     Progress Note    Disclaimer: Voice recognition software was used to generate this note. As a result, wrong word or 'sound-a-like' substitutions may have occurred due to the inherent limitations of voice recognition software. There may be errors in the script that have gone undetected. Please consider this when interpreting information found in this chart.     Patient Name: Audrey Ricks  Date: 6/29/23         Service Type: Individual      Session Start Time: 9:00AM  Session End Time: 9:45AM     Session Length: 45    Session #: 10    Attendees: Client attended alone    Service Modality:  Video Visit:      Provider verified identity through the following two step process.  Patient provided:  Patient is known previously to provider    Telemedicine Visit: The patient's condition can be safely assessed and treated via synchronous audio and visual telemedicine encounter.      Reason for Telemedicine Visit: Services only offered telehealth    Originating Site (Patient Location): Patient's home    Distant Site (Provider Location): Provider Remote Setting- Home Office    Consent:  The patient/guardian has verbally consented to: the potential risks and benefits of telemedicine (video visit) versus in person care; bill my insurance or make self-payment for services provided; and responsibility for payment of non-covered services.     Patient would like the video invitation sent by:  My Chart    Mode of Communication:  Video Conference via [facility]    Distant Location (Provider):  Off-site    As the provider I attest to compliance with applicable laws and regulations related to telemedicine.    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    Homework: Achieved / completed to satisfaction      Episode of Care Goals: Satisfactory progress - ACTION (Actively working towards change); Intervened by reinforcing change plan / affirming steps taken     Current / Ongoing Stressors and Concerns:  Shared her concerns for her granddaughter with her Bible Study.   Got section 8 voucher. But there is no good section 8 housing in her area.   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   Found local [employer] meeting.    Juggling job prospects.     Took time to process her trauma.        Treatment Objective(s) Addressed in This Session:   use at least 2 coping skills for anxiety management in the next 2 weeks     Intervention:   CBT: behavioral activation    Assessments completed prior to visit:  The following assessments were completed by patient for this visit:  PHQ9:       1/24/2023     3:42 PM 2/10/2023     8:44 AM 2/24/2023     3:50 PM 4/14/2023     8:52 AM 4/28/2023    10:53 AM 5/5/2023     8:06 AM 6/29/2023     8:37 AM   PHQ-9 SCORE   PHQ-9 Total Score MyChart 1 (Minimal depression) 4 (Minimal depression) 1 (Minimal depression) 2 (Minimal depression) 6 (Mild depression) 6 (Mild depression) 5 (Mild depression)   PHQ-9 Total Score 1 4 1 2 6 6 5     GAD7:       12/12/2022     1:54 PM 5/5/2023     8:08 AM   DAVID-7 SCORE   Total Score 4 (minimal anxiety) 6 (mild anxiety)   Total Score 4 6     CAGE-AID:       12/12/2022     2:00 PM   CAGE-AID Total Score   Total Score 0   Total Score MyChart 0 (A total score of 2 or greater is considered clinically significant)     PROMIS 10-Global Health (all questions and answers displayed):       12/12/2022     1:59 PM 4/14/2023     8:55 AM 4/28/2023    10:55 AM   PROMIS 10   In general, would you say your health is: Very good Good Very good   In general, would you say your quality of life is: Poor Good Very good   In general, how would you rate your physical health? Very good Good Very good   In general, how would you  rate your mental health, including your mood and your ability to think? Poor Fair Fair   In general, how would you rate your satisfaction with your social activities and relationships? Good Good Very good   In general, please rate how well you carry out your usual social activities and roles Good Fair Fair   To what extent are you able to carry out your everyday physical activities such as walking, climbing stairs, carrying groceries, or moving a chair? Completely Completely Completely   In the past 7 days, how often have you been bothered by emotional problems such as feeling anxious, depressed, or irritable? Sometimes Sometimes Sometimes   In the past 7 days, how would you rate your fatigue on average? None Mild Mild   In the past 7 days, how would you rate your pain on average, where 0 means no pain, and 10 means worst imaginable pain? 1 6 4   In general, would you say your health is: 4 3 4   In general, would you say your quality of life is: 1 3 4   In general, how would you rate your physical health? 4 3 4   In general, how would you rate your mental health, including your mood and your ability to think? 1 2 2   In general, how would you rate your satisfaction with your social activities and relationships? 3 3 4   In general, please rate how well you carry out your usual social activities and roles. (This includes activities at home, at work and in your community, and responsibilities as a parent, child, spouse, employee, friend, etc.) 3 2 2   To what extent are you able to carry out your everyday physical activities such as walking, climbing stairs, carrying groceries, or moving a chair? 5 5 5   In the past 7 days, how often have you been bothered by emotional problems such as feeling anxious, depressed, or irritable? 3 3 3   In the past 7 days, how would you rate your fatigue on average? 1 2 2   In the past 7 days, how would you rate your pain on average, where 0 means no pain, and 10 means worst imaginable  pain? 1 6 4   Global Mental Health Score 8 11 13   Global Physical Health Score 18 15 16   PROMIS TOTAL - SUBSCORES 26 26 29     [place] Suicide Severity Rating Scale (Lifetime/Recent)      1/24/2023     4:00 PM   [place] Suicide Severity Rating (Lifetime/Recent)   Q1 Wished to be Dead (Past Month) no   Q2 Suicidal Thoughts (Past Month) no   Q3 Suicidal Thought Method no   Q4 Suicidal Intent without Specific Plan no   Q5 Suicide Intent with Specific Plan no   Level of Risk per Screen low risk         ASSESSMENT: Current Emotional / Mental Status (status of significant symptoms):   Risk status (Self / Other harm or suicidal ideation)   Patient denies current fears or concerns for personal safety.   Patient denies current or recent suicidal ideation or behaviors.   Patient denies current or recent homicidal ideation or behaviors.   Patient denies current or recent self injurious behavior or ideation.   Patient denies other safety concerns.   Patient reports there has been no change in risk factors since their last session.     Patient reports there has been no change in protective factors since their last session.     Recommended that patient call 911 or go to the local ED should there be a change in any of these risk factors.     Appearance:   Appropriate    Eye Contact:   Good    Psychomotor Behavior: Normal    Attitude:   Cooperative    Orientation:   All   Speech    Rate / Production: Normal     Volume:  Normal    Mood:    Normal more confident   Affect:    Appropriate    Thought Content:  Clear    Thought Form:  Coherent  Logical    Insight:    Good      Medication Review:   No current psychiatric medications prescribed     Medication Compliance:   NA     Changes in Health Issues:   None reported     Chemical Use Review:   Substance Use: Chemical use reviewed, no active concerns identified      Tobacco Use: No current tobacco use.      Diagnosis:  Diagnosis:  1. Mild episode of recurrent major depressive  disorder (H)          Collateral Reports Completed:   Not Applicable    PLAN: (Patient Tasks / Therapist Tasks / Other)  Client to advocate for herself as appropriate. Participate in at least two activities to promote relaxation per week.         Lan Carcamo, PhD                                                         ______________________________________________________________________    Individual Treatment Plan    Patient's Name: Audrey Ricks  YOB: 1960    Date of Creation: 2/10/23  Date Treatment Plan Last Reviewed/Revised: 4/28/23    DSM5 Diagnoses: 300.01 (F41.0) Panic Disorder  Psychosocial / Contextual Factors: Chronic, intermittent pain  PROMIS (reviewed every 90 days):     Referral / Collaboration:  Referral to another professional/service is not indicated at this time..    Anticipated number of session for this episode of care: 20  Anticipation frequency of session: Every other week  Anticipated Duration of each session: 38-52 minutes  Treatment plan will be reviewed in 90 days or when goals have been changed.     Anxiety Treatment plan:  1. Education- the Biopsychosocial model of anxiety  a. Client will be able to describe how anxiety is effecting them physically, emotionally and socially  2. Distraction  a. Client will learn 2 techniques to distract themselves when becoming anxious  3. Diaphragmatic breathing  a. Client will learn to breath using their diaphragm  b. Client will learn 2 breathing patterns to use to reduce anxiety  4. Visualization  a. Client will learn to establish a safe, calm place in their mind utilizing all their senses  b. Client will practice using visualization at times when they are not anxious so they will be able to use it when anxiety occurs  5. Progressive muscle relaxation  a. Client will learn progressive muscle relaxation techniques and practice them 4-5 times per week.  b. Client will learn to use mental images of relaxation to relax muscle groups  and do this on a daily basis  6. Self-care  a. Client will identify 3 things they can do just for themselves  b. Client will take time for quiet, reflection, meditation 3 times per week  c. Client will Learn to set boundaries when appropriate  d. Client will Identify 3 individuals they can call on for support, distraction  7. Assessment of progress  a. Client will engage in assessment of their progress on a regular basis    Patient has reviewed and agreed to the above plan.      Lan Carcamo, PhD  February 10, 2023    Answers for HPI/ROS submitted by the patient on 2/10/2023  If you checked off any problems, how difficult have these problems made it for you to do your work, take care of things at home, or get along with other people?: Somewhat difficult  PHQ9 TOTAL SCORE: 4    Answers for HPI/ROS submitted by the patient on 2/24/2023  If you checked off any problems, how difficult have these problems made it for you to do your work, take care of things at home, or get along with other people?: Not difficult at all  PHQ9 TOTAL SCORE: 1  Answers for HPI/ROS submitted by the patient on 4/14/2023  If you checked off any problems, how difficult have these problems made it for you to do your work, take care of things at home, or get along with other people?: Not difficult at all  PHQ9 TOTAL SCORE: 2    Answers for HPI/ROS submitted by the patient on 4/28/2023  If you checked off any problems, how difficult have these problems made it for you to do your work, take care of things at home, or get along with other people?: Somewhat difficult  PHQ9 TOTAL SCORE: 6  Answers for HPI/ROS submitted by the patient on 6/29/2023  If you checked off any problems, how difficult have these problems made it for you to do your work, take care of things at home, or get along with other people?: Somewhat difficult  PHQ9 TOTAL SCORE: 5

## 2023-08-08 ENCOUNTER — ANCILLARY PROCEDURE (OUTPATIENT)
Dept: GENERAL RADIOLOGY | Facility: OTHER | Age: 63
End: 2023-08-08
Attending: STUDENT IN AN ORGANIZED HEALTH CARE EDUCATION/TRAINING PROGRAM
Payer: COMMERCIAL

## 2023-08-08 ENCOUNTER — OFFICE VISIT (OUTPATIENT)
Dept: FAMILY MEDICINE | Facility: OTHER | Age: 63
End: 2023-08-08
Payer: COMMERCIAL

## 2023-08-08 ENCOUNTER — NURSE TRIAGE (OUTPATIENT)
Dept: FAMILY MEDICINE | Facility: CLINIC | Age: 63
End: 2023-08-08

## 2023-08-08 VITALS
SYSTOLIC BLOOD PRESSURE: 124 MMHG | BODY MASS INDEX: 34.04 KG/M2 | TEMPERATURE: 97.5 F | HEART RATE: 86 BPM | DIASTOLIC BLOOD PRESSURE: 86 MMHG | HEIGHT: 62 IN | OXYGEN SATURATION: 98 % | RESPIRATION RATE: 16 BRPM | WEIGHT: 185 LBS

## 2023-08-08 DIAGNOSIS — M25.562 CHRONIC PAIN OF LEFT KNEE: ICD-10-CM

## 2023-08-08 DIAGNOSIS — Z12.11 SCREEN FOR COLON CANCER: Primary | ICD-10-CM

## 2023-08-08 DIAGNOSIS — G89.29 CHRONIC PAIN OF LEFT KNEE: ICD-10-CM

## 2023-08-08 DIAGNOSIS — Z12.4 CERVICAL CANCER SCREENING: ICD-10-CM

## 2023-08-08 PROCEDURE — 73560 X-RAY EXAM OF KNEE 1 OR 2: CPT | Mod: TC | Performed by: RADIOLOGY

## 2023-08-08 PROCEDURE — 99213 OFFICE O/P EST LOW 20 MIN: CPT | Performed by: STUDENT IN AN ORGANIZED HEALTH CARE EDUCATION/TRAINING PROGRAM

## 2023-08-08 ASSESSMENT — PAIN SCALES - GENERAL: PAINLEVEL: WORST PAIN (10)

## 2023-08-08 NOTE — TELEPHONE ENCOUNTER
Priority line transfer from [facility] for knee injury     Situation   Patient is calling for increased left knee     Background   2 years ago same thing     Assessment   Constant sharp pain under knee cap   Left Knee injury Twisted knee around the 4th of July   About 2 weeks ago pain started to be constant through out the day and increased with activity.     Felt like I tore something     Took 800 of ibuprofen and ice for 3 days   Once she walks on it the pain becomes severe.   Can't get comfortable sleeping.   Limping on left leg due to pain     Recommendations    Advised Tylenol (3000mg max/day) as directed,  ice 20 minutes 3-5 times a day,  ibuprofen as directed , limit aggravating  factors   Gave address, arrival and appointment time.   Future Appointments 8/8/2023 - 2/4/2024        Date Visit Type Length Department Provider     8/8/2023  1:30 PM OFFICE VISIT 30 min [facility] Santana Ruiz MD    Location Instructions:     [facility] is located at [address] , between [address] Free lot parking is available. Upon entering the building, the clinic is to the left.  DUE TO CONSTRUCTION NEAR THE CLINIC  AND THE SURROUNDING STREETS, PLEASE ALLOW FOR EXTRA TIME TO GET TO YOUR APPT ON TIME.              8/24/2023 10:00 AM ADULT PSYCHOTHERAPY RETURN 60 min [facility] PSYCHOLOGY Carlos AlbertoLan stout, PhD              10/9/2023  7:00 AM OFFICE VISIT 30 min [facility] James Mon PA-C    Location Instructions:     [facility] is located at [address], between [address] Free lot parking is available. Upon entering the building, the clinic is to the left.  DUE TO CONSTRUCTION NEAR THE CLINIC  AND THE SURROUNDING STREETS, PLEASE ALLOW FOR EXTRA TIME TO GET TO YOUR APPT ON TIME.                     Reason for Disposition   SEVERE pain (e.g., excruciating, unable to walk)    Additional  Information   Negative: Sounds like a life-threatening emergency to the triager   Negative: Followed a knee injury   Negative: Swollen knee joint and fever   Negative: Thigh or calf pain and only 1 side and present > 1 hour   Negative: Thigh or calf swelling and only 1 side   Negative: Patient sounds very sick or weak to the triager   Negative: Can't move swollen joint at all    Protocols used: Knee Pain-A-[place]

## 2023-08-08 NOTE — PROGRESS NOTES
Assessment & Plan       Chronic pain of left knee  Left knee pain for approximately 4 to 6 weeks, this occurred shortly after she was kayaking and was maneuvering her kayak out of the water and felt some slight pain over her suprapatellar region of the left knee.  Pain does come and go at times, typically prolonged standing or prolonged walks to bring the pain on.  Once again pain just over the suprapatellar region.  Patient does do many exercises and stretches daily as part of her normal routine.  This has not improved her pain.  On exam today, she does have some slight swelling in that region as well as some tenderness over the medial joint line.  No immediate concern for ligamentous injury.  We will get some baseline x-rays today physical therapy, and even consider orthopedic referral.  Conservative measures were discussed  - XR Knee Standing Left 2 Views; Future  - Physical Therapy Referral; Future  - Orthopedic  Referral; Future    SUNNY EPPS MD  [facility] DANN Ventura is a 63 year old, presenting for the following health issues:  Knee Pain        8/8/2023     1:01 PM   Additional Questions   Roomed by jemima   Accompanied by alone         8/8/2023     1:01 PM   Patient Reported Additional Medications   Patient reports taking the following new medications none       History of Present Illness       Reason for visit:  Knee injury  Symptom onset:  3-4 weeks ago  Symptoms include:  Knee pain  Symptom intensity:  Severe  Symptom progression:  Worsening  Had these symptoms before:  Yes  Has tried/received treatment for these symptoms:  Yes  Previous treatment was successful:  Yes  Prior treatment description:  Physical therapy  What makes it worse:  Driving and sitting  What makes it better:  Ice and elevation    She eats 4 or more servings of fruits and vegetables daily.She consumes 0 sweetened beverage(s) daily.She exercises with enough effort to increase her  "heart rate 30 to 60 minutes per day.  She exercises with enough effort to increase her heart rate 7 days per week.   She is taking medications regularly.         Review of Systems   Constitutional, HEENT, cardiovascular, pulmonary, gi and gu systems are negative, except as otherwise noted.      Objective    /86   Pulse 86   Temp 97.5  F (36.4  C) (Temporal)   Resp 16   Ht 1.575 m (5' 2.01\")   Wt 83.9 kg (185 lb)   LMP  (LMP Unknown)   SpO2 98%   BMI 33.83 kg/m    Body mass index is 33.83 kg/m .  Physical Exam  Vitals and nursing note reviewed.   Constitutional:       General: She is not in acute distress.     Appearance: Normal appearance. She is not ill-appearing, toxic-appearing or diaphoretic.   HENT:      Head: Normocephalic.      Right Ear: External ear normal.      Left Ear: External ear normal.      Nose: No rhinorrhea.   Eyes:      General:         Right eye: No discharge.         Left eye: No discharge.      Extraocular Movements: Extraocular movements intact.      Conjunctiva/sclera: Conjunctivae normal.      Pupils: Pupils are equal, round, and reactive to light.   Pulmonary:      Effort: Pulmonary effort is normal. No respiratory distress.   Musculoskeletal:         General: Normal range of motion.      Cervical back: Normal range of motion.      Comments: Tenderness to palpation over the medial joint line, subtle swelling over the suprapatellar region.  Slight pain with lateral apprehension of the left patella.  No laxity to MCL or LCL.  Negative Lachman's, anterior drawer, and posterior drawer.  Shahnaz's was difficult to assess due to patient discomfort.  Neurovascular intact distal bilateral lower extremities.   Neurological:      Mental Status: She is alert and oriented to person, place, and time.   Psychiatric:         Mood and Affect: Mood normal.         Behavior: Behavior normal.                      "

## 2023-08-08 NOTE — TELEPHONE ENCOUNTER
Reason for Call:  Appointment Request    Patient requesting this type of appt:  Check Up    Requested provider: soonest available     Reason patient unable to be scheduled: Not within requested timeframe    When does patient want to be seen/preferred time: 1-2 days    Comments: knee injury needs to come in sooner please     [facility] or would you prefer to receive a phone call?:   Patient would prefer a phone call   Okay to leave a detailed message?: Yes at Cell number on file:    Telephone Information:   Mobile [phone]       Call taken on 8/8/2023 at 8:57 AM by Fahad Cabral

## 2023-08-08 NOTE — PATIENT INSTRUCTIONS
-Tylenol and ibuprofen over-the-counter: (follow directions on the bottle).  Tylenol is a safer option.  Avoid ibuprofen if you have chronic kidney disease or history of severe GERD or gastric ulcers.  If you do take ibuprofen, take this with food and a large glass of water. No more than 4000 mg tylenol in a 24 hours period. Ibuprofen take only 400 mg at a time.     -Topical treatments over the counter: such as heat or ice (20 minutes on and 20 minutes off, alternate between the 2), *Voltaren gel*, IcyHot, Biofreeze, Aspercreme, Blue emu, Tiger balm, etc... (whatever you feel comfortable spending your money on)  -Sleep - as much as you need to feel satisfied, consider 7 hours  -Exercise - 30 min/day, 5 days/week  -Nutrition -  Anti-inflammatory diet  i.e. low sugar, no processed, home cooked,  -Stress Management              American Academy of orthopedic surgeons  Reviewed: 2023

## 2023-08-08 NOTE — RESULT ENCOUNTER NOTE
Audrey,    Here are your most recent x-ray results    There is some mild arthritis noted on the inner side of your knee, this could possibly be the culprit of your pain.  I would still encourage you to still follow-up with physical therapy as well as orthopedics      If you have any questions feel free to call the clinic at [phone].      Thank you,    Santana Ruiz MD

## 2023-08-10 ENCOUNTER — THERAPY VISIT (OUTPATIENT)
Dept: PHYSICAL THERAPY | Facility: CLINIC | Age: 63
End: 2023-08-10
Attending: STUDENT IN AN ORGANIZED HEALTH CARE EDUCATION/TRAINING PROGRAM
Payer: COMMERCIAL

## 2023-08-10 DIAGNOSIS — G89.29 CHRONIC PAIN OF LEFT KNEE: ICD-10-CM

## 2023-08-10 DIAGNOSIS — M25.562 CHRONIC PAIN OF LEFT KNEE: ICD-10-CM

## 2023-08-10 PROCEDURE — 97161 PT EVAL LOW COMPLEX 20 MIN: CPT | Mod: GP | Performed by: PHYSICAL THERAPIST

## 2023-08-10 PROCEDURE — 97110 THERAPEUTIC EXERCISES: CPT | Mod: GP | Performed by: PHYSICAL THERAPIST

## 2023-08-10 ASSESSMENT — ACTIVITIES OF DAILY LIVING (ADL)
GO UP STAIRS: ACTIVITY IS VERY DIFFICULT
GO DOWN STAIRS: ACTIVITY IS VERY DIFFICULT
KNEE_ACTIVITY_OF_DAILY_LIVING_SUM: 14
WALK: ACTIVITY IS VERY DIFFICULT
RAW_SCORE: 14
SQUAT: ACTIVITY IS VERY DIFFICULT
HOW_WOULD_YOU_RATE_THE_CURRENT_FUNCTION_OF_YOUR_KNEE_DURING_YOUR_USUAL_DAILY_ACTIVITIES_ON_A_SCALE_FROM_0_TO_100_WITH_100_BEING_YOUR_LEVEL_OF_KNEE_FUNCTION_PRIOR_TO_YOUR_INJURY_AND_0_BEING_THE_INABILITY_TO_PERFORM_ANY_OF_YOUR_USUAL_DAILY_ACTIVITIES?: 50
KNEE_ACTIVITY_OF_DAILY_LIVING_SCORE: 20
HOW_WOULD_YOU_RATE_THE_OVERALL_FUNCTION_OF_YOUR_KNEE_DURING_YOUR_USUAL_DAILY_ACTIVITIES?: SEVERELY ABNORMAL
SIT WITH YOUR KNEE BENT: ACTIVITY IS VERY DIFFICULT
WEAKNESS: THE SYMPTOM AFFECTS MY ACTIVITY SEVERELY
STIFFNESS: THE SYMPTOM AFFECTS MY ACTIVITY SEVERELY
KNEEL ON THE FRONT OF YOUR KNEE: ACTIVITY IS VERY DIFFICULT
STAND: ACTIVITY IS VERY DIFFICULT
PAIN: THE SYMPTOM AFFECTS MY ACTIVITY SEVERELY
SWELLING: THE SYMPTOM AFFECTS MY ACTIVITY SEVERELY
RISE FROM A CHAIR: ACTIVITY IS VERY DIFFICULT
GIVING WAY, BUCKLING OR SHIFTING OF KNEE: THE SYMPTOM AFFECTS MY ACTIVITY SEVERELY
LIMPING: THE SYMPTOM AFFECTS MY ACTIVITY SEVERELY
AS_A_RESULT_OF_YOUR_KNEE_INJURY,_HOW_WOULD_YOU_RATE_YOUR_CURRENT_LEVEL_OF_DAILY_ACTIVITY?: ABNORMAL

## 2023-08-10 NOTE — PROGRESS NOTES
PHYSICAL THERAPY EVALUATION  Type of Visit: Evaluation    See electronic medical record for Abuse and Falls Screening details.    Subjective       Presenting condition or subjective complaint: Knee pain Pt is a 62 yo female who presents to PT with c/o pain in L knee which began becoming aggravated before the 4th of July, becoming worse over the holiday with increased activity levels. Pt has continued to exercise daily with 4 mile walks, yoga, bicycle riding, swimming and has only backed off from activity over the past 2 weeks. Pt has begun to limp more favoring her L leg, utilizing her R for the majority of her support over level walking, stairs, and standing. Pt reports that any activities to cause her to twist or turn on her knee cause significant pain, but motion in the plane of the knee does not cause pain.  Date of onset: 07/01/23    Relevant medical history: Mental Illness; Overweight; Pain at night or rest; Significant weakness   Dates & types of surgery:      Prior diagnostic imaging/testing results: X-ray     Prior therapy history for the same diagnosis, illness or injury: Yes May through Aug 2021    Prior Level of Function  Transfers:   Ambulation:   ADL:   IADL:     Living Environment  Social support: With a significant other or spouse   Type of home: Apartment/condo   Stairs to enter the home: No       Ramp: Yes   Stairs inside the home: Yes       Help at home: Medication and/or finances; Assist for driving and community activities  Equipment owned:       Employment: Yes Substitute para  Hobbies/Interests: Swimming, biking, walking, reading    Patient goals for therapy: Bend knee and put weight on it    Pain assessment: Pain present     Objective   KNEE EVALUATION  PAIN: Pain Level at Rest: 2/10  Pain Level with Use: 9/10  INTEGUMENTARY (edema, incisions):   POSTURE:   GAIT:  Weightbearing Status: WBAT  Assistive Device(s): None  Gait Deviations: Antalgic  Stance time decreased  BALANCE/PROPRIOCEPTION:    WEIGHTBEARING ALIGNMENT:   NON-WEIGHTBEARING ALIGNMENT:   ROM:     STRENGTH:   FLEXIBILITY:   SPECIAL TESTS:    Left Right   Apley's (Meniscus)     Shahnaz's (Meniscus) Positive, medial; Negative, lateral    Gwendolyn's (ITB/TFL)     Patellar Apprehension Test     Patella Tracking     Ligamentous Stability     Anterior Drawer (ACL)     Posterior Drawer (PCL)     Prone Dial Test at 30 Deg and 90 Deg (PCL/PLC)     Valgus Stress Testing at 0 Deg and 30 Deg     Varus Stress Testing at 0 Deg and 30 Deg        FUNCTIONAL TESTS:   PALPATION:  TTP over anterior medial meniscus, no tenderness over suprapatellar region, but reports that it feels full  JOINT MOBILITY:     Assessment & Plan   CLINICAL IMPRESSIONS  Medical Diagnosis: Chronic pain of left knee (M25.562, G89.29)    Treatment Diagnosis: L knee pain   Impression/Assessment:  Pt presents to PT with pain through L medial knee compartment, TTP over medial meniscus, reduced mobility in L knee 2/2 pain, impaired gait with pain limiting function of her L knee, and reduced participation in her normal preferred activities because of her pain. Pt would benefit from skilled PT interventions in order to address her pain, reduced mobility, and facilitate return to PLOF for better QOL.    Clinical Decision Making (Complexity):  Clinical Presentation: Stable/Uncomplicated  Clinical Presentation Rationale: based on medical and personal factors listed in PT evaluation  Clinical Decision Making (Complexity): Low complexity    PLAN OF CARE  Treatment Interventions:  Interventions: Gait Training, Manual Therapy, Neuromuscular Re-education, Therapeutic Activity, Therapeutic Exercise    Long Term Goals     PT Goal 1  Goal Identifier: HEP  Goal Description: Pt will demo independence in performance and progression of strengthening, stretching, and balance HEP in order to improve CLOF.  Target Date: 08/31/23  PT Goal 2  Goal Identifier: KOS  Goal Description: Pt will demo improved knee pain  ratings and function as shown by increased KOS score of at least 7 points in order to show significant improvement and greater return to previous function.  Goal Progress: 14/70 (eval)  Target Date: 09/21/23      Frequency of Treatment: 1x/week  Duration of Treatment: 6 weeks    Recommended Referrals to Other Professionals:   Education Assessment:   Learner/Method: Patient    Risks and benefits of evaluation/treatment have been explained.   Patient/Family/caregiver agrees with Plan of Care.     Evaluation Time:     PT Eval, Low Complexity Minutes (90769): 15       Signing Clinician: MANNIE [facility]                                                                                   OUTPATIENT PHYSICAL THERAPY      PLAN OF TREATMENT FOR OUTPATIENT REHABILITATION   Patient's Last Name, First Name, Audrey Arroyo YOB: 1960   Provider's Name   [facility]   Medical Record No.  [id number]     Onset Date: 07/01/23  Start of Care Date: 08/10/23     Medical Diagnosis:  Chronic pain of left knee (M25.562, G89.29)      PT Treatment Diagnosis:  L knee pain Plan of Treatment  Frequency/Duration: 1x/week/ 6 weeks    Certification date from 08/10/23 to 09/21/23         See note for plan of treatment details and functional goals     Juwan Alegre, PT                         I CERTIFY THE NEED FOR THESE SERVICES FURNISHED UNDER        THIS PLAN OF TREATMENT AND WHILE UNDER MY CARE     (Physician attestation of this document indicates review and certification of the therapy plan).                Referring Provider:  Santana Ruiz MD      Initial Assessment  See Epic Evaluation- Start of Care Date: 08/10/23

## 2023-08-17 ENCOUNTER — THERAPY VISIT (OUTPATIENT)
Dept: PHYSICAL THERAPY | Facility: CLINIC | Age: 63
End: 2023-08-17
Attending: STUDENT IN AN ORGANIZED HEALTH CARE EDUCATION/TRAINING PROGRAM
Payer: COMMERCIAL

## 2023-08-17 DIAGNOSIS — G89.29 CHRONIC PAIN OF LEFT KNEE: Primary | ICD-10-CM

## 2023-08-17 DIAGNOSIS — M25.562 CHRONIC PAIN OF LEFT KNEE: Primary | ICD-10-CM

## 2023-08-17 PROCEDURE — 97110 THERAPEUTIC EXERCISES: CPT | Mod: GP | Performed by: PHYSICAL THERAPIST

## 2023-08-18 NOTE — PROGRESS NOTES
"Audrey Ricks  :  1960  DOS: 2023  MRN: [id number]  PCP: Alie De La Cruz    Sports Medicine Clinic Visit      HPI  Audrey Ricks is a 63 year old female who is seen in consultation at the request of  Santana Ruiz M.D. presenting with left knee pain.    - Mechanism of Injury:  Fell onto her anterior left knee in . Thinks she re-injured it while dancing  on 2023.   - Prior evaluation:  2023 with Santana Ruiz MD. described 4 to 6 weeks of suprapatellar knee pain after a shifting maneuver with her kayak.  Pain worse with prolonged standing and walking.  Performs HEP.  TTP over the suprapatellar region and medial joint line.  Referred to PT and orthopedic follow-up.  -XR L knee 2023 shows normal anatomic alignment with a very mild knee joint effusion, mild swelling over the patellar tendon, mild medial compartment joint space narrowing, no acute fracture or dislocation.    - Pain Character:  Pain has been present for  1.5 months  with worsening pain.  Pain is well localized to the anteromedial left knee without significant radiation.   - Endorses:  \"ice pick\" distal to the patella, numbness over the proximal anterior lower leg  - Denies:  clicking, popping, grinding, instability, radicular shooting pain, weakness.   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      Review of Systems  Musculoskeletal: as above  Remainder of review of systems is negative including constitutional, CV, pulmonary, GI, Skin and Neurologic except as noted in HPI or medical history.    Past Medical History:   Diagnosis Date    Depressive disorder     " "Hypertension     NSTEMI (non-ST elevated myocardial infarction) (H) 08/06/2022    SVT (supraventricular tachycardia) (H)      Past Surgical History:   Procedure Laterality Date    EP ABLATION SVT Bilateral 9/23/2022    Procedure: Ablation Supraventricular Tachycardia;  Surgeon: Donna Wolfe MD;  Location:  [facility]     Family History   Problem Relation Age of Onset    Diabetes Mother          Objective  Ht 1.575 m (5' 2\")   Wt 83.9 kg (185 lb)   LMP  (LMP Unknown)   BMI 33.84 kg/m      General: healthy, alert and in no acute distress.    HEENT: no scleral icterus or conjunctival erythema.   Skin: no suspicious lesions or rash. No jaundice.   CV: regular rhythm by palpation, 2+ distal pulses.  Resp: normal respiratory effort without conversational dyspnea.   Psych: normal mood and affect.    Gait: nonantalgic, appropriate coordination and balance.     Neuro:        - Sensation to light touch:    - Intact throughout the BLE including all peripheral nerve distributions.        - MSR:      RLE  LLE  - Patella 2+ 2+  - Achilles 2+ 2+       - Special tests:   - Slump/SLR:  Neg bilaterally    MSK - Knee:       - Inspection:    - Mild swelling present without surrounding erythema, warmth, ecchymosis, lesion.        - ROM:    - Full AROM/PROM with pain during knee flexion/extension.        - Palpation:    - TTP at the medial joint line, MCL, lateral joint line, LCL.   - NTTP elsewhere.        - Strength:  (*antalgic)  RLE LLE  - Hip Flexion  5 5    - Hip Abduction 5 5   - Hip Adduction 5 5  - Knee Flexion  5 5  - Knee Extension 5 5  - Dorsiflexion  5 5  - Plantarflexion 5 5  - Ext. Luisito. Longus 5 5  - Inversion  5 5  - Eversion  5 5       - Special tests:        - Lachman:  Neg        - A/P drawer:  Neg       - Pivot shift:  Neg   - Shahnaz: Positive for medial and lateral compartment pain     - Varus stress: Positive for pain, negative for laxity    - Valgus stress: Positive for pain, negative for " laxity   - Patellar grind: Positive      Radiology  I independently reviewed the available relevant imaging, with the following interpretation:   -XR L knee 8/8/2023 shows mild medial and lateral compartment joint space narrowing without significant osteophytosis, small knee joint effusion, no acute fractures or dislocations.    XR KNEE STANDING LEFT 2 VIEWS 8/8/2023 1:57 PM     HISTORY: Chronic pain of left knee; Chronic pain of left knee     COMPARISON: None.                                                                      IMPRESSION: No fracture or effusion. Mild medial compartment  narrowing.     ERICK THOMAS MD        Assessment  1. Pes anserine bursitis    2. Tendinopathy of patella    3. Tear of medial meniscus of left knee, current, unspecified tear type, initial encounter        Plan  Audrey Ricks is a 63 year old female that presents with anteromedial knee pain that is acute on chronic in nature.  Chronic knee pain feels slightly more diffuse within the knee joint as well as in the patellar tendon, no acute pain is at the medial aspect of the joint and just below the medial joint line.. History and physical exam appear most consistent with a medial meniscus tear, pes anserine bursitis, in the setting of chronic patellar tendinopathy.    Discussed the nature of the condition and treatment options and mutually agreed upon the following plan:    - Imaging:          - Reviewed relevant imaging in the chart.  - Reviewed results and images with patient.   -It will be important to evaluate the integrity of the soft tissue structures of the knee with advanced imaging (MRI).  MRI order has been placed and instructions given for scheduling the imaging and follow-up.  - Medications:          - Discussed pharmacologic options for pain relief.   -  May use NSAIDs (Ibuprofen, Naproxen) or Acetaminophen (Tylenol) as needed for pain control.   -  May also use topical medications such as lidocaine, IcyHot,  BioFreeze, or Voltaren gel as needed for pain control.   - Injections:          - Discussed possible injection options and alternatives.    - Options include intra-articular knee joint injection with corticosteroid, viscosupplementation, or PRP..    - Deferred injections today and will consider them in the future as needed.   - Therapy:          - Discussed the benefits of physical therapy vs home exercise program for optimization of range of motion, flexibility, strength, stability and function.   -We will consider a formalized therapy plan after results of the MRI are obtained.  - Modalities:          - May use ice, heat, massage or other modalities as needed.   - Bracing:          - Discussed bracing options and recommend using a knee stability brace.  We will follow-up with recommendations after the MRI is completed.  - Surgery:          - Discussed non-operative and operative treatment options for the patient's condition.   -Goal will be to continue conservative care for as long as possible before surgery would need to be considered.  - Activity:          - Encouraged to remain active and participate in regular activities as symptoms allow.  Avoid or modify exacerbating activities as needed.  - Follow up:          -When results of the MRI are obtained to discuss the results, for re-evaluation and update to treatment plan, or sooner for new/worsening symptoms.  - Patient has clinic contact information for questions or concerns.       Hany Lebron DO, CAROLE  [facility] - Sports Medicine  [facility] - Department of Orthopedic Surgery       Disclaimer:  This note was prepared and written using Dragon Medical dictation software. As a result, there may be errors in the script that have gone undetected. Please consider this when interpreting the information in this note.

## 2023-08-22 ENCOUNTER — OFFICE VISIT (OUTPATIENT)
Dept: ORTHOPEDICS | Facility: OTHER | Age: 63
End: 2023-08-22
Payer: COMMERCIAL

## 2023-08-22 VITALS — HEIGHT: 62 IN | WEIGHT: 185 LBS | BODY MASS INDEX: 34.04 KG/M2

## 2023-08-22 DIAGNOSIS — M67.969 TENDINOPATHY OF PATELLA: ICD-10-CM

## 2023-08-22 DIAGNOSIS — M70.50 PES ANSERINE BURSITIS: Primary | ICD-10-CM

## 2023-08-22 DIAGNOSIS — S83.242A TEAR OF MEDIAL MENISCUS OF LEFT KNEE, CURRENT, UNSPECIFIED TEAR TYPE, INITIAL ENCOUNTER: ICD-10-CM

## 2023-08-22 PROCEDURE — 99204 OFFICE O/P NEW MOD 45 MIN: CPT | Performed by: STUDENT IN AN ORGANIZED HEALTH CARE EDUCATION/TRAINING PROGRAM

## 2023-08-22 ASSESSMENT — PAIN SCALES - GENERAL: PAINLEVEL: WORST PAIN (10)

## 2023-08-22 NOTE — PATIENT INSTRUCTIONS
Van Audrey Ricks ,     A copy of your assessment and our treatment plan that we discussed together is included below, as written in your medical chart.   If you have any questions, please feel free to call the clinic.     --------------------------------------------------  Audrey Ricks is a 63 year old female that presents with anteromedial knee pain that is acute on chronic in nature.  Chronic knee pain feels slightly more diffuse within the knee joint as well as in the patellar tendon, no acute pain is at the medial aspect of the joint and just below the medial joint line.. History and physical exam appear most consistent with a medial meniscus tear, pes anserine bursitis, in the setting of chronic patellar tendinopathy.    Discussed the nature of the condition and treatment options and mutually agreed upon the following plan:    - Imaging:          - Reviewed relevant imaging in the chart.  - Reviewed results and images with patient.   -It will be important to evaluate the integrity of the soft tissue structures of the knee with advanced imaging (MRI).  MRI order has been placed and instructions given for scheduling the imaging and follow-up.  - Medications:          - Discussed pharmacologic options for pain relief.   -  May use NSAIDs (Ibuprofen, Naproxen) or Acetaminophen (Tylenol) as needed for pain control.   -  May also use topical medications such as lidocaine, IcyHot, BioFreeze, or Voltaren gel as needed for pain control.   - Injections:          - Discussed possible injection options and alternatives.    - Options include intra-articular knee joint injection with corticosteroid, viscosupplementation, or PRP..    - Deferred injections today and will consider them in the future as needed.   - Therapy:          - Discussed the benefits of physical therapy vs home exercise program for optimization of range of motion, flexibility, strength, stability and function.   -We will consider a formalized  therapy plan after results of the MRI are obtained.  - Modalities:          - May use ice, heat, massage or other modalities as needed.   - Bracing:          - Discussed bracing options and recommend using a knee stability brace.  We will follow-up with recommendations after the MRI is completed.  - Surgery:          - Discussed non-operative and operative treatment options for the patient's condition.   -Goal will be to continue conservative care for as long as possible before surgery would need to be considered.  - Activity:          - Encouraged to remain active and participate in regular activities as symptoms allow.  Avoid or modify exacerbating activities as needed.  - Follow up:          -When results of the MRI are obtained to discuss the results, for re-evaluation and update to treatment plan, or sooner for new/worsening symptoms.  - Patient has clinic contact information for questions or concerns.   --------------------------------------------------    It was a pleasure seeing you today. Thank you for choosing [facility] for your care.       Hany Lebron DO, CAQSM  [facility] - Sports Medicine  [facility] - Department of Orthopedic Surgery     Disclaimer:  This note was prepared and written using Dragon Medical dictation software. As a result, there may be errors in the script that have gone undetected. Please consider this when interpreting the information in this note.     -------------------------------------------------    MRI Scheduling Instructions  1.  Advanced imaging is done by appointment. Please call [facility] ([facility]) [phone] to schedule your MRI at your earliest convenience.     - Some insurance companies may require a prior authorization to be completed which can delay the time until you are able to schedule your appointment.       - If you are active on [email or web address], you may have access to your test results  before your provider is able to review the study and advise on next steps.      2. After your MRI is scheduled, please call [phone] to schedule an in-person or telephone follow up appointment with your provider to discuss the results and updated treatment recommendations.

## 2023-08-22 NOTE — LETTER
"    2023         RE: Audrey Ricks  [address]  [facility] [address]        Dear Colleague,    Thank you for referring your patient, Audrey Ricks, to the [facility] [place]. Please see a copy of my visit note below.    Audrey Ricks  :  1960  DOS: 2023  MRN: [id number]  PCP: Alie De La Cruz    Sports Medicine Clinic Visit      HPI  Audrey Ricks is a 63 year old female who is seen in consultation at the request of  Santana Ruiz M.D. presenting with left knee pain.    - Mechanism of Injury:  Fell onto her anterior left knee in . Thinks she re-injured it while dancing  on 2023.   - Prior evaluation:  2023 with Santana Ruiz MD. described 4 to 6 weeks of suprapatellar knee pain after a shifting maneuver with her kayak.  Pain worse with prolonged standing and walking.  Performs HEP.  TTP over the suprapatellar region and medial joint line.  Referred to PT and orthopedic follow-up.  -XR L knee 2023 shows normal anatomic alignment with a very mild knee joint effusion, mild swelling over the patellar tendon, mild medial compartment joint space narrowing, no acute fracture or dislocation.    - Pain Character:  Pain has been present for  1.5 months  with worsening pain.  Pain is well localized to the anteromedial left knee without significant radiation.   - Endorses:  \"ice pick\" distal to the patella, numbness over the proximal anterior lower leg  - Denies:  clicking, popping, grinding, instability, radicular shooting pain, weakness.   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      Review of " "Systems  Musculoskeletal: as above  Remainder of review of systems is negative including constitutional, CV, pulmonary, GI, Skin and Neurologic except as noted in HPI or medical history.    Past Medical History:   Diagnosis Date     Depressive disorder 2011     Hypertension      NSTEMI (non-ST elevated myocardial infarction) (H) 08/06/2022     SVT (supraventricular tachycardia) (H)      Past Surgical History:   Procedure Laterality Date     EP ABLATION SVT Bilateral 9/23/2022    Procedure: Ablation Supraventricular Tachycardia;  Surgeon: Donna Wolfe MD;  Location:  [facility]     Family History   Problem Relation Age of Onset     Diabetes Mother          Objective  Ht 1.575 m (5' 2\")   Wt 83.9 kg (185 lb)   LMP  (LMP Unknown)   BMI 33.84 kg/m      General: healthy, alert and in no acute distress.    HEENT: no scleral icterus or conjunctival erythema.   Skin: no suspicious lesions or rash. No jaundice.   CV: regular rhythm by palpation, 2+ distal pulses.  Resp: normal respiratory effort without conversational dyspnea.   Psych: normal mood and affect.    Gait: nonantalgic, appropriate coordination and balance.     Neuro:        - Sensation to light touch:    - Intact throughout the BLE including all peripheral nerve distributions.        - MSR:      RLE  LLE  - Patella 2+ 2+  - Achilles 2+ 2+       - Special tests:   - Slump/SLR:  Neg bilaterally    MSK - Knee:       - Inspection:    - Mild swelling present without surrounding erythema, warmth, ecchymosis, lesion.        - ROM:    - Full AROM/PROM with pain during knee flexion/extension.        - Palpation:    - TTP at the medial joint line, MCL, lateral joint line, LCL.   - NTTP elsewhere.        - Strength:  (*antalgic)  RLE LLE  - Hip Flexion  5 5    - Hip Abduction 5 5   - Hip Adduction 5 5  - Knee Flexion  5 5  - Knee Extension 5 5  - Dorsiflexion  5 5  - Plantarflexion 5 5  - Ext. Luisito. Longus 5 5  - Inversion  5 5  - Eversion  5 5       - " Special tests:        - Lachman:  Neg        - A/P drawer:  Neg       - Pivot shift:  Neg   - Shahnaz: Positive for medial and lateral compartment pain     - Varus stress: Positive for pain, negative for laxity    - Valgus stress: Positive for pain, negative for laxity   - Patellar grind: Positive      Radiology  I independently reviewed the available relevant imaging, with the following interpretation:   -XR L knee 8/8/2023 shows mild medial and lateral compartment joint space narrowing without significant osteophytosis, small knee joint effusion, no acute fractures or dislocations.    XR KNEE STANDING LEFT 2 VIEWS 8/8/2023 1:57 PM     HISTORY: Chronic pain of left knee; Chronic pain of left knee     COMPARISON: None.                                                                      IMPRESSION: No fracture or effusion. Mild medial compartment  narrowing.     ERICK THOMAS MD        Assessment  1. Pes anserine bursitis    2. Tendinopathy of patella    3. Tear of medial meniscus of left knee, current, unspecified tear type, initial encounter        Plan  Audrey Ricks is a 63 year old female that presents with anteromedial knee pain that is acute on chronic in nature.  Chronic knee pain feels slightly more diffuse within the knee joint as well as in the patellar tendon, no acute pain is at the medial aspect of the joint and just below the medial joint line.. History and physical exam appear most consistent with a medial meniscus tear, pes anserine bursitis, in the setting of chronic patellar tendinopathy.    Discussed the nature of the condition and treatment options and mutually agreed upon the following plan:    - Imaging:          - Reviewed relevant imaging in the chart.  - Reviewed results and images with patient.   -It will be important to evaluate the integrity of the soft tissue structures of the knee with advanced imaging (MRI).  MRI order has been placed and instructions given for scheduling the  imaging and follow-up.  - Medications:          - Discussed pharmacologic options for pain relief.   -  May use NSAIDs (Ibuprofen, Naproxen) or Acetaminophen (Tylenol) as needed for pain control.   -  May also use topical medications such as lidocaine, IcyHot, BioFreeze, or Voltaren gel as needed for pain control.   - Injections:          - Discussed possible injection options and alternatives.    - Options include intra-articular knee joint injection with corticosteroid, viscosupplementation, or PRP..    - Deferred injections today and will consider them in the future as needed.   - Therapy:          - Discussed the benefits of physical therapy vs home exercise program for optimization of range of motion, flexibility, strength, stability and function.   -We will consider a formalized therapy plan after results of the MRI are obtained.  - Modalities:          - May use ice, heat, massage or other modalities as needed.   - Bracing:          - Discussed bracing options and recommend using a knee stability brace.  We will follow-up with recommendations after the MRI is completed.  - Surgery:          - Discussed non-operative and operative treatment options for the patient's condition.   -Goal will be to continue conservative care for as long as possible before surgery would need to be considered.  - Activity:          - Encouraged to remain active and participate in regular activities as symptoms allow.  Avoid or modify exacerbating activities as needed.  - Follow up:          -When results of the MRI are obtained to discuss the results, for re-evaluation and update to treatment plan, or sooner for new/worsening symptoms.  - Patient has clinic contact information for questions or concerns.       Hany Lebron DO, CAQSM  [facility] - Sports Medicine  [facility] - Department of Orthopedic Surgery       Disclaimer:  This note was prepared and written using Dragon Medical dictation  software. As a result, there may be errors in the script that have gone undetected. Please consider this when interpreting the information in this note.       Again, thank you for allowing me to participate in the care of your patient.        Sincerely,        Hany Lebron, DO

## 2023-08-24 ENCOUNTER — THERAPY VISIT (OUTPATIENT)
Dept: PHYSICAL THERAPY | Facility: CLINIC | Age: 63
End: 2023-08-24
Attending: STUDENT IN AN ORGANIZED HEALTH CARE EDUCATION/TRAINING PROGRAM
Payer: COMMERCIAL

## 2023-08-24 ENCOUNTER — HOSPITAL ENCOUNTER (OUTPATIENT)
Dept: MRI IMAGING | Facility: CLINIC | Age: 63
Discharge: HOME | End: 2023-08-24
Attending: STUDENT IN AN ORGANIZED HEALTH CARE EDUCATION/TRAINING PROGRAM | Admitting: STUDENT IN AN ORGANIZED HEALTH CARE EDUCATION/TRAINING PROGRAM
Payer: COMMERCIAL

## 2023-08-24 ENCOUNTER — VIRTUAL VISIT (OUTPATIENT)
Dept: PSYCHOLOGY | Facility: CLINIC | Age: 63
End: 2023-08-24
Payer: COMMERCIAL

## 2023-08-24 DIAGNOSIS — G89.29 CHRONIC PAIN OF LEFT KNEE: Primary | ICD-10-CM

## 2023-08-24 DIAGNOSIS — M67.969 TENDINOPATHY OF PATELLA: ICD-10-CM

## 2023-08-24 DIAGNOSIS — M25.562 CHRONIC PAIN OF LEFT KNEE: Primary | ICD-10-CM

## 2023-08-24 DIAGNOSIS — M70.50 PES ANSERINE BURSITIS: ICD-10-CM

## 2023-08-24 DIAGNOSIS — F33.0 MILD EPISODE OF RECURRENT MAJOR DEPRESSIVE DISORDER (H): Primary | ICD-10-CM

## 2023-08-24 PROCEDURE — 73721 MRI JNT OF LWR EXTRE W/O DYE: CPT | Mod: 26 | Performed by: RADIOLOGY

## 2023-08-24 PROCEDURE — 97110 THERAPEUTIC EXERCISES: CPT | Mod: GP | Performed by: PHYSICAL THERAPIST

## 2023-08-24 PROCEDURE — 90834 PSYTX W PT 45 MINUTES: CPT | Mod: 95 | Performed by: PSYCHOLOGIST

## 2023-08-24 PROCEDURE — 73721 MRI JNT OF LWR EXTRE W/O DYE: CPT | Mod: LT

## 2023-08-24 ASSESSMENT — PATIENT HEALTH QUESTIONNAIRE - PHQ9
10. IF YOU CHECKED OFF ANY PROBLEMS, HOW DIFFICULT HAVE THESE PROBLEMS MADE IT FOR YOU TO DO YOUR WORK, TAKE CARE OF THINGS AT HOME, OR GET ALONG WITH OTHER PEOPLE: VERY DIFFICULT
SUM OF ALL RESPONSES TO PHQ QUESTIONS 1-9: 12
SUM OF ALL RESPONSES TO PHQ QUESTIONS 1-9: 12

## 2023-08-24 NOTE — PROGRESS NOTES
[facility]                                     Progress Note    Disclaimer: Voice recognition software was used to generate this note. As a result, wrong word or 'sound-a-like' substitutions may have occurred due to the inherent limitations of voice recognition software. There may be errors in the script that have gone undetected. Please consider this when interpreting information found in this chart.     Patient Name: Audrey Ricks  Date: 8/24/23         Service Type: Individual      Session Start Time: 10:00AM  Session End Time: 10:45AM     Session Length: 45    Session #: 11    Attendees: Client attended alone    Service Modality:  Video Visit:      Provider verified identity through the following two step process.  Patient provided:  Patient is known previously to provider    Telemedicine Visit: The patient's condition can be safely assessed and treated via synchronous audio and visual telemedicine encounter.      Reason for Telemedicine Visit: Services only offered telehealth    Originating Site (Patient Location): Patient's home    Distant Site (Provider Location): Provider Remote Setting- Home Office    Consent:  The patient/guardian has verbally consented to: the potential risks and benefits of telemedicine (video visit) versus in person care; bill my insurance or make self-payment for services provided; and responsibility for payment of non-covered services.     Patient would like the video invitation sent by:  My Chart    Mode of Communication:  Video Conference via [facility]    Distant Location (Provider):  Off-site    As the provider I attest to compliance with applicable laws and regulations related to telemedicine.    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 Always feels bad if she has to leave (history of  having to leave dt PA's).    Homework: Achieved / completed to satisfaction      Episode of Care Goals: Satisfactory progress - ACTION (Actively working towards change); Intervened by reinforcing change plan / affirming steps taken     Current / Ongoing Stressors and Concerns:  Knee went out on July 4th. Just saw orthopedics and will  be having MRI tomorrow. Has been doing PT. Increasing pain, worse rest.   Feels less patient, more fatigued.       Treatment Objective(s) Addressed in This Session:   use at least 2 coping skills for anxiety management in the next 2 weeks     Intervention:   CBT: behavioral activation    Assessments completed prior to visit:  The following assessments were completed by patient for this visit:  PHQ9:       2/24/2023     3:50 PM 4/14/2023     8:52 AM 4/28/2023    10:53 AM 5/5/2023     8:06 AM 6/29/2023     8:37 AM 7/28/2023     8:29 AM 8/24/2023     9:57 AM   PHQ-9 SCORE   PHQ-9 Total Score MyChart 1 (Minimal depression) 2 (Minimal depression) 6 (Mild depression) 6 (Mild depression) 5 (Mild depression) 12 (Moderate depression) 12 (Moderate depression)   PHQ-9 Total Score 1 2 6 6 5 12 12     GAD7:       12/12/2022     1:54 PM 5/5/2023     8:08 AM   DAVID-7 SCORE   Total Score 4 (minimal anxiety) 6 (mild anxiety)   Total Score 4 6     CAGE-AID:       12/12/2022     2:00 PM   CAGE-AID Total Score   Total Score 0   Total Score MyChart 0 (A total score of 2 or greater is considered clinically significant)     PROMIS 10-Global Health (all questions and answers displayed):       12/12/2022     1:59 PM 4/14/2023     8:55 AM 4/28/2023    10:55 AM 7/28/2023     8:32 AM   PROMIS 10   In general, would you say your health is: Very good Good Very good Fair   In general, would you say your quality of life is: Poor Good Very good Good   In general, how would you rate your physical health? Very good Good Very good Fair   In general, how would you rate your mental health, including your mood and your  ability to think? Poor Fair Fair Fair   In general, how would you rate your satisfaction with your social activities and relationships? Good Good Very good Fair   In general, please rate how well you carry out your usual social activities and roles Good Fair Fair Fair   To what extent are you able to carry out your everyday physical activities such as walking, climbing stairs, carrying groceries, or moving a chair? Completely Completely Completely Moderately   In the past 7 days, how often have you been bothered by emotional problems such as feeling anxious, depressed, or irritable? Sometimes Sometimes Sometimes Sometimes   In the past 7 days, how would you rate your fatigue on average? None Mild Mild Moderate   In the past 7 days, how would you rate your pain on average, where 0 means no pain, and 10 means worst imaginable pain? 1 6 4 8   In general, would you say your health is: 4 3 4 2   In general, would you say your quality of life is: 1 3 4 3   In general, how would you rate your physical health? 4 3 4 2   In general, how would you rate your mental health, including your mood and your ability to think? 1 2 2 2   In general, how would you rate your satisfaction with your social activities and relationships? 3 3 4 2   In general, please rate how well you carry out your usual social activities and roles. (This includes activities at home, at work and in your community, and responsibilities as a parent, child, spouse, employee, friend, etc.) 3 2 2 2   To what extent are you able to carry out your everyday physical activities such as walking, climbing stairs, carrying groceries, or moving a chair? 5 5 5 3   In the past 7 days, how often have you been bothered by emotional problems such as feeling anxious, depressed, or irritable? 3 3 3 3   In the past 7 days, how would you rate your fatigue on average? 1 2 2 3   In the past 7 days, how would you rate your pain on average, where 0 means no pain, and 10 means worst  imaginable pain? 1 6 4 8   Global Mental Health Score 8 11 13 10   Global Physical Health Score 18 15 16 10   PROMIS TOTAL - SUBSCORES 26 26 29 20     [place] Suicide Severity Rating Scale (Lifetime/Recent)      1/24/2023     4:00 PM   [place] Suicide Severity Rating (Lifetime/Recent)   Q1 Wished to be Dead (Past Month) no   Q2 Suicidal Thoughts (Past Month) no   Q3 Suicidal Thought Method no   Q4 Suicidal Intent without Specific Plan no   Q5 Suicide Intent with Specific Plan no   Level of Risk per Screen low risk         ASSESSMENT: Current Emotional / Mental Status (status of significant symptoms):   Risk status (Self / Other harm or suicidal ideation)   Patient denies current fears or concerns for personal safety.   Patient denies current or recent suicidal ideation or behaviors.   Patient denies current or recent homicidal ideation or behaviors.   Patient denies current or recent self injurious behavior or ideation.   Patient denies other safety concerns.   Patient reports there has been no change in risk factors since their last session.     Patient reports there has been no change in protective factors since their last session.     Recommended that patient call 911 or go to the local ED should there be a change in any of these risk factors.     Appearance:   Appropriate    Eye Contact:   Good    Psychomotor Behavior: Normal    Attitude:   Cooperative    Orientation:   All   Speech    Rate / Production: Normal     Volume:  Normal    Mood:    Normal   Affect:    Appropriate    Thought Content:  Clear    Thought Form:  Coherent  Logical    Insight:    Good      Medication Review:   No current psychiatric medications prescribed     Medication Compliance:   NA     Changes in Health Issues:   None reported     Chemical Use Review:   Substance Use: Chemical use reviewed, no active concerns identified      Tobacco Use: No current tobacco use.      Diagnosis:  Diagnosis:  1. Mild episode of recurrent major depressive  disorder (H)          Collateral Reports Completed:   Not Applicable    PLAN: (Patient Tasks / Therapist Tasks / Other)  Client to advocate for herself as appropriate. Participate in at least two activities to promote relaxation per week.         Lan Carcamo, PhD                                                         ______________________________________________________________________    Individual Treatment Plan    Patient's Name: Audrey Ricks  YOB: 1960    Date of Creation: 2/10/23  Date Treatment Plan Last Reviewed/Revised: 4/28/23, 8/24/23    DSM5 Diagnoses: 300.01 (F41.0) Panic Disorder  Psychosocial / Contextual Factors: Chronic, intermittent pain  PROMIS (reviewed every 90 days):     Referral / Collaboration:  Referral to another professional/service is not indicated at this time..    Anticipated number of session for this episode of care: 20  Anticipation frequency of session: Every other week  Anticipated Duration of each session: 38-52 minutes  Treatment plan will be reviewed in 90 days or when goals have been changed.     Anxiety Treatment plan:  Education- the Biopsychosocial model of anxiety  Client will be able to describe how anxiety is effecting them physically, emotionally and socially  Distraction  Client will learn 2 techniques to distract themselves when becoming anxious  Diaphragmatic breathing  Client will learn to breath using their diaphragm  Client will learn 2 breathing patterns to use to reduce anxiety  Visualization  Client will learn to establish a safe, calm place in their mind utilizing all their senses  Client will practice using visualization at times when they are not anxious so they will be able to use it when anxiety occurs  Progressive muscle relaxation  Client will learn progressive muscle relaxation techniques and practice them 4-5 times per week.  Client will learn to use mental images of relaxation to relax muscle groups and do this on a daily  basis  Self-care  Client will identify 3 things they can do just for themselves  Client will take time for quiet, reflection, meditation 3 times per week  Client will Learn to set boundaries when appropriate  Client will Identify 3 individuals they can call on for support, distraction  Assessment of progress  Client will engage in assessment of their progress on a regular basis    Patient has reviewed and agreed to the above plan.      Lan Carcamo, PhD  February 10, 2023    Answers for HPI/ROS submitted by the patient on 2/10/2023  If you checked off any problems, how difficult have these problems made it for you to do your work, take care of things at home, or get along with other people?: Somewhat difficult  PHQ9 TOTAL SCORE: 4    Answers for HPI/ROS submitted by the patient on 2/24/2023  If you checked off any problems, how difficult have these problems made it for you to do your work, take care of things at home, or get along with other people?: Not difficult at all  PHQ9 TOTAL SCORE: 1  Answers for HPI/ROS submitted by the patient on 4/14/2023  If you checked off any problems, how difficult have these problems made it for you to do your work, take care of things at home, or get along with other people?: Not difficult at all  PHQ9 TOTAL SCORE: 2    Answers for HPI/ROS submitted by the patient on 4/28/2023  If you checked off any problems, how difficult have these problems made it for you to do your work, take care of things at home, or get along with other people?: Somewhat difficult  PHQ9 TOTAL SCORE: 6  Answers for HPI/ROS submitted by the patient on 6/29/2023  If you checked off any problems, how difficult have these problems made it for you to do your work, take care of things at home, or get along with other people?: Somewhat difficult  PHQ9 TOTAL SCORE: 5  Answers submitted by the patient for this visit:  Patient Health Questionnaire (Submitted on 8/24/2023)  If you checked off any problems, how  difficult have these problems made it for you to do your work, take care of things at home, or get along with other people?: Very difficult  PHQ9 TOTAL SCORE: 12

## 2023-08-29 NOTE — PROGRESS NOTES
Audrey Ricks  :  1960  DOS: 2023  MRN: [id number]  PCP: Alie De La Cruz    Sports Medicine Clinic Visit    Interim History - 2023  - Last seen in clinic on 2023 for left knee pes anserine bursitis, left patellar tendonopathy and left medial meniscus tear.  - MR left knee completed on 2023. Results on file. Presents today to discuss treatment options.    -MR L knee 2023 shows a complex tear of the medial meniscus body including multidirectional vertical tearing of the posterior horn, lateral meniscus tear, posterolateral capsular tear/injury including the arcuate ligament.  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  Also with a small amount of pes anserine bursitis.    - Since the last visit, no significant change in symptoms. Feels better extended and elevated, but cannot walk even short distances without pain. No mechanical locking, but very stiff. No prior injections or braces. Has done PT.   - No interim injury.      Initial Visit: 2023  HPI  Audrey Ricks is a 63 year old female who is seen in consultation at the request of  Santana Ruiz M.D. presenting with left knee pain.    - Mechanism of Injury:  Fell onto her anterior left knee in . Thinks she re-injured it while dancing  on 2023.   - Prior evaluation:  2023 with Santana Ruiz MD. described 4 to 6 weeks of suprapatellar knee pain after a shifting maneuver with her kayak.  Pain worse with prolonged standing and walking.  Performs HEP.  TTP over the suprapatellar region and medial joint line.  Referred to PT and orthopedic follow-up.  -XR L knee 2023 shows normal anatomic alignment with a very mild knee joint effusion, mild swelling over the patellar tendon, mild medial compartment joint space narrowing, no acute fracture or dislocation.    - Pain Character:  Pain has been present for  1.5 months  with worsening pain.   "Pain is well localized to the anteromedial left knee without significant radiation.   - Endorses:  \"ice pick\" distal to the patella, numbness over the proximal anterior lower leg  - Denies:  clicking, popping, grinding, instability, radicular shooting pain, weakness.   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      Review of Systems  Musculoskeletal: as above  Remainder of review of systems is negative including constitutional, CV, pulmonary, GI, Skin and Neurologic except as noted in HPI or medical history.    Past Medical History:   Diagnosis Date    Depressive disorder 2011    Hypertension     NSTEMI (non-ST elevated myocardial infarction) (H) 08/06/2022    SVT (supraventricular tachycardia) (H)      Past Surgical History:   Procedure Laterality Date    EP ABLATION SVT Bilateral 9/23/2022    Procedure: Ablation Supraventricular Tachycardia;  Surgeon: Donna Wolfe MD;  Location:  [facility]     Family History   Problem Relation Age of Onset    Diabetes Mother          Objective  /86   Wt 83.9 kg (185 lb)   LMP  (LMP Unknown)   BMI 33.84 kg/m      General: healthy, alert and in no acute distress.    HEENT: no scleral icterus or conjunctival erythema.   Skin: no suspicious lesions or rash. No jaundice.   CV: regular rhythm by palpation, 2+ distal pulses.  Resp: normal respiratory effort without conversational dyspnea.   Psych: normal mood and affect.    Gait: nonantalgic, appropriate coordination and balance.     Neuro:        - Sensation to light touch:    - Intact throughout the BLE including all peripheral nerve distributions.        - MSR:      RLE "  LLE  - Patella 2+ 2+  - Achilles 2+ 2+       - Special tests:   - Slump/SLR:  Neg bilaterally    MSK - Knee:       - Inspection:    - Mild swelling present without surrounding erythema, warmth, ecchymosis, lesion.        - ROM:    - Full AROM/PROM with pain during knee flexion/extension.        - Palpation:    - TTP at the medial joint line, MCL, lateral joint line, LCL.   - NTTP elsewhere.        - Strength:  (*antalgic)  RLE LLE  - Hip Flexion  5 5    - Hip Abduction 5 5   - Hip Adduction 5 5  - Knee Flexion  5 5  - Knee Extension 5 5  - Dorsiflexion  5 5  - Plantarflexion 5 5  - Ext. Luisito. Longus 5 5  - Inversion  5 5  - Eversion  5 5       - Special tests:        - Lachman:  Neg        - A/P drawer:  Neg       - Pivot shift:  Neg   - Shahnaz: Positive for medial and lateral compartment pain     - Varus stress: Positive for pain, negative for laxity    - Valgus stress: Positive for pain, negative for laxity   - Patellar grind: Positive      Radiology  I independently reviewed the available relevant imaging, with the following interpretation:   - XR L knee 8/8/2023 shows mild medial and lateral compartment joint space narrowing without significant osteophytosis, small knee joint effusion, no acute fractures or dislocations.  -MRI with interpretation as above in HPI.    XR KNEE STANDING LEFT 2 VIEWS 8/8/2023 1:57 PM     HISTORY: Chronic pain of left knee; Chronic pain of left knee     COMPARISON: None.                                                                      IMPRESSION: No fracture or effusion. Mild medial compartment  narrowing.     ERICK THOMAS MD        MR left knee without contrast 8/24/2023 2:25 PM     Techniques: Multiplanar multisequence imaging of the left knee was  obtained without administration of intra-articular or intravenous  contrast using routing protocol.     History: Pes anserine bursitis; Tendinopathy of patella     Comparison: Radiograph 8/8/2023     Findings:     Motion  partially degrading images.     MENISCI:  Medial meniscus: Multidirectional tear of the medial meniscus body and  posterior horn including longitudinal vertical tearing component with  approximately 4 mm meniscal flap in the medial tibial gutter and at  the posterior horn root ligament junction.  Lateral meniscus: Free edge, likely longitudinal horizontal tear of  the lateral meniscal body.     LIGAMENTS  Cruciate ligaments: Intact.  Medial supporting structures: Peripheral bowing of the tibial  collateral ligament with moderate periligamentous edema surrounding  the tibial collateral ligament and posterior oblique ligament, trace  tibial collateral ligament bursal fluid, moderate to high-grade  sprain/partial tear meniscal femoral and meniscal tibial ligaments are  likely related to altered mechanics due to underlying meniscal  pathology.  Lateral supporting structures: Posterolateral capsular fluid signal,  consistent with capsular tear/injury including expected course of  arcuate ligament. Otherwise popliteus tendon, fibular collateral  ligament, biceps femoris tendon, conjoined tendon, popliteofibular  ligament and iliotibial band are intact.     EXTENSOR MECHANISM  Intact.     FLUID  Small joint effusion. No substantial Baker's cyst.  Trace pes anserine bursitis fluid.     OSSEOUS and ARTICULAR STRUCTURES  Bones: No fracture, contusion, or osseous lesion is seen.     Peripheral edema-like marrow signal intensity of the medial tibial  plateau, likely reactive to underlying meniscal pathology.     Patellofemoral compartment: On a background of diffuse moderate to  high-grade chondral loss, full-thickness chondral fissuring with  subtle subchondral edema-like marrow signal intensity in the patellar  articular cartilage. At least low-grade chondral loss of the central  trochlear cartilage.     Medial compartment: Focal full-thickness chondral fissure or subtle  subchondral edema across intensity posterior  weightbearing surface of  medial femoral condyle.     Lateral compartment: No high-grade hyaline cartilage disease.     ANCILLARY FINDINGS  Mild nonspecific subcutaneous edema especially anteriorly.                                                                      Impression:  Motion partially degrading images.  1. Multidirectional tear of the medial meniscus body and posterior  horn including longitudinal vertical tearing component.   a. Presumed reactive changes of medial supporting structures.   b. Mild pes anserine bursitis, likely reactive.  2. Free edge, likely longitudinal horizontal tear of the lateral  meniscal body.  3. Posterolateral capsular tear/injury including expected course of  arcuate ligament; otherwise, lateral supporting structures are intact.  4. Foci of grade IV chondromalacia in the patellofemoral and medial  compartments.     ROGER MAGALLON     Procedure  Large Joint Injection/Arthocentesis: L knee joint    Date/Time: 8/30/2023 8:20 AM    Performed by: Hany Lebron DO  Authorized by: Hany Lebron DO    Indications:  Pain  Needle Size:  22 G  Guidance: landmark guided    Approach:  Anterolateral  Location:  Knee      Medications:  40 mg triamcinolone 40 MG/ML; 2 mL lidocaine 1 %; 2 mL BUPivacaine 0.5 %  Outcome:  Tolerated well, no immediate complications  Procedure discussed: discussed risks, benefits, and alternatives    Consent Given by:  Patient  Prep: patient was prepped and draped in usual sterile fashion           PROCEDURE  Intraarticular Knee Joint Injection - Left  The patient was informed of the risks and benefits of the procedure and alternatives were discussed. A written consent was signed by the patient.   The injection site was prepped with chlorhexidine in sterile fashion.   An injectate solution containing 2 mL of 1% lidocaine, 2 mL of 0.5% bupivacaine, and 1 mL of Kenalog (40 mg/mL) was drawn up into a 5 mL syringe.  Injection was performed using sterile  technique.  A 1.5-inch 22-gauge needle was used to enter the knee joint using a lateral infrapatellar approach and injectate was injected successfully. After the injection, the site was cleaned and a bandage applied. The patient tolerated the procedure well without complications.       Assessment  1. Complex tear of medial meniscus of left knee as current injury, initial encounter    2. Primary osteoarthritis of left knee    3. Pes anserine bursitis          Plan  Audrey Ricks is a 63 year old female that presents for follow-up of her chronic anteromedial left knee pain.  Original history and physical exam appear most consistent with a medial meniscus tear and pes anserine bursitis, in the setting of chronic patellar tendinopathy.  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    History, imaging, and physical exam are consistent with a complex medial meniscus tear, lateral meniscus tear, posterior lateral capsule injury and pes anserine bursitis in the setting of severe primary osteoarthritis of the left knee.    Discussed the nature of the condition and treatment options and mutually agreed upon the following plan:    - Imaging:          - Reviewed relevant imaging in the chart.  - Reviewed results and images with patient.   - Medications:          - Discussed pharmacologic options for pain relief.   -  May use NSAIDs (Ibuprofen, Naproxen) or Acetaminophen (Tylenol) as needed for pain control.   -  May also use topical medications such as lidocaine, IcyHot, BioFreeze, or Voltaren gel as needed for pain control.   - Injections:          - Discussed possible injection options and alternatives.    - Options include intra-articular knee joint injection with corticosteroid,  viscosupplementation, or PRP.   - Performed a corticosteroid injection of the left intra-articular knee joint today in clinic. Patient tolerated the procedure well without complications.   - Post-procedure instructions:    - Keep the injection site clean and dry.   - Do not submerge the injection site for 24 hours (no baths, pools). Showers are ok.   - Rest the area for 24-48 hours before resuming normal activities. Avoid overexerting the area for the first few weeks.   - It may take 2-3 days to start noticing the effects of the injection and up to 3-4 weeks to feel significant benefits.   - In general, an injection in the same anatomical region can be repeated every 3 months as needed.  However, for the health of your tissues you will want to space out the injections as much as possible and request them only when symptoms are significantly bothersome and disrupting daily function and/or sleep.   - Therapy:          - Discussed the benefits of physical therapy vs home exercise program for optimization of range of motion, flexibility, strength, stability and function.   -She has done physical therapy in the past and has an established home exercise program.  Encouraged to continue home exercise program as instructed by PT.  - Modalities:          - May use ice, heat, massage or other modalities as needed.   - Bracing:          - Discussed bracing options and recommend using a knee stability brace or an  brace (likely medial) for stability and pain relief with walking, weightbearing, and exacerbating activities.  Interested in a referral for the  bracing program, which was placed today.   - Please call [phone] to schedule your brace fitting appointment with the physical therapy department.  - Surgery:          - Discussed non-operative and operative treatment options for the patient's condition, which includes meniscal repair versus more likely discussion of arthroplasty based on the level of  arthritis in the knee already.   - Goal is to continue conservative care for as long as possible before surgery would need to be considered.  We will consider surgical referral in the future if symptoms do not improve with conservative management.  - Activity:          - Encouraged to remain active and participate in regular activities as symptoms allow.  Avoid or modify exacerbating activities as needed.  - Follow up:          - As needed in at least 3 months for re-evaluation and update to treatment plan, or sooner for new/worsening symptoms.  - Patient has clinic contact information for questions or concerns.       Hany Lebron DO, CAROLE  [facility] Sports Medicine  [facility] - Department of Orthopedic Surgery       Disclaimer:  This note was prepared and written using Dragon Medical dictation software. As a result, there may be errors in the script that have gone undetected. Please consider this when interpreting the information in this note.

## 2023-08-30 ENCOUNTER — OFFICE VISIT (OUTPATIENT)
Dept: ORTHOPEDICS | Facility: CLINIC | Age: 63
End: 2023-08-30
Attending: STUDENT IN AN ORGANIZED HEALTH CARE EDUCATION/TRAINING PROGRAM
Payer: COMMERCIAL

## 2023-08-30 VITALS — SYSTOLIC BLOOD PRESSURE: 124 MMHG | WEIGHT: 185 LBS | DIASTOLIC BLOOD PRESSURE: 86 MMHG | BODY MASS INDEX: 33.84 KG/M2

## 2023-08-30 DIAGNOSIS — M17.12 PRIMARY OSTEOARTHRITIS OF LEFT KNEE: ICD-10-CM

## 2023-08-30 DIAGNOSIS — M70.50 PES ANSERINE BURSITIS: ICD-10-CM

## 2023-08-30 DIAGNOSIS — S83.232A COMPLEX TEAR OF MEDIAL MENISCUS OF LEFT KNEE AS CURRENT INJURY, INITIAL ENCOUNTER: Primary | ICD-10-CM

## 2023-08-30 PROCEDURE — 99214 OFFICE O/P EST MOD 30 MIN: CPT | Mod: 25 | Performed by: STUDENT IN AN ORGANIZED HEALTH CARE EDUCATION/TRAINING PROGRAM

## 2023-08-30 PROCEDURE — 20610 DRAIN/INJ JOINT/BURSA W/O US: CPT | Mod: LT | Performed by: STUDENT IN AN ORGANIZED HEALTH CARE EDUCATION/TRAINING PROGRAM

## 2023-08-30 RX ORDER — TRIAMCINOLONE ACETONIDE 40 MG/ML
40 INJECTION, SUSPENSION INTRA-ARTICULAR; INTRAMUSCULAR
Status: SHIPPED | OUTPATIENT
Start: 2023-08-30

## 2023-08-30 RX ORDER — LIDOCAINE HYDROCHLORIDE 10 MG/ML
2 INJECTION, SOLUTION INFILTRATION; PERINEURAL
Status: SHIPPED | OUTPATIENT
Start: 2023-08-30

## 2023-08-30 RX ORDER — BUPIVACAINE HYDROCHLORIDE 5 MG/ML
2 INJECTION, SOLUTION PERINEURAL
Status: SHIPPED | OUTPATIENT
Start: 2023-08-30

## 2023-08-30 RX ADMIN — LIDOCAINE HYDROCHLORIDE 2 ML: 10 INJECTION, SOLUTION INFILTRATION; PERINEURAL at 08:20

## 2023-08-30 RX ADMIN — BUPIVACAINE HYDROCHLORIDE 2 ML: 5 INJECTION, SOLUTION PERINEURAL at 08:20

## 2023-08-30 RX ADMIN — TRIAMCINOLONE ACETONIDE 40 MG: 40 INJECTION, SUSPENSION INTRA-ARTICULAR; INTRAMUSCULAR at 08:20

## 2023-08-30 ASSESSMENT — PAIN SCALES - GENERAL: PAINLEVEL: MODERATE PAIN (4)

## 2023-08-30 NOTE — PATIENT INSTRUCTIONS
Van Audrey Ricks ,     A copy of your assessment and our treatment plan that we discussed together is included below, as written in your medical chart.   If you have any questions, please feel free to call the clinic.     --------------------------------------------------  Audrey Ricks is a 63 year old female that presents for follow-up of her chronic anteromedial left knee pain.  Original history and physical exam appear most consistent with a medial meniscus tear and pes anserine bursitis, in the setting of chronic patellar tendinopathy.  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    History, imaging, and physical exam are consistent with a complex medial meniscus tear, lateral meniscus tear, posterior lateral capsule injury and pes anserine bursitis in the setting of severe primary osteoarthritis of the left knee.    Discussed the nature of the condition and treatment options and mutually agreed upon the following plan:    - Imaging:          - Reviewed relevant imaging in the chart.  - Reviewed results and images with patient.   - Medications:          - Discussed pharmacologic options for pain relief.   -  May use NSAIDs (Ibuprofen, Naproxen) or Acetaminophen (Tylenol) as needed for pain control.   -  May also use topical medications such as lidocaine, IcyHot, BioFreeze, or Voltaren gel as needed for pain control.   - Injections:          - Discussed possible injection options and alternatives.    - Options include intra-articular knee joint injection with corticosteroid, viscosupplementation, or PRP.   - Performed a corticosteroid injection of the left intra-articular knee joint today in clinic. Patient tolerated the procedure well without complications.   -  Post-procedure instructions:    - Keep the injection site clean and dry.   - Do not submerge the injection site for 24 hours (no baths, pools). Showers are ok.   - Rest the area for 24-48 hours before resuming normal activities. Avoid overexerting the area for the first few weeks.   - It may take 2-3 days to start noticing the effects of the injection and up to 3-4 weeks to feel significant benefits.   - In general, an injection in the same anatomical region can be repeated every 3 months as needed.  However, for the health of your tissues you will want to space out the injections as much as possible and request them only when symptoms are significantly bothersome and disrupting daily function and/or sleep.   - Therapy:          - Discussed the benefits of physical therapy vs home exercise program for optimization of range of motion, flexibility, strength, stability and function.   -She has done physical therapy in the past and has an established home exercise program.  Encouraged to continue home exercise program as instructed by PT.  - Modalities:          - May use ice, heat, massage or other modalities as needed.   - Bracing:          - Discussed bracing options and recommend using a knee stability brace or an  brace (likely medial) for stability and pain relief with walking, weightbearing, and exacerbating activities.  Interested in a referral for the  bracing program, which was placed today.   - Please call [phone] to schedule your brace fitting appointment with the physical therapy department.  - Surgery:          - Discussed non-operative and operative treatment options for the patient's condition, which includes meniscal repair versus more likely discussion of arthroplasty based on the level of arthritis in the knee already.   - Goal is to continue conservative care for as long as possible before surgery would need to be considered.  We will consider surgical referral in the future if  symptoms do not improve with conservative management.  - Activity:          - Encouraged to remain active and participate in regular activities as symptoms allow.  Avoid or modify exacerbating activities as needed.  - Follow up:          - As needed in at least 3 months for re-evaluation and update to treatment plan, or sooner for new/worsening symptoms.  - Patient has clinic contact information for questions or concerns.   --------------------------------------------------    It was a pleasure seeing you today. Thank you for choosing [facility] for your care.       Hany Lebron DO, CAQSM  [facility] - Sports Medicine  [facility] - Department of Orthopedic Surgery     Disclaimer:  This note was prepared and written using Dragon Medical dictation software. As a result, there may be errors in the script that have gone undetected. Please consider this when interpreting the information in this note.

## 2023-08-30 NOTE — LETTER
2023         RE: Audrey Ricks  [address]  [facility] [address]        Dear Colleague,    Thank you for referring your patient, Audrey Ricks, to the [facility] [place]. Please see a copy of my visit note below.    Audrey Ricks  :  1960  DOS: 2023  MRN: [id number]  PCP: Alie De La Cruz    Sports Medicine Clinic Visit    Interim History - 2023  - Last seen in clinic on 2023 for left knee pes anserine bursitis, left patellar tendonopathy and left medial meniscus tear.  - MR left knee completed on 2023. Results on file. Presents today to discuss treatment options.    -MR L knee 2023 shows a complex tear of the medial meniscus body including multidirectional vertical tearing of the posterior horn, lateral meniscus tear, posterolateral capsular tear/injury including the arcuate ligament.  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  Also with a small amount of pes anserine bursitis.    - Since the last visit, no significant change in symptoms. Feels better extended and elevated, but cannot walk even short distances without pain. No mechanical locking, but very stiff. No prior injections or braces. Has done PT.   - No interim injury.      Initial Visit: 2023  HPI  Audrey Ricks is a 63 year old female who is seen in consultation at the request of  Santana Ruiz M.D. presenting with left knee pain.    - Mechanism of Injury:  Fell onto her anterior left knee in . Thinks she re-injured it while dancing  on 2023.   - Prior evaluation:  2023 with Santana Ruiz MD. described 4 to 6 weeks of suprapatellar knee pain after a shifting maneuver with her kayak.  Pain worse with prolonged standing and walking.  Performs HEP.  TTP over the suprapatellar region and medial joint line.  Referred to PT and orthopedic follow-up.  -XR L knee 2023 shows  "normal anatomic alignment with a very mild knee joint effusion, mild swelling over the patellar tendon, mild medial compartment joint space narrowing, no acute fracture or dislocation.    - Pain Character:  Pain has been present for  1.5 months  with worsening pain.  Pain is well localized to the anteromedial left knee without significant radiation.   - Endorses:  \"ice pick\" distal to the patella, numbness over the proximal anterior lower leg  - Denies:  clicking, popping, grinding, instability, radicular shooting pain, weakness.   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      Review of Systems  Musculoskeletal: as above  Remainder of review of systems is negative including constitutional, CV, pulmonary, GI, Skin and Neurologic except as noted in HPI or medical history.    Past Medical History:   Diagnosis Date     Depressive disorder 2011     Hypertension      NSTEMI (non-ST elevated myocardial infarction) (H) 08/06/2022     SVT (supraventricular tachycardia) (H)      Past Surgical History:   Procedure Laterality Date     EP ABLATION SVT Bilateral 9/23/2022    Procedure: Ablation Supraventricular Tachycardia;  Surgeon: Donna Wolfe MD;  Location:  HEART CARDIAC CATH LAB     Family History   Problem Relation Age of Onset     Diabetes Mother          Objective  /86   Wt 83.9 kg (185 lb)   LMP  (LMP Unknown)   BMI 33.84 kg/m      General: healthy, alert and in no acute distress.    HEENT: no scleral icterus or conjunctival erythema.   Skin: no suspicious lesions or rash. No jaundice.   CV: regular rhythm by palpation, 2+ distal pulses.  Resp: normal respiratory " effort without conversational dyspnea.   Psych: normal mood and affect.    Gait: nonantalgic, appropriate coordination and balance.     Neuro:        - Sensation to light touch:    - Intact throughout the BLE including all peripheral nerve distributions.        - MSR:      RLE  LLE  - Patella 2+ 2+  - Achilles 2+ 2+       - Special tests:   - Slump/SLR:  Neg bilaterally    MSK - Knee:       - Inspection:    - Mild swelling present without surrounding erythema, warmth, ecchymosis, lesion.        - ROM:    - Full AROM/PROM with pain during knee flexion/extension.        - Palpation:    - TTP at the medial joint line, MCL, lateral joint line, LCL.   - NTTP elsewhere.        - Strength:  (*antalgic)  RLE LLE  - Hip Flexion  5 5    - Hip Abduction 5 5   - Hip Adduction 5 5  - Knee Flexion  5 5  - Knee Extension 5 5  - Dorsiflexion  5 5  - Plantarflexion 5 5  - Ext. Luisito. Longus 5 5  - Inversion  5 5  - Eversion  5 5       - Special tests:        - Lachman:  Neg        - A/P drawer:  Neg       - Pivot shift:  Neg   - Shahnaz: Positive for medial and lateral compartment pain     - Varus stress: Positive for pain, negative for laxity    - Valgus stress: Positive for pain, negative for laxity   - Patellar grind: Positive      Radiology  I independently reviewed the available relevant imaging, with the following interpretation:   - XR L knee 8/8/2023 shows mild medial and lateral compartment joint space narrowing without significant osteophytosis, small knee joint effusion, no acute fractures or dislocations.  -MRI with interpretation as above in HPI.    XR KNEE STANDING LEFT 2 VIEWS 8/8/2023 1:57 PM     HISTORY: Chronic pain of left knee; Chronic pain of left knee     COMPARISON: None.                                                                      IMPRESSION: No fracture or effusion. Mild medial compartment  narrowing.     ERICK THOMAS MD        MR left knee without contrast 8/24/2023 2:25 PM     Techniques:  Multiplanar multisequence imaging of the left knee was  obtained without administration of intra-articular or intravenous  contrast using routing protocol.     History: Pes anserine bursitis; Tendinopathy of patella     Comparison: Radiograph 8/8/2023     Findings:     Motion partially degrading images.     MENISCI:  Medial meniscus: Multidirectional tear of the medial meniscus body and  posterior horn including longitudinal vertical tearing component with  approximately 4 mm meniscal flap in the medial tibial gutter and at  the posterior horn root ligament junction.  Lateral meniscus: Free edge, likely longitudinal horizontal tear of  the lateral meniscal body.     LIGAMENTS  Cruciate ligaments: Intact.  Medial supporting structures: Peripheral bowing of the tibial  collateral ligament with moderate periligamentous edema surrounding  the tibial collateral ligament and posterior oblique ligament, trace  tibial collateral ligament bursal fluid, moderate to high-grade  sprain/partial tear meniscal femoral and meniscal tibial ligaments are  likely related to altered mechanics due to underlying meniscal  pathology.  Lateral supporting structures: Posterolateral capsular fluid signal,  consistent with capsular tear/injury including expected course of  arcuate ligament. Otherwise popliteus tendon, fibular collateral  ligament, biceps femoris tendon, conjoined tendon, popliteofibular  ligament and iliotibial band are intact.     EXTENSOR MECHANISM  Intact.     FLUID  Small joint effusion. No substantial Baker's cyst.  Trace pes anserine bursitis fluid.     OSSEOUS and ARTICULAR STRUCTURES  Bones: No fracture, contusion, or osseous lesion is seen.     Peripheral edema-like marrow signal intensity of the medial tibial  plateau, likely reactive to underlying meniscal pathology.     Patellofemoral compartment: On a background of diffuse moderate to  high-grade chondral loss, full-thickness chondral fissuring with  subtle  subchondral edema-like marrow signal intensity in the patellar  articular cartilage. At least low-grade chondral loss of the central  trochlear cartilage.     Medial compartment: Focal full-thickness chondral fissure or subtle  subchondral edema across intensity posterior weightbearing surface of  medial femoral condyle.     Lateral compartment: No high-grade hyaline cartilage disease.     ANCILLARY FINDINGS  Mild nonspecific subcutaneous edema especially anteriorly.                                                                      Impression:  Motion partially degrading images.  1. Multidirectional tear of the medial meniscus body and posterior  horn including longitudinal vertical tearing component.   a. Presumed reactive changes of medial supporting structures.   b. Mild pes anserine bursitis, likely reactive.  2. Free edge, likely longitudinal horizontal tear of the lateral  meniscal body.  3. Posterolateral capsular tear/injury including expected course of  arcuate ligament; otherwise, lateral supporting structures are intact.  4. Foci of grade IV chondromalacia in the patellofemoral and medial  compartments.     ROGER MAGALLON     Procedure  Large Joint Injection/Arthocentesis: L knee joint    Date/Time: 8/30/2023 8:20 AM    Performed by: Hany Lebron DO  Authorized by: Hany Lebron DO    Indications:  Pain  Needle Size:  22 G  Guidance: landmark guided    Approach:  Anterolateral  Location:  Knee      Medications:  40 mg triamcinolone 40 MG/ML; 2 mL lidocaine 1 %; 2 mL BUPivacaine 0.5 %  Outcome:  Tolerated well, no immediate complications  Procedure discussed: discussed risks, benefits, and alternatives    Consent Given by:  Patient  Prep: patient was prepped and draped in usual sterile fashion           PROCEDURE  Intraarticular Knee Joint Injection - Left  The patient was informed of the risks and benefits of the procedure and alternatives were discussed. A written consent was signed by the  patient.   The injection site was prepped with chlorhexidine in sterile fashion.   An injectate solution containing 2 mL of 1% lidocaine, 2 mL of 0.5% bupivacaine, and 1 mL of Kenalog (40 mg/mL) was drawn up into a 5 mL syringe.  Injection was performed using sterile technique.  A 1.5-inch 22-gauge needle was used to enter the knee joint using a lateral infrapatellar approach and injectate was injected successfully. After the injection, the site was cleaned and a bandage applied. The patient tolerated the procedure well without complications.       Assessment  1. Complex tear of medial meniscus of left knee as current injury, initial encounter    2. Primary osteoarthritis of left knee    3. Pes anserine bursitis          Plan  Audrey Ricks is a 63 year old female that presents for follow-up of her chronic anteromedial left knee pain.  Original history and physical exam appear most consistent with a medial meniscus tear and pes anserine bursitis, in the setting of chronic patellar tendinopathy.  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    History, imaging, and physical exam are consistent with a complex medial meniscus tear, lateral meniscus tear, posterior lateral capsule injury and pes anserine bursitis in the setting of severe primary osteoarthritis of the left knee.    Discussed the nature of the condition and treatment options and mutually agreed upon the following plan:    - Imaging:          - Reviewed relevant imaging in the chart.  - Reviewed results and images with patient.   - Medications:          - Discussed pharmacologic options for pain relief.   -  May use NSAIDs (Ibuprofen, Naproxen) or Acetaminophen (Tylenol) as needed for pain control.   -  May also use  topical medications such as lidocaine, IcyHot, BioFreeze, or Voltaren gel as needed for pain control.   - Injections:          - Discussed possible injection options and alternatives.    - Options include intra-articular knee joint injection with corticosteroid, viscosupplementation, or PRP.   - Performed a corticosteroid injection of the left intra-articular knee joint today in clinic. Patient tolerated the procedure well without complications.   - Post-procedure instructions:    - Keep the injection site clean and dry.   - Do not submerge the injection site for 24 hours (no baths, pools). Showers are ok.   - Rest the area for 24-48 hours before resuming normal activities. Avoid overexerting the area for the first few weeks.   - It may take 2-3 days to start noticing the effects of the injection and up to 3-4 weeks to feel significant benefits.   - In general, an injection in the same anatomical region can be repeated every 3 months as needed.  However, for the health of your tissues you will want to space out the injections as much as possible and request them only when symptoms are significantly bothersome and disrupting daily function and/or sleep.   - Therapy:          - Discussed the benefits of physical therapy vs home exercise program for optimization of range of motion, flexibility, strength, stability and function.   -She has done physical therapy in the past and has an established home exercise program.  Encouraged to continue home exercise program as instructed by PT.  - Modalities:          - May use ice, heat, massage or other modalities as needed.   - Bracing:          - Discussed bracing options and recommend using a knee stability brace or an  brace (likely medial) for stability and pain relief with walking, weightbearing, and exacerbating activities.  Interested in a referral for the  bracing program, which was placed today.   - Please call [phone] to schedule your brace  fitting appointment with the physical therapy department.  - Surgery:          - Discussed non-operative and operative treatment options for the patient's condition, which includes meniscal repair versus more likely discussion of arthroplasty based on the level of arthritis in the knee already.   - Goal is to continue conservative care for as long as possible before surgery would need to be considered.  We will consider surgical referral in the future if symptoms do not improve with conservative management.  - Activity:          - Encouraged to remain active and participate in regular activities as symptoms allow.  Avoid or modify exacerbating activities as needed.  - Follow up:          - As needed in at least 3 months for re-evaluation and update to treatment plan, or sooner for new/worsening symptoms.  - Patient has clinic contact information for questions or concerns.       Hany Lebron DO, CAROLE  [facility] - Sports Medicine  [facility] - Department of Orthopedic Surgery       Disclaimer:  This note was prepared and written using Dragon Medical dictation software. As a result, there may be errors in the script that have gone undetected. Please consider this when interpreting the information in this note.       Again, thank you for allowing me to participate in the care of your patient.        Sincerely,        Hany Lebron DO

## 2023-09-01 ENCOUNTER — THERAPY VISIT (OUTPATIENT)
Dept: PHYSICAL THERAPY | Facility: CLINIC | Age: 63
End: 2023-09-01
Attending: STUDENT IN AN ORGANIZED HEALTH CARE EDUCATION/TRAINING PROGRAM
Payer: COMMERCIAL

## 2023-09-01 ENCOUNTER — TELEPHONE (OUTPATIENT)
Dept: ORTHOPEDICS | Facility: CLINIC | Age: 63
End: 2023-09-01

## 2023-09-01 DIAGNOSIS — M25.562 CHRONIC PAIN OF LEFT KNEE: Primary | ICD-10-CM

## 2023-09-01 DIAGNOSIS — M17.12 PRIMARY OSTEOARTHRITIS OF LEFT KNEE: ICD-10-CM

## 2023-09-01 DIAGNOSIS — S83.232A COMPLEX TEAR OF MEDIAL MENISCUS OF LEFT KNEE AS CURRENT INJURY, INITIAL ENCOUNTER: ICD-10-CM

## 2023-09-01 DIAGNOSIS — M67.969 TENDINOPATHY OF PATELLA: ICD-10-CM

## 2023-09-01 DIAGNOSIS — G89.29 CHRONIC PAIN OF LEFT KNEE: Primary | ICD-10-CM

## 2023-09-01 DIAGNOSIS — M70.50 PES ANSERINE BURSITIS: ICD-10-CM

## 2023-09-01 DIAGNOSIS — M17.12 PRIMARY OSTEOARTHRITIS OF LEFT KNEE: Primary | ICD-10-CM

## 2023-09-01 PROCEDURE — 97110 THERAPEUTIC EXERCISES: CPT | Mod: GP | Performed by: PHYSICAL THERAPIST

## 2023-09-01 NOTE — TELEPHONE ENCOUNTER
[facility] Call Center    Phone Message    May a detailed message be left on voicemail: yes     Reason for Call: Other: Patient says that Dr Lebron forgot to put in the order for the brace. Please call the patient back to confirm when it is done     Action Taken: Message routed to:  Other: yevgeniy lebron    Travel Screening: Not Applicable

## 2023-09-11 ENCOUNTER — THERAPY VISIT (OUTPATIENT)
Dept: PHYSICAL THERAPY | Facility: CLINIC | Age: 63
End: 2023-09-11
Attending: STUDENT IN AN ORGANIZED HEALTH CARE EDUCATION/TRAINING PROGRAM
Payer: COMMERCIAL

## 2023-09-11 DIAGNOSIS — M17.12 PRIMARY OSTEOARTHRITIS OF LEFT KNEE: ICD-10-CM

## 2023-09-11 DIAGNOSIS — M70.50 PES ANSERINE BURSITIS: ICD-10-CM

## 2023-09-11 DIAGNOSIS — M67.969 TENDINOPATHY OF PATELLA: ICD-10-CM

## 2023-09-11 DIAGNOSIS — G89.29 CHRONIC PAIN OF LEFT KNEE: Primary | ICD-10-CM

## 2023-09-11 DIAGNOSIS — S83.232A COMPLEX TEAR OF MEDIAL MENISCUS OF LEFT KNEE AS CURRENT INJURY, INITIAL ENCOUNTER: ICD-10-CM

## 2023-09-11 DIAGNOSIS — M25.562 CHRONIC PAIN OF LEFT KNEE: Primary | ICD-10-CM

## 2023-09-11 PROCEDURE — 97530 THERAPEUTIC ACTIVITIES: CPT | Mod: GP | Performed by: PHYSICAL THERAPIST

## 2023-09-11 NOTE — PROGRESS NOTES
Ossur Knee  Brace Trial:     Pain Rating    Affected Joint: Left Knee    Without  brace:    At rest 2/10  Walking: 3/10    Squatting: 3/10   Stairs: 9/10      Wearing  brace:    At rest 0/10  Walkin/10    Squattin/10   Stairs: 0/10      Brace and Measurements:    Brace trialed:     Type - Ossur  One  Size - Medium  Side - Medial  Affected Knee - Left Knee    Circumference 6 inches below mid patella: 17 inches (Ossur  One/X)    Other Comments:  See PT evaluation in Epic for any additional information including joint special testing/ligament testing

## 2023-09-21 ENCOUNTER — THERAPY VISIT (OUTPATIENT)
Dept: PHYSICAL THERAPY | Facility: CLINIC | Age: 63
End: 2023-09-21
Attending: STUDENT IN AN ORGANIZED HEALTH CARE EDUCATION/TRAINING PROGRAM
Payer: COMMERCIAL

## 2023-09-21 DIAGNOSIS — G89.29 CHRONIC PAIN OF LEFT KNEE: Primary | ICD-10-CM

## 2023-09-21 DIAGNOSIS — M25.562 CHRONIC PAIN OF LEFT KNEE: Primary | ICD-10-CM

## 2023-09-21 PROCEDURE — 97110 THERAPEUTIC EXERCISES: CPT | Mod: GP | Performed by: PHYSICAL THERAPIST

## 2023-09-21 ASSESSMENT — ACTIVITIES OF DAILY LIVING (ADL)
STIFFNESS: THE SYMPTOM AFFECTS MY ACTIVITY SLIGHTLY
HOW_WOULD_YOU_RATE_THE_OVERALL_FUNCTION_OF_YOUR_KNEE_DURING_YOUR_USUAL_DAILY_ACTIVITIES?: ABNORMAL
GIVING WAY, BUCKLING OR SHIFTING OF KNEE: I DO NOT HAVE THE SYMPTOM
KNEE_ACTIVITY_OF_DAILY_LIVING_SUM: 44
AS_A_RESULT_OF_YOUR_KNEE_INJURY,_HOW_WOULD_YOU_RATE_YOUR_CURRENT_LEVEL_OF_DAILY_ACTIVITY?: ABNORMAL
RAW_SCORE: 44
WEAKNESS: THE SYMPTOM AFFECTS MY ACTIVITY SLIGHTLY
SQUAT: ACTIVITY IS SOMEWHAT DIFFICULT
WALK: ACTIVITY IS SOMEWHAT DIFFICULT
SIT WITH YOUR KNEE BENT: ACTIVITY IS MINIMALLY DIFFICULT
KNEEL ON THE FRONT OF YOUR KNEE: ACTIVITY IS VERY DIFFICULT
GO UP STAIRS: ACTIVITY IS NOT DIFFICULT
STAND: ACTIVITY IS NOT DIFFICULT
GO DOWN STAIRS: ACTIVITY IS VERY DIFFICULT
LIMPING: THE SYMPTOM AFFECTS MY ACTIVITY MODERATELY
RISE FROM A CHAIR: ACTIVITY IS MINIMALLY DIFFICULT
PAIN: THE SYMPTOM AFFECTS MY ACTIVITY MODERATELY
HOW_WOULD_YOU_RATE_THE_CURRENT_FUNCTION_OF_YOUR_KNEE_DURING_YOUR_USUAL_DAILY_ACTIVITIES_ON_A_SCALE_FROM_0_TO_100_WITH_100_BEING_YOUR_LEVEL_OF_KNEE_FUNCTION_PRIOR_TO_YOUR_INJURY_AND_0_BEING_THE_INABILITY_TO_PERFORM_ANY_OF_YOUR_USUAL_DAILY_ACTIVITIES?: 50
KNEE_ACTIVITY_OF_DAILY_LIVING_SCORE: 62.86
SWELLING: THE SYMPTOM AFFECTS MY ACTIVITY SLIGHTLY

## 2023-09-21 NOTE — PROGRESS NOTES
[facility]                                                                                   OUTPATIENT PHYSICAL THERAPY    PLAN OF TREATMENT FOR OUTPATIENT REHABILITATION   Patient's Last Name, First Name, Audrey Arroyo YOB: 1960   Provider's Name   [facility]   Medical Record No.  [id number]     Onset Date: 07/01/23  Start of Care Date: 08/10/23     Medical Diagnosis:  Chronic pain of left knee (M25.562, G89.29)      PT Treatment Diagnosis:  L knee pain Plan of Treatment  Frequency/Duration: 1x/week/ 6 weeks    Certification date from 09/21/23 to 11/02/23         See note for plan of treatment details and functional goals     Juwan Alegre PT                         I CERTIFY THE NEED FOR THESE SERVICES FURNISHED UNDER        THIS PLAN OF TREATMENT AND WHILE UNDER MY CARE     (Physician attestation of this document indicates review and certification of the therapy plan).                Referring Provider:  Santana Ruiz MD      Initial Assessment  See Epic Evaluation- Start of Care Date: 08/10/23       09/21/23 0500   Appointment Info   Signing clinician's name / credentials Juwan Alegre PT   Visits Used 6   Medical Diagnosis Chronic pain of left knee (M25.562, G89.29)   PT Tx Diagnosis L knee pain   Quick Adds Certification   Progress Note/Certification   Start of Care Date 08/10/23   Onset of illness/injury or Date of Surgery 07/01/23   Therapy Frequency 1x/week   Predicted Duration 6 weeks   Certification date from 09/21/23   Certification date to 11/02/23   Progress Note Due Date 11/02/23   Progress Note Completed Date 09/21/23   GOALS   PT Goals 2;3   PT Goal 1   Goal Identifier HEP   Goal Description Pt will demo independence in performance and progression of strengthening, stretching, and balance HEP in order to improve CLOF.   Goal Progress Pt shows ability to manage and self progress her HEP independently.  "  Target Date 08/31/23   PT Goal 2   Goal Identifier KOS   Goal Description Pt will demo improved knee pain ratings and function as shown by increased KOS score of at least 7 points in order to show significant improvement and greater return to previous function.   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   Goal Progress 1045' with pain increasing from 2/10 to 5/10 in 5 minutes.   Target Date 11/02/23   Subjective Report   Subjective Report Pt reports general improvement in her knee in spite of cortisone shot wearing off. 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 Pt states that the offloader brace was very effective at managing her sx, but has since had sx after removing the brace. Her sx do dissipate with time. Currently rating pain up to 8/10, but down to 2/10 with about 10 minutes of rest.   Treatment Interventions (PT)   Interventions Therapeutic Procedure/Exercise;Therapeutic Activity   Therapeutic Procedure/Exercise   Therapeutic Procedures: strength, endurance, ROM, flexibillity minutes (26715) 40   Ther Proc 1 Gait for strength and activity tolerance, general strengthening   Ther Proc 1 - Details Reassessed goals for recert today, pt showing significant improvements in her KOS, from 14/70 to 44/70. Pt wanting to add more functional goals with ongoing pain complaints. POC updated with walking goal. Pt able to walk 5 minutes in the [place] for 1045' and pain increase from 2/10 to 5/10. Pt then instructed in functional strengthening through stairs with 8\" step, cued to focus on form and posture, engaging glute med and glute max to improve " ascent/descent, pt showing ability to maintain level pelvis, knees collapsing slightly R & L. Educating throughout on posture and pt's weakness, leaning for offloading of L medial knee. Pt very receptive to education.   Patient Response/Progress Pt receptive to all education, update to POC.   Education   Learner/Method Patient   Plan   Home program Hip, knee, core   Updates to plan of care Stairs for functional strength activity   Plan for next session Advance strength, incorporate balance, work with offloader brace as able   Total Session Time   Timed Code Treatment Minutes 40   Total Treatment Time (sum of timed and untimed services) 40

## 2023-09-25 ENCOUNTER — TELEPHONE (OUTPATIENT)
Dept: FAMILY MEDICINE | Facility: CLINIC | Age: 63
End: 2023-09-25
Payer: COMMERCIAL

## 2023-09-25 NOTE — TELEPHONE ENCOUNTER
Forms/Letter Request    Type of form/letter:  [facility] - insurance     Have you been seen for this request: N/A    Do we have the form/letter: Yes: in [place] folder at registration desk     Who is the form from? Patient    Where did/will the form come from? Patient or family brought in       When is form/letter needed by: n/a    How would you like the form/letter returned: Mail  Is this the correct address?: No -pt provided envelope to [address] [address]    Patient Notified form requests are processed in 3-5 business days:Yes    Could we send this information to you in [employer] or would you prefer to receive a phone call?:   Patient would prefer a phone call   Okay to leave a detailed message?: No at Home number on file [phone] (home)

## 2023-10-12 ENCOUNTER — VIRTUAL VISIT (OUTPATIENT)
Dept: PSYCHOLOGY | Facility: CLINIC | Age: 63
End: 2023-10-12
Payer: COMMERCIAL

## 2023-10-12 DIAGNOSIS — F33.0 MILD EPISODE OF RECURRENT MAJOR DEPRESSIVE DISORDER (H): Primary | ICD-10-CM

## 2023-10-12 PROCEDURE — 90834 PSYTX W PT 45 MINUTES: CPT | Mod: 95 | Performed by: PSYCHOLOGIST

## 2023-10-12 ASSESSMENT — PATIENT HEALTH QUESTIONNAIRE - PHQ9
SUM OF ALL RESPONSES TO PHQ QUESTIONS 1-9: 8
SUM OF ALL RESPONSES TO PHQ QUESTIONS 1-9: 8
10. IF YOU CHECKED OFF ANY PROBLEMS, HOW DIFFICULT HAVE THESE PROBLEMS MADE IT FOR YOU TO DO YOUR WORK, TAKE CARE OF THINGS AT HOME, OR GET ALONG WITH OTHER PEOPLE: SOMEWHAT DIFFICULT

## 2023-10-12 NOTE — PROGRESS NOTES
[facility]                                     Progress Note    Disclaimer: Voice recognition software was used to generate this note. As a result, wrong word or 'sound-a-like' substitutions may have occurred due to the inherent limitations of voice recognition software. There may be errors in the script that have gone undetected. Please consider this when interpreting information found in this chart.     Patient Name: Audrey Ricks  Date: 10/12/23         Service Type: Individual      Session Start Time: 10:00AM  Session End Time: 10:45AM     Session Length: 45    Session #: 12    Attendees: Client attended alone    Service Modality:  Video Visit:      Provider verified identity through the following two step process.  Patient provided:  Patient is known previously to provider    Telemedicine Visit: The patient's condition can be safely assessed and treated via synchronous audio and visual telemedicine encounter.      Reason for Telemedicine Visit: Services only offered telehealth    Originating Site (Patient Location): Patient's home    Distant Site (Provider Location): Provider Remote Setting- Home Office    Consent:  The patient/guardian has verbally consented to: the potential risks and benefits of telemedicine (video visit) versus in person care; bill my insurance or make self-payment for services provided; and responsibility for payment of non-covered services.     Patient would like the video invitation sent by:  My Chart    Mode of Communication:  Video Conference via [facility]    Distant Location (Provider):  Off-site    As the provider I attest to compliance with applicable laws and regulations related to telemedicine.    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 Always feels bad if she has to leave (history of  having to leave dt PA's).    Homework: Achieved / completed to satisfaction      Episode of Care Goals: Satisfactory progress - ACTION (Actively working towards change); Intervened by reinforcing change plan / affirming steps taken     Current / Ongoing Stressors and Concerns:  Had a great vacation in [place] with best friend. Home for both of them.   Still substitute teaching. Good money.        Treatment Objective(s) Addressed in This Session:   use at least 2 coping skills for anxiety management in the next 2 weeks     Intervention:   CBT: behavioral activation    Assessments completed prior to visit:  The following assessments were completed by patient for this visit:  PHQ9:       4/14/2023     8:52 AM 4/28/2023    10:53 AM 5/5/2023     8:06 AM 6/29/2023     8:37 AM 7/28/2023     8:29 AM 8/24/2023     9:57 AM 10/12/2023    10:01 AM   PHQ-9 SCORE   PHQ-9 Total Score MyChart 2 (Minimal depression) 6 (Mild depression) 6 (Mild depression) 5 (Mild depression) 12 (Moderate depression) 12 (Moderate depression) 8 (Mild depression)   PHQ-9 Total Score 2 6 6 5 12 12 8     GAD7:       12/12/2022     1:54 PM 5/5/2023     8:08 AM   DAVID-7 SCORE   Total Score 4 (minimal anxiety) 6 (mild anxiety)   Total Score 4 6     CAGE-AID:       12/12/2022     2:00 PM   CAGE-AID Total Score   Total Score 0   Total Score MyChart 0 (A total score of 2 or greater is considered clinically significant)     PROMIS 10-Global Health (all questions and answers displayed):       12/12/2022     1:59 PM 4/14/2023     8:55 AM 4/28/2023    10:55 AM 7/28/2023     8:32 AM   PROMIS 10   In general, would you say your health is: Very good Good Very good Fair   In general, would you say your quality of life is: Poor Good Very good Good   In general, how would you rate your physical health? Very good Good Very good Fair   In general, how would you rate your mental health, including your mood and your ability to think? Poor Fair Fair Fair   In general, how would  you rate your satisfaction with your social activities and relationships? Good Good Very good Fair   In general, please rate how well you carry out your usual social activities and roles Good Fair Fair Fair   To what extent are you able to carry out your everyday physical activities such as walking, climbing stairs, carrying groceries, or moving a chair? Completely Completely Completely Moderately   In the past 7 days, how often have you been bothered by emotional problems such as feeling anxious, depressed, or irritable? Sometimes Sometimes Sometimes Sometimes   In the past 7 days, how would you rate your fatigue on average? None Mild Mild Moderate   In the past 7 days, how would you rate your pain on average, where 0 means no pain, and 10 means worst imaginable pain? 1 6 4 8   In general, would you say your health is: 4 3 4 2   In general, would you say your quality of life is: 1 3 4 3   In general, how would you rate your physical health? 4 3 4 2   In general, how would you rate your mental health, including your mood and your ability to think? 1 2 2 2   In general, how would you rate your satisfaction with your social activities and relationships? 3 3 4 2   In general, please rate how well you carry out your usual social activities and roles. (This includes activities at home, at work and in your community, and responsibilities as a parent, child, spouse, employee, friend, etc.) 3 2 2 2   To what extent are you able to carry out your everyday physical activities such as walking, climbing stairs, carrying groceries, or moving a chair? 5 5 5 3   In the past 7 days, how often have you been bothered by emotional problems such as feeling anxious, depressed, or irritable? 3 3 3 3   In the past 7 days, how would you rate your fatigue on average? 1 2 2 3   In the past 7 days, how would you rate your pain on average, where 0 means no pain, and 10 means worst imaginable pain? 1 6 4 8   Global Mental Health Score 8 11 13  10   Global Physical Health Score 18 15 16 10   PROMIS TOTAL - SUBSCORES 26 26 29 20     [place] Suicide Severity Rating Scale (Lifetime/Recent)      1/24/2023     4:00 PM   [place] Suicide Severity Rating (Lifetime/Recent)   Q1 Wished to be Dead (Past Month) no   Q2 Suicidal Thoughts (Past Month) no   Q3 Suicidal Thought Method no   Q4 Suicidal Intent without Specific Plan no   Q5 Suicide Intent with Specific Plan no   Level of Risk per Screen low risk         ASSESSMENT: Current Emotional / Mental Status (status of significant symptoms):   Risk status (Self / Other harm or suicidal ideation)   Patient denies current fears or concerns for personal safety.   Patient denies current or recent suicidal ideation or behaviors.   Patient denies current or recent homicidal ideation or behaviors.   Patient denies current or recent self injurious behavior or ideation.   Patient denies other safety concerns.   Patient reports there has been no change in risk factors since their last session.     Patient reports there has been no change in protective factors since their last session.     Recommended that patient call 911 or go to the local ED should there be a change in any of these risk factors.     Appearance:   Appropriate    Eye Contact:   Good    Psychomotor Behavior: Normal    Attitude:   Cooperative    Orientation:   All   Speech    Rate / Production: Normal     Volume:  Normal    Mood:    Normal   Affect:    Appropriate    Thought Content:  Clear    Thought Form:  Coherent  Logical    Insight:    Good      Medication Review:   No current psychiatric medications prescribed     Medication Compliance:   NA     Changes in Health Issues:   None reported     Chemical Use Review:   Substance Use: Chemical use reviewed, no active concerns identified      Tobacco Use: No current tobacco use.      Diagnosis:  Diagnosis:  1. Mild episode of recurrent major depressive disorder (H24)          Collateral Reports Completed:   Not  Applicable    PLAN: (Patient Tasks / Therapist Tasks / Other)  Client to advocate for herself as appropriate. Participate in at least two activities to promote relaxation per week.         Lan Carcamo, PhD                                                         ______________________________________________________________________    Individual Treatment Plan    Patient's Name: Audrey Ricks  YOB: 1960    Date of Creation: 2/10/23  Date Treatment Plan Last Reviewed/Revised: 4/28/23, 8/24/23    DSM5 Diagnoses: 300.01 (F41.0) Panic Disorder  Psychosocial / Contextual Factors: Chronic, intermittent pain  PROMIS (reviewed every 90 days):     Referral / Collaboration:  Referral to another professional/service is not indicated at this time..    Anticipated number of session for this episode of care: 20  Anticipation frequency of session: Every other week  Anticipated Duration of each session: 38-52 minutes  Treatment plan will be reviewed in 90 days or when goals have been changed.     Anxiety Treatment plan:  Education- the Biopsychosocial model of anxiety  Client will be able to describe how anxiety is effecting them physically, emotionally and socially  Distraction  Client will learn 2 techniques to distract themselves when becoming anxious  Diaphragmatic breathing  Client will learn to breath using their diaphragm  Client will learn 2 breathing patterns to use to reduce anxiety  Visualization  Client will learn to establish a safe, calm place in their mind utilizing all their senses  Client will practice using visualization at times when they are not anxious so they will be able to use it when anxiety occurs  Progressive muscle relaxation  Client will learn progressive muscle relaxation techniques and practice them 4-5 times per week.  Client will learn to use mental images of relaxation to relax muscle groups and do this on a daily basis  Self-care  Client will identify 3 things they can do just  for themselves  Client will take time for quiet, reflection, meditation 3 times per week  Client will Learn to set boundaries when appropriate  Client will Identify 3 individuals they can call on for support, distraction  Assessment of progress  Client will engage in assessment of their progress on a regular basis    Patient has reviewed and agreed to the above plan.      Lan Carcamo, PhD  February 10, 2023    Answers for HPI/ROS submitted by the patient on 2/10/2023  If you checked off any problems, how difficult have these problems made it for you to do your work, take care of things at home, or get along with other people?: Somewhat difficult  PHQ9 TOTAL SCORE: 4    Answers for HPI/ROS submitted by the patient on 2/24/2023  If you checked off any problems, how difficult have these problems made it for you to do your work, take care of things at home, or get along with other people?: Not difficult at all  PHQ9 TOTAL SCORE: 1  Answers for HPI/ROS submitted by the patient on 4/14/2023  If you checked off any problems, how difficult have these problems made it for you to do your work, take care of things at home, or get along with other people?: Not difficult at all  PHQ9 TOTAL SCORE: 2    Answers for HPI/ROS submitted by the patient on 4/28/2023  If you checked off any problems, how difficult have these problems made it for you to do your work, take care of things at home, or get along with other people?: Somewhat difficult  PHQ9 TOTAL SCORE: 6  Answers for HPI/ROS submitted by the patient on 6/29/2023  If you checked off any problems, how difficult have these problems made it for you to do your work, take care of things at home, or get along with other people?: Somewhat difficult  PHQ9 TOTAL SCORE: 5  Answers submitted by the patient for this visit:  Patient Health Questionnaire (Submitted on 8/24/2023)  If you checked off any problems, how difficult have these problems made it for you to do your work, take  care of things at home, or get along with other people?: Very difficult  PHQ9 TOTAL SCORE: 12    Answers submitted by the patient for this visit:  Patient Health Questionnaire (Submitted on 10/12/2023)  If you checked off any problems, how difficult have these problems made it for you to do your work, take care of things at home, or get along with other people?: Somewhat difficult  PHQ9 TOTAL SCORE: 8

## 2023-10-17 ENCOUNTER — THERAPY VISIT (OUTPATIENT)
Dept: PHYSICAL THERAPY | Facility: CLINIC | Age: 63
End: 2023-10-17
Attending: STUDENT IN AN ORGANIZED HEALTH CARE EDUCATION/TRAINING PROGRAM
Payer: COMMERCIAL

## 2023-10-17 ENCOUNTER — OFFICE VISIT (OUTPATIENT)
Dept: CARDIOLOGY | Facility: CLINIC | Age: 63
End: 2023-10-17
Payer: COMMERCIAL

## 2023-10-17 VITALS
RESPIRATION RATE: 16 BRPM | HEIGHT: 62 IN | HEART RATE: 95 BPM | BODY MASS INDEX: 34.78 KG/M2 | DIASTOLIC BLOOD PRESSURE: 84 MMHG | WEIGHT: 189 LBS | OXYGEN SATURATION: 96 % | SYSTOLIC BLOOD PRESSURE: 132 MMHG

## 2023-10-17 DIAGNOSIS — G89.29 CHRONIC PAIN OF LEFT KNEE: Primary | ICD-10-CM

## 2023-10-17 DIAGNOSIS — M25.562 CHRONIC PAIN OF LEFT KNEE: Primary | ICD-10-CM

## 2023-10-17 DIAGNOSIS — I47.10 SVT (SUPRAVENTRICULAR TACHYCARDIA) (H): ICD-10-CM

## 2023-10-17 PROCEDURE — 99213 OFFICE O/P EST LOW 20 MIN: CPT | Performed by: NURSE PRACTITIONER

## 2023-10-17 PROCEDURE — 97110 THERAPEUTIC EXERCISES: CPT | Mod: GP | Performed by: PHYSICAL THERAPIST

## 2023-10-17 RX ORDER — IBUPROFEN 600 MG/1
600 TABLET, FILM COATED ORAL EVERY 6 HOURS PRN
COMMUNITY
End: 2023-11-27

## 2023-10-17 RX ORDER — ACETAMINOPHEN 500 MG
650 TABLET ORAL EVERY 6 HOURS PRN
COMMUNITY

## 2023-10-17 ASSESSMENT — PAIN SCALES - GENERAL: PAINLEVEL: MILD PAIN (2)

## 2023-10-17 NOTE — PROGRESS NOTES
DISCHARGE  Reason for Discharge: Patient has met all goals.    Equipment Issued:     Discharge Plan: Patient to continue home program.    Referring Provider:  Santana Ruiz MD       10/17/23 0500   Appointment Info   Signing clinician's name / credentials Juwan Alegre, PT   Visits Used 7   Medical Diagnosis Chronic pain of left knee (M25.562, G89.29)   PT Tx Diagnosis L knee pain   Quick Adds Certification   Progress Note/Certification   Start of Care Date 08/10/23   Onset of illness/injury or Date of Surgery 07/01/23   Therapy Frequency 1x/week   Predicted Duration 6 weeks   Certification date from 09/21/23   Certification date to 11/02/23   Progress Note Due Date 11/02/23   Progress Note Completed Date 09/21/23   GOALS   PT Goals 2;3   PT Goal 1   Goal Identifier HEP   Goal Description Pt will demo independence in performance and progression of strengthening, stretching, and balance HEP in order to improve CLOF.   Goal Progress Pt shows ability to manage and self progress her HEP independently.   Target Date 08/31/23   PT Goal 2   Goal Identifier KOS   Goal Description Pt will demo improved knee pain ratings and function as shown by increased KOS score of at least 7 points in order to show significant improvement and greater return to previous function.   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   Goal Progress Pt reports walking an entire day at school, navigating stairs with her students without c/o pain in L knee while wearing  brace.   Target Date 11/02/23   Date Met 10/17/23   Subjective Report   Subjective Report Pt arrives in   brace which she began wearing a week ago. She states that she was able to run around all day yesterday at school and had no issues with L knee, pain 1/10, but realized how painful her R knee is. Overall, happy with her brace. Pt also reports changing, improving her gait style has drastically improved her pain control, greater engagement of her hip mm.   Treatment Interventions (PT)   Interventions Therapeutic Procedure/Exercise;Therapeutic Activity   Therapeutic Procedure/Exercise   Therapeutic Procedures: strength, endurance, ROM, flexibillity minutes (83285) 38   Ther Proc 1 Education   Ther Proc 1 - Details Extensive education on exercise with  brace. Pt wanting to get back into yoga and other group fitness classes. Pt advised to ease into the activities, modify exercises so they are as pain free as possible, decrease intensity initially in order to prevent injury, increased pain, and facilitate optimal performance for greatest gains and long term enjoyment. Pt very thankful for conversation, resetting her expectations to allow successful performance and participation in the activities she enjoys.   Patient Response/Progress Pt receptive to all education   Education   Learner/Method Patient   Plan   Home program Hip, knee, core   Updates to plan of care Stairs for functional strength activity   Plan for next session Pt has met all her goals, D/C skilled PT   Total Session Time   Timed Code Treatment Minutes 38   Total Treatment Time (sum of timed and untimed services) 38

## 2023-10-17 NOTE — PROGRESS NOTES
Electrophysiology Clinic Progress Note  Audrey Ricks MRN# [id number]   YOB: 1960 Age: 62 year old     Primary cardiologist: Dr. Cuellar    Reason for visit: SVT     History of presenting illness:    Audrey Ricks is a pleasant 62 year old patient with past medical history significant for:    SVT: Status post SVT ablation 9/2022 (AVNRT)  GERD  Hyperlipidemia.    The patient has a history of SVT dating back approximately 10 years ago for which she presented to the emergency department and received adenosine.  She was doing well up until February 2022 when she suffered severe back pain and sciatica with associated heart racing.  She presented to the emergency department at [facility] and was found to be back in SVT that was terminated with adenosine x2.    She was evaluated by Dr. Cuellar in June 2022 who recommended that she meet with electrophysiology to discuss a potential ablation.  Prior to meeting with the EP she presented to the emergency department with concerns of chest pain and palpitations.  Again she was found to be in SVT that was terminated with adenosine.  Due to troponin elevation in the setting of chest pain and SVT she was transferred to [facility] where she was evaluated by cardiology who recommended a CT coronary angiogram with a calcium score of 0 with normal coronary anatomy with no detectable stenosis or plaque.  An echocardiogram showed LVEF of 55 to 60% with no significant wall motion or valvular abnormalities.  During the admission she was started on bisoprolol.     She then met with Dr. Wolfe on 8/18/2022 and reported significant side effects from the bisoprolol as well as metoprolol.  He recommended EP study and ablation for SVT.  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    Audrey returns for annual follow-up.  She has had no prolonged episodes of palpitations concerning for  recurrent SVT.  Several months ago she did injure her left knee and with the pain and taking THC to sleep she did have some intermittent palpitations that do sound more like PVCs.  Thankfully these episodes were not prolonged and she was not severely symptomatic.  She now has better pain control with corticosteroid injections and wearing a leg brace and palpitations and other symptoms have decreased.    Diagnostic Studies:  CT coronary angiogram (8/2022): calcium score of 0 with normal coronary anatomy with no detectable stenosis or plaque  Echocardiogram (8/2022): LVEF of 55 to 60% with no significant wall motion or valvular abnormalities            Assessment and Plan:     ASSESSMENT:    SVT  History of recurrences prompting ED visits requiring adenosine for conversion  Status post AV chey reentrant tachycardia ablation on 9/23/2022 with Dr. Wolfe  No symptomatic recurrence  Previously did not tolerate beta blockaders due to fatigue.     PLAN:     Return to clinic in 1 year or sooner if needed.     Orders this Visit:  Orders Placed This Encounter   Procedures    Follow-Up with Cardiology    EKG 12-lead complete w/read - Clinics     No orders of the defined types were placed in this encounter.    There are no discontinued medications.    Today's clinic visit entailed:  Review of the result(s) of each unique test - Echo, CT, ablation, EKG  Assessment requiring an independent historian(s) - significant other - Nelson  15 minutes spent on the date of the encounter doing chart review, history and exam, documentation and further activities per the note  Provider  Link to [facility] Help Grid     The level of medical decision making during this visit was of moderate complexity.           Review of Systems:     Review of Systems:  Skin:        Eyes:       ENT:       Respiratory:  Negative shortness of breath;dyspnea on exertion;hemoptysis;cough  Cardiovascular:  Negative;palpitations;chest pain;dizziness;syncope or  "near-syncope;lightheadedness Positive for;edema  Gastroenterology:      Genitourinary:       Musculoskeletal:       Neurologic:  Negative numbness or tingling of feet;numbness or tingling of hands  Psychiatric:       Heme/Lymph/Imm:  Positive for allergies  Endocrine:                 Physical Exam:     Vitals: /78 (BP Location: Right arm, Patient Position: Sitting, Cuff Size: Adult Regular)   Pulse 103   Ht 1.575 m (5' 2\")   Wt 84.2 kg (185 lb 9.6 oz)   SpO2 98%   BMI 33.95 kg/m    Constitutional: Well nourished and in no apparent distress.  Eyes: Pupils equal, round. Sclerae anicteric.   HEENT: Normocephalic, atraumatic.   Neck: Supple. JVD   Respiratory: Breathing non-labored. Lungs clear to auscultation bilaterally. No crackles, wheezes, rhonchi, or rales.  Cardiovascular: Tachycardic rate and regular rhythm, normal S1 and S2. No murmur, rub, or gallop.  Skin: Warm, dry. No rashes, cyanosis, or xanthelasma.  Extremities: No edema.  No concerns at right femoral vein access site  Neurologic: No gross motor deficits. Alert, awake, and oriented to person, place and time.  Psychiatric: Affect appropriate.        CURRENT MEDICATIONS:  Current Outpatient Medications   Medication Sig Dispense Refill    B Complex Vitamins (VITAMIN-B COMPLEX PO) Take 1 tablet by mouth daily Unknown tablet strength      CALCIUM PO Take 1 tablet by mouth daily Unknown tablet strength      Coenzyme Q10 (CO Q 10 PO) Take 1 capsule by mouth daily Unknown capsule strength      estradiol-norethindrone (COMBIPATCH) 0.05-0.14 MG/DAY bi-weekly patch Place 1 patch onto the skin twice a week 24 patch 3    Folic Acid (FOLATE PO) Take 1 tablet by mouth daily Unknown tablet strength      medical cannabis (Patient's own supply) See Admin Instructions (The purpose of this order is to document that the patient reports taking medical cannabis.  This is not a prescription, and is not used to certify that the patient has a qualifying medical " condition.)    Patient uses an extended release liquid formulation overnight for sleep; takes if experiencing sciatica pain (does not take if taking Ambien PRN for sleep)      metroNIDAZOLE (METROGEL) 1 % external gel APPLY TOPICALLY DAILY 60 g 3    OMEGA-3 FATTY ACIDS PO Take 1 capsule by mouth daily Unknown capsule strength      omeprazole (PRILOSEC) 20 MG DR capsule TAKE 1 CAPSULE BY MOUTH ONE TIME A DAY. TAKES AS NEEDED FOR HEARTBURN 90 capsule 3    POTASSIUM PO Take 1 tablet by mouth daily Unknown tablet strength      triamcinolone (KENALOG) 0.1 % external cream Apply topically 2 times daily as needed for irritation 30 g 1    TURMERIC PO Take 1 tablet by mouth daily Unknown tablet strength      bisoprolol (ZEBETA) 5 MG tablet Take 0.5 tablets (2.5 mg) by mouth daily (Patient not taking: Reported on 10/26/2022) 30 tablet 0    naproxen (NAPROSYN) 500 MG tablet Take 1 tablet (500 mg) by mouth 2 times daily (with meals) (Patient not taking: Reported on 10/26/2022) 60 tablet 1    zolpidem (AMBIEN) 5 MG tablet TAKE ONE TABLET BY MOUTH NIGHTLY AS NEEDED FOR SLEEP (Patient not taking: Reported on 10/26/2022) 30 tablet 1       ALLERGIES  Allergies   Allergen Reactions    Amlodipine Shortness Of Breath and Swelling         PAST MEDICAL HISTORY:  Past Medical History:   Diagnosis Date    Depressive disorder 2011    Hypertension     NSTEMI (non-ST elevated myocardial infarction) (H) 08/06/2022    SVT (supraventricular tachycardia) (H)        PAST SURGICAL HISTORY:  Past Surgical History:   Procedure Laterality Date    EP ABLATION SVT Bilateral 9/23/2022    Procedure: Ablation Supraventricular Tachycardia;  Surgeon: Donna Wolfe MD;  Location: [facility] CARDIAC CATH LAB       FAMILY HISTORY:  Family History   Problem Relation Age of Onset    Diabetes Mother        SOCIAL HISTORY:  Social History     Socioeconomic History    Marital status: Single     Spouse name: None    Number of children: None    Years of education: None     Highest education level: None   Tobacco Use    Smoking status: Never    Smokeless tobacco: Never   Vaping Use    Vaping Use: Never used   Substance and Sexual Activity    Alcohol use: Yes     Comment: rare    Drug use: Never   Other Topics Concern    Parent/sibling w/ CABG, MI or angioplasty before 65F 55M? No

## 2023-10-17 NOTE — LETTER
10/17/2023    Alie De La Cruz PA-C  [address] [place] [address]    RE: Audrye Ricks       Dear Colleague,     I had the pleasure of seeing Audrey Ricks in the [facility].    Electrophysiology Clinic Progress Note  Audrey Ricks MRN# [id number]   YOB: 1960 Age: 62 year old     Primary cardiologist: Dr. Cuellar    Reason for visit: SVT     History of presenting illness:    Audrey Ricks is a pleasant 62 year old patient with past medical history significant for:    SVT: Status post SVT ablation 9/2022 (AVNRT)  GERD  Hyperlipidemia.    The patient has a history of SVT dating back approximately 10 years ago for which she presented to the emergency department and received adenosine.  She was doing well up until February 2022 when she suffered severe back pain and sciatica with associated heart racing.  She presented to the emergency department at [facility] and was found to be back in SVT that was terminated with adenosine x2.    She was evaluated by Dr. Cuellar in June 2022 who recommended that she meet with electrophysiology to discuss a potential ablation.  Prior to meeting with the EP she presented to the emergency department with concerns of chest pain and palpitations.  Again she was found to be in SVT that was terminated with adenosine.  Due to troponin elevation in the setting of chest pain and SVT she was transferred to [facility] where she was evaluated by cardiology who recommended a CT coronary angiogram with a calcium score of 0 with normal coronary anatomy with no detectable stenosis or plaque.  An echocardiogram showed LVEF of 55 to 60% with no significant wall motion or valvular abnormalities.  During the admission she was started on bisoprolol.     She then met with Dr. Wolfe on 8/18/2022 and reported significant side effects from the bisoprolol as well as metoprolol.  He recommended EP study and ablation for SVT.   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    Audrey returns for annual follow-up.  She has had no prolonged episodes of palpitations concerning for recurrent SVT.  Several months ago she did injure her left knee and with the pain and taking THC to sleep she did have some intermittent palpitations that do sound more like PVCs.  Thankfully these episodes were not prolonged and she was not severely symptomatic.  She now has better pain control with corticosteroid injections and wearing a leg brace and palpitations and other symptoms have decreased.    Diagnostic Studies:  CT coronary angiogram (8/2022): calcium score of 0 with normal coronary anatomy with no detectable stenosis or plaque  Echocardiogram (8/2022): LVEF of 55 to 60% with no significant wall motion or valvular abnormalities            Assessment and Plan:     ASSESSMENT:    SVT  History of recurrences prompting ED visits requiring adenosine for conversion  Status post AV chey reentrant tachycardia ablation on 9/23/2022 with Dr. Wolfe  No symptomatic recurrence  Previously did not tolerate beta blockaders due to fatigue.     PLAN:     Return to clinic in 1 year or sooner if needed.     Orders this Visit:  Orders Placed This Encounter   Procedures    Follow-Up with Cardiology    EKG 12-lead complete w/read - Clinics     No orders of the defined types were placed in this encounter.    There are no discontinued medications.    Today's clinic visit entailed:  Review of the result(s) of each unique test - Echo, CT, ablation, EKG  Assessment requiring an independent historian(s) - significant other - Nelson  15 minutes spent on the date of the encounter doing chart review, history and exam, documentation and further activities per the note  Provider  Link to [facility] Help Grid     The level of medical decision making during this visit was of moderate complexity.           Review of Systems:     Review of  "Systems:  Skin:        Eyes:       ENT:       Respiratory:  Negative shortness of breath;dyspnea on exertion;hemoptysis;cough  Cardiovascular:  Negative;palpitations;chest pain;dizziness;syncope or near-syncope;lightheadedness Positive for;edema  Gastroenterology:      Genitourinary:       Musculoskeletal:       Neurologic:  Negative numbness or tingling of feet;numbness or tingling of hands  Psychiatric:       Heme/Lymph/Imm:  Positive for allergies  Endocrine:                 Physical Exam:     Vitals: /78 (BP Location: Right arm, Patient Position: Sitting, Cuff Size: Adult Regular)   Pulse 103   Ht 1.575 m (5' 2\")   Wt 84.2 kg (185 lb 9.6 oz)   SpO2 98%   BMI 33.95 kg/m    Constitutional: Well nourished and in no apparent distress.  Eyes: Pupils equal, round. Sclerae anicteric.   HEENT: Normocephalic, atraumatic.   Neck: Supple. JVD   Respiratory: Breathing non-labored. Lungs clear to auscultation bilaterally. No crackles, wheezes, rhonchi, or rales.  Cardiovascular: Tachycardic rate and regular rhythm, normal S1 and S2. No murmur, rub, or gallop.  Skin: Warm, dry. No rashes, cyanosis, or xanthelasma.  Extremities: No edema.  No concerns at right femoral vein access site  Neurologic: No gross motor deficits. Alert, awake, and oriented to person, place and time.  Psychiatric: Affect appropriate.        CURRENT MEDICATIONS:  Current Outpatient Medications   Medication Sig Dispense Refill    B Complex Vitamins (VITAMIN-B COMPLEX PO) Take 1 tablet by mouth daily Unknown tablet strength      CALCIUM PO Take 1 tablet by mouth daily Unknown tablet strength      Coenzyme Q10 (CO Q 10 PO) Take 1 capsule by mouth daily Unknown capsule strength      estradiol-norethindrone (COMBIPATCH) 0.05-0.14 MG/DAY bi-weekly patch Place 1 patch onto the skin twice a week 24 patch 3    Folic Acid (FOLATE PO) Take 1 tablet by mouth daily Unknown tablet strength      medical cannabis (Patient's own supply) See Admin Instructions " (The purpose of this order is to document that the patient reports taking medical cannabis.  This is not a prescription, and is not used to certify that the patient has a qualifying medical condition.)    Patient uses an extended release liquid formulation overnight for sleep; takes if experiencing sciatica pain (does not take if taking Ambien PRN for sleep)      metroNIDAZOLE (METROGEL) 1 % external gel APPLY TOPICALLY DAILY 60 g 3    OMEGA-3 FATTY ACIDS PO Take 1 capsule by mouth daily Unknown capsule strength      omeprazole (PRILOSEC) 20 MG DR capsule TAKE 1 CAPSULE BY MOUTH ONE TIME A DAY. TAKES AS NEEDED FOR HEARTBURN 90 capsule 3    POTASSIUM PO Take 1 tablet by mouth daily Unknown tablet strength      triamcinolone (KENALOG) 0.1 % external cream Apply topically 2 times daily as needed for irritation 30 g 1    TURMERIC PO Take 1 tablet by mouth daily Unknown tablet strength      bisoprolol (ZEBETA) 5 MG tablet Take 0.5 tablets (2.5 mg) by mouth daily (Patient not taking: Reported on 10/26/2022) 30 tablet 0    naproxen (NAPROSYN) 500 MG tablet Take 1 tablet (500 mg) by mouth 2 times daily (with meals) (Patient not taking: Reported on 10/26/2022) 60 tablet 1    zolpidem (AMBIEN) 5 MG tablet TAKE ONE TABLET BY MOUTH NIGHTLY AS NEEDED FOR SLEEP (Patient not taking: Reported on 10/26/2022) 30 tablet 1       ALLERGIES  Allergies   Allergen Reactions    Amlodipine Shortness Of Breath and Swelling         PAST MEDICAL HISTORY:  Past Medical History:   Diagnosis Date    Depressive disorder 2011    Hypertension     NSTEMI (non-ST elevated myocardial infarction) (H) 08/06/2022    SVT (supraventricular tachycardia) (H)        PAST SURGICAL HISTORY:  Past Surgical History:   Procedure Laterality Date    EP ABLATION SVT Bilateral 9/23/2022    Procedure: Ablation Supraventricular Tachycardia;  Surgeon: Donna Wolfe MD;  Location:  [facility]       FAMILY HISTORY:  Family History   Problem Relation Age of Onset     Diabetes Mother        SOCIAL HISTORY:  Social History     Socioeconomic History    Marital status: Single     Spouse name: None    Number of children: None    Years of education: None    Highest education level: None   Tobacco Use    Smoking status: Never    Smokeless tobacco: Never   Vaping Use    Vaping Use: Never used   Substance and Sexual Activity    Alcohol use: Yes     Comment: rare    Drug use: Never   Other Topics Concern    Parent/sibling w/ CABG, MI or angioplasty before 65F 55M? No     Thank you for allowing me to participate in the care of your patient.      Sincerely,   GENOVEVA North CNP     [facility]  cc:   GENOVEVA Umaña CNP  [address]  [place] [address]

## 2023-11-27 ENCOUNTER — MYC MEDICAL ADVICE (OUTPATIENT)
Dept: FAMILY MEDICINE | Facility: CLINIC | Age: 63
End: 2023-11-27
Payer: COMMERCIAL

## 2023-11-27 DIAGNOSIS — M54.12 CERVICAL RADICULOPATHY AT C7: Primary | ICD-10-CM

## 2023-11-28 RX ORDER — IBUPROFEN 600 MG/1
600 TABLET, FILM COATED ORAL EVERY 6 HOURS PRN
Qty: 120 TABLET | Refills: 1 | Status: SHIPPED | OUTPATIENT
Start: 2023-11-28

## 2023-12-04 ENCOUNTER — VIRTUAL VISIT (OUTPATIENT)
Dept: PSYCHOLOGY | Facility: CLINIC | Age: 63
End: 2023-12-04
Payer: COMMERCIAL

## 2023-12-04 DIAGNOSIS — F33.0 MILD EPISODE OF RECURRENT MAJOR DEPRESSIVE DISORDER (H): Primary | ICD-10-CM

## 2023-12-04 PROCEDURE — 90834 PSYTX W PT 45 MINUTES: CPT | Performed by: PSYCHOLOGIST

## 2023-12-04 ASSESSMENT — PATIENT HEALTH QUESTIONNAIRE - PHQ9
SUM OF ALL RESPONSES TO PHQ QUESTIONS 1-9: 3
10. IF YOU CHECKED OFF ANY PROBLEMS, HOW DIFFICULT HAVE THESE PROBLEMS MADE IT FOR YOU TO DO YOUR WORK, TAKE CARE OF THINGS AT HOME, OR GET ALONG WITH OTHER PEOPLE: NOT DIFFICULT AT ALL
SUM OF ALL RESPONSES TO PHQ QUESTIONS 1-9: 3

## 2023-12-04 NOTE — PROGRESS NOTES
[facility]                                     Progress Note    Disclaimer: Voice recognition software was used to generate this note. As a result, wrong word or 'sound-a-like' substitutions may have occurred due to the inherent limitations of voice recognition software. There may be errors in the script that have gone undetected. Please consider this when interpreting information found in this chart.     Patient Name: Audrey Ricks  Date: 12/4//23         Service Type: Individual      Session Start Time: 10:00AM  Session End Time: 10:45AM     Session Length: 45    Session #: 13    Attendees: Client attended alone    Service Modality:  Video Visit:      Provider verified identity through the following two step process.  Patient provided:  Patient is known previously to provider    Telemedicine Visit: The patient's condition can be safely assessed and treated via synchronous audio and visual telemedicine encounter.      Reason for Telemedicine Visit: Services only offered telehealth    Originating Site (Patient Location): Patient's home    Distant Site (Provider Location): Provider Remote Setting- Home Office    Consent:  The patient/guardian has verbally consented to: the potential risks and benefits of telemedicine (video visit) versus in person care; bill my insurance or make self-payment for services provided; and responsibility for payment of non-covered services.     Patient would like the video invitation sent by:  My Chart    Mode of Communication:  Video Conference via [facility]    Distant Location (Provider):  Off-site    As the provider I attest to compliance with applicable laws and regulations related to telemedicine.    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 Always feels bad if she has to leave (history of  having to leave dt PA's).    Homework: Achieved / completed to satisfaction      Episode of Care Goals: Satisfactory progress - ACTION (Actively working towards change); Intervened by reinforcing change plan / affirming steps taken     Current / Ongoing Stressors and Concerns:    Been cutting a lot of her self care short. All of her children are doing poorly right now. All 3 of her children are involved with drugs.One is frequently homeless with 3 teenage children.  Granddaughter has been authorized to go to [facility] for treatment. In a bit of limbo right now.     Keeping up with her gratitude list. Close to her goal weight.        Treatment Objective(s) Addressed in This Session:   use at least 2 coping skills for anxiety management in the next 2 weeks     Intervention:   CBT: behavioral activation    Assessments completed prior to visit:  The following assessments were completed by patient for this visit:  PHQ9:       4/28/2023    10:53 AM 5/5/2023     8:06 AM 6/29/2023     8:37 AM 7/28/2023     8:29 AM 8/24/2023     9:57 AM 10/12/2023    10:01 AM 12/4/2023     9:44 AM   PHQ-9 SCORE   PHQ-9 Total Score MyChart 6 (Mild depression) 6 (Mild depression) 5 (Mild depression) 12 (Moderate depression) 12 (Moderate depression) 8 (Mild depression) 3 (Minimal depression)   PHQ-9 Total Score 6 6 5 12 12 8 3     GAD7:       12/12/2022     1:54 PM 5/5/2023     8:08 AM   DAVID-7 SCORE   Total Score 4 (minimal anxiety) 6 (mild anxiety)   Total Score 4 6     CAGE-AID:       12/12/2022     2:00 PM   CAGE-AID Total Score   Total Score 0   Total Score MyChart 0 (A total score of 2 or greater is considered clinically significant)     PROMIS 10-Global Health (all questions and answers displayed):       12/12/2022     1:59 PM 4/14/2023     8:55 AM 4/28/2023    10:55 AM 7/28/2023     8:32 AM 12/4/2023     9:49 AM   PROMIS 10   In general, would you say your health is: Very good Good Very good Fair Good   In general, would you say  your quality of life is: Poor Good Very good Good Good   In general, how would you rate your physical health? Very good Good Very good Fair Fair   In general, how would you rate your mental health, including your mood and your ability to think? Poor Fair Fair Fair Fair   In general, how would you rate your satisfaction with your social activities and relationships? Good Good Very good Fair Good   In general, please rate how well you carry out your usual social activities and roles Good Fair Fair Fair Fair   To what extent are you able to carry out your everyday physical activities such as walking, climbing stairs, carrying groceries, or moving a chair? Completely Completely Completely Moderately Mostly   In the past 7 days, how often have you been bothered by emotional problems such as feeling anxious, depressed, or irritable? Sometimes Sometimes Sometimes Sometimes Sometimes   In the past 7 days, how would you rate your fatigue on average? None Mild Mild Moderate None   In the past 7 days, how would you rate your pain on average, where 0 means no pain, and 10 means worst imaginable pain? 1 6 4 8 5   In general, would you say your health is: 4 3 4 2 3   In general, would you say your quality of life is: 1 3 4 3 3   In general, how would you rate your physical health? 4 3 4 2 2   In general, how would you rate your mental health, including your mood and your ability to think? 1 2 2 2 2   In general, how would you rate your satisfaction with your social activities and relationships? 3 3 4 2 3   In general, please rate how well you carry out your usual social activities and roles. (This includes activities at home, at work and in your community, and responsibilities as a parent, child, spouse, employee, friend, etc.) 3 2 2 2 2   To what extent are you able to carry out your everyday physical activities such as walking, climbing stairs, carrying groceries, or moving a chair? 5 5 5 3 4   In the past 7 days, how often  have you been bothered by emotional problems such as feeling anxious, depressed, or irritable? 3 3 3 3 3   In the past 7 days, how would you rate your fatigue on average? 1 2 2 3 1   In the past 7 days, how would you rate your pain on average, where 0 means no pain, and 10 means worst imaginable pain? 1 6 4 8 5   Global Mental Health Score 8 11 13 10 11   Global Physical Health Score 18 15 16 10 14   PROMIS TOTAL - SUBSCORES 26 26 29 20 25     [place] Suicide Severity Rating Scale (Lifetime/Recent)      1/24/2023     4:00 PM   [place] Suicide Severity Rating (Lifetime/Recent)   Q1 Wished to be Dead (Past Month) no   Q2 Suicidal Thoughts (Past Month) no   Q3 Suicidal Thought Method no   Q4 Suicidal Intent without Specific Plan no   Q5 Suicide Intent with Specific Plan no   Level of Risk per Screen low risk         ASSESSMENT: Current Emotional / Mental Status (status of significant symptoms):   Risk status (Self / Other harm or suicidal ideation)   Patient denies current fears or concerns for personal safety.   Patient denies current or recent suicidal ideation or behaviors.   Patient denies current or recent homicidal ideation or behaviors.   Patient denies current or recent self injurious behavior or ideation.   Patient denies other safety concerns.   Patient reports there has been no change in risk factors since their last session.     Patient reports there has been no change in protective factors since their last session.     Recommended that patient call 911 or go to the local ED should there be a change in any of these risk factors.     Appearance:   Appropriate    Eye Contact:   Good    Psychomotor Behavior: Normal    Attitude:   Cooperative    Orientation:   All   Speech    Rate / Production: Normal     Volume:  Normal    Mood:    Normal   Affect:    Appropriate    Thought Content:  Clear    Thought Form:  Coherent  Logical    Insight:    Good      Medication Review:   No current psychiatric medications  prescribed     Medication Compliance:   NA     Changes in Health Issues:   None reported     Chemical Use Review:   Substance Use: Chemical use reviewed, no active concerns identified      Tobacco Use: No current tobacco use.      Diagnosis:  Diagnosis:  1. Mild episode of recurrent major depressive disorder (H24)          Collateral Reports Completed:   Not Applicable    PLAN: (Patient Tasks / Therapist Tasks / Other)  Client to advocate for herself as appropriate. Participate in at least two activities to promote relaxation per week.         Lan Carcamo, PhD                                                         ______________________________________________________________________    Individual Treatment Plan    Patient's Name: Audrey Ricks  YOB: 1960    Date of Creation: 2/10/23  Date Treatment Plan Last Reviewed/Revised: 4/28/23, 8/24/23, 12/4/23    DSM5 Diagnoses: 300.01 (F41.0) Panic Disorder  Psychosocial / Contextual Factors: Chronic, intermittent pain  PROMIS (reviewed every 90 days):     Referral / Collaboration:  Referral to another professional/service is not indicated at this time..    Anticipated number of session for this episode of care: 20  Anticipation frequency of session: Every other week  Anticipated Duration of each session: 38-52 minutes  Treatment plan will be reviewed in 90 days or when goals have been changed.     Anxiety Treatment plan:  Education- the Biopsychosocial model of anxiety  Client will be able to describe how anxiety is effecting them physically, emotionally and socially  Distraction  Client will learn 2 techniques to distract themselves when becoming anxious  Diaphragmatic breathing  Client will learn to breath using their diaphragm  Client will learn 2 breathing patterns to use to reduce anxiety  Visualization  Client will learn to establish a safe, calm place in their mind utilizing all their senses  Client will practice using visualization at times  when they are not anxious so they will be able to use it when anxiety occurs  Progressive muscle relaxation  Client will learn progressive muscle relaxation techniques and practice them 4-5 times per week.  Client will learn to use mental images of relaxation to relax muscle groups and do this on a daily basis  Self-care  Client will identify 3 things they can do just for themselves  Client will take time for quiet, reflection, meditation 3 times per week  Client will Learn to set boundaries when appropriate  Client will Identify 3 individuals they can call on for support, distraction  Assessment of progress  Client will engage in assessment of their progress on a regular basis    Patient has reviewed and agreed to the above plan.      Lan Carcamo, PhD  February 10, 2023    Answers for HPI/ROS submitted by the patient on 2/10/2023  If you checked off any problems, how difficult have these problems made it for you to do your work, take care of things at home, or get along with other people?: Somewhat difficult  PHQ9 TOTAL SCORE: 4    Answers for HPI/ROS submitted by the patient on 2/24/2023  If you checked off any problems, how difficult have these problems made it for you to do your work, take care of things at home, or get along with other people?: Not difficult at all  PHQ9 TOTAL SCORE: 1  Answers for HPI/ROS submitted by the patient on 4/14/2023  If you checked off any problems, how difficult have these problems made it for you to do your work, take care of things at home, or get along with other people?: Not difficult at all  PHQ9 TOTAL SCORE: 2    Answers for HPI/ROS submitted by the patient on 4/28/2023  If you checked off any problems, how difficult have these problems made it for you to do your work, take care of things at home, or get along with other people?: Somewhat difficult  PHQ9 TOTAL SCORE: 6  Answers for HPI/ROS submitted by the patient on 6/29/2023  If you checked off any problems, how  difficult have these problems made it for you to do your work, take care of things at home, or get along with other people?: Somewhat difficult  PHQ9 TOTAL SCORE: 5  Answers submitted by the patient for this visit:  Patient Health Questionnaire (Submitted on 8/24/2023)  If you checked off any problems, how difficult have these problems made it for you to do your work, take care of things at home, or get along with other people?: Very difficult  PHQ9 TOTAL SCORE: 12    Answers submitted by the patient for this visit:  Patient Health Questionnaire (Submitted on 10/12/2023)  If you checked off any problems, how difficult have these problems made it for you to do your work, take care of things at home, or get along with other people?: Somewhat difficult  PHQ9 TOTAL SCORE: 8    Answers submitted by the patient for this visit:  Patient Health Questionnaire (Submitted on 12/4/2023)  If you checked off any problems, how difficult have these problems made it for you to do your work, take care of things at home, or get along with other people?: Not difficult at all  PHQ9 TOTAL SCORE: 3

## 2023-12-13 RX ORDER — NAPROXEN 500 MG/1
500 TABLET ORAL 2 TIMES DAILY WITH MEALS
Qty: 60 TABLET | Refills: 1 | Status: SHIPPED | OUTPATIENT
Start: 2023-12-13 | End: 2024-02-07

## 2024-01-04 NOTE — PROGRESS NOTES
"   01/28/22 8192   Appointment Info   Signing clinician's name / credentials Libertad Garcia, PT   Visits Used 3   Subjective Report   Subjective Report Labial today, c/o depression as cause from all body problems and wondering if she will go back to work.  Pt talks about not wanting to exercise but knowing she should as a step for today.     Objective Measures   Objective Measures Objective Measure 2   Objective Measure 2   Objective Measure sx's   Details feels pain deep in posterior hip joint--sitting does not help it.  Knees--\"think pretty good--haven't had sharp painfor a while. And even when dull, I can relax it out.\"    Therapeutic Procedure/Exercise   Therapeutic Procedures: strength, endurance, ROM, flexibillity minutes (28088) 20   Skilled Intervention determining needs, training   Patient Response/Progress feels better--stepper seems to loosen hip   Neuromuscular Re-education   Neuromuscular re-ed of mvmt, balance, coord, kinesthetic sense, posture, proprioception minutes (43449) 8   Skilled Intervention postures and positions; retrain mm activation/relaxation   Patient Response/Progress more relaxed   Treatment Detail calm talking and situation processing for relaxing mm/body with stress   Manual Therapy   Manual Therapy: Mobilization, MFR, MLD, friction massage minutes (19512) 15   Skilled Intervention soft tissue release   Patient Response/Progress feels better   Treatment Detail Sidely for R buttock mm release.    Education   Learner/Method Patient   Education Comments plan, treatment   Plan   Homework getting in and out of car with both feet/alignment   Home program SLR toes up and out, quad stretch; inner thigh stretch, hpi IR/ER seated contract-relax, unsupported ab march   Plan for next session set plan for first day of work on 2/5/22   Ortho Goal 1   Goal Identifier walking   Goal Description Audrey will be able to walk during work activities without increased knee or hip pain more than 2 points.  "   Target Date 02/25/22   Ortho Goal 2   Goal Identifier standing   Goal Description Audrey will be able to stand for an hour for kitchen and work activities without knee and hip pain.    Target Date 02/25/22   Ortho Goal 3   Goal Identifier pain control   Goal Description Audrey has control of her knee pain to function normally with work and home.   Target Date 02/25/22   Session Number   Additional Session Number eval Latesha 1/14/22; ELIZABETH AguirreC    Chronic pain of both knees; Hip pain, right   Authorization status Primary: MEDICARE Secondary: ÓSCAR CARBAJAL ([facility])         DISCHARGE  Reason for Discharge: Patient has failed to schedule further appointments.    Equipment Issued:     Discharge Plan: Patient to continue home program.    Referring Provider:  Alie De La Cruz

## 2024-01-05 ENCOUNTER — VIRTUAL VISIT (OUTPATIENT)
Dept: PSYCHOLOGY | Facility: CLINIC | Age: 64
End: 2024-01-05
Payer: COMMERCIAL

## 2024-01-05 DIAGNOSIS — F33.0 MILD EPISODE OF RECURRENT MAJOR DEPRESSIVE DISORDER (H): Primary | ICD-10-CM

## 2024-01-05 PROCEDURE — 90834 PSYTX W PT 45 MINUTES: CPT | Mod: 95 | Performed by: PSYCHOLOGIST

## 2024-01-05 NOTE — PROGRESS NOTES
[facility]                                     Progress Note    Disclaimer: Voice recognition software was used to generate this note. As a result, wrong word or 'sound-a-like' substitutions may have occurred due to the inherent limitations of voice recognition software. There may be errors in the script that have gone undetected. Please consider this when interpreting information found in this chart.     Patient Name: Audrey Ricks  Date: January 5, 2024           Service Type: Individual      Session Start Time: 10:00AM  Session End Time: 10:45AM     Session Length: 45    Session #: 14    Attendees: Client attended alone    Service Modality:  Video Visit:      Provider verified identity through the following two step process.  Patient provided:  Patient is known previously to provider    Telemedicine Visit: The patient's condition can be safely assessed and treated via synchronous audio and visual telemedicine encounter.      Reason for Telemedicine Visit: Services only offered telehealth    Originating Site (Patient Location): Patient's home    Distant Site (Provider Location): Provider Remote Setting- Home Office    Consent:  The patient/guardian has verbally consented to: the potential risks and benefits of telemedicine (video visit) versus in person care; bill my insurance or make self-payment for services provided; and responsibility for payment of non-covered services.     Patient would like the video invitation sent by:  My Chart    Mode of Communication:  Video Conference via [facility]    Distant Location (Provider):  Off-site    As the provider I attest to compliance with applicable laws and regulations related to telemedicine.    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 Always feels bad if she has to leave  (history of having to leave dt PA's).    Homework: Achieved / completed to satisfaction      Episode of Care Goals: Satisfactory progress - ACTION (Actively working towards change); Intervened by reinforcing change plan / affirming steps taken     Current / Ongoing Stressors and Concerns:    Granddaughter had court on 12/22/23. No [facility] time if she went to teen [place], Then 2 years probation.   Still doing some odd jobs around building.   Brother is in hospice with cancer. Doesn't really know him.      Treatment Objective(s) Addressed in This Session:   use at least 2 coping skills for anxiety management in the next 2 weeks     Intervention:   CBT: behavioral activation    Assessments completed prior to visit:  The following assessments were completed by patient for this visit:  PHQ9:       5/5/2023     8:06 AM 6/29/2023     8:37 AM 7/28/2023     8:29 AM 8/24/2023     9:57 AM 10/12/2023    10:01 AM 12/4/2023     9:44 AM 1/5/2024     9:53 AM   PHQ-9 SCORE   PHQ-9 Total Score MyChart 6 (Mild depression) 5 (Mild depression) 12 (Moderate depression) 12 (Moderate depression) 8 (Mild depression) 3 (Minimal depression) 4 (Minimal depression)   PHQ-9 Total Score 6 5 12 12 8 3 4     GAD7:       12/12/2022     1:54 PM 5/5/2023     8:08 AM   DAVID-7 SCORE   Total Score 4 (minimal anxiety) 6 (mild anxiety)   Total Score 4 6     CAGE-AID:       12/12/2022     2:00 PM   CAGE-AID Total Score   Total Score 0   Total Score MyChart 0 (A total score of 2 or greater is considered clinically significant)     PROMIS 10-Global Health (all questions and answers displayed):       12/12/2022     1:59 PM 4/14/2023     8:55 AM 4/28/2023    10:55 AM 7/28/2023     8:32 AM 12/4/2023     9:49 AM   PROMIS 10   In general, would you say your health is: Very good Good Very good Fair Good   In general, would you say your quality of life is: Poor Good Very good Good Good   In general, how would you rate your physical health? Very good Good Very good  Fair Fair   In general, how would you rate your mental health, including your mood and your ability to think? Poor Fair Fair Fair Fair   In general, how would you rate your satisfaction with your social activities and relationships? Good Good Very good Fair Good   In general, please rate how well you carry out your usual social activities and roles Good Fair Fair Fair Fair   To what extent are you able to carry out your everyday physical activities such as walking, climbing stairs, carrying groceries, or moving a chair? Completely Completely Completely Moderately Mostly   In the past 7 days, how often have you been bothered by emotional problems such as feeling anxious, depressed, or irritable? Sometimes Sometimes Sometimes Sometimes Sometimes   In the past 7 days, how would you rate your fatigue on average? None Mild Mild Moderate None   In the past 7 days, how would you rate your pain on average, where 0 means no pain, and 10 means worst imaginable pain? 1 6 4 8 5   In general, would you say your health is: 4 3 4 2 3   In general, would you say your quality of life is: 1 3 4 3 3   In general, how would you rate your physical health? 4 3 4 2 2   In general, how would you rate your mental health, including your mood and your ability to think? 1 2 2 2 2   In general, how would you rate your satisfaction with your social activities and relationships? 3 3 4 2 3   In general, please rate how well you carry out your usual social activities and roles. (This includes activities at home, at work and in your community, and responsibilities as a parent, child, spouse, employee, friend, etc.) 3 2 2 2 2   To what extent are you able to carry out your everyday physical activities such as walking, climbing stairs, carrying groceries, or moving a chair? 5 5 5 3 4   In the past 7 days, how often have you been bothered by emotional problems such as feeling anxious, depressed, or irritable? 3 3 3 3 3   In the past 7 days, how would  you rate your fatigue on average? 1 2 2 3 1   In the past 7 days, how would you rate your pain on average, where 0 means no pain, and 10 means worst imaginable pain? 1 6 4 8 5   Global Mental Health Score 8 11 13 10 11   Global Physical Health Score 18 15 16 10 14   PROMIS TOTAL - SUBSCORES 26 26 29 20 25     [place] Suicide Severity Rating Scale (Lifetime/Recent)      1/24/2023     4:00 PM   [place] Suicide Severity Rating (Lifetime/Recent)   Q1 Wished to be Dead (Past Month) no   Q2 Suicidal Thoughts (Past Month) no   Q3 Suicidal Thought Method no   Q4 Suicidal Intent without Specific Plan no   Q5 Suicide Intent with Specific Plan no   Level of Risk per Screen low risk         ASSESSMENT: Current Emotional / Mental Status (status of significant symptoms):   Risk status (Self / Other harm or suicidal ideation)   Patient denies current fears or concerns for personal safety.   Patient denies current or recent suicidal ideation or behaviors.   Patient denies current or recent homicidal ideation or behaviors.   Patient denies current or recent self injurious behavior or ideation.   Patient denies other safety concerns.   Patient reports there has been no change in risk factors since their last session.     Patient reports there has been no change in protective factors since their last session.     Recommended that patient call 911 or go to the local ED should there be a change in any of these risk factors.     Appearance:   Appropriate    Eye Contact:   Good    Psychomotor Behavior: Normal    Attitude:   Cooperative    Orientation:   All   Speech    Rate / Production: Normal     Volume:  Normal    Mood:    Normal   Affect:    Appropriate    Thought Content:  Clear    Thought Form:  Coherent  Logical    Insight:    Good      Medication Review:   No current psychiatric medications prescribed     Medication Compliance:   NA     Changes in Health Issues:   None reported     Chemical Use Review:   Substance Use: Chemical  use reviewed, no active concerns identified      Tobacco Use: No current tobacco use.      Diagnosis:  Diagnosis:  1. Mild episode of recurrent major depressive disorder (H24)          Collateral Reports Completed:   Not Applicable    PLAN: (Patient Tasks / Therapist Tasks / Other)  Client to advocate for herself as appropriate. Participate in at least two activities to promote relaxation per week.         Lan Carcamo, PhD                                                         ______________________________________________________________________    Individual Treatment Plan    Patient's Name: Audrey Ricks  YOB: 1960    Date of Creation: 2/10/23  Date Treatment Plan Last Reviewed/Revised: 4/28/23, 8/24/23, 12/4/23    DSM5 Diagnoses: 300.01 (F41.0) Panic Disorder  Psychosocial / Contextual Factors: Chronic, intermittent pain  PROMIS (reviewed every 90 days):     Referral / Collaboration:  Referral to another professional/service is not indicated at this time..    Anticipated number of session for this episode of care: 20  Anticipation frequency of session: Every other week  Anticipated Duration of each session: 38-52 minutes  Treatment plan will be reviewed in 90 days or when goals have been changed.     Anxiety Treatment plan:  Education- the Biopsychosocial model of anxiety  Client will be able to describe how anxiety is effecting them physically, emotionally and socially  Distraction  Client will learn 2 techniques to distract themselves when becoming anxious  Diaphragmatic breathing  Client will learn to breath using their diaphragm  Client will learn 2 breathing patterns to use to reduce anxiety  Visualization  Client will learn to establish a safe, calm place in their mind utilizing all their senses  Client will practice using visualization at times when they are not anxious so they will be able to use it when anxiety occurs  Progressive muscle relaxation  Client will learn progressive  muscle relaxation techniques and practice them 4-5 times per week.  Client will learn to use mental images of relaxation to relax muscle groups and do this on a daily basis  Self-care  Client will identify 3 things they can do just for themselves  Client will take time for quiet, reflection, meditation 3 times per week  Client will Learn to set boundaries when appropriate  Client will Identify 3 individuals they can call on for support, distraction  Assessment of progress  Client will engage in assessment of their progress on a regular basis    Patient has reviewed and agreed to the above plan.      Lan Carcamo, PhD  February 10, 2023    Answers for HPI/ROS submitted by the patient on 2/10/2023  If you checked off any problems, how difficult have these problems made it for you to do your work, take care of things at home, or get along with other people?: Somewhat difficult  PHQ9 TOTAL SCORE: 4    Answers for HPI/ROS submitted by the patient on 2/24/2023  If you checked off any problems, how difficult have these problems made it for you to do your work, take care of things at home, or get along with other people?: Not difficult at all  PHQ9 TOTAL SCORE: 1  Answers for HPI/ROS submitted by the patient on 4/14/2023  If you checked off any problems, how difficult have these problems made it for you to do your work, take care of things at home, or get along with other people?: Not difficult at all  PHQ9 TOTAL SCORE: 2    Answers for HPI/ROS submitted by the patient on 4/28/2023  If you checked off any problems, how difficult have these problems made it for you to do your work, take care of things at home, or get along with other people?: Somewhat difficult  PHQ9 TOTAL SCORE: 6  Answers for HPI/ROS submitted by the patient on 6/29/2023  If you checked off any problems, how difficult have these problems made it for you to do your work, take care of things at home, or get along with other people?: Somewhat  difficult  PHQ9 TOTAL SCORE: 5  Answers submitted by the patient for this visit:  Patient Health Questionnaire (Submitted on 8/24/2023)  If you checked off any problems, how difficult have these problems made it for you to do your work, take care of things at home, or get along with other people?: Very difficult  PHQ9 TOTAL SCORE: 12    Answers submitted by the patient for this visit:  Patient Health Questionnaire (Submitted on 10/12/2023)  If you checked off any problems, how difficult have these problems made it for you to do your work, take care of things at home, or get along with other people?: Somewhat difficult  PHQ9 TOTAL SCORE: 8    Answers submitted by the patient for this visit:  Patient Health Questionnaire (Submitted on 12/4/2023)  If you checked off any problems, how difficult have these problems made it for you to do your work, take care of things at home, or get along with other people?: Not difficult at all  PHQ9 TOTAL SCORE: 3    Answers submitted by the patient for this visit:  Patient Health Questionnaire (Submitted on 1/5/2024)  If you checked off any problems, how difficult have these problems made it for you to do your work, take care of things at home, or get along with other people?: Somewhat difficult  PHQ9 TOTAL SCORE: 4

## 2024-01-16 ENCOUNTER — OFFICE VISIT (OUTPATIENT)
Dept: ORTHOPEDICS | Facility: OTHER | Age: 64
End: 2024-01-16
Payer: COMMERCIAL

## 2024-01-16 VITALS — WEIGHT: 189 LBS | BODY MASS INDEX: 34.78 KG/M2 | HEIGHT: 62 IN

## 2024-01-16 DIAGNOSIS — M17.12 PRIMARY OSTEOARTHRITIS OF LEFT KNEE: ICD-10-CM

## 2024-01-16 DIAGNOSIS — S83.242A TEAR OF MEDIAL MENISCUS OF LEFT KNEE, CURRENT, UNSPECIFIED TEAR TYPE, INITIAL ENCOUNTER: Primary | ICD-10-CM

## 2024-01-16 PROCEDURE — 99213 OFFICE O/P EST LOW 20 MIN: CPT | Mod: 25 | Performed by: STUDENT IN AN ORGANIZED HEALTH CARE EDUCATION/TRAINING PROGRAM

## 2024-01-16 PROCEDURE — 20610 DRAIN/INJ JOINT/BURSA W/O US: CPT | Mod: LT | Performed by: STUDENT IN AN ORGANIZED HEALTH CARE EDUCATION/TRAINING PROGRAM

## 2024-01-16 RX ORDER — LIDOCAINE HYDROCHLORIDE 10 MG/ML
2 INJECTION, SOLUTION INFILTRATION; PERINEURAL
Status: SHIPPED | OUTPATIENT
Start: 2024-01-16

## 2024-01-16 RX ORDER — TRIAMCINOLONE ACETONIDE 40 MG/ML
40 INJECTION, SUSPENSION INTRA-ARTICULAR; INTRAMUSCULAR
Status: SHIPPED | OUTPATIENT
Start: 2024-01-16

## 2024-01-16 RX ORDER — BUPIVACAINE HYDROCHLORIDE 5 MG/ML
2 INJECTION, SOLUTION PERINEURAL
Status: SHIPPED | OUTPATIENT
Start: 2024-01-16

## 2024-01-16 RX ADMIN — LIDOCAINE HYDROCHLORIDE 2 ML: 10 INJECTION, SOLUTION INFILTRATION; PERINEURAL at 15:45

## 2024-01-16 RX ADMIN — TRIAMCINOLONE ACETONIDE 40 MG: 40 INJECTION, SUSPENSION INTRA-ARTICULAR; INTRAMUSCULAR at 15:45

## 2024-01-16 RX ADMIN — BUPIVACAINE HYDROCHLORIDE 2 ML: 5 INJECTION, SOLUTION PERINEURAL at 15:45

## 2024-01-16 ASSESSMENT — PAIN SCALES - GENERAL: PAINLEVEL: SEVERE PAIN (7)

## 2024-01-16 NOTE — LETTER
2024         RE: Audrey Ricks  [address]  [facility] [address]        Dear Colleague,    Thank you for referring your patient, Audrey Ricks, to the [facility] [place]. Please see a copy of my visit note below.    Audrey Ricks  :  1960  DOS: 2024  MRN: [id number]  PCP: Alie De La Cruz    Sports Medicine Clinic Visit    Interim History - 2024  - Last seen in clinic on 2023 for left knee pes anserine bursitis and a left medial meniscus tear.  - [place] guided left knee corticosteroid injection completed on 2023 provided 4 months of great relief.  - Since the last visit, she notes a return in left medial knee pain over the past month. One instance of popping while swimming but has not popped since then. Does feel like a sharp pain occurs on occasion. Her gait pattern is starting to be affected again. Has started to take strong ibuprofen (previously for her sciatica).  - No interim injury.       Interim History - 2023  - Last seen in clinic on 2023 for left knee pes anserine bursitis, left patellar tendonopathy and left medial meniscus tear.  - MR left knee completed on 2023. Results on file. Presents today to discuss treatment options.    -MR L knee 2023 shows a complex tear of the medial meniscus body including multidirectional vertical tearing of the posterior horn, lateral meniscus tear, posterolateral capsular tear/injury including the arcuate ligament.  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  Also with a small amount of pes anserine bursitis.    - Since the last visit, no significant change in symptoms. Feels better extended and elevated, but cannot walk even short distances without pain. No mechanical locking, but very stiff. No prior injections or braces. Has done PT.   - No interim injury.      Initial Visit: 2023  YESSENIA PECK  "Rambo is a 63 year old female who is seen in consultation at the request of  Santana Ruiz M.D. presenting with left knee pain.    - Mechanism of Injury:  Fell onto her anterior left knee in 2021. Thinks she re-injured it while dancing  on July 4, 2023.   - Prior evaluation:  8/8/2023 with Santana Ruiz MD. described 4 to 6 weeks of suprapatellar knee pain after a shifting maneuver with her kayak.  Pain worse with prolonged standing and walking.  Performs HEP.  TTP over the suprapatellar region and medial joint line.  Referred to PT and orthopedic follow-up.  -XR L knee 8/8/2023 shows normal anatomic alignment with a very mild knee joint effusion, mild swelling over the patellar tendon, mild medial compartment joint space narrowing, no acute fracture or dislocation.    - Pain Character:  Pain has been present for  1.5 months  with worsening pain.  Pain is well localized to the anteromedial left knee without significant radiation.   - Endorses:  \"ice pick\" distal to the patella, numbness over the proximal anterior lower leg  - Denies:  clicking, popping, grinding, instability, radicular shooting pain, weakness.   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      Review of Systems  Musculoskeletal: as above  Remainder of review of systems is negative including constitutional, CV, pulmonary, GI, Skin and Neurologic except as noted in HPI or medical history.    Past Medical History:   Diagnosis Date     Depressive disorder 2011     Hypertension      NSTEMI (non-ST elevated myocardial infarction) (H) 08/06/2022     SVT (supraventricular tachycardia)      Past Surgical " "History:   Procedure Laterality Date     EP ABLATION SVT Bilateral 9/23/2022    Procedure: Ablation Supraventricular Tachycardia;  Surgeon: Donna Wolfe MD;  Location:  [facility]ATH LAB     Family History   Problem Relation Age of Onset     Diabetes Mother          Objective  Ht 1.575 m (5' 2\")   Wt 85.7 kg (189 lb)   BMI 34.57 kg/m      General: healthy, alert and in no acute distress.    HEENT: no scleral icterus or conjunctival erythema.   Skin: no suspicious lesions or rash. No jaundice.   CV: regular rhythm by palpation, 2+ distal pulses.  Resp: normal respiratory effort without conversational dyspnea.   Psych: normal mood and affect.    Gait: nonantalgic, appropriate coordination and balance.     Neuro:        - Sensation to light touch:    - Intact throughout the BLE including all peripheral nerve distributions.        - MSR:      RLE  LLE  - Patella 2+ 2+  - Achilles 2+ 2+       - Special tests:   - Slump/SLR:  Neg bilaterally    MSK - Knee:       - Inspection:    - Mild swelling present without surrounding erythema, warmth, ecchymosis, lesion.        - ROM:    - Full AROM/PROM with pain during knee flexion/extension.        - Palpation:    - TTP at the medial joint line  - NTTP elsewhere.        - Strength:  (*antalgic)  RLE LLE  - Hip Flexion  5 5    - Hip Abduction 5 5   - Hip Adduction 5 5  - Knee Flexion  5 5  - Knee Extension 5 5  - Dorsiflexion  5 5  - Plantarflexion 5 5  - Ext. Luisito. Longus 5 5  - Inversion  5 5  - Eversion  5 5       - Special tests:        - Lachman:  Neg        - A/P drawer:  Neg       - Pivot shift:  Neg   - Shahnaz: Neg     - Varus stress: Neg    - Valgus stress: Neg   - Patellar grind: Positive      Radiology  I independently reviewed the available relevant imaging, with the following interpretation:   - XR L knee 8/8/2023 shows mild medial and lateral compartment joint space narrowing without significant osteophytosis, small knee joint effusion, no acute fractures or " dislocations.  - MRI with interpretation as above in HPI.    XR KNEE STANDING LEFT 2 VIEWS 8/8/2023 1:57 PM     HISTORY: Chronic pain of left knee; Chronic pain of left knee     COMPARISON: None.                                                                      IMPRESSION: No fracture or effusion. Mild medial compartment  narrowing.     ERICK THOMAS MD        MR left knee without contrast 8/24/2023 2:25 PM     Techniques: Multiplanar multisequence imaging of the left knee was  obtained without administration of intra-articular or intravenous  contrast using routing protocol.     History: Pes anserine bursitis; Tendinopathy of patella     Comparison: Radiograph 8/8/2023     Findings:     Motion partially degrading images.     MENISCI:  Medial meniscus: Multidirectional tear of the medial meniscus body and  posterior horn including longitudinal vertical tearing component with  approximately 4 mm meniscal flap in the medial tibial gutter and at  the posterior horn root ligament junction.  Lateral meniscus: Free edge, likely longitudinal horizontal tear of  the lateral meniscal body.     LIGAMENTS  Cruciate ligaments: Intact.  Medial supporting structures: Peripheral bowing of the tibial  collateral ligament with moderate periligamentous edema surrounding  the tibial collateral ligament and posterior oblique ligament, trace  tibial collateral ligament bursal fluid, moderate to high-grade  sprain/partial tear meniscal femoral and meniscal tibial ligaments are  likely related to altered mechanics due to underlying meniscal  pathology.  Lateral supporting structures: Posterolateral capsular fluid signal,  consistent with capsular tear/injury including expected course of  arcuate ligament. Otherwise popliteus tendon, fibular collateral  ligament, biceps femoris tendon, conjoined tendon, popliteofibular  ligament and iliotibial band are intact.     EXTENSOR MECHANISM  Intact.     FLUID  Small joint effusion. No  substantial Baker's cyst.  Trace pes anserine bursitis fluid.     OSSEOUS and ARTICULAR STRUCTURES  Bones: No fracture, contusion, or osseous lesion is seen.     Peripheral edema-like marrow signal intensity of the medial tibial  plateau, likely reactive to underlying meniscal pathology.     Patellofemoral compartment: On a background of diffuse moderate to  high-grade chondral loss, full-thickness chondral fissuring with  subtle subchondral edema-like marrow signal intensity in the patellar  articular cartilage. At least low-grade chondral loss of the central  trochlear cartilage.     Medial compartment: Focal full-thickness chondral fissure or subtle  subchondral edema across intensity posterior weightbearing surface of  medial femoral condyle.     Lateral compartment: No high-grade hyaline cartilage disease.     ANCILLARY FINDINGS  Mild nonspecific subcutaneous edema especially anteriorly.                                                                      Impression:  Motion partially degrading images.  1. Multidirectional tear of the medial meniscus body and posterior  horn including longitudinal vertical tearing component.   a. Presumed reactive changes of medial supporting structures.   b. Mild pes anserine bursitis, likely reactive.  2. Free edge, likely longitudinal horizontal tear of the lateral  meniscal body.  3. Posterolateral capsular tear/injury including expected course of  arcuate ligament; otherwise, lateral supporting structures are intact.  4. Foci of grade IV chondromalacia in the patellofemoral and medial  compartments.     ROGER MAGALLON     Procedure  Large Joint Injection/Arthocentesis    Date/Time: 1/16/2024 3:45 PM    Performed by: Hany Lebron DO  Authorized by: Hany Lebron DO    Indications:  Pain  Needle Size:  22 G  Guidance: landmark guided    Approach:  Anterolateral  Location:  Knee      Medications:  40 mg triamcinolone 40 MG/ML; 2 mL lidocaine 1 %; 2 mL BUPivacaine 0.5  %  Outcome:  Tolerated well, no immediate complications  Procedure discussed: discussed risks, benefits, and alternatives    Consent Given by:  Patient  Prep: patient was prepped and draped in usual sterile fashion           PROCEDURE  Intraarticular Knee Joint Injection - Left  The patient was informed of the risks and benefits of the procedure and alternatives were discussed. A written consent was signed by the patient.   The injection site was prepped with chlorhexidine in sterile fashion.   An injectate solution containing 2 mL of 1% lidocaine, 2 mL of 0.5% bupivacaine, and 1 mL of Kenalog (40 mg/mL) was drawn up into a 5 mL syringe.  Injection was performed using sterile technique.  A 1.5-inch 22-gauge needle was used to enter the knee joint using a lateral infrapatellar approach and injectate was injected successfully. After the injection, the site was cleaned and a bandage applied. The patient tolerated the procedure well without complications.       Assessment  1. Tear of medial meniscus of left knee, current, unspecified tear type, initial encounter    2. Primary osteoarthritis of left knee          Daryl Ricks is a 63 year old female that presents for follow-up of her chronic anteromedial left knee pain.  Original history and physical exam appear most consistent with a medial meniscus tear and pes anserine bursitis, in the setting of chronic patellar tendinopathy.  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    History, imaging, and physical exam are consistent with a complex medial meniscus tear, lateral meniscus tear, posterior lateral capsule injury and pes anserine bursitis in the setting of severe primary osteoarthritis of the left knee.    Update  1/16/24:  She had about 4 months of really good relief out of the steroid injection performed in August.  Also has found the  brace to be quite helpful.  Her pain has since returned and she is interested in additional corticosteroid injection today in clinic.    Discussed the nature of the condition and treatment options and mutually agreed upon the following plan:    - Medications:          - Discussed pharmacologic options for pain relief.   -  May use NSAIDs (Ibuprofen, Naproxen) or Acetaminophen (Tylenol) as needed for pain control.   -  May also use topical medications such as lidocaine, IcyHot, BioFreeze, or Voltaren gel as needed for pain control.   - Injections:          - Discussed possible injection options and alternatives.    - Options include intra-articular knee joint injection with corticosteroid, viscosupplementation, or PRP.   - Performed a corticosteroid injection of the left intra-articular knee joint today in clinic. Patient tolerated the procedure well without complications.   - Post-procedure instructions:    - Keep the injection site clean and dry.   - Do not submerge the injection site for 24 hours (no baths, pools). Showers are ok.   - Rest the area for 24-48 hours before resuming normal activities. Avoid overexerting the area for the first few weeks.   - It may take 2-3 days to start noticing the effects of the injection and up to 3-4 weeks to feel significant benefits.   - In general, an injection in the same anatomical region can be repeated every 3 months as needed.  However, for the health of your tissues you will want to space out the injections as much as possible and request them only when symptoms are significantly bothersome and disrupting daily function and/or sleep.   - Therapy:          - Discussed the benefits of physical therapy vs home exercise program for optimization of range of motion, flexibility, strength, stability and function.   - She has done physical therapy  in the past and has an established home exercise program.  Encouraged to continue home exercise program as instructed by PT.  - Modalities:          - May use ice, heat, massage or other modalities as needed.   - Bracing:          - Continue using the  brace for ambulation and other exacerbating activities as needed.  - Surgery:          - Discussed non-operative and operative treatment options for the patient's condition, which includes meniscal repair versus more likely discussion of arthroplasty based on the level of arthritis in the knee already.   - Goal is to continue conservative care for as long as possible before surgery would need to be considered.  We will consider surgical referral in the future if symptoms do not improve with conservative management.  - Activity:          - Encouraged to remain active and participate in regular activities as symptoms allow.  Avoid or modify exacerbating activities as needed.  - Follow up:          - As needed in at least 3 months for re-evaluation and update to treatment plan, or sooner for new/worsening symptoms.  - Patient has clinic contact information for questions or concerns.       Hany Lebron DO CAQSM  [facility]  [facility] - Department of Orthopedic Surgery       Disclaimer:  This note was prepared and written using Dragon Medical dictation software. As a result, there may be errors in the script that have gone undetected. Please consider this when interpreting the information in this note.       Again, thank you for allowing me to participate in the care of your patient.        Sincerely,        Hany Lebron DO

## 2024-01-16 NOTE — PROGRESS NOTES
Audrey Ricks  :  1960  DOS: 2024  MRN: [id number]  PCP: Alie De La Cruz    Sports Medicine Clinic Visit    Interim History - 2024  - Last seen in clinic on 2023 for left knee pes anserine bursitis and a left medial meniscus tear.  - [place] guided left knee corticosteroid injection completed on 2023 provided 4 months of great relief.  - Since the last visit, she notes a return in left medial knee pain over the past month. One instance of popping while swimming but has not popped since then. Does feel like a sharp pain occurs on occasion. Her gait pattern is starting to be affected again. Has started to take strong ibuprofen (previously for her sciatica).  - No interim injury.       Interim History - 2023  - Last seen in clinic on 2023 for left knee pes anserine bursitis, left patellar tendonopathy and left medial meniscus tear.  - MR left knee completed on 2023. Results on file. Presents today to discuss treatment options.    -MR L knee 2023 shows a complex tear of the medial meniscus body including multidirectional vertical tearing of the posterior horn, lateral meniscus tear, posterolateral capsular tear/injury including the arcuate ligament.  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  Also with a small amount of pes anserine bursitis.    - Since the last visit, no significant change in symptoms. Feels better extended and elevated, but cannot walk even short distances without pain. No mechanical locking, but very stiff. No prior injections or braces. Has done PT.   - No interim injury.      Initial Visit: 2023  HPI  Audrey Rikcs is a 63 year old female who is seen in consultation at the request of  Santana Ruiz M.D. presenting with left knee pain.    - Mechanism of Injury:  Fell onto her anterior left knee in . Thinks she re-injured it while dancing  on 2023.   -  "Prior evaluation:  8/8/2023 with Santana Ruiz MD. described 4 to 6 weeks of suprapatellar knee pain after a shifting maneuver with her kayak.  Pain worse with prolonged standing and walking.  Performs HEP.  TTP over the suprapatellar region and medial joint line.  Referred to PT and orthopedic follow-up.  -XR L knee 8/8/2023 shows normal anatomic alignment with a very mild knee joint effusion, mild swelling over the patellar tendon, mild medial compartment joint space narrowing, no acute fracture or dislocation.    - Pain Character:  Pain has been present for  1.5 months  with worsening pain.  Pain is well localized to the anteromedial left knee without significant radiation.   - Endorses:  \"ice pick\" distal to the patella, numbness over the proximal anterior lower leg  - Denies:  clicking, popping, grinding, instability, radicular shooting pain, weakness.   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      Review of Systems  Musculoskeletal: as above  Remainder of review of systems is negative including constitutional, CV, pulmonary, GI, Skin and Neurologic except as noted in HPI or medical history.    Past Medical History:   Diagnosis Date    Depressive disorder 2011    Hypertension     NSTEMI (non-ST elevated myocardial infarction) (H) 08/06/2022    SVT (supraventricular tachycardia)      Past Surgical History:   Procedure Laterality Date    EP ABLATION SVT Bilateral 9/23/2022    Procedure: Ablation Supraventricular Tachycardia;  Surgeon: Donna Wolfe MD;  Location:  [facility]     Family History   Problem Relation Age of Onset    Diabetes Mother  " "        Objective  Ht 1.575 m (5' 2\")   Wt 85.7 kg (189 lb)   BMI 34.57 kg/m      General: healthy, alert and in no acute distress.    HEENT: no scleral icterus or conjunctival erythema.   Skin: no suspicious lesions or rash. No jaundice.   CV: regular rhythm by palpation, 2+ distal pulses.  Resp: normal respiratory effort without conversational dyspnea.   Psych: normal mood and affect.    Gait: nonantalgic, appropriate coordination and balance.     Neuro:        - Sensation to light touch:    - Intact throughout the BLE including all peripheral nerve distributions.        - MSR:      RLE  LLE  - Patella 2+ 2+  - Achilles 2+ 2+       - Special tests:   - Slump/SLR:  Neg bilaterally    MSK - Knee:       - Inspection:    - Mild swelling present without surrounding erythema, warmth, ecchymosis, lesion.        - ROM:    - Full AROM/PROM with pain during knee flexion/extension.        - Palpation:    - TTP at the medial joint line  - NTTP elsewhere.        - Strength:  (*antalgic)  RLE LLE  - Hip Flexion  5 5    - Hip Abduction 5 5   - Hip Adduction 5 5  - Knee Flexion  5 5  - Knee Extension 5 5  - Dorsiflexion  5 5  - Plantarflexion 5 5  - Ext. Luisito. Longus 5 5  - Inversion  5 5  - Eversion  5 5       - Special tests:        - Lachman:  Neg        - A/P drawer:  Neg       - Pivot shift:  Neg   - Shahnaz: Neg     - Varus stress: Neg    - Valgus stress: Neg   - Patellar grind: Positive      Radiology  I independently reviewed the available relevant imaging, with the following interpretation:   - XR L knee 8/8/2023 shows mild medial and lateral compartment joint space narrowing without significant osteophytosis, small knee joint effusion, no acute fractures or dislocations.  - MRI with interpretation as above in HPI.    XR KNEE STANDING LEFT 2 VIEWS 8/8/2023 1:57 PM     HISTORY: Chronic pain of left knee; Chronic pain of left knee     COMPARISON: None.                                                                    "   IMPRESSION: No fracture or effusion. Mild medial compartment  narrowing.     ERICK THOMAS MD        MR left knee without contrast 8/24/2023 2:25 PM     Techniques: Multiplanar multisequence imaging of the left knee was  obtained without administration of intra-articular or intravenous  contrast using routing protocol.     History: Pes anserine bursitis; Tendinopathy of patella     Comparison: Radiograph 8/8/2023     Findings:     Motion partially degrading images.     MENISCI:  Medial meniscus: Multidirectional tear of the medial meniscus body and  posterior horn including longitudinal vertical tearing component with  approximately 4 mm meniscal flap in the medial tibial gutter and at  the posterior horn root ligament junction.  Lateral meniscus: Free edge, likely longitudinal horizontal tear of  the lateral meniscal body.     LIGAMENTS  Cruciate ligaments: Intact.  Medial supporting structures: Peripheral bowing of the tibial  collateral ligament with moderate periligamentous edema surrounding  the tibial collateral ligament and posterior oblique ligament, trace  tibial collateral ligament bursal fluid, moderate to high-grade  sprain/partial tear meniscal femoral and meniscal tibial ligaments are  likely related to altered mechanics due to underlying meniscal  pathology.  Lateral supporting structures: Posterolateral capsular fluid signal,  consistent with capsular tear/injury including expected course of  arcuate ligament. Otherwise popliteus tendon, fibular collateral  ligament, biceps femoris tendon, conjoined tendon, popliteofibular  ligament and iliotibial band are intact.     EXTENSOR MECHANISM  Intact.     FLUID  Small joint effusion. No substantial Baker's cyst.  Trace pes anserine bursitis fluid.     OSSEOUS and ARTICULAR STRUCTURES  Bones: No fracture, contusion, or osseous lesion is seen.     Peripheral edema-like marrow signal intensity of the medial tibial  plateau, likely reactive to  underlying meniscal pathology.     Patellofemoral compartment: On a background of diffuse moderate to  high-grade chondral loss, full-thickness chondral fissuring with  subtle subchondral edema-like marrow signal intensity in the patellar  articular cartilage. At least low-grade chondral loss of the central  trochlear cartilage.     Medial compartment: Focal full-thickness chondral fissure or subtle  subchondral edema across intensity posterior weightbearing surface of  medial femoral condyle.     Lateral compartment: No high-grade hyaline cartilage disease.     ANCILLARY FINDINGS  Mild nonspecific subcutaneous edema especially anteriorly.                                                                      Impression:  Motion partially degrading images.  1. Multidirectional tear of the medial meniscus body and posterior  horn including longitudinal vertical tearing component.   a. Presumed reactive changes of medial supporting structures.   b. Mild pes anserine bursitis, likely reactive.  2. Free edge, likely longitudinal horizontal tear of the lateral  meniscal body.  3. Posterolateral capsular tear/injury including expected course of  arcuate ligament; otherwise, lateral supporting structures are intact.  4. Foci of grade IV chondromalacia in the patellofemoral and medial  compartments.     ROGER MAGALLON     Procedure  Large Joint Injection/Arthocentesis    Date/Time: 1/16/2024 3:45 PM    Performed by: Hany Lebron DO  Authorized by: Hany Lebron DO    Indications:  Pain  Needle Size:  22 G  Guidance: landmark guided    Approach:  Anterolateral  Location:  Knee      Medications:  40 mg triamcinolone 40 MG/ML; 2 mL lidocaine 1 %; 2 mL BUPivacaine 0.5 %  Outcome:  Tolerated well, no immediate complications  Procedure discussed: discussed risks, benefits, and alternatives    Consent Given by:  Patient  Prep: patient was prepped and draped in usual sterile fashion           PROCEDURE  Intraarticular Knee  Joint Injection - Left  The patient was informed of the risks and benefits of the procedure and alternatives were discussed. A written consent was signed by the patient.   The injection site was prepped with chlorhexidine in sterile fashion.   An injectate solution containing 2 mL of 1% lidocaine, 2 mL of 0.5% bupivacaine, and 1 mL of Kenalog (40 mg/mL) was drawn up into a 5 mL syringe.  Injection was performed using sterile technique.  A 1.5-inch 22-gauge needle was used to enter the knee joint using a lateral infrapatellar approach and injectate was injected successfully. After the injection, the site was cleaned and a bandage applied. The patient tolerated the procedure well without complications.       Assessment  1. Tear of medial meniscus of left knee, current, unspecified tear type, initial encounter    2. Primary osteoarthritis of left knee          Daryl Ricks is a 63 year old female that presents for follow-up of her chronic anteromedial left knee pain.  Original history and physical exam appear most consistent with a medial meniscus tear and pes anserine bursitis, in the setting of chronic patellar tendinopathy.  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    History, imaging, and physical exam are consistent with a complex medial meniscus tear, lateral meniscus tear, posterior lateral capsule injury and pes anserine bursitis in the setting of severe primary osteoarthritis of the left knee.    Update 1/16/24:  She had about 4 months of really good relief out of the steroid injection performed in August.  Also has found the  brace to be quite helpful.  Her pain has since returned and she is interested in additional corticosteroid injection today in  clinic.    Discussed the nature of the condition and treatment options and mutually agreed upon the following plan:    - Medications:          - Discussed pharmacologic options for pain relief.   -  May use NSAIDs (Ibuprofen, Naproxen) or Acetaminophen (Tylenol) as needed for pain control.   -  May also use topical medications such as lidocaine, IcyHot, BioFreeze, or Voltaren gel as needed for pain control.   - Injections:          - Discussed possible injection options and alternatives.    - Options include intra-articular knee joint injection with corticosteroid, viscosupplementation, or PRP.   - Performed a corticosteroid injection of the left intra-articular knee joint today in clinic. Patient tolerated the procedure well without complications.   - Post-procedure instructions:    - Keep the injection site clean and dry.   - Do not submerge the injection site for 24 hours (no baths, pools). Showers are ok.   - Rest the area for 24-48 hours before resuming normal activities. Avoid overexerting the area for the first few weeks.   - It may take 2-3 days to start noticing the effects of the injection and up to 3-4 weeks to feel significant benefits.   - In general, an injection in the same anatomical region can be repeated every 3 months as needed.  However, for the health of your tissues you will want to space out the injections as much as possible and request them only when symptoms are significantly bothersome and disrupting daily function and/or sleep.   - Therapy:          - Discussed the benefits of physical therapy vs home exercise program for optimization of range of motion, flexibility, strength, stability and function.   - She has done physical therapy in the past and has an established home exercise program.  Encouraged to continue home exercise program as instructed by PT.  - Modalities:          - May use ice, heat, massage or other modalities as needed.   - Bracing:          - Continue using the   brace for ambulation and other exacerbating activities as needed.  - Surgery:          - Discussed non-operative and operative treatment options for the patient's condition, which includes meniscal repair versus more likely discussion of arthroplasty based on the level of arthritis in the knee already.   - Goal is to continue conservative care for as long as possible before surgery would need to be considered.  We will consider surgical referral in the future if symptoms do not improve with conservative management.  - Activity:          - Encouraged to remain active and participate in regular activities as symptoms allow.  Avoid or modify exacerbating activities as needed.  - Follow up:          - As needed in at least 3 months for re-evaluation and update to treatment plan, or sooner for new/worsening symptoms.  - Patient has clinic contact information for questions or concerns.       Hany Lebron DO, CAROLE  [facility] - Sports Medicine  [facility] - Department of Orthopedic Surgery       Disclaimer:  This note was prepared and written using Dragon Medical dictation software. As a result, there may be errors in the script that have gone undetected. Please consider this when interpreting the information in this note.

## 2024-01-20 DIAGNOSIS — L71.9 ROSACEA: ICD-10-CM

## 2024-01-22 RX ORDER — METRONIDAZOLE 10 MG/G
GEL TOPICAL DAILY
Qty: 60 G | Refills: 1 | Status: SHIPPED | OUTPATIENT
Start: 2024-01-22 | End: 2024-09-28

## 2024-02-07 ENCOUNTER — OFFICE VISIT (OUTPATIENT)
Dept: FAMILY MEDICINE | Facility: CLINIC | Age: 64
End: 2024-02-07
Payer: COMMERCIAL

## 2024-02-07 ENCOUNTER — ORDERS ONLY (AUTO-RELEASED) (OUTPATIENT)
Dept: FAMILY MEDICINE | Facility: CLINIC | Age: 64
End: 2024-02-07

## 2024-02-07 VITALS
DIASTOLIC BLOOD PRESSURE: 70 MMHG | SYSTOLIC BLOOD PRESSURE: 130 MMHG | BODY MASS INDEX: 35.33 KG/M2 | TEMPERATURE: 97.1 F | OXYGEN SATURATION: 97 % | WEIGHT: 192 LBS | HEIGHT: 62 IN | HEART RATE: 81 BPM | RESPIRATION RATE: 18 BRPM

## 2024-02-07 DIAGNOSIS — Z12.11 SCREEN FOR COLON CANCER: ICD-10-CM

## 2024-02-07 DIAGNOSIS — Z12.31 ENCOUNTER FOR SCREENING MAMMOGRAM FOR BREAST CANCER: ICD-10-CM

## 2024-02-07 DIAGNOSIS — F31.81 BIPOLAR 2 DISORDER (H): ICD-10-CM

## 2024-02-07 DIAGNOSIS — N95.1 MENOPAUSAL SYNDROME (HOT FLASHES): ICD-10-CM

## 2024-02-07 DIAGNOSIS — K21.9 GASTROESOPHAGEAL REFLUX DISEASE WITHOUT ESOPHAGITIS: ICD-10-CM

## 2024-02-07 DIAGNOSIS — Z13.1 SCREENING FOR DIABETES MELLITUS: ICD-10-CM

## 2024-02-07 DIAGNOSIS — E66.01 MORBID OBESITY (H): ICD-10-CM

## 2024-02-07 DIAGNOSIS — E87.6 HYPOKALEMIA: ICD-10-CM

## 2024-02-07 DIAGNOSIS — I21.4 NSTEMI (NON-ST ELEVATED MYOCARDIAL INFARCTION) (H): ICD-10-CM

## 2024-02-07 DIAGNOSIS — M54.12 CERVICAL RADICULOPATHY AT C7: ICD-10-CM

## 2024-02-07 DIAGNOSIS — E78.5 HYPERLIPIDEMIA LDL GOAL <130: ICD-10-CM

## 2024-02-07 DIAGNOSIS — Z00.00 ENCOUNTER FOR MEDICARE ANNUAL WELLNESS EXAM: Primary | ICD-10-CM

## 2024-02-07 DIAGNOSIS — Z12.4 CERVICAL CANCER SCREENING: ICD-10-CM

## 2024-02-07 LAB
ANION GAP SERPL CALCULATED.3IONS-SCNC: 13 MMOL/L (ref 7–15)
BUN SERPL-MCNC: 21.8 MG/DL (ref 8–23)
CALCIUM SERPL-MCNC: 8.8 MG/DL (ref 8.8–10.2)
CHLORIDE SERPL-SCNC: 103 MMOL/L (ref 98–107)
CHOLEST SERPL-MCNC: 257 MG/DL
CREAT SERPL-MCNC: 1 MG/DL (ref 0.51–0.95)
DEPRECATED HCO3 PLAS-SCNC: 24 MMOL/L (ref 22–29)
EGFRCR SERPLBLD CKD-EPI 2021: 63 ML/MIN/1.73M2
ERYTHROCYTE [DISTWIDTH] IN BLOOD BY AUTOMATED COUNT: 13.5 % (ref 10–15)
FASTING STATUS PATIENT QL REPORTED: YES
GLUCOSE SERPL-MCNC: 105 MG/DL (ref 70–99)
HCT VFR BLD AUTO: 40.6 % (ref 35–47)
HDLC SERPL-MCNC: 51 MG/DL
HGB BLD-MCNC: 13.5 G/DL (ref 11.7–15.7)
LDLC SERPL CALC-MCNC: 163 MG/DL
MCH RBC QN AUTO: 30.9 PG (ref 26.5–33)
MCHC RBC AUTO-ENTMCNC: 33.3 G/DL (ref 31.5–36.5)
MCV RBC AUTO: 93 FL (ref 78–100)
NONHDLC SERPL-MCNC: 206 MG/DL
PLATELET # BLD AUTO: 226 10E3/UL (ref 150–450)
POTASSIUM SERPL-SCNC: 3.9 MMOL/L (ref 3.4–5.3)
RBC # BLD AUTO: 4.37 10E6/UL (ref 3.8–5.2)
SODIUM SERPL-SCNC: 140 MMOL/L (ref 135–145)
TRIGL SERPL-MCNC: 215 MG/DL
WBC # BLD AUTO: 5.2 10E3/UL (ref 4–11)

## 2024-02-07 PROCEDURE — 80048 BASIC METABOLIC PNL TOTAL CA: CPT | Performed by: PHYSICIAN ASSISTANT

## 2024-02-07 PROCEDURE — 99396 PREV VISIT EST AGE 40-64: CPT | Performed by: PHYSICIAN ASSISTANT

## 2024-02-07 PROCEDURE — G0145 SCR C/V CYTO,THINLAYER,RESCR: HCPCS | Performed by: PHYSICIAN ASSISTANT

## 2024-02-07 PROCEDURE — 87624 HPV HI-RISK TYP POOLED RSLT: CPT | Performed by: PHYSICIAN ASSISTANT

## 2024-02-07 PROCEDURE — 36415 COLL VENOUS BLD VENIPUNCTURE: CPT | Performed by: PHYSICIAN ASSISTANT

## 2024-02-07 PROCEDURE — 85027 COMPLETE CBC AUTOMATED: CPT | Performed by: PHYSICIAN ASSISTANT

## 2024-02-07 PROCEDURE — 80061 LIPID PANEL: CPT | Performed by: PHYSICIAN ASSISTANT

## 2024-02-07 PROCEDURE — 99213 OFFICE O/P EST LOW 20 MIN: CPT | Mod: 25 | Performed by: PHYSICIAN ASSISTANT

## 2024-02-07 RX ORDER — POTASSIUM CHLORIDE 750 MG/1
10 TABLET, EXTENDED RELEASE ORAL DAILY
Qty: 90 TABLET | Refills: 1 | Status: SHIPPED | OUTPATIENT
Start: 2024-02-07 | End: 2024-08-30

## 2024-02-07 RX ORDER — ESTRADIOL/NORETHINDRONE ACETATE TRANSDERMAL SYSTEM .05; .14 MG/D; MG/D
PATCH, EXTENDED RELEASE TRANSDERMAL
Qty: 24 PATCH | Refills: 3 | Status: SHIPPED | OUTPATIENT
Start: 2024-02-07

## 2024-02-07 RX ORDER — ZOLPIDEM TARTRATE 5 MG/1
TABLET ORAL
Qty: 30 TABLET | Refills: 1 | Status: SHIPPED | OUTPATIENT
Start: 2024-02-07 | End: 2024-08-07

## 2024-02-07 RX ORDER — NAPROXEN 500 MG/1
500 TABLET ORAL 2 TIMES DAILY WITH MEALS
Qty: 60 TABLET | Refills: 3 | Status: SHIPPED | OUTPATIENT
Start: 2024-02-07 | End: 2024-09-09

## 2024-02-07 SDOH — HEALTH STABILITY: PHYSICAL HEALTH: ON AVERAGE, HOW MANY DAYS PER WEEK DO YOU ENGAGE IN MODERATE TO STRENUOUS EXERCISE (LIKE A BRISK WALK)?: 6 DAYS

## 2024-02-07 ASSESSMENT — PAIN SCALES - GENERAL: PAINLEVEL: MILD PAIN (2)

## 2024-02-07 ASSESSMENT — SOCIAL DETERMINANTS OF HEALTH (SDOH): HOW OFTEN DO YOU GET TOGETHER WITH FRIENDS OR RELATIVES?: MORE THAN THREE TIMES A WEEK

## 2024-02-07 NOTE — PROGRESS NOTES
Preventive Care Visit  [facility]  Alie De La Cruz PA-C, Family Medicine  Feb 7, 2024      Subjective   Audrey is a 64 year old, presenting for the following:  Physical        2/7/2024     7:50 AM   Additional Questions   Roomed by Gelacio bowman         Health Care Directive  Patient does not have a Health Care Directive or Living Will: Patient states has Advance Directive and will bring in a copy to clinic.    HPI  Overall doing OK. Has been having a lot of trouble with her knees. Working with orthopedics for this. Knee injections do help but short lived. She was told she should wait 2+ years for a knee replacement. Really does not know if she can make it that long. She likes to be active and exercise is very important for her mental health.     Heart palpitations have been so much better since having the surgery for SVT. This really helped resolve her anxiety as well.     Continues to work with a counselor and this is going well.         2/7/2024   General Health   How would you rate your overall physical health? (!) FAIR   Feel stress (tense, anxious, or unable to sleep) Only a little   (!) STRESS CONCERN      2/7/2024   Nutrition   Diet: Carbohydrate counting         2/7/2024   Exercise   Days per week of moderate/strenous exercise 6 days         2/7/2024   Social Factors   Frequency of gathering with friends or relatives More than three times a week   Worry food won't last until get money to buy more No   Food not last or not have enough money for food? No   Do you have housing?  Yes   Are you worried about losing your housing? Yes   Lack of transportation? No   Unable to get utilities (heat,electricity)? No   Want help with housing or utility concern? No   (!) HOUSING CONCERN PRESENT      2/7/2024   Fall Risk   Fallen 2 or more times in the past year? No   Trouble with walking or balance? Yes           2/7/2024   Activities of Daily Living- Home Safety   Needs help with the following  daily activites None of the above   Safety concerns in the home Throw rugs in the hallway         2/7/2024   Dental   Dentist two times every year? Yes         2/7/2024   Hearing Screening   Hearing concerns? None of the above         2/7/2024   Driving Risk Screening   Patient/family members have concerns about driving No         2/7/2024   General Alertness/Fatigue Screening   Have you been more tired than usual lately? (!) YES         2/7/2024   Urinary Incontinence Screening   Bothered by leaking urine in past 6 months Yes          No data to display                Today's PHQ-9 Score:       2/7/2024     7:43 AM   PHQ-9 SCORE   PHQ-9 Total Score MyChart 6 (Mild depression)   PHQ-9 Total Score 6         2/7/2024   Substance Use   Alcohol more than 3/day or more than 7/wk Not Applicable   Do you have a current opioid prescription? No   How severe/bad is pain from 1 to 10? 2/10   Do you use any other substances recreationally? No     Social History     Tobacco Use    Smoking status: Never    Smokeless tobacco: Never   Vaping Use    Vaping Use: Never used   Substance Use Topics    Alcohol use: Yes     Comment: rare    Drug use: Never             2/7/2024   Breast Cancer Screening   Family history of breast, colon, or ovarian cancer? No / Unknown          No data to display               Mammogram Screening - Mammogram every 1-2 years updated in Health Maintenance based on mutual decision making      History of abnormal Pap smear: NO - age 30-65 PAP every 5 years with negative HPV co-testing recommended        6/13/2018    12:00 AM   PAP / HPV   PAP-ABSTRACT See Scanned Document           This result is from an external source.     The ASCVD Risk score (Rae RAMOS, et al., 2019) failed to calculate for the following reasons:    The patient has a prior MI or stroke diagnosis          Reviewed and updated as needed this visit by Provider                    BP Readings from Last 3 Encounters:   02/07/24 130/70   10/17/23  132/84   08/30/23 124/86    Wt Readings from Last 3 Encounters:   02/07/24 87.1 kg (192 lb)   01/16/24 85.7 kg (189 lb)   10/17/23 85.7 kg (189 lb)                  Patient Active Problem List   Diagnosis    Bipolar 2 disorder (H)    Cervical radiculopathy at C7    BMI 33.0-33.9,adult    GERD (gastroesophageal reflux disease)    Glaucoma    Morbid obesity (H)    Hyperlipidemia LDL goal <130    NSTEMI (non-ST elevated myocardial infarction) (H)     Past Surgical History:   Procedure Laterality Date    EP ABLATION SVT Bilateral 9/23/2022    Procedure: Ablation Supraventricular Tachycardia;  Surgeon: Donna Wolfe MD;  Location:  [facility]       Social History     Tobacco Use    Smoking status: Never    Smokeless tobacco: Never   Substance Use Topics    Alcohol use: Yes     Comment: rare     Family History   Problem Relation Age of Onset    Diabetes Mother          Current Outpatient Medications   Medication Sig Dispense Refill    acetaminophen (TYLENOL) 500 MG tablet Take 650 mg by mouth every 6 hours as needed for mild pain      B Complex Vitamins (VITAMIN-B COMPLEX PO) Take 1 tablet by mouth daily Unknown tablet strength      CALCIUM PO Take 1 tablet by mouth daily Unknown tablet strength      estradiol-norethindrone (COMBIPATCH) 0.05-0.14 MG/DAY bi-weekly patch REMOVE OLD PATCH AND PLACE 1 PATCH ONTO THE SKIN TWICE A WEEK 24 patch 3    ibuprofen (ADVIL/MOTRIN) 600 MG tablet Take 1 tablet (600 mg) by mouth every 6 hours as needed for moderate pain 120 tablet 1    metroNIDAZOLE (METROGEL) 1 % external gel APPLY TOPICALLY DAILY 60 g 1    naproxen (NAPROSYN) 500 MG tablet Take 1 tablet (500 mg) by mouth 2 times daily (with meals) 60 tablet 3    OMEGA-3 FATTY ACIDS PO Take 1 capsule by mouth daily Unknown capsule strength      omeprazole (PRILOSEC) 20 MG DR capsule TAKE 1 CAPSULE BY MOUTH ONE TIME A DAY. TAKES AS NEEDED FOR HEARTBURN 90 capsule 3    potassium chloride ER (KLOR-CON M) 10 MEQ CR tablet Take 1  tablet (10 mEq) by mouth daily 90 tablet 1    POTASSIUM PO Take 1 tablet by mouth daily Unknown tablet strength      triamcinolone (KENALOG) 0.1 % external cream Apply topically 2 times daily as needed for irritation 30 g 1    zolpidem (AMBIEN) 5 MG tablet TAKE 1/2-1 TABLET BY MOUTH AT BEDTIME AS NEEDED FOR SLEEP. 30 tablet 1     Current providers sharing in care for this patient include:  Patient Care Team:  Alie De La Cruz PA-C as PCP - General (Family Medicine)  Alie De La Cruz PA-C as Assigned PCP  Jose Rafael Cuellar MD as MD (Cardiovascular Disease)  Lan Carcamo, PhD as Assigned Behavioral Health Provider  Stefanie Kent APRN CNP as Assigned Heart and Vascular Provider  Shahid Macdonald DO as Assigned Surgical Provider  Hany Lebron DO as Assigned Neuroscience Provider    The following health maintenance items are reviewed in Epic and correct as of today:  Health Maintenance   Topic Date Due    RSV VACCINE (Pregnancy & 60+) (1 - 1-dose 60+ series) Never done    MEDICARE ANNUAL WELLNESS VISIT  03/11/2022    HPV TEST  06/13/2023    PAP  06/13/2023    COVID-19 Vaccine (5 - 2023-24 season) 09/01/2023    COLORECTAL CANCER SCREENING  09/09/2023    ANNUAL REVIEW OF HM ORDERS  05/05/2024    DTAP/TDAP/TD IMMUNIZATION (5 - Td or Tdap) 09/30/2024    MAMMO SCREENING  03/23/2025    GLUCOSE  09/23/2025    ADVANCE CARE PLANNING  03/18/2026    LIPID  08/07/2027    INFLUENZA VACCINE  Completed    Pneumococcal Vaccine: Pediatrics (0 to 5 Years) and At-Risk Patients (6 to 64 Years)  Completed    ZOSTER IMMUNIZATION  Completed    IPV IMMUNIZATION  Aged Out    HPV IMMUNIZATION  Aged Out    MENINGITIS IMMUNIZATION  Aged Out    RSV MONOCLONAL ANTIBODY  Aged Out    HEPATITIS C SCREENING  Discontinued    HIV SCREENING  Discontinued     Review of Systems    Review of Systems  Constitutional, neuro, ENT, endocrine, pulmonary, cardiac, gastrointestinal, genitourinary, musculoskeletal, integument and psychiatric systems are  "negative, except as otherwise noted.     Objective    Exam  /70   Pulse 81   Temp 97.1  F (36.2  C) (Temporal)   Resp 18   Ht 1.58 m (5' 2.21\")   Wt 87.1 kg (192 lb)   SpO2 97%   BMI 34.89 kg/m     Estimated body mass index is 34.89 kg/m  as calculated from the following:    Height as of this encounter: 1.58 m (5' 2.21\").    Weight as of this encounter: 87.1 kg (192 lb).    Physical Exam  GENERAL: alert and no distress  EYES: Eyes grossly normal to inspection, PERRL and conjunctivae and sclerae normal  HENT: ear canals and TM's normal, nose and mouth without ulcers or lesions  NECK: no adenopathy, no asymmetry, masses, or scars  RESP: lungs clear to auscultation - no rales, rhonchi or wheezes  CV: regular rate and rhythm, normal S1 S2, no S3 or S4, no murmur, click or rub, no peripheral edema  ABDOMEN: soft, nontender, no hepatosplenomegaly, no masses and bowel sounds normal   (female) w/bimanual: normal female external genitalia, normal urethral meatus, normal vaginal mucosa, and normal cervix/adnexa/uterus without masses or discharge  MS: no gross musculoskeletal defects noted, no edema  SKIN: no suspicious lesions or rashes  NEURO: Normal strength and tone, mentation intact and speech normal  PSYCH: mentation appears normal, affect normal/bright        2/7/2024   Mini Cog   Clock Draw Score 2 Normal   3 Item Recall 3 objects recalled   Mini Cog Total Score 5          Assessment & Plan     Encounter for Medicare annual wellness exam    Cervical cancer screening  - Pap Screen with HPV - recommended age 30 - 65 years    Screen for colon cancer  - COLOGUARD(FedBid); Future    Morbid obesity (H)  Encouraged ongoing diet and exercise modifications as able.     Hyperlipidemia LDL goal <130  - Lipid panel reflex to direct LDL Fasting; Future  - Lipid panel reflex to direct LDL Fasting    Bipolar 2 disorder (H)  Stable. Continues to follow with mental health therapist as well.   - zolpidem (AMBIEN) " 5 MG tablet; TAKE 1/2-1 TABLET BY MOUTH AT BEDTIME AS NEEDED FOR SLEEP.    NSTEMI (non-ST elevated myocardial infarction) (H)  Does follow with cardiology. Not currently on ASA or statin.   - CBC with platelets; Future  - CBC with platelets    Screening for diabetes mellitus  - Basic metabolic panel  (Ca, Cl, CO2, Creat, Gluc, K, Na, BUN); Future  - Basic metabolic panel  (Ca, Cl, CO2, Creat, Gluc, K, Na, BUN)    Encounter for screening mammogram for breast cancer  - MA Screen Bilateral w/Zev; Future    Menopausal syndrome (hot flashes)  - estradiol-norethindrone (COMBIPATCH) 0.05-0.14 MG/DAY bi-weekly patch; REMOVE OLD PATCH AND PLACE 1 PATCH ONTO THE SKIN TWICE A WEEK    Cervical radiculopathy at C7  - naproxen (NAPROSYN) 500 MG tablet; Take 1 tablet (500 mg) by mouth 2 times daily (with meals)    Gastroesophageal reflux disease without esophagitis  - omeprazole (PRILOSEC) 20 MG DR capsule; TAKE 1 CAPSULE BY MOUTH ONE TIME A DAY. TAKES AS NEEDED FOR HEARTBURN    Hypokalemia  - potassium chloride ER (KLOR-CON M) 10 MEQ CR tablet; Take 1 tablet (10 mEq) by mouth daily    Counseling  Appropriate preventive services were discussed with this patient, including applicable screening as appropriate for fall prevention, nutrition, physical activity, Tobacco-use cessation, weight loss and cognition.  Checklist reviewing preventive services available has been given to the patient.  Reviewed patient's diet, addressing concerns and/or questions.   She is at risk for psychosocial distress and has been provided with information to reduce risk.   Discussed possible causes of fatigue. Patient reported safety concerns were addressed today.Information on urinary incontinence and treatment options given to patient.   The patient's PHQ-9 score is consistent with mild depression. She was provided with information regarding depression.     Patient has been advised of split billing requirements and indicates understanding: Yes    Signed  Electronically by: Alie De La Cruz PA-C    Answers submitted by the patient for this visit:  Patient Health Questionnaire (Submitted on 2/7/2024)  If you checked off any problems, how difficult have these problems made it for you to do your work, take care of things at home, or get along with other people?: Somewhat difficult  PHQ9 TOTAL SCORE: 6

## 2024-02-07 NOTE — PATIENT INSTRUCTIONS
Your Health Risk Assessment indicates you feel you are not in good health    A healthy lifestyle helps keep the body fit and the mind alert. It helps protect you from disease, helps you fight disease, and helps prevent chronic disease (disease that doesn't go away) from getting worse. This is important as you get older and begin to notice twinges in muscles and joints and a decline in the strength and stamina you once took for granted. A healthy lifestyle includes good healthcare, good nutrition, weight control, recreation, and regular exercise. Avoid harmful substances and do what you can to keep safe. Another part of a healthy lifestyle is stay mentally active and socially involved.    Good healthcare   Have a wellness visit every year.   If you have new symptoms, let us know right away. Don't wait until the next checkup.   Take medicines exactly as prescribed and keep your medicines in a safe place. Tell us if your medicine causes problems.   Healthy diet and weight control   Eat 3 or 4 small, nutritious, low-fat, high-fiber meals a day. Include a variety of fruits, vegetables, and whole-grain foods.   Make sure you get enough calcium in your diet. Calcium, vitamin D, and exercise help prevent osteoporosis (bone thinning).   If you live alone, try eating with others when you can. That way you get a good meal and have company while you eat it.   Try to keep a healthy weight. If you eat more calories than your body uses for energy, it will be stored as fat and you will gain weight.     Recreation   Recreation is not limited to sports and team events. It includes any activity that provides relaxation, interest, enjoyment, and exercise. Recreation provides an outlet for physical, mental, and social energy. It can give a sense of worth and achievement. It can help you stay healthy.    Mental Exercise and Social Involvement  Mental and emotional health is as important as physical health. Keep in touch with friends and  family. Stay as active as possible. Continue to learn and challenge yourself.   Things you can do to stay mentally active are:  Learn something new, like a foreign language or musical instrument.   Play SCRABBLE or do crossword puzzles. If you cannot find people to play these games with you at home, you can play them with others on your computer through the Internet.   Join a games club--anything from card games to chess or checkers or lawn bowling.   Start a new hobby.   Go back to school.   Volunteer.   Read.   Keep up with world events.  Eating Healthy Foods: Care Instructions  With every meal, you can make healthy food choices. Try to eat a variety of fruits, vegetables, whole grains, lean proteins, and low-fat dairy products. This can help you get the right balance of nutrients, including vitamins and minerals. Small changes add up over time. You can start by adding one healthy food to your meals each day.    Try to make half your plate fruits and vegetables, one-fourth whole grains, and one-fourth lean proteins. Try including dairy with your meals.   Eat more fruits and vegetables. Try to have them with most meals and snacks.   Foods for healthy eating    Fruits    These can be fresh, frozen, canned, or dried.  Try to choose whole fruit rather than fruit juice.  Eat a variety of colors.    Vegetables    These can be fresh, frozen, canned, or dried.  Beans, peas, and lentils count too.    Whole grains    Choose whole-grain breads, cereals, and noodles.  Try brown rice.    Lean proteins    These can include lean meat, poultry, fish, and eggs.  You can also have tofu, beans, peas, lentils, nuts, and seeds.    Dairy    Try milk, yogurt, and cheese.  Choose low-fat or fat-free when you can.  If you need to, use lactose-free milk or fortified plant-based milk products, such as soy milk.    Water    Drink water when you're thirsty.  Limit sugar-sweetened drinks, including soda, fruit drinks, and sports drinks.  Where  "can you learn more?  Go to https://www.[employer].net/patiented  Enter T756 in the search box to learn more about \"Eating Healthy Foods: Care Instructions.\"  Current as of: February 28, 2023               Content Version: 13.8    0308-8386 [employer].   Care instructions adapted under license by your healthcare professional. If you have questions about a medical condition or this instruction, always ask your healthcare professional. [employer] disclaims any warranty or liability for your use of this information.      Learning About Activities of Daily Living  What are activities of daily living?     Activities of daily living (ADLs) are the basic self-care tasks you do every day. As you age, and if you have health problems, you may find that it's harder to do these things for yourself. That's when you may need some help.  Your doctor uses ADLs to measure how much help you need. Knowing what you can and can't do for yourself is an important first step to getting help. And when you have the help you need, you can stay as independent as possible.  Your doctor will want to know if you are able to do tasks such as:  Take a bath or shower without help.  Go to the bathroom by yourself.  Dress and undress without help.  Shave, comb your hair, and brush teeth on your own.  Get in and out of bed or a chair without help.  Feed yourself without help.  If you are having trouble doing basic self-care tasks, talk with your doctor. You may want to bring a caregiver or family member who can help the doctor understand your needs and abilities.  How will a doctor assess your ADLs?  Asking about ADLs is part of a routine health checkup your doctor will likely do as you age. Your health check might be done in a doctor's office, in your home, or at a hospital. The goal is to find out if you are having any problems that could make your health problems worse or that make it unsafe for you to be on your own.  To " measure your ADLs, your doctor will ask how hard it is for you to do routine tasks. He or she may also want to know if you have changed the way you do a task because of a health problem. He or she may watch how you:  Walk back and forth.  Keep your balance while you stand or walk.  Move from sitting to standing or from a bed to a chair.  Button or unbutton a shirt or sweater.  Remove and put on your shoes.  It's normal to feel a little worried or anxious if you find you can't do all the things you used to be able to do. Talking with your doctor about ADLs isn't a test that you either pass or fail. It's just a way to get more information about your health and safety.  Follow-up care is a key part of your treatment and safety. Be sure to make and go to all appointments, and call your doctor if you are having problems. It's also a good idea to know your test results and keep a list of the medicines you take.  Current as of: February 26, 2023               Content Version: 13.8    4258-1689 [employer].   Care instructions adapted under license by your healthcare professional. If you have questions about a medical condition or this instruction, always ask your healthcare professional. [employer] disclaims any warranty or liability for your use of this information.      Preventing Falls: Care Instructions  Injuries and health problems such as trouble walking or poor eyesight can increase your risk of falling. So can some medicines. But there are things you can do to help prevent falls. You can exercise to get stronger. You can also arrange your home to make it safer.    Talk to your doctor about the medicines you take. Ask if any of them increase the risk of falls and whether they can be changed or stopped.   Try to exercise regularly. It can help improve your strength and balance. This can help lower your risk of falling.     Practice fall safety and prevention.    Wear low-heeled shoes that  "fit well and give your feet good support. Talk to your doctor if you have foot problems that make this hard.  Carry a cellphone or wear a medical alert device that you can use to call for help.  Use stepladders instead of chairs to reach high objects. Don't climb if you're at risk for falls. Ask for help, if needed.  Wear the correct eyeglasses, if you need them.    Make your home safer.    Remove rugs, cords, clutter, and furniture from walkways.  Keep your house well lit. Use night-lights in hallways and bathrooms.  Install and use sturdy handrails on stairways.  Wear nonskid footwear, even inside. Don't walk barefoot or in socks without shoes.    Be safe outside.    Use handrails, curb cuts, and ramps whenever possible.  Keep your hands free by using a shoulder bag or backpack.  Try to walk in well-lit areas. Watch out for uneven ground, changes in pavement, and debris.  Be careful in the winter. Walk on the grass or gravel when sidewalks are slippery. Use de-icer on steps and walkways. Add non-slip devices to shoes.    Put grab bars and nonskid mats in your shower or tub and near the toilet. Try to use a shower chair or bath bench when bathing.   Get into a tub or shower by putting in your weaker leg first. Get out with your strong side first. Have a phone or medical alert device in the bathroom with you.   Where can you learn more?  Go to https://www.[employer].net/patiented  Enter G117 in the search box to learn more about \"Preventing Falls: Care Instructions.\"  Current as of: July 18, 2023               Content Version: 13.8    3044-1149 [employer].   Care instructions adapted under license by your healthcare professional. If you have questions about a medical condition or this instruction, always ask your healthcare professional. [employer] disclaims any warranty or liability for your use of this information.      Learning About Stress  What is stress?     Stress is your body's " response to a hard situation. Your body can have a physical, emotional, or mental response. Stress is a fact of life for most people, and it affects everyone differently. What causes stress for you may not be stressful for someone else.  A lot of things can cause stress. You may feel stress when you go on a job interview, take a test, or run a race. This kind of short-term stress is normal and even useful. It can help you if you need to work hard or react quickly. For example, stress can help you finish an important job on time.  Long-term stress is caused by ongoing stressful situations or events. Examples of long-term stress include long-term health problems, ongoing problems at work, or conflicts in your family. Long-term stress can harm your health.  How does stress affect your health?  When you are stressed, your body responds as though you are in danger. It makes hormones that speed up your heart, make you breathe faster, and give you a burst of energy. This is called the fight-or-flight stress response. If the stress is over quickly, your body goes back to normal and no harm is done.  But if stress happens too often or lasts too long, it can have bad effects. Long-term stress can make you more likely to get sick, and it can make symptoms of some diseases worse. If you tense up when you are stressed, you may develop neck, shoulder, or low back pain. Stress is linked to high blood pressure and heart disease.  Stress also harms your emotional health. It can make you friend, tense, or depressed. Your relationships may suffer, and you may not do well at work or school.  What can you do to manage stress?  You can try these things to help manage stress:   Do something active. Exercise or activity can help reduce stress. Walking is a great way to get started. Even everyday activities such as housecleaning or yard work can help.  Try yoga or charisma chi. These techniques combine exercise and meditation. You may need some  training at first to learn them.  Do something you enjoy. For example, listen to music or go to a movie. Practice your hobby or do volunteer work.  Meditate. This can help you relax, because you are not worrying about what happened before or what may happen in the future.  Do guided imagery. Imagine yourself in any setting that helps you feel calm. You can use online videos, books, or a teacher to guide you.  Do breathing exercises. For example:  From a standing position, bend forward from the waist with your knees slightly bent. Let your arms dangle close to the floor.  Breathe in slowly and deeply as you return to a standing position. Roll up slowly and lift your head last.  Hold your breath for just a few seconds in the standing position.  Breathe out slowly and bend forward from the waist.  Let your feelings out. Talk, laugh, cry, and express anger when you need to. Talking with supportive friends or family, a counselor, or a edmund leader about your feelings is a healthy way to relieve stress. Avoid discussing your feelings with people who make you feel worse.  Write. It may help to write about things that are bothering you. This helps you find out how much stress you feel and what is causing it. When you know this, you can find better ways to cope.  What can you do to prevent stress?  You might try some of these things to help prevent stress:  Manage your time. This helps you find time to do the things you want and need to do.  Get enough sleep. Your body recovers from the stresses of the day while you are sleeping.  Get support. Your family, friends, and community can make a difference in how you experience stress.  Limit your news feed. Avoid or limit time on social media or news that may make you feel stressed.  Do something active. Exercise or activity can help reduce stress. Walking is a great way to get started.  Where can you learn more?  Go to https://www.healthwise.net/patiented  Enter N032 in the  "search box to learn more about \"Learning About Stress.\"  Current as of: February 26, 2023               Content Version: 13.8    7907-1403 [employer].   Care instructions adapted under license by your healthcare professional. If you have questions about a medical condition or this instruction, always ask your healthcare professional. [employer] disclaims any warranty or liability for your use of this information.      Learning About Sleeping Well  What does sleeping well mean?     Sleeping well means getting enough sleep to feel good and stay healthy. How much sleep is enough varies among people.  The number of hours you sleep and how you feel when you wake up are both important. If you do not feel refreshed, you probably need more sleep. Another sign of not getting enough sleep is feeling tired during the day.  Experts recommend that adults get at least 7 or more hours of sleep per day. Children and older adults need more sleep.  Why is getting enough sleep important?  Getting enough quality sleep is a basic part of good health. When your sleep suffers, your physical health, mood, and your thoughts can suffer too. You may find yourself feeling more grumpy or stressed. Not getting enough sleep also can lead to serious problems, including injury, accidents, anxiety, and depression.  What might cause poor sleeping?  Many things can cause sleep problems, including:  Changes to your sleep schedule.  Stress. Stress can be caused by fear about a single event, such as giving a speech. Or you may have ongoing stress, such as worry about work or school.  Depression, anxiety, and other mental or emotional conditions.  Changes in your sleep habits or surroundings. This includes changes that happen where you sleep, such as noise, light, or sleeping in a different bed. It also includes changes in your sleep pattern, such as having jet lag or working a late shift.  Health problems, such as pain, " "breathing problems, and restless legs syndrome.  Lack of regular exercise.  Using alcohol, nicotine, or caffeine before bed.  How can you help yourself?  Here are some tips that may help you sleep more soundly and wake up feeling more refreshed.  Your sleeping area   Use your bedroom only for sleeping and sex. A bit of light reading may help you fall asleep. But if it doesn't, do your reading elsewhere in the house. Try not to use your TV, computer, smartphone, or tablet while you are in bed.  Be sure your bed is big enough to stretch out comfortably, especially if you have a sleep partner.  Keep your bedroom quiet, dark, and cool. Use curtains, blinds, or a sleep mask to block out light. To block out noise, use earplugs, soothing music, or a \"white noise\" machine.  Your evening and bedtime routine   Create a relaxing bedtime routine. You might want to take a warm shower or bath, or listen to soothing music.  Go to bed at the same time every night. And get up at the same time every morning, even if you feel tired.  What to avoid   Limit caffeine (coffee, tea, caffeinated sodas) during the day, and don't have any for at least 6 hours before bedtime.  Avoid drinking alcohol before bedtime. Alcohol can cause you to wake up more often during the night.  Try not to smoke or use tobacco, especially in the evening. Nicotine can keep you awake.  Limit naps during the day, especially close to bedtime.  Avoid lying in bed awake for too long. If you can't fall asleep or if you wake up in the middle of the night and can't get back to sleep within about 20 minutes, get out of bed and go to another room until you feel sleepy.  Avoid taking medicine right before bed that may keep you awake or make you feel hyper or energized. Your doctor can tell you if your medicine may do this and if you can take it earlier in the day.  If you can't sleep   Imagine yourself in a peaceful, pleasant scene. Focus on the details and feelings of " "being in a place that is relaxing.  Get up and do a quiet or boring activity until you feel sleepy.  Avoid drinking any liquids before going to bed to help prevent waking up often to use the bathroom.  Where can you learn more?  Go to https://www.[employer].net/patiented  Enter J942 in the search box to learn more about \"Learning About Sleeping Well.\"  Current as of: July 11, 2023               Content Version: 13.8    2922-3980 [email or web address].   Care instructions adapted under license by your healthcare professional. If you have questions about a medical condition or this instruction, always ask your healthcare professional. [email or web address] disclaims any warranty or liability for your use of this information.      Bladder Training: Care Instructions  Your Care Instructions     Bladder training is used to treat urge incontinence and stress incontinence. Urge incontinence means that the need to urinate comes on so fast that you can't get to a toilet in time. Stress incontinence means that you leak urine because of pressure on your bladder. For example, it may happen when you laugh, cough, or lift something heavy.  Bladder training can increase how long you can wait before you have to urinate. It can also help your bladder hold more urine. And it can give you better control over the urge to urinate.  It is important to remember that bladder training takes a few weeks to a few months to make a difference. You may not see results right away, but don't give up.  Follow-up care is a key part of your treatment and safety. Be sure to make and go to all appointments, and call your doctor if you are having problems. It's also a good idea to know your test results and keep a list of the medicines you take.  How can you care for yourself at home?  Work with your doctor to come up with a bladder training program that is right for you. You may use one or more of the following methods.  Delayed urination  In the " "beginning, try to keep from urinating for 5 minutes after you first feel the need to go.  While you wait, take deep, slow breaths to relax. Kegel exercises can also help you delay the need to go to the bathroom.  After some practice, when you can easily wait 5 minutes to urinate, try to wait 10 minutes before you urinate.  Slowly increase the waiting period until you are able to control when you have to urinate.  Scheduled urination  Empty your bladder when you first wake up in the morning.  Schedule times throughout the day when you will urinate.  Start by going to the bathroom every hour, even if you don't need to go.  Slowly increase the time between trips to the bathroom.  When you have found a schedule that works well for you, keep doing it.  If you wake up during the night and have to urinate, do it. Apply your schedule to waking hours only.  Kegel exercises  These tighten and strengthen pelvic muscles, which can help you control the flow of urine. (If doing these exercises causes pain, stop doing them and talk with your doctor.) To do Kegel exercises:  Squeeze your muscles as if you were trying not to pass gas. Or squeeze your muscles as if you were stopping the flow of urine. Your belly, legs, and buttocks shouldn't move.  Hold the squeeze for 3 seconds, then relax for 5 to 10 seconds.  Start with 3 seconds, then add 1 second each week until you are able to squeeze for 10 seconds.  Repeat the exercise 10 times a session. Do 3 to 8 sessions a day.  When should you call for help?  Watch closely for changes in your health, and be sure to contact your doctor if:    Your incontinence is getting worse.     You do not get better as expected.   Where can you learn more?  Go to https://www.[employer].net/patiented  Enter V684 in the search box to learn more about \"Bladder Training: Care Instructions.\"  Current as of: February 28, 2023               Content Version: 13.8    9518-2320 [employer].   Care " instructions adapted under license by your healthcare professional. If you have questions about a medical condition or this instruction, always ask your healthcare professional. Healthwise, [facility] disclaims any warranty or liability for your use of this information.      Learning About Depression Screening  What is depression screening?  Depression screening is a way to see if you have depression symptoms. It may be done by a doctor or counselor. It's often part of a routine checkup. That's because your mental health is just as important as your physical health.  Depression is a mental health condition that affects how you feel, think, and act. You may:  Have less energy.  Lose interest in your daily activities.  Feel sad and grouchy for a long time.  Depression is very common. It affects people of all ages.  Many things can lead to depression. Some people become depressed after they have a stroke or find out they have a major illness like cancer or heart disease. The death of a loved one or a breakup may lead to depression. It can run in families. Most experts believe that a combination of inherited genes and stressful life events can cause it.  What happens during screening?  You may be asked to fill out a form about your depression symptoms. You and the doctor will discuss your answers. The doctor may ask you more questions to learn more about how you think, act, and feel.  What happens after screening?  If you have symptoms of depression, your doctor will talk to you about your options.  Doctors usually treat depression with medicines or counseling. Often, combining the two works best. Many people don't get help because they think that they'll get over the depression on their own. But people with depression may not get better unless they get treatment.  The cause of depression is not well understood. There may be many factors involved. But if you have depression, it's not your fault.  A serious symptom of  "depression is thinking about death or suicide. If you or someone you care about talks about this or about feeling hopeless, get help right away.  It's important to know that depression can be treated. Medicine, counseling, and self-care may help.  Where can you learn more?  Go to https://www.[employer].net/patiented  Enter T185 in the search box to learn more about \"Learning About Depression Screening.\"  Current as of: June 25, 2023               Content Version: 13.8    8380-8576 [employer].   Care instructions adapted under license by your healthcare professional. If you have questions about a medical condition or this instruction, always ask your healthcare professional. [employer] disclaims any warranty or liability for your use of this information.      Preventive Care Advice   This is general advice given by our system to help you stay healthy. However, your care team may have specific advice just for you. Please talk to your care team about your preventive care needs.  Nutrition  Eat 5 or more servings of fruits and vegetables each day.  Try wheat bread, brown rice and whole grain pasta (instead of white bread, rice, and pasta).  Get enough calcium and vitamin D. Check the label on foods and aim for 100% of the RDA (recommended daily allowance).  Lifestyle  Exercise at least 150 minutes each week  (30 minutes a day, 5 days a week).  Do muscle strengthening activities 2 days a week. These help control your weight and prevent disease.  No smoking.  Wear sunscreen to prevent skin cancer.  Have a dental exam and cleaning every 6 months.  Yearly exams  See your health care team every year to talk about:  Any changes in your health.  Any medicines your care team has prescribed.  Preventive care, family planning, and ways to prevent chronic diseases.  Shots (vaccines)   HPV shots (up to age 26), if you've never had them before.  Hepatitis B shots (up to age 59), if you've never had them " before.  COVID-19 shot: Get this shot when it's due.  Flu shot: Get a flu shot every year.  Tetanus shot: Get a tetanus shot every 10 years.  Pneumococcal, hepatitis A, and RSV shots: Ask your care team if you need these based on your risk.  Shingles shot (for age 50 and up)  General health tests  Diabetes screening:  Starting at age 35, Get screened for diabetes at least every 3 years.  If you are younger than age 35, ask your care team if you should be screened for diabetes.  Cholesterol test: At age 39, start having a cholesterol test every 5 years, or more often if advised.  Bone density scan (DEXA): At age 50, ask your care team if you should have this scan for osteoporosis (brittle bones).  Hepatitis C: Get tested at least once in your life.  STIs (sexually transmitted infections)  Before age 24: Ask your care team if you should be screened for STIs.  After age 24: Get screened for STIs if you're at risk. You are at risk for STIs (including HIV) if:  You are sexually active with more than one person.  You don't use condoms every time.  You or a partner was diagnosed with a sexually transmitted infection.  If you are at risk for HIV, ask about PrEP medicine to prevent HIV.  Get tested for HIV at least once in your life, whether you are at risk for HIV or not.  Cancer screening tests  Cervical cancer screening: If you have a cervix, begin getting regular cervical cancer screening tests starting at age 21.  Breast cancer scan (mammogram): If you've ever had breasts, begin having regular mammograms starting at age 40. This is a scan to check for breast cancer.  Colon cancer screening: It is important to start screening for colon cancer at age 45.  Have a colonoscopy test every 10 years (or more often if you're at risk) Or, ask your provider about stool tests like a FIT test every year or Cologuard test every 3 years.  To learn more about your testing options, visit:   [email or web address].  For help  making a decision, visit:   https://bit.ly/ym12971.  Prostate cancer screening test: If you have a prostate, ask your care team if a prostate cancer screening test (PSA) at age 55 is right for you.  Lung cancer screening: If you are a current or former smoker ages 50 to 80, ask your care team if ongoing lung cancer screenings are right for you.  For informational purposes only. Not to replace the advice of your health care provider. Copyright   2023 [facility]. All rights reserved. Clinically reviewed by the [facility]. Ambarella [id number] - REV 01/24.

## 2024-02-09 ENCOUNTER — VIRTUAL VISIT (OUTPATIENT)
Dept: PSYCHOLOGY | Facility: CLINIC | Age: 64
End: 2024-02-09
Payer: COMMERCIAL

## 2024-02-09 ENCOUNTER — TELEPHONE (OUTPATIENT)
Dept: FAMILY MEDICINE | Facility: CLINIC | Age: 64
End: 2024-02-09
Payer: COMMERCIAL

## 2024-02-09 DIAGNOSIS — F33.0 MILD EPISODE OF RECURRENT MAJOR DEPRESSIVE DISORDER (H): Primary | ICD-10-CM

## 2024-02-09 LAB
BKR LAB AP GYN ADEQUACY: NORMAL
BKR LAB AP GYN INTERPRETATION: NORMAL
BKR LAB AP HPV REFLEX: NORMAL
BKR LAB AP PREVIOUS ABNORMAL: NORMAL
PATH REPORT.COMMENTS IMP SPEC: NORMAL
PATH REPORT.COMMENTS IMP SPEC: NORMAL
PATH REPORT.RELEVANT HX SPEC: NORMAL

## 2024-02-09 PROCEDURE — 90834 PSYTX W PT 45 MINUTES: CPT | Mod: 95 | Performed by: PSYCHOLOGIST

## 2024-02-09 ASSESSMENT — PATIENT HEALTH QUESTIONNAIRE - PHQ9
10. IF YOU CHECKED OFF ANY PROBLEMS, HOW DIFFICULT HAVE THESE PROBLEMS MADE IT FOR YOU TO DO YOUR WORK, TAKE CARE OF THINGS AT HOME, OR GET ALONG WITH OTHER PEOPLE: SOMEWHAT DIFFICULT
SUM OF ALL RESPONSES TO PHQ QUESTIONS 1-9: 6
SUM OF ALL RESPONSES TO PHQ QUESTIONS 1-9: 6

## 2024-02-09 NOTE — TELEPHONE ENCOUNTER
Spoke with patient and informed of results below. Patient declined to start medication at this time. Stated that she has had a test completed 3 times that shows no cholesterol build up in her arteries.     Trudy Modi MA

## 2024-02-09 NOTE — PROGRESS NOTES
[facility]                                     Progress Note    Disclaimer: Voice recognition software was used to generate this note. As a result, wrong word or 'sound-a-like' substitutions may have occurred due to the inherent limitations of voice recognition software. There may be errors in the script that have gone undetected. Please consider this when interpreting information found in this chart.     Patient Name: Audrey Ricks  Date: February 9, 2024               Service Type: Individual      Session Start Time: 10:00AM  Session End Time: 10:45AM     Session Length: 45    Session #: 15    Attendees: Client attended alone    Service Modality:  Video Visit:      Provider verified identity through the following two step process.  Patient provided:  Patient is known previously to provider    Telemedicine Visit: The patient's condition can be safely assessed and treated via synchronous audio and visual telemedicine encounter.      Reason for Telemedicine Visit: Services only offered telehealth    Originating Site (Patient Location): Patient's home    Distant Site (Provider Location): Provider Remote Setting- Home Office    Consent:  The patient/guardian has verbally consented to: the potential risks and benefits of telemedicine (video visit) versus in person care; bill my insurance or make self-payment for services provided; and responsibility for payment of non-covered services.     Patient would like the video invitation sent by:  My Chart    Mode of Communication:  Video Conference via [facility]    Distant Location (Provider):  Off-site    As the provider I attest to compliance with applicable laws and regulations related to telemedicine.    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 Always feels bad if she has to leave  (history of having to leave dt PA's).    Homework: Achieved / completed to satisfaction      Episode of Care Goals: Satisfactory progress - ACTION (Actively working towards change); Intervened by reinforcing change plan / affirming steps taken     Current / Ongoing Stressors and Concerns:    Noticing increasing problems with memory. Forgetting appointments Has been having problems with her knee probably heading for knee replacement in a couple years. Insurance is making her go through a lot of preliminary treatments before they will authorize replacement.   Granddaughter has transitioned to [facility]. Now 9 months chemical and smoke free.      Treatment Objective(s) Addressed in This Session:   use at least 2 coping skills for anxiety management in the next 2 weeks     Intervention:   CBT: behavioral activation    Assessments completed prior to visit:  The following assessments were completed by patient for this visit:  PHQ9:       7/28/2023     8:29 AM 8/24/2023     9:57 AM 10/12/2023    10:01 AM 12/4/2023     9:44 AM 1/5/2024     9:53 AM 2/7/2024     7:43 AM 2/9/2024     9:57 AM   PHQ-9 SCORE   PHQ-9 Total Score MyChart 12 (Moderate depression) 12 (Moderate depression) 8 (Mild depression) 3 (Minimal depression) 4 (Minimal depression) 6 (Mild depression) 6 (Mild depression)   PHQ-9 Total Score 12 12 8 3 4 6 6     GAD7:       12/12/2022     1:54 PM 5/5/2023     8:08 AM   DAVID-7 SCORE   Total Score 4 (minimal anxiety) 6 (mild anxiety)   Total Score 4 6     CAGE-AID:       12/12/2022     2:00 PM   CAGE-AID Total Score   Total Score 0   Total Score MyChart 0 (A total score of 2 or greater is considered clinically significant)     PROMIS 10-Global Health (all questions and answers displayed):       12/12/2022     1:59 PM 4/14/2023     8:55 AM 4/28/2023    10:55 AM 7/28/2023     8:32 AM 12/4/2023     9:49 AM   PROMIS 10   In general, would you say your health is: Very good Good Very good Fair Good   In  general, would you say your quality of life is: Poor Good Very good Good Good   In general, how would you rate your physical health? Very good Good Very good Fair Fair   In general, how would you rate your mental health, including your mood and your ability to think? Poor Fair Fair Fair Fair   In general, how would you rate your satisfaction with your social activities and relationships? Good Good Very good Fair Good   In general, please rate how well you carry out your usual social activities and roles Good Fair Fair Fair Fair   To what extent are you able to carry out your everyday physical activities such as walking, climbing stairs, carrying groceries, or moving a chair? Completely Completely Completely Moderately Mostly   In the past 7 days, how often have you been bothered by emotional problems such as feeling anxious, depressed, or irritable? Sometimes Sometimes Sometimes Sometimes Sometimes   In the past 7 days, how would you rate your fatigue on average? None Mild Mild Moderate None   In the past 7 days, how would you rate your pain on average, where 0 means no pain, and 10 means worst imaginable pain? 1 6 4 8 5   In general, would you say your health is: 4 3 4 2 3   In general, would you say your quality of life is: 1 3 4 3 3   In general, how would you rate your physical health? 4 3 4 2 2   In general, how would you rate your mental health, including your mood and your ability to think? 1 2 2 2 2   In general, how would you rate your satisfaction with your social activities and relationships? 3 3 4 2 3   In general, please rate how well you carry out your usual social activities and roles. (This includes activities at home, at work and in your community, and responsibilities as a parent, child, spouse, employee, friend, etc.) 3 2 2 2 2   To what extent are you able to carry out your everyday physical activities such as walking, climbing stairs, carrying groceries, or moving a chair? 5 5 5 3 4   In the  past 7 days, how often have you been bothered by emotional problems such as feeling anxious, depressed, or irritable? 3 3 3 3 3   In the past 7 days, how would you rate your fatigue on average? 1 2 2 3 1   In the past 7 days, how would you rate your pain on average, where 0 means no pain, and 10 means worst imaginable pain? 1 6 4 8 5   Global Mental Health Score 8 11 13 10 11   Global Physical Health Score 18 15 16 10 14   PROMIS TOTAL - SUBSCORES 26 26 29 20 25     [place] Suicide Severity Rating Scale (Lifetime/Recent)      1/24/2023     4:00 PM   [place] Suicide Severity Rating (Lifetime/Recent)   Q1 Wished to be Dead (Past Month) no   Q2 Suicidal Thoughts (Past Month) no   Q3 Suicidal Thought Method no   Q4 Suicidal Intent without Specific Plan no   Q5 Suicide Intent with Specific Plan no   Level of Risk per Screen low risk         ASSESSMENT: Current Emotional / Mental Status (status of significant symptoms):   Risk status (Self / Other harm or suicidal ideation)   Patient denies current fears or concerns for personal safety.   Patient denies current or recent suicidal ideation or behaviors.   Patient denies current or recent homicidal ideation or behaviors.   Patient denies current or recent self injurious behavior or ideation.   Patient denies other safety concerns.   Patient reports there has been no change in risk factors since their last session.     Patient reports there has been no change in protective factors since their last session.     Recommended that patient call 911 or go to the local ED should there be a change in any of these risk factors.     Appearance:   Appropriate    Eye Contact:   Good    Psychomotor Behavior: Normal    Attitude:   Cooperative    Orientation:   All   Speech    Rate / Production: Normal     Volume:  Normal    Mood:    Normal   Affect:    Appropriate    Thought Content:  Clear    Thought Form:  Coherent  Logical    Insight:    Good      Medication Review:   No current  psychiatric medications prescribed     Medication Compliance:   NA     Changes in Health Issues:   None reported     Chemical Use Review:   Substance Use: Chemical use reviewed, no active concerns identified      Tobacco Use: No current tobacco use.      Diagnosis:  Diagnosis:  1. Mild episode of recurrent major depressive disorder (H24)          Collateral Reports Completed:   Not Applicable    PLAN: (Patient Tasks / Therapist Tasks / Other)  Client to advocate for herself as appropriate. Participate in at least two activities to promote relaxation per week.         Lan Carcamo, PhD                                                         ______________________________________________________________________    Individual Treatment Plan    Patient's Name: Audrey Ricks  YOB: 1960    Date of Creation: 2/10/23  Date Treatment Plan Last Reviewed/Revised: 4/28/23, 8/24/23, 12/4/23    DSM5 Diagnoses: 300.01 (F41.0) Panic Disorder  Psychosocial / Contextual Factors: Chronic, intermittent pain  PROMIS (reviewed every 90 days):     Referral / Collaboration:  Referral to another professional/service is not indicated at this time..    Anticipated number of session for this episode of care: 20  Anticipation frequency of session: Every other week  Anticipated Duration of each session: 38-52 minutes  Treatment plan will be reviewed in 90 days or when goals have been changed.     Anxiety Treatment plan:  Education- the Biopsychosocial model of anxiety  Client will be able to describe how anxiety is effecting them physically, emotionally and socially  Distraction  Client will learn 2 techniques to distract themselves when becoming anxious  Diaphragmatic breathing  Client will learn to breath using their diaphragm  Client will learn 2 breathing patterns to use to reduce anxiety  Visualization  Client will learn to establish a safe, calm place in their mind utilizing all their senses  Client will practice using  visualization at times when they are not anxious so they will be able to use it when anxiety occurs  Progressive muscle relaxation  Client will learn progressive muscle relaxation techniques and practice them 4-5 times per week.  Client will learn to use mental images of relaxation to relax muscle groups and do this on a daily basis  Self-care  Client will identify 3 things they can do just for themselves  Client will take time for quiet, reflection, meditation 3 times per week  Client will Learn to set boundaries when appropriate  Client will Identify 3 individuals they can call on for support, distraction  Assessment of progress  Client will engage in assessment of their progress on a regular basis    Patient has reviewed and agreed to the above plan.      Lan Carcamo, PhD  February 10, 2023    Answers for HPI/ROS submitted by the patient on 2/10/2023  If you checked off any problems, how difficult have these problems made it for you to do your work, take care of things at home, or get along with other people?: Somewhat difficult  PHQ9 TOTAL SCORE: 4    Answers for HPI/ROS submitted by the patient on 2/24/2023  If you checked off any problems, how difficult have these problems made it for you to do your work, take care of things at home, or get along with other people?: Not difficult at all  PHQ9 TOTAL SCORE: 1  Answers for HPI/ROS submitted by the patient on 4/14/2023  If you checked off any problems, how difficult have these problems made it for you to do your work, take care of things at home, or get along with other people?: Not difficult at all  PHQ9 TOTAL SCORE: 2    Answers for HPI/ROS submitted by the patient on 4/28/2023  If you checked off any problems, how difficult have these problems made it for you to do your work, take care of things at home, or get along with other people?: Somewhat difficult  PHQ9 TOTAL SCORE: 6  Answers for HPI/ROS submitted by the patient on 6/29/2023  If you checked off any  problems, how difficult have these problems made it for you to do your work, take care of things at home, or get along with other people?: Somewhat difficult  PHQ9 TOTAL SCORE: 5  Answers submitted by the patient for this visit:  Patient Health Questionnaire (Submitted on 8/24/2023)  If you checked off any problems, how difficult have these problems made it for you to do your work, take care of things at home, or get along with other people?: Very difficult  PHQ9 TOTAL SCORE: 12    Answers submitted by the patient for this visit:  Patient Health Questionnaire (Submitted on 10/12/2023)  If you checked off any problems, how difficult have these problems made it for you to do your work, take care of things at home, or get along with other people?: Somewhat difficult  PHQ9 TOTAL SCORE: 8    Answers submitted by the patient for this visit:  Patient Health Questionnaire (Submitted on 12/4/2023)  If you checked off any problems, how difficult have these problems made it for you to do your work, take care of things at home, or get along with other people?: Not difficult at all  PHQ9 TOTAL SCORE: 3    Answers submitted by the patient for this visit:  Patient Health Questionnaire (Submitted on 1/5/2024)  If you checked off any problems, how difficult have these problems made it for you to do your work, take care of things at home, or get along with other people?: Somewhat difficult  PHQ9 TOTAL SCORE: 4    Answers submitted by the patient for this visit:  Patient Health Questionnaire (Submitted on 2/9/2024)  If you checked off any problems, how difficult have these problems made it for you to do your work, take care of things at home, or get along with other people?: Somewhat difficult  PHQ9 TOTAL SCORE: 6

## 2024-02-09 NOTE — RESULT ENCOUNTER NOTE
Please notify patient /parent that overall labs were stable with the exception that her cholesterol levels are quite high. I would recommend starting a statin to bring this down and decrease heart attack and stroke risk. Please inquire if she is comfortable with this.    Alie De La Cruz PA-C

## 2024-02-09 NOTE — TELEPHONE ENCOUNTER
----- Message from Alie De La Cruz PA-C sent at 2/9/2024 12:47 PM CST -----  Please notify patient /parent that overall labs were stable with the exception that her cholesterol levels are quite high. I would recommend starting a statin to bring this down and decrease heart attack and stroke risk. Please inquire if she is comfortable with this.    Alie De La Cruz PA-C

## 2024-02-12 LAB
HUMAN PAPILLOMA VIRUS 16 DNA: NEGATIVE
HUMAN PAPILLOMA VIRUS 18 DNA: NEGATIVE
HUMAN PAPILLOMA VIRUS FINAL DIAGNOSIS: NORMAL
HUMAN PAPILLOMA VIRUS OTHER HR: NEGATIVE

## 2024-03-06 LAB — NONINV COLON CA DNA+OCC BLD SCRN STL QL: NEGATIVE

## 2024-04-16 NOTE — PROGRESS NOTES
Audrey Ricks  :  1960  DOS: 2024  MRN: [id number]  PCP: Alie De La Cruz    Sports Medicine Clinic Visit    Interim History - 2024  - Last seen in clinic on 2024 for left knee pes anserine bursitis and a left medial meniscus tear .  - Left knee injection completed on 2024 provided 2 months of moderate relief . She notes that she has had a few episodes where if feels like there is a very sharp needle in the medial compartment of her knee that radiates to the medial joint line and posterior knee and is unable to fully bear weight for a few days, feels a strong catching sensation at this time as well. Denies mechanical locking. Patient notes that she would like a repeat CSI injection  - Since the last visit, her left knee pain has returned over the past 4 weeks. She is taking Extra strength Tylenol and extra strength Naproxen. Takes Ambien at night to sleep. Has not been able to work out or go to work for 2 weeks due to knee pain.  Has not been able to wear her  brace due to pain and pressure on the medial knee.  - No interim injury.       Interim History - 2024  - Last seen in clinic on 2023 for left knee pes anserine bursitis and a left medial meniscus tear.  - [place] guided left knee corticosteroid injection completed on 2023 provided 4 months of great relief.  - Since the last visit, she notes a return in left medial knee pain over the past month. One instance of popping while swimming but has not popped since then. Does feel like a sharp pain occurs on occasion. Her gait pattern is starting to be affected again. Has started to take strong ibuprofen (previously for her sciatica).  - No interim injury.       Interim History - 2023  - Last seen in clinic on 2023 for left knee pes anserine bursitis, left patellar tendonopathy and left medial meniscus tear.  - MR left knee completed on 2023. Results on file. Presents today to discuss  "treatment options.    -MR L knee 8/24/2023 shows a complex tear of the medial meniscus body including multidirectional vertical tearing of the posterior horn, lateral meniscus tear, posterolateral capsular tear/injury including the arcuate ligament.  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  Also with a small amount of pes anserine bursitis.    - Since the last visit, no significant change in symptoms. Feels better extended and elevated, but cannot walk even short distances without pain. No mechanical locking, but very stiff. No prior injections or braces. Has done PT.   - No interim injury.      Initial Visit: August 22, 2023  HPI  Audrey Ricks is a 63 year old female who is seen in consultation at the request of  Santana Ruiz M.D. presenting with left knee pain.    - Mechanism of Injury:  Fell onto her anterior left knee in 2021. Thinks she re-injured it while dancing  on July 4, 2023.   - Prior evaluation:  8/8/2023 with Santana Ruiz MD. described 4 to 6 weeks of suprapatellar knee pain after a shifting maneuver with her kayak.  Pain worse with prolonged standing and walking.  Performs HEP.  TTP over the suprapatellar region and medial joint line.  Referred to PT and orthopedic follow-up.  -XR L knee 8/8/2023 shows normal anatomic alignment with a very mild knee joint effusion, mild swelling over the patellar tendon, mild medial compartment joint space narrowing, no acute fracture or dislocation.    - Pain Character:  Pain has been present for  1.5 months  with worsening pain.  Pain is well localized to the anteromedial left knee without significant radiation.   - Endorses:  \"ice pick\" distal to the patella, numbness over the proximal anterior lower leg  - Denies:  clicking, popping, grinding, instability, radicular shooting pain, weakness.   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      Review of Systems  Musculoskeletal: as above  Remainder of review of systems is negative including constitutional, CV, pulmonary, GI, Skin and Neurologic except as noted in HPI or medical history.    Past Medical History:   Diagnosis Date    Depressive disorder 2011    Hypertension     NSTEMI (non-ST elevated myocardial infarction) (H) 08/06/2022    SVT (supraventricular tachycardia)      Past Surgical History:   Procedure Laterality Date    EP ABLATION SVT Bilateral 9/23/2022    Procedure: Ablation Supraventricular Tachycardia;  Surgeon: Donna Wolfe MD;  Location:  [facility]     Family History   Problem Relation Age of Onset    Diabetes Mother          Objective  Wt 87.1 kg (192 lb)   BMI 34.89 kg/m      General: healthy, alert and in no acute distress.    HEENT: no scleral icterus or conjunctival erythema.   Skin: no suspicious lesions or rash. No jaundice.   CV: regular rhythm by palpation, 2+ distal pulses.  Resp: normal respiratory effort without conversational dyspnea.   Psych: normal mood and affect.    Gait: nonantalgic, appropriate coordination and balance.     Neuro:        - Sensation to light touch:    - Intact throughout the BLE including all peripheral nerve distributions.        - MSR:      RLE  LLE  - Patella 2+ 2+  - Achilles 2+ 2+       - Special tests:   - Slump/SLR:  Neg bilaterally    MSK - Knee:       - Inspection:    - Moderate swelling present at the medial joint line and pes anserine bursa with some ecchymosis and without surrounding erythema, warmth, lesion.        - ROM:    - Full AROM/PROM with pain during knee flexion/extension.        - Palpation:    - TTP at the medial joint  line, pes anserine bursa  - NTTP elsewhere.        - Strength:  (*antalgic)    - Hip Flexion  5     - Hip Abduction 5    - Hip Adduction 5   - Knee Flexion  5*   - Knee Extension 5*   - Dorsiflexion  5   - Plantarflexion 5   - Ext. Luisito. Longus 5   - Inversion  5   - Eversion  5        - Special tests:        - Lachman:  Neg        - A/P drawer:  Neg       - Pivot shift:  Neg   - Shahnaz: Positive for medial compartment pain and click     - Varus stress: Neg    - Valgus stress:  positive for pain, no laxity   - Patellar grind: Positive      Radiology  I independently reviewed the available relevant imaging, with the following interpretation:   - XR L knee 8/8/2023 shows mild medial and lateral compartment joint space narrowing without significant osteophytosis, small knee joint effusion, no acute fractures or dislocations.  - MRI with interpretation as above in HPI.    XR KNEE STANDING LEFT 2 VIEWS 8/8/2023 1:57 PM     HISTORY: Chronic pain of left knee; Chronic pain of left knee     COMPARISON: None.                                                                      IMPRESSION: No fracture or effusion. Mild medial compartment  narrowing.     ERICK THOMAS MD        MR left knee without contrast 8/24/2023 2:25 PM     Techniques: Multiplanar multisequence imaging of the left knee was  obtained without administration of intra-articular or intravenous  contrast using routing protocol.     History: Pes anserine bursitis; Tendinopathy of patella     Comparison: Radiograph 8/8/2023     Findings:     Motion partially degrading images.     MENISCI:  Medial meniscus: Multidirectional tear of the medial meniscus body and  posterior horn including longitudinal vertical tearing component with  approximately 4 mm meniscal flap in the medial tibial gutter and at  the posterior horn root ligament junction.  Lateral meniscus: Free edge, likely longitudinal horizontal tear of  the lateral meniscal body.      LIGAMENTS  Cruciate ligaments: Intact.  Medial supporting structures: Peripheral bowing of the tibial  collateral ligament with moderate periligamentous edema surrounding  the tibial collateral ligament and posterior oblique ligament, trace  tibial collateral ligament bursal fluid, moderate to high-grade  sprain/partial tear meniscal femoral and meniscal tibial ligaments are  likely related to altered mechanics due to underlying meniscal  pathology.  Lateral supporting structures: Posterolateral capsular fluid signal,  consistent with capsular tear/injury including expected course of  arcuate ligament. Otherwise popliteus tendon, fibular collateral  ligament, biceps femoris tendon, conjoined tendon, popliteofibular  ligament and iliotibial band are intact.     EXTENSOR MECHANISM  Intact.     FLUID  Small joint effusion. No substantial Baker's cyst.  Trace pes anserine bursitis fluid.     OSSEOUS and ARTICULAR STRUCTURES  Bones: No fracture, contusion, or osseous lesion is seen.     Peripheral edema-like marrow signal intensity of the medial tibial  plateau, likely reactive to underlying meniscal pathology.     Patellofemoral compartment: On a background of diffuse moderate to  high-grade chondral loss, full-thickness chondral fissuring with  subtle subchondral edema-like marrow signal intensity in the patellar  articular cartilage. At least low-grade chondral loss of the central  trochlear cartilage.     Medial compartment: Focal full-thickness chondral fissure or subtle  subchondral edema across intensity posterior weightbearing surface of  medial femoral condyle.     Lateral compartment: No high-grade hyaline cartilage disease.     ANCILLARY FINDINGS  Mild nonspecific subcutaneous edema especially anteriorly.                                                                      Impression:  Motion partially degrading images.  1. Multidirectional tear of the medial meniscus body and posterior  horn including  longitudinal vertical tearing component.   a. Presumed reactive changes of medial supporting structures.   b. Mild pes anserine bursitis, likely reactive.  2. Free edge, likely longitudinal horizontal tear of the lateral  meniscal body.  3. Posterolateral capsular tear/injury including expected course of  arcuate ligament; otherwise, lateral supporting structures are intact.  4. Foci of grade IV chondromalacia in the patellofemoral and medial  compartments.     ROGER MAGALLON     Procedure  Large Joint Injection/Arthocentesis: L knee joint    Date/Time: 4/18/2024 9:01 AM    Performed by: Hany Lebron DO  Authorized by: Hany Lebron DO    Indications:  Osteoarthritis  Needle Size:  18 G  Guidance: ultrasound    Approach:  Anterolateral  Location:  Knee      Medications:  40 mg triamcinolone 40 MG/ML; 2 mL lidocaine 1 %; 2 mL BUPivacaine 0.5 %  Aspirate amount (mL):  23  Aspirate:  Clear and yellow  Outcome:  Tolerated well, no immediate complications  Procedure discussed: discussed risks, benefits, and alternatives    Consent Given by:  Patient  Prep: patient was prepped and draped in usual sterile fashion     Ultrasound images of procedure were permanently stored.     3 ml 1% Lidocaine used for anesthetic       Ultrasound Guided Intra-articular Knee Aspiration + Injection  The knee was prepped and draped in a sterile manner.  Ultrasound identification of the patella, suprapatellar pouch, quadriceps tendon and femur in both long and short axis was obtained. The probe was placed in short axis to the femur.  A 1.5 inch 25-gauge needle was used to inject 3 mL of local anesthetic.  Then a 1.5 inch 18 gauge needle was placed under ultrasound guidance into the superior knee joint using a lateral approach. Approximately 23 mL of clear yellow synovial fluid was aspirated from the knee joint. Then, the aspiration syringe was exchanged for the injection syringe without removing the needle. Then, a mixture of 2 mL of  1% lidocaine, 2 mL of 0.5% bupivacaine and 1 ml kenalog (40mg/ml) was injected without difficulty. The needle was removed and there was good hemostasis without complications.  Patient tolerated procedure well.  There was ultrasound documentation of needle placement and injection.            Assessment  1. Complex tear of medial meniscus of left knee as current injury, initial encounter    2. Primary osteoarthritis of left knee        Plan  Audrey Ricks is a 63 year old female that presents for follow-up of her chronic anteromedial left knee pain.  Original history and physical exam appear most consistent with a medial meniscus tear and pes anserine bursitis, in the setting of chronic patellar tendinopathy.  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    History, imaging, and physical exam are consistent with a complex medial meniscus tear, lateral meniscus tear, posterior lateral capsule injury and pes anserine bursitis in the setting of severe primary osteoarthritis of the left knee.    Originally preferred to avoid surgery and was dedicated and motivated for conservative therapy. She has gotten good relief from corticosteroid injections, but only for a few months at a time with diminishing returns recently. She has attempted conservative treatments including NSAIDs,  bracing, physical therapy, home exercise program, corticosteroid injections which have provided decent  pain relief, but now she is experiencing more debilitating mechanical catching that is stopping her ADLs and makes her feel like she is going to fall.  She is now more in favor of discussing surgical repair of the meniscus versus arthroplasty  depending on surgeon's preference and goals.  She is very active and would tend to lean towards minimal surgery to repair the meniscus to remain active is much as possible, not opposed to arthroplasty in the future.  Very interested in another corticosteroid injection today and is aware that this may delay surgical intervention.    Discussed the nature of the condition and treatment options and mutually agreed upon the following plan:    - Medications:          - Discussed pharmacologic options for pain relief.   -  May use NSAIDs (Ibuprofen, Naproxen) or Acetaminophen (Tylenol) as needed for pain control.   -  May also use topical medications such as lidocaine, IcyHot, BioFreeze, or Voltaren gel as needed for pain control.   - Injections:          - Discussed possible injection options and alternatives.    - Options include intra-articular knee joint injection with corticosteroid, viscosupplementation, or PRP.   - Performed an Ultrasound-guided aspiration and corticosteroid injection of the left intra-articular knee joint today in clinic. Patient tolerated the procedure well without complications.   - Post-procedure instructions:    - Keep the injection site clean and dry.   - Do not submerge the injection site for 24 hours (no baths, pools). Showers are ok.   - Rest the area for 24-48 hours before resuming normal activities. Avoid overexerting the area for the first few weeks.   - It may take 2-3 days to start noticing the effects of the injection and up to 3-4 weeks to feel significant benefits.   - In general, an injection in the same anatomical region can be repeated every 3 months as needed.  However, for the health of your tissues you will want to space out the injections as much as possible and request them only when symptoms are significantly bothersome and disrupting daily function and/or sleep.   - Therapy:          - Discussed the benefits of physical therapy vs home exercise program for optimization of  range of motion, flexibility, strength, stability and function.   - She has done physical therapy in the past and has an established home exercise program.  Encouraged to continue home exercise program as instructed by PT.  - Modalities:          - May use ice, heat, massage or other modalities as needed.   - Bracing:          - Continue using the  brace for ambulation and other exacerbating activities as needed.  - Surgery:          - Discussed non-operative and operative treatment options for the patient's condition, which includes meniscal repair versus arthroplasty based on the level of arthritis in the knee already.   -  She was doing marginally well with conservative care at until the last few months when she has been experiencing much more debilitating mechanical symptoms and is now interested in discussing surgical repair of the meniscus versus arthroplasty.  Orthopedic  referral has been placed today to Dr. Ang and case discussed with his NP Ray Vidal.   - Activity:          - Encouraged to remain active and participate in regular activities as symptoms allow.  Avoid or modify exacerbating activities as needed.  - Follow up:          - With Dr. Ang for discussion of surgical options.  If nonsurgical treatment is decided, could see her back in 3+ months as needed for re-evaluation and update to treatment plan, or sooner for new/worsening symptoms.  - Patient has clinic contact information for questions or concerns.       Hany Lebron DO, CAQSM  [facility] - Sports Medicine  [facility] - Department of Orthopedic Surgery       Disclaimer:  This note was prepared and written using Dragon Medical dictation software. As a result, there may be errors in the script that have gone undetected. Please consider this when interpreting the information in this note.

## 2024-04-18 ENCOUNTER — OFFICE VISIT (OUTPATIENT)
Dept: ORTHOPEDICS | Facility: CLINIC | Age: 64
End: 2024-04-18
Payer: COMMERCIAL

## 2024-04-18 VITALS — WEIGHT: 192 LBS | BODY MASS INDEX: 34.89 KG/M2

## 2024-04-18 DIAGNOSIS — M17.12 PRIMARY OSTEOARTHRITIS OF LEFT KNEE: ICD-10-CM

## 2024-04-18 DIAGNOSIS — M25.562 LEFT KNEE PAIN: Primary | ICD-10-CM

## 2024-04-18 DIAGNOSIS — S83.232A COMPLEX TEAR OF MEDIAL MENISCUS OF LEFT KNEE AS CURRENT INJURY, INITIAL ENCOUNTER: Primary | ICD-10-CM

## 2024-04-18 PROCEDURE — 99213 OFFICE O/P EST LOW 20 MIN: CPT | Mod: 25 | Performed by: STUDENT IN AN ORGANIZED HEALTH CARE EDUCATION/TRAINING PROGRAM

## 2024-04-18 PROCEDURE — 20611 DRAIN/INJ JOINT/BURSA W/US: CPT | Mod: LT | Performed by: STUDENT IN AN ORGANIZED HEALTH CARE EDUCATION/TRAINING PROGRAM

## 2024-04-18 RX ORDER — TRIAMCINOLONE ACETONIDE 40 MG/ML
40 INJECTION, SUSPENSION INTRA-ARTICULAR; INTRAMUSCULAR
Status: SHIPPED | OUTPATIENT
Start: 2024-04-18

## 2024-04-18 RX ORDER — BUPIVACAINE HYDROCHLORIDE 5 MG/ML
2 INJECTION, SOLUTION PERINEURAL
Status: SHIPPED | OUTPATIENT
Start: 2024-04-18

## 2024-04-18 RX ORDER — LIDOCAINE HYDROCHLORIDE 10 MG/ML
2 INJECTION, SOLUTION INFILTRATION; PERINEURAL
Status: SHIPPED | OUTPATIENT
Start: 2024-04-18

## 2024-04-18 RX ADMIN — TRIAMCINOLONE ACETONIDE 40 MG: 40 INJECTION, SUSPENSION INTRA-ARTICULAR; INTRAMUSCULAR at 09:01

## 2024-04-18 RX ADMIN — LIDOCAINE HYDROCHLORIDE 2 ML: 10 INJECTION, SOLUTION INFILTRATION; PERINEURAL at 09:01

## 2024-04-18 RX ADMIN — BUPIVACAINE HYDROCHLORIDE 2 ML: 5 INJECTION, SOLUTION PERINEURAL at 09:01

## 2024-04-18 ASSESSMENT — PAIN SCALES - GENERAL: PAINLEVEL: MODERATE PAIN (5)

## 2024-04-18 NOTE — LETTER
2024         RE: Audrey Ricks  [address]  [facility] [address]        Dear Colleague,    Thank you for referring your patient, Audrey Ricks, to the [facility] [place]. Please see a copy of my visit note below.    Audrey Ricks  :  1960  DOS: 2024  MRN: [id number]  PCP: Alie De La Cruz    Sports Medicine Clinic Visit    Interim History - 2024  - Last seen in clinic on 2024 for left knee pes anserine bursitis and a left medial meniscus tear .  - Left knee injection completed on 2024 provided 2 months of moderate relief . She notes that she has had a few episodes where if feels like there is a very sharp needle in the medial compartment of her knee that radiates to the medial joint line and posterior knee and is unable to fully bear weight for a few days, feels a strong catching sensation at this time as well. Denies mechanical locking. Patient notes that she would like a repeat CSI injection  - Since the last visit, her left knee pain has returned over the past 4 weeks. She is taking Extra strength Tylenol and extra strength Naproxen. Takes Ambien at night to sleep. Has not been able to work out or go to work for 2 weeks due to knee pain.  Has not been able to wear her  brace due to pain and pressure on the medial knee.  - No interim injury.       Interim History - 2024  - Last seen in clinic on 2023 for left knee pes anserine bursitis and a left medial meniscus tear.  - [place] guided left knee corticosteroid injection completed on 2023 provided 4 months of great relief.  - Since the last visit, she notes a return in left medial knee pain over the past month. One instance of popping while swimming but has not popped since then. Does feel like a sharp pain occurs on occasion. Her gait pattern is starting to be affected again. Has started to take strong ibuprofen (previously for her sciatica).  - No  interim injury.       Interim History - August 30, 2023  - Last seen in clinic on 8/22/2023 for left knee pes anserine bursitis, left patellar tendonopathy and left medial meniscus tear.  - MR left knee completed on 8/24/2023. Results on file. Presents today to discuss treatment options.    -MR L knee 8/24/2023 shows a complex tear of the medial meniscus body including multidirectional vertical tearing of the posterior horn, lateral meniscus tear, posterolateral capsular tear/injury including the arcuate ligament.  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  Also with a small amount of pes anserine bursitis.    - Since the last visit, no significant change in symptoms. Feels better extended and elevated, but cannot walk even short distances without pain. No mechanical locking, but very stiff. No prior injections or braces. Has done PT.   - No interim injury.      Initial Visit: August 22, 2023  HPI  Audrey Ricks is a 63 year old female who is seen in consultation at the request of  Santana Ruiz M.D. presenting with left knee pain.    - Mechanism of Injury:  Fell onto her anterior left knee in 2021. Thinks she re-injured it while dancing  on July 4, 2023.   - Prior evaluation:  8/8/2023 with Santana Ruiz MD. described 4 to 6 weeks of suprapatellar knee pain after a shifting maneuver with her kayak.  Pain worse with prolonged standing and walking.  Performs HEP.  TTP over the suprapatellar region and medial joint line.  Referred to PT and orthopedic follow-up.  -XR L knee 8/8/2023 shows normal anatomic alignment with a very mild knee joint effusion, mild swelling over the patellar tendon, mild medial compartment joint space narrowing, no acute fracture or dislocation.    - Pain Character:  Pain has been present for  1.5 months  with worsening pain.  Pain is well localized to the anteromedial left knee without significant radiation.   - Endorses:   "\"ice pick\" distal to the patella, numbness over the proximal anterior lower leg  - Denies:  clicking, popping, grinding, instability, radicular shooting pain, weakness.   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      Review of Systems  Musculoskeletal: as above  Remainder of review of systems is negative including constitutional, CV, pulmonary, GI, Skin and Neurologic except as noted in HPI or medical history.    Past Medical History:   Diagnosis Date     Depressive disorder 2011     Hypertension      NSTEMI (non-ST elevated myocardial infarction) (H) 08/06/2022     SVT (supraventricular tachycardia)      Past Surgical History:   Procedure Laterality Date     EP ABLATION SVT Bilateral 9/23/2022    Procedure: Ablation Supraventricular Tachycardia;  Surgeon: Donna Wolfe MD;  Location:  HEART CARDIAC CATH LAB     Family History   Problem Relation Age of Onset     Diabetes Mother          Objective  Wt 87.1 kg (192 lb)   BMI 34.89 kg/m      General: healthy, alert and in no acute distress.    HEENT: no scleral icterus or conjunctival erythema.   Skin: no suspicious lesions or rash. No jaundice.   CV: regular rhythm by palpation, 2+ distal pulses.  Resp: normal respiratory effort without conversational dyspnea.   Psych: normal mood and affect.    Gait: nonantalgic, appropriate coordination and balance.     Neuro:        - Sensation to light touch:    - Intact throughout the BLE including all peripheral nerve distributions.        - MSR:      RLE  LLE  - Patella 2+ 2+  - Achilles 2+ 2+       - Special tests:   - Slump/SLR:  Neg bilaterally    MSK - Knee:       - Inspection:  "   - Moderate swelling present at the medial joint line and pes anserine bursa with some ecchymosis and without surrounding erythema, warmth, lesion.        - ROM:    - Full AROM/PROM with pain during knee flexion/extension.        - Palpation:    - TTP at the medial joint line, pes anserine bursa  - NTTP elsewhere.        - Strength:  (*antalgic)    - Hip Flexion  5     - Hip Abduction 5    - Hip Adduction 5   - Knee Flexion  5*   - Knee Extension 5*   - Dorsiflexion  5   - Plantarflexion 5   - Ext. Luisito. Longus 5   - Inversion  5   - Eversion  5        - Special tests:        - Lachman:  Neg        - A/P drawer:  Neg       - Pivot shift:  Neg   - Shahnaz: Positive for medial compartment pain and click     - Varus stress: Neg    - Valgus stress:  positive for pain, no laxity   - Patellar grind: Positive      Radiology  I independently reviewed the available relevant imaging, with the following interpretation:   - XR L knee 8/8/2023 shows mild medial and lateral compartment joint space narrowing without significant osteophytosis, small knee joint effusion, no acute fractures or dislocations.  - MRI with interpretation as above in HPI.    XR KNEE STANDING LEFT 2 VIEWS 8/8/2023 1:57 PM     HISTORY: Chronic pain of left knee; Chronic pain of left knee     COMPARISON: None.                                                                      IMPRESSION: No fracture or effusion. Mild medial compartment  narrowing.     ERICK THOMAS MD        MR left knee without contrast 8/24/2023 2:25 PM     Techniques: Multiplanar multisequence imaging of the left knee was  obtained without administration of intra-articular or intravenous  contrast using routing protocol.     History: Pes anserine bursitis; Tendinopathy of patella     Comparison: Radiograph 8/8/2023     Findings:     Motion partially degrading images.     MENISCI:  Medial meniscus: Multidirectional tear of the medial meniscus body and  posterior horn including  longitudinal vertical tearing component with  approximately 4 mm meniscal flap in the medial tibial gutter and at  the posterior horn root ligament junction.  Lateral meniscus: Free edge, likely longitudinal horizontal tear of  the lateral meniscal body.     LIGAMENTS  Cruciate ligaments: Intact.  Medial supporting structures: Peripheral bowing of the tibial  collateral ligament with moderate periligamentous edema surrounding  the tibial collateral ligament and posterior oblique ligament, trace  tibial collateral ligament bursal fluid, moderate to high-grade  sprain/partial tear meniscal femoral and meniscal tibial ligaments are  likely related to altered mechanics due to underlying meniscal  pathology.  Lateral supporting structures: Posterolateral capsular fluid signal,  consistent with capsular tear/injury including expected course of  arcuate ligament. Otherwise popliteus tendon, fibular collateral  ligament, biceps femoris tendon, conjoined tendon, popliteofibular  ligament and iliotibial band are intact.     EXTENSOR MECHANISM  Intact.     FLUID  Small joint effusion. No substantial Baker's cyst.  Trace pes anserine bursitis fluid.     OSSEOUS and ARTICULAR STRUCTURES  Bones: No fracture, contusion, or osseous lesion is seen.     Peripheral edema-like marrow signal intensity of the medial tibial  plateau, likely reactive to underlying meniscal pathology.     Patellofemoral compartment: On a background of diffuse moderate to  high-grade chondral loss, full-thickness chondral fissuring with  subtle subchondral edema-like marrow signal intensity in the patellar  articular cartilage. At least low-grade chondral loss of the central  trochlear cartilage.     Medial compartment: Focal full-thickness chondral fissure or subtle  subchondral edema across intensity posterior weightbearing surface of  medial femoral condyle.     Lateral compartment: No high-grade hyaline cartilage disease.     ANCILLARY FINDINGS  Mild  nonspecific subcutaneous edema especially anteriorly.                                                                      Impression:  Motion partially degrading images.  1. Multidirectional tear of the medial meniscus body and posterior  horn including longitudinal vertical tearing component.   a. Presumed reactive changes of medial supporting structures.   b. Mild pes anserine bursitis, likely reactive.  2. Free edge, likely longitudinal horizontal tear of the lateral  meniscal body.  3. Posterolateral capsular tear/injury including expected course of  arcuate ligament; otherwise, lateral supporting structures are intact.  4. Foci of grade IV chondromalacia in the patellofemoral and medial  compartments.     ROGER MAGALLON     Procedure  Large Joint Injection/Arthocentesis: L knee joint    Date/Time: 4/18/2024 9:01 AM    Performed by: Hany Lebron DO  Authorized by: Hany Lebron DO    Indications:  Osteoarthritis  Needle Size:  18 G  Guidance: ultrasound    Approach:  Anterolateral  Location:  Knee      Medications:  40 mg triamcinolone 40 MG/ML; 2 mL lidocaine 1 %; 2 mL BUPivacaine 0.5 %  Aspirate amount (mL):  23  Aspirate:  Clear and yellow  Outcome:  Tolerated well, no immediate complications  Procedure discussed: discussed risks, benefits, and alternatives    Consent Given by:  Patient  Prep: patient was prepped and draped in usual sterile fashion     Ultrasound images of procedure were permanently stored.     3 ml 1% Lidocaine used for anesthetic       Ultrasound Guided Intra-articular Knee Aspiration + Injection  The knee was prepped and draped in a sterile manner.  Ultrasound identification of the patella, suprapatellar pouch, quadriceps tendon and femur in both long and short axis was obtained. The probe was placed in short axis to the femur.  A 1.5 inch 25-gauge needle was used to inject 3 mL of local anesthetic.  Then a 1.5 inch 18 gauge needle was placed under ultrasound guidance into the  superior knee joint using a lateral approach. Approximately 23 mL of clear yellow synovial fluid was aspirated from the knee joint. Then, the aspiration syringe was exchanged for the injection syringe without removing the needle. Then, a mixture of 2 mL of 1% lidocaine, 2 mL of 0.5% bupivacaine and 1 ml kenalog (40mg/ml) was injected without difficulty. The needle was removed and there was good hemostasis without complications.  Patient tolerated procedure well.  There was ultrasound documentation of needle placement and injection.            Assessment  1. Complex tear of medial meniscus of left knee as current injury, initial encounter    2. Primary osteoarthritis of left knee        Plan  Audrey Ricks is a 63 year old female that presents for follow-up of her chronic anteromedial left knee pain.  Original history and physical exam appear most consistent with a medial meniscus tear and pes anserine bursitis, in the setting of chronic patellar tendinopathy.  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    History, imaging, and physical exam are consistent with a complex medial meniscus tear, lateral meniscus tear, posterior lateral capsule injury and pes anserine bursitis in the setting of severe primary osteoarthritis of the left knee.    Originally preferred to avoid surgery and was dedicated and motivated for conservative therapy. She has gotten good relief from corticosteroid injections, but only for a few months at a time with diminishing returns recently. She has attempted conservative treatments including NSAIDs,  bracing, physical therapy, home exercise program, corticosteroid injections which have provided  decent  pain relief, but now she is experiencing more debilitating mechanical catching that is stopping her ADLs and makes her feel like she is going to fall.  She is now more in favor of discussing surgical repair of the meniscus versus arthroplasty depending on surgeon's preference and goals.  She is very active and would tend to lean towards minimal surgery to repair the meniscus to remain active is much as possible, not opposed to arthroplasty in the future.  Very interested in another corticosteroid injection today and is aware that this may delay surgical intervention.    Discussed the nature of the condition and treatment options and mutually agreed upon the following plan:    - Medications:          - Discussed pharmacologic options for pain relief.   -  May use NSAIDs (Ibuprofen, Naproxen) or Acetaminophen (Tylenol) as needed for pain control.   -  May also use topical medications such as lidocaine, IcyHot, BioFreeze, or Voltaren gel as needed for pain control.   - Injections:          - Discussed possible injection options and alternatives.    - Options include intra-articular knee joint injection with corticosteroid, viscosupplementation, or PRP.   - Performed an Ultrasound-guided aspiration and corticosteroid injection of the left intra-articular knee joint today in clinic. Patient tolerated the procedure well without complications.   - Post-procedure instructions:    - Keep the injection site clean and dry.   - Do not submerge the injection site for 24 hours (no baths, pools). Showers are ok.   - Rest the area for 24-48 hours before resuming normal activities. Avoid overexerting the area for the first few weeks.   - It may take 2-3 days to start noticing the effects of the injection and up to 3-4 weeks to feel significant benefits.   - In general, an injection in the same anatomical region can be repeated every 3 months as needed.  However, for the health of your tissues you will want to space out the  injections as much as possible and request them only when symptoms are significantly bothersome and disrupting daily function and/or sleep.   - Therapy:          - Discussed the benefits of physical therapy vs home exercise program for optimization of range of motion, flexibility, strength, stability and function.   - She has done physical therapy in the past and has an established home exercise program.  Encouraged to continue home exercise program as instructed by PT.  - Modalities:          - May use ice, heat, massage or other modalities as needed.   - Bracing:          - Continue using the  brace for ambulation and other exacerbating activities as needed.  - Surgery:          - Discussed non-operative and operative treatment options for the patient's condition, which includes meniscal repair versus arthroplasty based on the level of arthritis in the knee already.   -  She was doing marginally well with conservative care at until the last few months when she has been experiencing much more debilitating mechanical symptoms and is now interested in discussing surgical repair of the meniscus versus arthroplasty.  Orthopedic  referral has been placed today to Dr. Ang and case discussed with his NP Ray Vidal.   - Activity:          - Encouraged to remain active and participate in regular activities as symptoms allow.  Avoid or modify exacerbating activities as needed.  - Follow up:          - With Dr. Ang for discussion of surgical options.  If nonsurgical treatment is decided, could see her back in 3+ months as needed for re-evaluation and update to treatment plan, or sooner for new/worsening symptoms.  - Patient has clinic contact information for questions or concerns.       Hany Lebron DO, BAOM  [facility] - Sports Medicine  [facility] - Department of Orthopedic Surgery       Disclaimer:  This note was prepared and written using Dragon Medical dictation  software. As a result, there may be errors in the script that have gone undetected. Please consider this when interpreting the information in this note.       Again, thank you for allowing me to participate in the care of your patient.        Sincerely,        Hany Lebron, DO

## 2024-04-19 ENCOUNTER — TELEPHONE (OUTPATIENT)
Dept: ORTHOPEDICS | Facility: OTHER | Age: 64
End: 2024-04-19

## 2024-04-19 ENCOUNTER — ANCILLARY PROCEDURE (OUTPATIENT)
Dept: GENERAL RADIOLOGY | Facility: CLINIC | Age: 64
End: 2024-04-19
Attending: ORTHOPAEDIC SURGERY
Payer: COMMERCIAL

## 2024-04-19 ENCOUNTER — OFFICE VISIT (OUTPATIENT)
Dept: ORTHOPEDICS | Facility: CLINIC | Age: 64
End: 2024-04-19
Attending: STUDENT IN AN ORGANIZED HEALTH CARE EDUCATION/TRAINING PROGRAM
Payer: COMMERCIAL

## 2024-04-19 VITALS
TEMPERATURE: 98.1 F | SYSTOLIC BLOOD PRESSURE: 162 MMHG | WEIGHT: 194.8 LBS | HEIGHT: 62 IN | BODY MASS INDEX: 35.85 KG/M2 | DIASTOLIC BLOOD PRESSURE: 113 MMHG

## 2024-04-19 DIAGNOSIS — S83.232A COMPLEX TEAR OF MEDIAL MENISCUS OF LEFT KNEE AS CURRENT INJURY, INITIAL ENCOUNTER: ICD-10-CM

## 2024-04-19 DIAGNOSIS — M25.562 LEFT KNEE PAIN: ICD-10-CM

## 2024-04-19 DIAGNOSIS — M17.12 PRIMARY OSTEOARTHRITIS OF LEFT KNEE: ICD-10-CM

## 2024-04-19 PROCEDURE — 99203 OFFICE O/P NEW LOW 30 MIN: CPT | Performed by: ORTHOPAEDIC SURGERY

## 2024-04-19 PROCEDURE — 73560 X-RAY EXAM OF KNEE 1 OR 2: CPT | Mod: TC | Performed by: RADIOLOGY

## 2024-04-19 NOTE — PROGRESS NOTES
"ORTHOPEDIC CONSULT      Chief Complaint: Audrey Ricks is a 64 year old female who is being seen for   Chief Complaint   Patient presents with    Left Knee - Pain       History of Present Illness:   Here to discuss left knee pain. Started in 2018 after an injury then reinjured the knee in 2021 and most recently 7/2023 while swing dancing with a plant and twist type injury. The pain is intermittent. When it does flare up can be severe at times mild. Medial sided. Worst with weight bearing, twisting. Also endorses popping, locking, catching.  At times hard to walk, bend the knee, drive due to the pain and locking.  Flaired up again recently, was severe saw sports medicine and got an intraarticular injection with aspiration.  The knee is better today.   Treatments tried in total: formal physical therapy, last round was summer of 2023, regular use of Aleve, tylenol. 3 total steroid injections (usually provide 1-2 month of relief then symptoms come back), rest, activity modification.  Despite this can have severe pain, locking, catching, decreased ability to be active, weight gain due to inactivity, can inhibit driving.     She reports is normally very active with bicycling, dancing, and other activities and having to stop those activities due to knee pain has \"taken a toll\" on her quality of life.     No previous surgeries to the knee.  No hx of Dm.       Patient's past medical, surgical, social and family histories reviewed.     Past Medical History:   Diagnosis Date    Depressive disorder 2011    Hypertension     NSTEMI (non-ST elevated myocardial infarction) (H) 08/06/2022    SVT (supraventricular tachycardia)        Past Surgical History:   Procedure Laterality Date    EP ABLATION SVT Bilateral 9/23/2022    Procedure: Ablation Supraventricular Tachycardia;  Surgeon: Donna Wolfe MD;  [facility]:  [facility]       Medications:  Current Outpatient Medications   Medication Sig Dispense Refill    " acetaminophen (TYLENOL) 500 MG tablet Take 650 mg by mouth every 6 hours as needed for mild pain      B Complex Vitamins (VITAMIN-B COMPLEX PO) Take 1 tablet by mouth daily Unknown tablet strength      CALCIUM PO Take 1 tablet by mouth daily Unknown tablet strength      estradiol-norethindrone (COMBIPATCH) 0.05-0.14 MG/DAY bi-weekly patch REMOVE OLD PATCH AND PLACE 1 PATCH ONTO THE SKIN TWICE A WEEK 24 patch 3    ibuprofen (ADVIL/MOTRIN) 600 MG tablet Take 1 tablet (600 mg) by mouth every 6 hours as needed for moderate pain 120 tablet 1    metroNIDAZOLE (METROGEL) 1 % external gel APPLY TOPICALLY DAILY 60 g 1    naproxen (NAPROSYN) 500 MG tablet Take 1 tablet (500 mg) by mouth 2 times daily (with meals) 60 tablet 3    OMEGA-3 FATTY ACIDS PO Take 1 capsule by mouth daily Unknown capsule strength      omeprazole (PRILOSEC) 20 MG DR capsule TAKE 1 CAPSULE BY MOUTH ONE TIME A DAY. TAKES AS NEEDED FOR HEARTBURN 90 capsule 3    potassium chloride ER (KLOR-CON M) 10 MEQ CR tablet Take 1 tablet (10 mEq) by mouth daily 90 tablet 1    POTASSIUM PO Take 1 tablet by mouth daily Unknown tablet strength      triamcinolone (KENALOG) 0.1 % external cream Apply topically 2 times daily as needed for irritation 30 g 1    zolpidem (AMBIEN) 5 MG tablet TAKE 1/2-1 TABLET BY MOUTH AT BEDTIME AS NEEDED FOR SLEEP. 30 tablet 1     Current Facility-Administered Medications   Medication Dose Route Frequency Provider Last Rate Last Admin    2.0 mL bupivacaine (MARCAINE) 0.5% injection (50 mL vial)  2 mL      2 mL at 04/18/24 0901    2.0 mL bupivacaine (MARCAINE) 0.5% injection (50 mL vial)  2 mL   Hany Lebron,    2 mL at 01/16/24 1545    2.0 mL bupivacaine (MARCAINE) 0.5% injection (50 mL vial)  2 mL   Hany Lebron DO   2 mL at 08/30/23 0820    lidocaine 1 % injection 2 mL  2 mL      2 mL at 04/18/24 0901    lidocaine 1 % injection 2 mL  2 mL   Hany Lebron DO   2 mL at 01/16/24 1545    lidocaine 1 % injection 2 mL  2 mL   Bernardino  "Hany, DO   2 mL at 08/30/23 0820    triamcinolone (KENALOG-40) injection 40 mg  40 mg      40 mg at 04/18/24 0901    triamcinolone (KENALOG-40) injection 40 mg  40 mg   BernardinoHany cooper, DO   40 mg at 01/16/24 1545    triamcinolone (KENALOG-40) injection 40 mg  40 mg   BernardinoHany cooper, DO   40 mg at 08/30/23 0820       Allergies   Allergen Reactions    Amlodipine Shortness Of Breath and Swelling       Social History     Occupational History    Not on file   Tobacco Use    Smoking status: Never    Smokeless tobacco: Never   Vaping Use    Vaping status: Never Used   Substance and Sexual Activity    Alcohol use: Yes     Comment: rare    Drug use: Never    Sexual activity: Not on file       Family History   Problem Relation Age of Onset    Diabetes Mother        REVIEW OF SYSTEMS  10 point review systems performed otherwise negative as noted as per history of present illness.    Physical Exam:  Vitals: BP (!) 162/113   Temp 98.1  F (36.7  C)   Ht 1.58 m (5' 2.21\")   Wt 88.4 kg (194 lb 12.8 oz)   BMI 35.39 kg/m    BMI= Body mass index is 35.39 kg/m .  Constitutional: healthy, alert and no acute distress   Psychiatric: mentation appears normal and affect normal/bright  NEURO: no focal deficits  RESP: Normal with easy respirations and no use of accessory muscles to breathe, no audible wheezing or retractions  CV: No peripheral edema         Regular rate and rhythm by palpation  SKIN: No erythema, rashes, excoriation, or breakdown. No evidence of infection. Small needle site from recent aspiration/injection visible. No evidence of infection. Non-open or bleeding.   JOINT/EXTREMITIES:left knee: small effusion. AROM 0-115. No instability with valgus, varus, or lachman's.  Pain with lachman's. Mild tenderness along medial joint line.  Calf soft non-tender. Patella tracks midline. Extensor intact.    GAIT: not tested     Diagnostic Modalities:  Left knee x-rays: Weightbearing views shows very mild medial compartment narrowing " more on the Dunne.  The remainder the knee is well-preserved.  No fractures or dislocations    MR left knee without contrast 8/24/2023 2:25 PM     Techniques: Multiplanar multisequence imaging of the left knee was  obtained without administration of intra-articular or intravenous  contrast using routing protocol.     History: Pes anserine bursitis; Tendinopathy of patella     Comparison: Radiograph 8/8/2023     Findings:     Motion partially degrading images.     MENISCI:  Medial meniscus: Multidirectional tear of the medial meniscus body and  posterior horn including longitudinal vertical tearing component with  approximately 4 mm meniscal flap in the medial tibial gutter and at  the posterior horn root ligament junction.  Lateral meniscus: Free edge, likely longitudinal horizontal tear of  the lateral meniscal body.     LIGAMENTS  Cruciate ligaments: Intact.  Medial supporting structures: Peripheral bowing of the tibial  collateral ligament with moderate periligamentous edema surrounding  the tibial collateral ligament and posterior oblique ligament, trace  tibial collateral ligament bursal fluid, moderate to high-grade  sprain/partial tear meniscal femoral and meniscal tibial ligaments are  likely related to altered mechanics due to underlying meniscal  pathology.  Lateral supporting structures: Posterolateral capsular fluid signal,  consistent with capsular tear/injury including expected course of  arcuate ligament. Otherwise popliteus tendon, fibular collateral  ligament, biceps femoris tendon, conjoined tendon, popliteofibular  ligament and iliotibial band are intact.     EXTENSOR MECHANISM  Intact.     FLUID  Small joint effusion. No substantial Baker's cyst.  Trace pes anserine bursitis fluid.     OSSEOUS and ARTICULAR STRUCTURES  Bones: No fracture, contusion, or osseous lesion is seen.     Peripheral edema-like marrow signal intensity of the medial tibial  plateau, likely reactive to underlying  meniscal pathology.     Patellofemoral compartment: On a background of diffuse moderate to  high-grade chondral loss, full-thickness chondral fissuring with  subtle subchondral edema-like marrow signal intensity in the patellar  articular cartilage. At least low-grade chondral loss of the central  trochlear cartilage.     Medial compartment: Focal full-thickness chondral fissure or subtle  subchondral edema across intensity posterior weightbearing surface of  medial femoral condyle.     Lateral compartment: No high-grade hyaline cartilage disease.     ANCILLARY FINDINGS  Mild nonspecific subcutaneous edema especially anteriorly.                                                                      Impression:  Motion partially degrading images.  1. Multidirectional tear of the medial meniscus body and posterior  horn including longitudinal vertical tearing component.   a. Presumed reactive changes of medial supporting structures.   b. Mild pes anserine bursitis, likely reactive.  2. Free edge, likely longitudinal horizontal tear of the lateral  meniscal body.  3. Posterolateral capsular tear/injury including expected course of  arcuate ligament; otherwise, lateral supporting structures are intact.  4. Foci of grade IV chondromalacia in the patellofemoral and medial  compartments.  Independent visualization of the images was performed.      Impression: left knee complex multidirectional medial meniscus tear, lateral meniscus tear, chondromalacia    Plan:  All of the above pertinent physical exam and imaging modalities findings was reviewed with Audrey. Has injured the knee a total of 3 times in last 6 years. The most recent 7/2023. Will get intermittent severe symptoms. Discussed the medial tears with underlying chondromalacia. However, given her age, activity level, and mechanical symptoms She would like to proceed with knee arthroscopy she does understand with the underlying chondromalacia cannot guarantee she would  have total resolution and may have continued issues.                                             ELECTIVE SURGERY:  The patient has attempted conservative treatments that include formal physical therapy, last round was summer of 2023, regular use of Aleve, tylenol. 3 total steroid injections (usually provide 1-2 month of relief then symptoms come back), rest, activity modification.  Despite this can have severe pain, locking, catching, decreased ability to be active, weight gain due to inactivity, can inhibit driving. Secondary to these reasons I have offered surgery in the form of left knee arthroscopy with medial and lateral meniscus debridement.    Risks, benefits, complications, limitations, and anticipated postoperative course were discussed. Risks including infections (requiring possible repeat surgery and antibiotics), bleeding, blood clots, cartilage degeneration (arthritis), scar, scar tenderness, stiffness, skin problems and failure to relieve all symptoms were reviewed. I also reviewed the risks of heart attack, stroke, death. Anticipated anesthesia is local with IV sedation.  Generally most patients are able to return to desk work in one week and active work in 3-4 weeks. All question were answered, surgery is planned in the near future.   Will plan for surgery at least 3-4 weeks out given the recent injection     Return to clinic 10 days post-operatively. , or sooner as needed for changes.  Re-x-ray on return: No    Scribed by:  GENOVEVA Arshad, CNP  8:49 AM  4/19/2024  The information in this document, created by a scribe for me, accurately reflects the services I personally performed and the decisions made by me. I have reviewed and approved this document for accuracy    Alvaro Ang DO

## 2024-04-19 NOTE — LETTER
"    4/19/2024         RE: Audrey Ricks  [address]  [facility] [address]        Dear Colleague,    Thank you for referring your patient, Audrey Ricks, to the [facility]. Please see a copy of my visit note below.    ORTHOPEDIC CONSULT      Chief Complaint: Audrey Ricks is a 64 year old female who is being seen for   Chief Complaint   Patient presents with     Left Knee - Pain       History of Present Illness:   Here to discuss left knee pain. Started in 2018 after an injury then reinjured the knee in 2021 and most recently 7/2023 while swing dancing with a plant and twist type injury. The pain is intermittent. When it does flare up can be severe at times mild. Medial sided. Worst with weight bearing, twisting. Also endorses popping, locking, catching.  At times hard to walk, bend the knee, drive due to the pain and locking.  Flaired up again recently, was severe saw sports medicine and got an intraarticular injection with aspiration.  The knee is better today.   Treatments tried in total: formal physical therapy, last round was summer of 2023, regular use of Aleve, tylenol. 3 total steroid injections (usually provide 1-2 month of relief then symptoms come back), rest, activity modification.  Despite this can have severe pain, locking, catching, decreased ability to be active, weight gain due to inactivity, can inhibit driving.     She reports is normally very active with bicycling, dancing, and other activities and having to stop those activities due to knee pain has \"taken a toll\" on her quality of life.     No previous surgeries to the knee.  No hx of Dm.       Patient's past medical, surgical, social and family histories reviewed.     Past Medical History:   Diagnosis Date     Depressive disorder 2011     Hypertension      NSTEMI (non-ST elevated myocardial infarction) (H) 08/06/2022     SVT (supraventricular tachycardia)        Past Surgical History:   Procedure Laterality Date "     EP ABLATION SVT Bilateral 9/23/2022    Procedure: Ablation Supraventricular Tachycardia;  Surgeon: Donna Wolfe MD;  Location:  [facility]       Medications:  Current Outpatient Medications   Medication Sig Dispense Refill     acetaminophen (TYLENOL) 500 MG tablet Take 650 mg by mouth every 6 hours as needed for mild pain       B Complex Vitamins (VITAMIN-B COMPLEX PO) Take 1 tablet by mouth daily Unknown tablet strength       CALCIUM PO Take 1 tablet by mouth daily Unknown tablet strength       estradiol-norethindrone (COMBIPATCH) 0.05-0.14 MG/DAY bi-weekly patch REMOVE OLD PATCH AND PLACE 1 PATCH ONTO THE SKIN TWICE A WEEK 24 patch 3     ibuprofen (ADVIL/MOTRIN) 600 MG tablet Take 1 tablet (600 mg) by mouth every 6 hours as needed for moderate pain 120 tablet 1     metroNIDAZOLE (METROGEL) 1 % external gel APPLY TOPICALLY DAILY 60 g 1     naproxen (NAPROSYN) 500 MG tablet Take 1 tablet (500 mg) by mouth 2 times daily (with meals) 60 tablet 3     OMEGA-3 FATTY ACIDS PO Take 1 capsule by mouth daily Unknown capsule strength       omeprazole (PRILOSEC) 20 MG DR capsule TAKE 1 CAPSULE BY MOUTH ONE TIME A DAY. TAKES AS NEEDED FOR HEARTBURN 90 capsule 3     potassium chloride ER (KLOR-CON M) 10 MEQ CR tablet Take 1 tablet (10 mEq) by mouth daily 90 tablet 1     POTASSIUM PO Take 1 tablet by mouth daily Unknown tablet strength       triamcinolone (KENALOG) 0.1 % external cream Apply topically 2 times daily as needed for irritation 30 g 1     zolpidem (AMBIEN) 5 MG tablet TAKE 1/2-1 TABLET BY MOUTH AT BEDTIME AS NEEDED FOR SLEEP. 30 tablet 1     Current Facility-Administered Medications   Medication Dose Route Frequency Provider Last Rate Last Admin     2.0 mL bupivacaine (MARCAINE) 0.5% injection (50 mL vial)  2 mL      2 mL at 04/18/24 0901     2.0 mL bupivacaine (MARCAINE) 0.5% injection (50 mL vial)  2 mL   Hany Lebron DO   2 mL at 01/16/24 1545     2.0 mL bupivacaine (MARCAINE) 0.5% injection (50  "mL vial)  2 mL   Hany Lebron, DO   2 mL at 08/30/23 0820     lidocaine 1 % injection 2 mL  2 mL      2 mL at 04/18/24 0901     lidocaine 1 % injection 2 mL  2 mL   Hany Lebron, DO   2 mL at 01/16/24 1545     lidocaine 1 % injection 2 mL  2 mL   Hany Lebron, DO   2 mL at 08/30/23 0820     triamcinolone (KENALOG-40) injection 40 mg  40 mg      40 mg at 04/18/24 0901     triamcinolone (KENALOG-40) injection 40 mg  40 mg   Hany Lebron, DO   40 mg at 01/16/24 1545     triamcinolone (KENALOG-40) injection 40 mg  40 mg   Hany Lebron, DO   40 mg at 08/30/23 0820       Allergies   Allergen Reactions     Amlodipine Shortness Of Breath and Swelling       Social History     Occupational History     Not on file   Tobacco Use     Smoking status: Never     Smokeless tobacco: Never   Vaping Use     Vaping status: Never Used   Substance and Sexual Activity     Alcohol use: Yes     Comment: rare     Drug use: Never     Sexual activity: Not on file       Family History   Problem Relation Age of Onset     Diabetes Mother        REVIEW OF SYSTEMS  10 point review systems performed otherwise negative as noted as per history of present illness.    Physical Exam:  Vitals: BP (!) 162/113   Temp 98.1  F (36.7  C)   Ht 1.58 m (5' 2.21\")   Wt 88.4 kg (194 lb 12.8 oz)   BMI 35.39 kg/m    BMI= Body mass index is 35.39 kg/m .  Constitutional: healthy, alert and no acute distress   Psychiatric: mentation appears normal and affect normal/bright  NEURO: no focal deficits  RESP: Normal with easy respirations and no use of accessory muscles to breathe, no audible wheezing or retractions  CV: No peripheral edema         Regular rate and rhythm by palpation  SKIN: No erythema, rashes, excoriation, or breakdown. No evidence of infection. Small needle site from recent aspiration/injection visible. No evidence of infection. Non-open or bleeding.   JOINT/EXTREMITIES:left knee: small effusion. AROM 0-115. No instability with valgus, varus, " or lachman's.  Pain with lachman's. Mild tenderness along medial joint line.  Calf soft non-tender. Patella tracks midline. Extensor intact.    GAIT: not tested     Diagnostic Modalities:  Left knee x-rays: Weightbearing views shows very mild medial compartment narrowing more on the Dunne.  The remainder the knee is well-preserved.  No fractures or dislocations    MR left knee without contrast 8/24/2023 2:25 PM     Techniques: Multiplanar multisequence imaging of the left knee was  obtained without administration of intra-articular or intravenous  contrast using routing protocol.     History: Pes anserine bursitis; Tendinopathy of patella     Comparison: Radiograph 8/8/2023     Findings:     Motion partially degrading images.     MENISCI:  Medial meniscus: Multidirectional tear of the medial meniscus body and  posterior horn including longitudinal vertical tearing component with  approximately 4 mm meniscal flap in the medial tibial gutter and at  the posterior horn root ligament junction.  Lateral meniscus: Free edge, likely longitudinal horizontal tear of  the lateral meniscal body.     LIGAMENTS  Cruciate ligaments: Intact.  Medial supporting structures: Peripheral bowing of the tibial  collateral ligament with moderate periligamentous edema surrounding  the tibial collateral ligament and posterior oblique ligament, trace  tibial collateral ligament bursal fluid, moderate to high-grade  sprain/partial tear meniscal femoral and meniscal tibial ligaments are  likely related to altered mechanics due to underlying meniscal  pathology.  Lateral supporting structures: Posterolateral capsular fluid signal,  consistent with capsular tear/injury including expected course of  arcuate ligament. Otherwise popliteus tendon, fibular collateral  ligament, biceps femoris tendon, conjoined tendon, popliteofibular  ligament and iliotibial band are intact.     EXTENSOR MECHANISM  Intact.     FLUID  Small joint effusion. No  substantial Baker's cyst.  Trace pes anserine bursitis fluid.     OSSEOUS and ARTICULAR STRUCTURES  Bones: No fracture, contusion, or osseous lesion is seen.     Peripheral edema-like marrow signal intensity of the medial tibial  plateau, likely reactive to underlying meniscal pathology.     Patellofemoral compartment: On a background of diffuse moderate to  high-grade chondral loss, full-thickness chondral fissuring with  subtle subchondral edema-like marrow signal intensity in the patellar  articular cartilage. At least low-grade chondral loss of the central  trochlear cartilage.     Medial compartment: Focal full-thickness chondral fissure or subtle  subchondral edema across intensity posterior weightbearing surface of  medial femoral condyle.     Lateral compartment: No high-grade hyaline cartilage disease.     ANCILLARY FINDINGS  Mild nonspecific subcutaneous edema especially anteriorly.                                                                      Impression:  Motion partially degrading images.  1. Multidirectional tear of the medial meniscus body and posterior  horn including longitudinal vertical tearing component.   a. Presumed reactive changes of medial supporting structures.   b. Mild pes anserine bursitis, likely reactive.  2. Free edge, likely longitudinal horizontal tear of the lateral  meniscal body.  3. Posterolateral capsular tear/injury including expected course of  arcuate ligament; otherwise, lateral supporting structures are intact.  4. Foci of grade IV chondromalacia in the patellofemoral and medial  compartments.  Independent visualization of the images was performed.      Impression: left knee complex multidirectional medial meniscus tear, lateral meniscus tear, chondromalacia    Plan:  All of the above pertinent physical exam and imaging modalities findings was reviewed with Audrey. Has injured the knee a total of 3 times in last 6 years. The most recent 7/2023. Will get intermittent  severe symptoms. Discussed the medial tears with underlying chondromalacia. However, given her age, activity level, and mechanical symptoms She would like to proceed with knee arthroscopy she does understand with the underlying chondromalacia cannot guarantee she would have total resolution and may have continued issues.                                             ELECTIVE SURGERY:  The patient has attempted conservative treatments that include formal physical therapy, last round was summer of 2023, regular use of Aleve, tylenol. 3 total steroid injections (usually provide 1-2 month of relief then symptoms come back), rest, activity modification.  Despite this can have severe pain, locking, catching, decreased ability to be active, weight gain due to inactivity, can inhibit driving. Secondary to these reasons I have offered surgery in the form of left knee arthroscopy with medial and lateral meniscus debridement.    Risks, benefits, complications, limitations, and anticipated postoperative course were discussed. Risks including infections (requiring possible repeat surgery and antibiotics), bleeding, blood clots, cartilage degeneration (arthritis), scar, scar tenderness, stiffness, skin problems and failure to relieve all symptoms were reviewed. I also reviewed the risks of heart attack, stroke, death. Anticipated anesthesia is local with IV sedation.  Generally most patients are able to return to desk work in one week and active work in 3-4 weeks. All question were answered, surgery is planned in the near future.   Will plan for surgery at least 3-4 weeks out given the recent injection     Return to clinic 10 days post-operatively. , or sooner as needed for changes.  Re-x-ray on return: No    Scribed by:  GENOVEVA Arshad, CNP  8:49 AM  4/19/2024  The information in this document, created by a scribe for me, accurately reflects the services I personally performed and the decisions made by me. I have reviewed and  approved this document for accuracy    Alvaro Ang DO         Again, thank you for allowing me to participate in the care of your patient.        Sincerely,        Alvaro Ang DO

## 2024-05-13 ENCOUNTER — TELEPHONE (OUTPATIENT)
Dept: FAMILY MEDICINE | Facility: CLINIC | Age: 64
End: 2024-05-13
Payer: COMMERCIAL

## 2024-05-13 NOTE — TELEPHONE ENCOUNTER
Forms/Letter Request    Type of form/letter: OTHER: [facility] Annual Wellness Reward       Do we have the form/letter: Yes: yes at     Who is the form from? Patient    Where did/will the form come from? Patient or family brought in       When is form/letter needed by: ASAP    How would you like the form/letter returned: Mail  Is this the correct address?: No -[employer]                                            [facility] [address]                                                      [place] [address]    [id number]  [facility] [address]    Patient Notified form requests are processed in 5-7 business days:Yes    Could we send this information to you in [facility] or would you prefer to receive a phone call?:   No preference   Okay to leave a detailed message?: Yes at Home number on file [phone] (home)   Juani Harris MA 5/13/2024

## 2024-05-29 ENCOUNTER — OFFICE VISIT (OUTPATIENT)
Dept: FAMILY MEDICINE | Facility: CLINIC | Age: 64
End: 2024-05-29
Payer: COMMERCIAL

## 2024-05-29 VITALS
DIASTOLIC BLOOD PRESSURE: 86 MMHG | OXYGEN SATURATION: 98 % | RESPIRATION RATE: 18 BRPM | BODY MASS INDEX: 35.51 KG/M2 | WEIGHT: 193 LBS | HEIGHT: 62 IN | TEMPERATURE: 98 F | SYSTOLIC BLOOD PRESSURE: 136 MMHG | HEART RATE: 106 BPM

## 2024-05-29 DIAGNOSIS — Z01.818 PRE-OP EXAM: Primary | ICD-10-CM

## 2024-05-29 DIAGNOSIS — I47.10 SVT (SUPRAVENTRICULAR TACHYCARDIA) (H): ICD-10-CM

## 2024-05-29 DIAGNOSIS — S83.232D COMPLEX TEAR OF MEDIAL MENISCUS OF LEFT KNEE, UNSPECIFIED WHETHER OLD OR CURRENT TEAR, SUBSEQUENT ENCOUNTER: ICD-10-CM

## 2024-05-29 DIAGNOSIS — I10 ESSENTIAL HYPERTENSION: ICD-10-CM

## 2024-05-29 DIAGNOSIS — F31.81 BIPOLAR 2 DISORDER (H): ICD-10-CM

## 2024-05-29 PROBLEM — I21.4 NSTEMI (NON-ST ELEVATED MYOCARDIAL INFARCTION) (H): Status: RESOLVED | Noted: 2022-08-06 | Resolved: 2024-05-29

## 2024-05-29 LAB
ANION GAP SERPL CALCULATED.3IONS-SCNC: 10 MMOL/L (ref 7–15)
BUN SERPL-MCNC: 17.4 MG/DL (ref 8–23)
CALCIUM SERPL-MCNC: 9.2 MG/DL (ref 8.8–10.2)
CHLORIDE SERPL-SCNC: 102 MMOL/L (ref 98–107)
CREAT SERPL-MCNC: 0.89 MG/DL (ref 0.51–0.95)
DEPRECATED HCO3 PLAS-SCNC: 26 MMOL/L (ref 22–29)
EGFRCR SERPLBLD CKD-EPI 2021: 73 ML/MIN/1.73M2
ERYTHROCYTE [DISTWIDTH] IN BLOOD BY AUTOMATED COUNT: 13.4 % (ref 10–15)
GLUCOSE SERPL-MCNC: 110 MG/DL (ref 70–99)
HCT VFR BLD AUTO: 41.3 % (ref 35–47)
HGB BLD-MCNC: 13.9 G/DL (ref 11.7–15.7)
MCH RBC QN AUTO: 31.4 PG (ref 26.5–33)
MCHC RBC AUTO-ENTMCNC: 33.7 G/DL (ref 31.5–36.5)
MCV RBC AUTO: 93 FL (ref 78–100)
PLATELET # BLD AUTO: 239 10E3/UL (ref 150–450)
POTASSIUM SERPL-SCNC: 4.1 MMOL/L (ref 3.4–5.3)
RBC # BLD AUTO: 4.43 10E6/UL (ref 3.8–5.2)
SODIUM SERPL-SCNC: 138 MMOL/L (ref 135–145)
WBC # BLD AUTO: 6.8 10E3/UL (ref 4–11)

## 2024-05-29 PROCEDURE — 36415 COLL VENOUS BLD VENIPUNCTURE: CPT | Performed by: PHYSICIAN ASSISTANT

## 2024-05-29 PROCEDURE — 99214 OFFICE O/P EST MOD 30 MIN: CPT | Performed by: PHYSICIAN ASSISTANT

## 2024-05-29 PROCEDURE — 85027 COMPLETE CBC AUTOMATED: CPT | Performed by: PHYSICIAN ASSISTANT

## 2024-05-29 PROCEDURE — 80048 BASIC METABOLIC PNL TOTAL CA: CPT | Performed by: PHYSICIAN ASSISTANT

## 2024-05-29 PROCEDURE — 93000 ELECTROCARDIOGRAM COMPLETE: CPT | Performed by: PHYSICIAN ASSISTANT

## 2024-05-29 RX ORDER — RESPIRATORY SYNCYTIAL VIRUS VACCINE 120MCG/0.5
0.5 KIT INTRAMUSCULAR ONCE
Qty: 1 EACH | Refills: 0 | Status: CANCELLED | OUTPATIENT
Start: 2024-05-29 | End: 2024-05-29

## 2024-05-29 ASSESSMENT — PAIN SCALES - GENERAL: PAINLEVEL: MILD PAIN (3)

## 2024-05-29 ASSESSMENT — PATIENT HEALTH QUESTIONNAIRE - PHQ9
SUM OF ALL RESPONSES TO PHQ QUESTIONS 1-9: 4
10. IF YOU CHECKED OFF ANY PROBLEMS, HOW DIFFICULT HAVE THESE PROBLEMS MADE IT FOR YOU TO DO YOUR WORK, TAKE CARE OF THINGS AT HOME, OR GET ALONG WITH OTHER PEOPLE: SOMEWHAT DIFFICULT
SUM OF ALL RESPONSES TO PHQ QUESTIONS 1-9: 4

## 2024-05-29 NOTE — LETTER
May 29, 2024      Audrey LIV Ricks  [id number] [address]        To Whom It May Concern:    Audrey PECK Rambo  was seen on 5/29/24.             Sincerely,        Alie De La Cruz PA-C

## 2024-05-29 NOTE — PROGRESS NOTES
Preoperative Evaluation  [address] [address]  Phone: [phone]  Fax: [phone]  Primary Provider: Alie De La Cruz PA-C  Pre-op Performing Provider: Alie De La Cruz PA-C  May 29, 2024             5/29/2024   Surgical Information   What procedure is being done? knee surgery   Facility or Hospital where procedure/surgery will be performed: [facility]   Who is doing the procedure / surgery? [facility]   Date of surgery / procedure: June 4   Time of surgery / procedure: 11   Where do you plan to recover after surgery? at home with family     Fax number for surgical facility: Note does not need to be faxed, will be available electronically in Epic.      Damian Ventura is a 64 year old, presenting for the following:  Pre-Op Exam          5/29/2024     9:57 AM   Additional Questions   Roomed by Linnea CASAS related to upcoming procedure: meniscus tear        5/29/2024   Pre-Op Questionnaire   Have you ever had a heart attack or stroke? No   Have you ever had surgery on your heart or blood vessels, such as a stent placement, a coronary artery bypass, or surgery on an artery in your head, neck, heart, or legs? (!) YES - ablation due to SVT   Do you have chest pain with activity? No   Do you have a history of heart failure? No   Do you currently have a cold, bronchitis or symptoms of other infection? No   Do you have a cough, shortness of breath, or wheezing? No   Do you or anyone in your family have previous history of blood clots? No   Do you or does anyone in your family have a serious bleeding problem such as prolonged bleeding following surgeries or cuts? No   Have you ever had problems with anemia or been told to take iron pills? (!) YES - with periods     Have you had any abnormal blood loss such as black, tarry or bloody stools, or abnormal vaginal bleeding? No   Have you ever had a blood transfusion? No   Are you willing to have a blood transfusion  if it is medically needed before, during, or after your surgery? Yes   Have you or any of your relatives ever had problems with anesthesia? No   Do you have sleep apnea, excessive snoring or daytime drowsiness? No   Do you have any artifical heart valves or other implanted medical devices like a pacemaker, defibrillator, or continuous glucose monitor? No   Do you have artificial joints? No   Are you allergic to latex? No     Health Care Directive  Patient does not have a Health Care Directive or Living Will: Discussed advance care planning with patient; information given to patient to review.    Preoperative Review of    reviewed - no record of controlled substances prescribed.    Status of Chronic Conditions:  See problem list for active medical problems.  Problems all longstanding and stable, except as noted/documented.  See ROS for pertinent symptoms related to these conditions.    HYPERTENSION - Patient has longstanding history of HTN , currently denies any symptoms referable to elevated blood pressure. Specifically denies chest pain, palpitations, dyspnea, orthopnea, PND or peripheral edema. Blood pressure readings have been in normal range. Current medication regimen is as listed below. Patient denies any side effects of medication.     SVT - This has been stable since last ablation.     Patient Active Problem List    Diagnosis Date Noted    NSTEMI (non-ST elevated myocardial infarction) (H) 08/06/2022     Priority: Medium    Hyperlipidemia LDL goal <130 03/18/2021     Priority: Medium    Morbid obesity (H) 02/16/2021     Priority: Medium    BMI 33.0-33.9,adult 01/14/2020     Priority: Medium    Cervical radiculopathy at C7 05/18/2016     Priority: Medium    Glaucoma 08/19/2015     Priority: Medium    Bipolar 2 disorder (H) 01/12/2012     Priority: Medium     IMO Update 10/11      GERD (gastroesophageal reflux disease) 01/12/2012     Priority: Medium      Past Medical History:   Diagnosis Date     Depressive disorder 2011    Hypertension     NSTEMI (non-ST elevated myocardial infarction) (H) 08/06/2022    SVT (supraventricular tachycardia) (H24)      Past Surgical History:   Procedure Laterality Date    EP ABLATION SVT Bilateral 9/23/2022    Procedure: Ablation Supraventricular Tachycardia;  Surgeon: Donna Wolfe MD;  Location:  [facility]     Current Outpatient Medications   Medication Sig Dispense Refill    acetaminophen (TYLENOL) 500 MG tablet Take 650 mg by mouth every 6 hours as needed for mild pain      B Complex Vitamins (VITAMIN-B COMPLEX PO) Take 1 tablet by mouth daily Unknown tablet strength      CALCIUM PO Take 1 tablet by mouth daily Unknown tablet strength      estradiol-norethindrone (COMBIPATCH) 0.05-0.14 MG/DAY bi-weekly patch REMOVE OLD PATCH AND PLACE 1 PATCH ONTO THE SKIN TWICE A WEEK 24 patch 3    ibuprofen (ADVIL/MOTRIN) 600 MG tablet Take 1 tablet (600 mg) by mouth every 6 hours as needed for moderate pain 120 tablet 1    metroNIDAZOLE (METROGEL) 1 % external gel APPLY TOPICALLY DAILY 60 g 1    naproxen (NAPROSYN) 500 MG tablet Take 1 tablet (500 mg) by mouth 2 times daily (with meals) 60 tablet 3    OMEGA-3 FATTY ACIDS PO Take 1 capsule by mouth daily Unknown capsule strength      omeprazole (PRILOSEC) 20 MG DR capsule TAKE 1 CAPSULE BY MOUTH ONE TIME A DAY. TAKES AS NEEDED FOR HEARTBURN 90 capsule 3    potassium chloride ER (KLOR-CON M) 10 MEQ CR tablet Take 1 tablet (10 mEq) by mouth daily 90 tablet 1    POTASSIUM PO Take 1 tablet by mouth daily Unknown tablet strength      triamcinolone (KENALOG) 0.1 % external cream Apply topically 2 times daily as needed for irritation 30 g 1    zolpidem (AMBIEN) 5 MG tablet TAKE 1/2-1 TABLET BY MOUTH AT BEDTIME AS NEEDED FOR SLEEP. 30 tablet 1       Allergies   Allergen Reactions    Amlodipine Shortness Of Breath and Swelling        Social History     Tobacco Use    Smoking status: Never    Smokeless tobacco: Never   Substance Use  "Topics    Alcohol use: Yes     Comment: rare     Family History   Problem Relation Age of Onset    Diabetes Mother      History   Drug Use Unknown             Review of Systems  Constitutional, HEENT, cardiovascular, pulmonary, GI, , musculoskeletal, neuro, skin, endocrine and psych systems are negative, except as otherwise noted.    Objective    BP (!) 158/118   Pulse 106   Temp 98  F (36.7  C) (Temporal)   Resp 18   Ht 1.58 m (5' 2.21\")   Wt 87.5 kg (193 lb)   SpO2 98%   BMI 35.07 kg/m     Estimated body mass index is 35.07 kg/m  as calculated from the following:    Height as of this encounter: 1.58 m (5' 2.21\").    Weight as of this encounter: 87.5 kg (193 lb).  Physical Exam  GENERAL: alert and no distress  EYES: Eyes grossly normal to inspection, PERRL and conjunctivae and sclerae normal  HENT: ear canals and TM's normal, nose and mouth without ulcers or lesions  NECK: no adenopathy, no asymmetry, masses, or scars  RESP: lungs clear to auscultation - no rales, rhonchi or wheezes  CV: regular rate and rhythm, normal S1 S2, no S3 or S4, no murmur, click or rub, no peripheral edema  ABDOMEN: soft, nontender, no hepatosplenomegaly, no masses and bowel sounds normal  MS: no gross musculoskeletal defects noted, no edema  SKIN: no suspicious lesions or rashes  NEURO: Normal strength and tone, mentation intact and speech normal  PSYCH: mentation appears normal, affect normal/bright    Recent Labs   Lab Test 02/07/24  0831   HGB 13.5         POTASSIUM 3.9   CR 1.00*        Diagnostics  Recent Results (from the past 24 hour(s))   CBC with platelets    Collection Time: 05/29/24 10:45 AM   Result Value Ref Range    WBC Count 6.8 4.0 - 11.0 10e3/uL    RBC Count 4.43 3.80 - 5.20 10e6/uL    Hemoglobin 13.9 11.7 - 15.7 g/dL    Hematocrit 41.3 35.0 - 47.0 %    MCV 93 78 - 100 fL    MCH 31.4 26.5 - 33.0 pg    MCHC 33.7 31.5 - 36.5 g/dL    RDW 13.4 10.0 - 15.0 %    Platelet Count 239 150 - 450 10e3/uL "   Basic metabolic panel  (Ca, Cl, CO2, Creat, Gluc, K, Na, BUN)    Collection Time: 05/29/24 10:45 AM   Result Value Ref Range    Sodium 138 135 - 145 mmol/L    Potassium 4.1 3.4 - 5.3 mmol/L    Chloride 102 98 - 107 mmol/L    Carbon Dioxide (CO2) 26 22 - 29 mmol/L    Anion Gap 10 7 - 15 mmol/L    Urea Nitrogen 17.4 8.0 - 23.0 mg/dL    Creatinine 0.89 0.51 - 0.95 mg/dL    GFR Estimate 73 >60 mL/min/1.73m2    Calcium 9.2 8.8 - 10.2 mg/dL    Glucose 110 (H) 70 - 99 mg/dL      EKG: appears normal, NSR, normal axis, normal intervals, no acute ST/T changes c/w ischemia, no LVH by voltage criteria, unchanged from previous tracings    CT coronary angiogram (8/2022): calcium score of 0 with normal coronary anatomy with no detectable stenosis or plaque  Echocardiogram (8/2022): LVEF of 55 to 60% with no significant wall motion or valvular abnormalities    Revised Cardiac Risk Index (RCRI)  The patient has the following serious cardiovascular risks for perioperative complications:   - No serious cardiac risks = 0 points     RCRI Interpretation: 0 points: Class I (very low risk - 0.4% complication rate)       Assessment & Plan     The proposed surgical procedure is considered INTERMEDIATE risk.    Pre-op exam  - CBC with platelets; Future  - Basic metabolic panel  (Ca, Cl, CO2, Creat, Gluc, K, Na, BUN); Future  - EKG 12-lead complete w/read - Clinics  - CBC with platelets  - Basic metabolic panel  (Ca, Cl, CO2, Creat, Gluc, K, Na, BUN)    Complex tear of medial meniscus of left knee, unspecified whether old or current tear, subsequent encounter    Bipolar 2 disorder (H)    SVT (supraventricular tachycardia) (H24)    Essential hypertension       - No identified additional risk factors other than previously addressed    Antiplatelet or Anticoagulation Medication Instructions   - Patient is on no antiplatelet or anticoagulation medications.    Additional Medication Instructions   - ibuprofen (Advil, Motrin): DO NOT TAKE 1 day  before surgery.    - naproxen (Aleve, Naprosyn): DO NOT TAKE 4 days before surgery.     Recommendation  Approval given to proceed with proposed procedure, without further diagnostic evaluation.        Signed Electronically by: Alie De La Cruz PA-C  Copy of this evaluation report is provided to requesting physician.         Answers submitted by the patient for this visit:  Patient Health Questionnaire (Submitted on 5/29/2024)  If you checked off any problems, how difficult have these problems made it for you to do your work, take care of things at home, or get along with other people?: Somewhat difficult  PHQ9 TOTAL SCORE: 4

## 2024-05-29 NOTE — H&P (VIEW-ONLY)
Preoperative Evaluation  [address] [address]  Phone: [phone]  Fax: [phone]  Primary Provider: Alie De La Cruz PA-C  Pre-op Performing Provider: Alie De La Cruz PA-C  May 29, 2024             5/29/2024   Surgical Information   What procedure is being done? knee surgery   Facility or Hospital where procedure/surgery will be performed: [place]   Who is doing the procedure / surgery? [facility]   Date of surgery / procedure: June 4   Time of surgery / procedure: 11   Where do you plan to recover after surgery? at home with family     Fax number for surgical facility: Note does not need to be faxed, will be available electronically in Epic.      Damian Ventura is a 64 year old, presenting for the following:  Pre-Op Exam          5/29/2024     9:57 AM   Additional Questions   Roomed by Linnea CASAS related to upcoming procedure: meniscus tear        5/29/2024   Pre-Op Questionnaire   Have you ever had a heart attack or stroke? No   Have you ever had surgery on your heart or blood vessels, such as a stent placement, a coronary artery bypass, or surgery on an artery in your head, neck, heart, or legs? (!) YES - ablation due to SVT   Do you have chest pain with activity? No   Do you have a history of heart failure? No   Do you currently have a cold, bronchitis or symptoms of other infection? No   Do you have a cough, shortness of breath, or wheezing? No   Do you or anyone in your family have previous history of blood clots? No   Do you or does anyone in your family have a serious bleeding problem such as prolonged bleeding following surgeries or cuts? No   Have you ever had problems with anemia or been told to take iron pills? (!) YES - with periods     Have you had any abnormal blood loss such as black, tarry or bloody stools, or abnormal vaginal bleeding? No   Have you ever had a blood transfusion? No   Are you willing to have a blood transfusion  if it is medically needed before, during, or after your surgery? Yes   Have you or any of your relatives ever had problems with anesthesia? No   Do you have sleep apnea, excessive snoring or daytime drowsiness? No   Do you have any artifical heart valves or other implanted medical devices like a pacemaker, defibrillator, or continuous glucose monitor? No   Do you have artificial joints? No   Are you allergic to latex? No     Health Care Directive  Patient does not have a Health Care Directive or Living Will: Discussed advance care planning with patient; information given to patient to review.    Preoperative Review of    reviewed - no record of controlled substances prescribed.    Status of Chronic Conditions:  See problem list for active medical problems.  Problems all longstanding and stable, except as noted/documented.  See ROS for pertinent symptoms related to these conditions.    HYPERTENSION - Patient has longstanding history of HTN , currently denies any symptoms referable to elevated blood pressure. Specifically denies chest pain, palpitations, dyspnea, orthopnea, PND or peripheral edema. Blood pressure readings have been in normal range. Current medication regimen is as listed below. Patient denies any side effects of medication.     SVT - This has been stable since last ablation.     Patient Active Problem List    Diagnosis Date Noted    NSTEMI (non-ST elevated myocardial infarction) (H) 08/06/2022     Priority: Medium    Hyperlipidemia LDL goal <130 03/18/2021     Priority: Medium    Morbid obesity (H) 02/16/2021     Priority: Medium    BMI 33.0-33.9,adult 01/14/2020     Priority: Medium    Cervical radiculopathy at C7 05/18/2016     Priority: Medium    Glaucoma 08/19/2015     Priority: Medium    Bipolar 2 disorder (H) 01/12/2012     Priority: Medium     IMO Update 10/11      GERD (gastroesophageal reflux disease) 01/12/2012     Priority: Medium      Past Medical History:   Diagnosis Date     Depressive disorder 2011    Hypertension     NSTEMI (non-ST elevated myocardial infarction) (H) 08/06/2022    SVT (supraventricular tachycardia) (H24)      Past Surgical History:   Procedure Laterality Date    EP ABLATION SVT Bilateral 9/23/2022    Procedure: Ablation Supraventricular Tachycardia;  Surgeon: Donna Wolfe MD;  Location:  [facility]     Current Outpatient Medications   Medication Sig Dispense Refill    acetaminophen (TYLENOL) 500 MG tablet Take 650 mg by mouth every 6 hours as needed for mild pain      B Complex Vitamins (VITAMIN-B COMPLEX PO) Take 1 tablet by mouth daily Unknown tablet strength      CALCIUM PO Take 1 tablet by mouth daily Unknown tablet strength      estradiol-norethindrone (COMBIPATCH) 0.05-0.14 MG/DAY bi-weekly patch REMOVE OLD PATCH AND PLACE 1 PATCH ONTO THE SKIN TWICE A WEEK 24 patch 3    ibuprofen (ADVIL/MOTRIN) 600 MG tablet Take 1 tablet (600 mg) by mouth every 6 hours as needed for moderate pain 120 tablet 1    metroNIDAZOLE (METROGEL) 1 % external gel APPLY TOPICALLY DAILY 60 g 1    naproxen (NAPROSYN) 500 MG tablet Take 1 tablet (500 mg) by mouth 2 times daily (with meals) 60 tablet 3    OMEGA-3 FATTY ACIDS PO Take 1 capsule by mouth daily Unknown capsule strength      omeprazole (PRILOSEC) 20 MG DR capsule TAKE 1 CAPSULE BY MOUTH ONE TIME A DAY. TAKES AS NEEDED FOR HEARTBURN 90 capsule 3    potassium chloride ER (KLOR-CON M) 10 MEQ CR tablet Take 1 tablet (10 mEq) by mouth daily 90 tablet 1    POTASSIUM PO Take 1 tablet by mouth daily Unknown tablet strength      triamcinolone (KENALOG) 0.1 % external cream Apply topically 2 times daily as needed for irritation 30 g 1    zolpidem (AMBIEN) 5 MG tablet TAKE 1/2-1 TABLET BY MOUTH AT BEDTIME AS NEEDED FOR SLEEP. 30 tablet 1       Allergies   Allergen Reactions    Amlodipine Shortness Of Breath and Swelling        Social History     Tobacco Use    Smoking status: Never    Smokeless tobacco: Never   Substance Use  "Topics    Alcohol use: Yes     Comment: rare     Family History   Problem Relation Age of Onset    Diabetes Mother      History   Drug Use Unknown             Review of Systems  Constitutional, HEENT, cardiovascular, pulmonary, GI, , musculoskeletal, neuro, skin, endocrine and psych systems are negative, except as otherwise noted.    Objective    BP (!) 158/118   Pulse 106   Temp 98  F (36.7  C) (Temporal)   Resp 18   Ht 1.58 m (5' 2.21\")   Wt 87.5 kg (193 lb)   SpO2 98%   BMI 35.07 kg/m     Estimated body mass index is 35.07 kg/m  as calculated from the following:    Height as of this encounter: 1.58 m (5' 2.21\").    Weight as of this encounter: 87.5 kg (193 lb).  Physical Exam  GENERAL: alert and no distress  EYES: Eyes grossly normal to inspection, PERRL and conjunctivae and sclerae normal  HENT: ear canals and TM's normal, nose and mouth without ulcers or lesions  NECK: no adenopathy, no asymmetry, masses, or scars  RESP: lungs clear to auscultation - no rales, rhonchi or wheezes  CV: regular rate and rhythm, normal S1 S2, no S3 or S4, no murmur, click or rub, no peripheral edema  ABDOMEN: soft, nontender, no hepatosplenomegaly, no masses and bowel sounds normal  MS: no gross musculoskeletal defects noted, no edema  SKIN: no suspicious lesions or rashes  NEURO: Normal strength and tone, mentation intact and speech normal  PSYCH: mentation appears normal, affect normal/bright    Recent Labs   Lab Test 02/07/24  0831   HGB 13.5         POTASSIUM 3.9   CR 1.00*        Diagnostics  Recent Results (from the past 24 hour(s))   CBC with platelets    Collection Time: 05/29/24 10:45 AM   Result Value Ref Range    WBC Count 6.8 4.0 - 11.0 10e3/uL    RBC Count 4.43 3.80 - 5.20 10e6/uL    Hemoglobin 13.9 11.7 - 15.7 g/dL    Hematocrit 41.3 35.0 - 47.0 %    MCV 93 78 - 100 fL    MCH 31.4 26.5 - 33.0 pg    MCHC 33.7 31.5 - 36.5 g/dL    RDW 13.4 10.0 - 15.0 %    Platelet Count 239 150 - 450 10e3/uL "   Basic metabolic panel  (Ca, Cl, CO2, Creat, Gluc, K, Na, BUN)    Collection Time: 05/29/24 10:45 AM   Result Value Ref Range    Sodium 138 135 - 145 mmol/L    Potassium 4.1 3.4 - 5.3 mmol/L    Chloride 102 98 - 107 mmol/L    Carbon Dioxide (CO2) 26 22 - 29 mmol/L    Anion Gap 10 7 - 15 mmol/L    Urea Nitrogen 17.4 8.0 - 23.0 mg/dL    Creatinine 0.89 0.51 - 0.95 mg/dL    GFR Estimate 73 >60 mL/min/1.73m2    Calcium 9.2 8.8 - 10.2 mg/dL    Glucose 110 (H) 70 - 99 mg/dL      EKG: appears normal, NSR, normal axis, normal intervals, no acute ST/T changes c/w ischemia, no LVH by voltage criteria, unchanged from previous tracings    CT coronary angiogram (8/2022): calcium score of 0 with normal coronary anatomy with no detectable stenosis or plaque  Echocardiogram (8/2022): LVEF of 55 to 60% with no significant wall motion or valvular abnormalities    Revised Cardiac Risk Index (RCRI)  The patient has the following serious cardiovascular risks for perioperative complications:   - No serious cardiac risks = 0 points     RCRI Interpretation: 0 points: Class I (very low risk - 0.4% complication rate)       Assessment & Plan     The proposed surgical procedure is considered INTERMEDIATE risk.    Pre-op exam  - CBC with platelets; Future  - Basic metabolic panel  (Ca, Cl, CO2, Creat, Gluc, K, Na, BUN); Future  - EKG 12-lead complete w/read - Clinics  - CBC with platelets  - Basic metabolic panel  (Ca, Cl, CO2, Creat, Gluc, K, Na, BUN)    Complex tear of medial meniscus of left knee, unspecified whether old or current tear, subsequent encounter    Bipolar 2 disorder (H)    SVT (supraventricular tachycardia) (H24)    Essential hypertension       - No identified additional risk factors other than previously addressed    Antiplatelet or Anticoagulation Medication Instructions   - Patient is on no antiplatelet or anticoagulation medications.    Additional Medication Instructions   - ibuprofen (Advil, Motrin): DO NOT TAKE 1 day  before surgery.    - naproxen (Aleve, Naprosyn): DO NOT TAKE 4 days before surgery.     Recommendation  Approval given to proceed with proposed procedure, without further diagnostic evaluation.        Signed Electronically by: Alie De La Cruz PA-C  Copy of this evaluation report is provided to requesting physician.         Answers submitted by the patient for this visit:  Patient Health Questionnaire (Submitted on 5/29/2024)  If you checked off any problems, how difficult have these problems made it for you to do your work, take care of things at home, or get along with other people?: Somewhat difficult  PHQ9 TOTAL SCORE: 4

## 2024-06-04 ENCOUNTER — HOSPITAL ENCOUNTER (OUTPATIENT)
Facility: CLINIC | Age: 64
Discharge: HOME | End: 2024-06-04
Attending: ORTHOPAEDIC SURGERY | Admitting: ORTHOPAEDIC SURGERY
Payer: COMMERCIAL

## 2024-06-04 ENCOUNTER — ANESTHESIA EVENT (OUTPATIENT)
Dept: SURGERY | Facility: CLINIC | Age: 64
End: 2024-06-04
Payer: COMMERCIAL

## 2024-06-04 ENCOUNTER — ANESTHESIA (OUTPATIENT)
Dept: SURGERY | Facility: CLINIC | Age: 64
End: 2024-06-04
Payer: COMMERCIAL

## 2024-06-04 VITALS
SYSTOLIC BLOOD PRESSURE: 156 MMHG | OXYGEN SATURATION: 99 % | HEART RATE: 80 BPM | RESPIRATION RATE: 16 BRPM | DIASTOLIC BLOOD PRESSURE: 109 MMHG | TEMPERATURE: 96.3 F | BODY MASS INDEX: 35.07 KG/M2 | WEIGHT: 193 LBS

## 2024-06-04 DIAGNOSIS — Z98.890 S/P LEFT KNEE ARTHROSCOPY: Primary | ICD-10-CM

## 2024-06-04 DIAGNOSIS — S83.232A COMPLEX TEAR OF MEDIAL MENISCUS OF LEFT KNEE AS CURRENT INJURY, INITIAL ENCOUNTER: ICD-10-CM

## 2024-06-04 PROCEDURE — 999N000141 HC STATISTIC PRE-PROCEDURE NURSING ASSESSMENT: Performed by: ORTHOPAEDIC SURGERY

## 2024-06-04 PROCEDURE — 250N000009 HC RX 250: Performed by: NURSE ANESTHETIST, CERTIFIED REGISTERED

## 2024-06-04 PROCEDURE — 250N000009 HC RX 250: Performed by: ORTHOPAEDIC SURGERY

## 2024-06-04 PROCEDURE — 250N000011 HC RX IP 250 OP 636: Performed by: NURSE PRACTITIONER

## 2024-06-04 PROCEDURE — 29881 ARTHRS KNE SRG MNISECTMY M/L: CPT | Mod: LT | Performed by: ORTHOPAEDIC SURGERY

## 2024-06-04 PROCEDURE — 258N000003 HC RX IP 258 OP 636: Performed by: NURSE ANESTHETIST, CERTIFIED REGISTERED

## 2024-06-04 PROCEDURE — 370N000017 HC ANESTHESIA TECHNICAL FEE, PER MIN: Performed by: ORTHOPAEDIC SURGERY

## 2024-06-04 PROCEDURE — 710N000012 HC RECOVERY PHASE 2, PER MINUTE: Performed by: ORTHOPAEDIC SURGERY

## 2024-06-04 PROCEDURE — 250N000011 HC RX IP 250 OP 636: Performed by: NURSE ANESTHETIST, CERTIFIED REGISTERED

## 2024-06-04 PROCEDURE — 360N000076 HC SURGERY LEVEL 3, PER MIN: Performed by: ORTHOPAEDIC SURGERY

## 2024-06-04 PROCEDURE — 272N000001 HC OR GENERAL SUPPLY STERILE: Performed by: ORTHOPAEDIC SURGERY

## 2024-06-04 RX ORDER — OXYCODONE HYDROCHLORIDE 5 MG/1
5 TABLET ORAL
Status: CANCELLED | OUTPATIENT
Start: 2024-06-04

## 2024-06-04 RX ORDER — OXYCODONE HYDROCHLORIDE 5 MG/1
5-10 TABLET ORAL EVERY 4 HOURS PRN
Qty: 18 TABLET | Refills: 0 | Status: SHIPPED | OUTPATIENT
Start: 2024-06-04

## 2024-06-04 RX ORDER — ACETAMINOPHEN 325 MG/1
650 TABLET ORAL
Status: CANCELLED | OUTPATIENT
Start: 2024-06-04

## 2024-06-04 RX ORDER — ONDANSETRON 2 MG/ML
4 INJECTION INTRAMUSCULAR; INTRAVENOUS EVERY 30 MIN PRN
Status: CANCELLED | OUTPATIENT
Start: 2024-06-04

## 2024-06-04 RX ORDER — NALOXONE HYDROCHLORIDE 0.4 MG/ML
0.1 INJECTION, SOLUTION INTRAMUSCULAR; INTRAVENOUS; SUBCUTANEOUS
Status: CANCELLED | OUTPATIENT
Start: 2024-06-04

## 2024-06-04 RX ORDER — PROPOFOL 10 MG/ML
INJECTION, EMULSION INTRAVENOUS CONTINUOUS PRN
Status: DISCONTINUED | OUTPATIENT
Start: 2024-06-04 | End: 2024-06-04

## 2024-06-04 RX ORDER — SODIUM CHLORIDE, SODIUM LACTATE, POTASSIUM CHLORIDE, CALCIUM CHLORIDE 600; 310; 30; 20 MG/100ML; MG/100ML; MG/100ML; MG/100ML
INJECTION, SOLUTION INTRAVENOUS CONTINUOUS
Status: DISCONTINUED | OUTPATIENT
Start: 2024-06-04 | End: 2024-06-08 | Stop reason: HOSPADM

## 2024-06-04 RX ORDER — ONDANSETRON 4 MG/1
4 TABLET, ORALLY DISINTEGRATING ORAL EVERY 30 MIN PRN
Status: CANCELLED | OUTPATIENT
Start: 2024-06-04

## 2024-06-04 RX ORDER — ONDANSETRON 2 MG/ML
INJECTION INTRAMUSCULAR; INTRAVENOUS PRN
Status: DISCONTINUED | OUTPATIENT
Start: 2024-06-04 | End: 2024-06-04

## 2024-06-04 RX ORDER — KETOROLAC TROMETHAMINE 30 MG/ML
INJECTION, SOLUTION INTRAMUSCULAR; INTRAVENOUS PRN
Status: DISCONTINUED | OUTPATIENT
Start: 2024-06-04 | End: 2024-06-04

## 2024-06-04 RX ORDER — ONDANSETRON 4 MG/1
4 TABLET, ORALLY DISINTEGRATING ORAL
Status: CANCELLED | OUTPATIENT
Start: 2024-06-04

## 2024-06-04 RX ORDER — PROPOFOL 10 MG/ML
INJECTION, EMULSION INTRAVENOUS PRN
Status: DISCONTINUED | OUTPATIENT
Start: 2024-06-04 | End: 2024-06-04

## 2024-06-04 RX ORDER — FENTANYL CITRATE 50 UG/ML
INJECTION, SOLUTION INTRAMUSCULAR; INTRAVENOUS PRN
Status: DISCONTINUED | OUTPATIENT
Start: 2024-06-04 | End: 2024-06-04

## 2024-06-04 RX ORDER — DIMENHYDRINATE 50 MG/ML
INJECTION, SOLUTION INTRAMUSCULAR; INTRAVENOUS PRN
Status: DISCONTINUED | OUTPATIENT
Start: 2024-06-04 | End: 2024-06-04

## 2024-06-04 RX ORDER — OXYCODONE HYDROCHLORIDE 5 MG/1
10 TABLET ORAL
Status: CANCELLED | OUTPATIENT
Start: 2024-06-04

## 2024-06-04 RX ORDER — CEFAZOLIN SODIUM/WATER 2 G/20 ML
2 SYRINGE (ML) INTRAVENOUS
Status: COMPLETED | OUTPATIENT
Start: 2024-06-04 | End: 2024-06-04

## 2024-06-04 RX ORDER — CEFAZOLIN SODIUM/WATER 2 G/20 ML
2 SYRINGE (ML) INTRAVENOUS SEE ADMIN INSTRUCTIONS
Status: DISCONTINUED | OUTPATIENT
Start: 2024-06-04 | End: 2024-06-08 | Stop reason: HOSPADM

## 2024-06-04 RX ORDER — DEXAMETHASONE SODIUM PHOSPHATE 10 MG/ML
4 INJECTION, SOLUTION INTRAMUSCULAR; INTRAVENOUS
Status: CANCELLED | OUTPATIENT
Start: 2024-06-04

## 2024-06-04 RX ORDER — MULTIVITAMIN WITH IRON
1 TABLET ORAL DAILY
COMMUNITY
Start: 2023-06-25

## 2024-06-04 RX ORDER — LIDOCAINE HYDROCHLORIDE 20 MG/ML
INJECTION, SOLUTION INFILTRATION; PERINEURAL PRN
Status: DISCONTINUED | OUTPATIENT
Start: 2024-06-04 | End: 2024-06-04

## 2024-06-04 RX ORDER — FENTANYL CITRATE 50 UG/ML
25 INJECTION, SOLUTION INTRAMUSCULAR; INTRAVENOUS
Status: CANCELLED | OUTPATIENT
Start: 2024-06-04

## 2024-06-04 RX ORDER — OXYCODONE HYDROCHLORIDE 5 MG/1
5-10 TABLET ORAL
Status: CANCELLED | OUTPATIENT
Start: 2024-06-04

## 2024-06-04 RX ADMIN — KETOROLAC TROMETHAMINE 30 MG: 30 INJECTION, SOLUTION INTRAMUSCULAR at 13:52

## 2024-06-04 RX ADMIN — ONDANSETRON 4 MG: 2 INJECTION INTRAMUSCULAR; INTRAVENOUS at 13:26

## 2024-06-04 RX ADMIN — MIDAZOLAM 2 MG: 1 INJECTION INTRAMUSCULAR; INTRAVENOUS at 13:09

## 2024-06-04 RX ADMIN — FENTANYL CITRATE 50 MCG: 50 INJECTION INTRAMUSCULAR; INTRAVENOUS at 13:40

## 2024-06-04 RX ADMIN — PROPOFOL 50 MG: 10 INJECTION, EMULSION INTRAVENOUS at 13:14

## 2024-06-04 RX ADMIN — Medication 2 G: at 13:02

## 2024-06-04 RX ADMIN — SODIUM CHLORIDE, POTASSIUM CHLORIDE, SODIUM LACTATE AND CALCIUM CHLORIDE: 600; 310; 30; 20 INJECTION, SOLUTION INTRAVENOUS at 11:35

## 2024-06-04 RX ADMIN — DIMENHYDRINATE 25 MG: 50 INJECTION, SOLUTION INTRAMUSCULAR; INTRAVENOUS at 13:37

## 2024-06-04 RX ADMIN — FENTANYL CITRATE 50 MCG: 50 INJECTION INTRAMUSCULAR; INTRAVENOUS at 13:14

## 2024-06-04 RX ADMIN — PROPOFOL 50 MG: 10 INJECTION, EMULSION INTRAVENOUS at 13:15

## 2024-06-04 RX ADMIN — LIDOCAINE HYDROCHLORIDE 50 MG: 20 INJECTION, SOLUTION INFILTRATION; PERINEURAL at 13:14

## 2024-06-04 RX ADMIN — PROPOFOL 200 MCG/KG/MIN: 10 INJECTION, EMULSION INTRAVENOUS at 13:14

## 2024-06-04 ASSESSMENT — ACTIVITIES OF DAILY LIVING (ADL)
ADLS_ACUITY_SCORE: 37

## 2024-06-04 ASSESSMENT — ENCOUNTER SYMPTOMS: DYSRHYTHMIAS: 1

## 2024-06-04 ASSESSMENT — LIFESTYLE VARIABLES: TOBACCO_USE: 0

## 2024-06-04 NOTE — ANESTHESIA CARE TRANSFER NOTE
Patient: Audrey Ricks    Procedure: Procedure(s):  Left knee arthroscopy with medial and lateral meniscus debridement       Diagnosis: Complex tear of medial meniscus of left knee as current injury, initial encounter [S83.232A]  Diagnosis Additional Information: No value filed.    Anesthesia Type:   MAC     Note:    Oropharynx: oropharynx clear of all foreign objects and spontaneously breathing  Level of Consciousness: drowsy  Oxygen Supplementation: face mask    Independent Airway: airway patency satisfactory and stable  Dentition: dentition unchanged  Vital Signs Stable: post-procedure vital signs reviewed and stable  Report to RN Given: handoff report given  Patient transferred to: Phase II    Handoff Report: Identifed the Patient, Identified the Reponsible Provider, Reviewed the pertinent medical history, Discussed the surgical course, Reviewed Intra-OP anesthesia mangement and issues during anesthesia, Set expectations for post-procedure period and Allowed opportunity for questions and acknowledgement of understanding      Vitals:  Vitals Value Taken Time   BP     Temp     Pulse     Resp     SpO2         Electronically Signed By: GENOVEVA Patricio CRNA  June 4, 2024  1:57 PM

## 2024-06-04 NOTE — PROCEDURES
[facility] Operative Report    PREOPERATIVE DIAGNOSIS: left knee complex multidirectional medial meniscus tear, lateral meniscus tear, chondromalacia     POSTOPERATIVE DIAGNOSIS: left knee medial lateral meniscus tear with chondromalacia.     PROCEDURE:  left knee arthroscopy partial medial lateral meniscectomy.     SURGEON: Alvaro Ang DO     ASSISTANT: Ray Vidal APRN, CNP (A advanced practice provider was necessary for his expertise, exposure and surgical assistance throughout the case.)    ANESTHESIA: local with mac    COMPLICATIONS: None.     ESTIMATED BLOOD LOSS: Minimal    COUNTS: Correct.     Specimen: None    IMPLANTS: None.     DISPOSITION: To PACU in stable condition.     GROSS FINDINGS:    Patellofemoral Compartment: Chondromalacia along the apex and medial patellar facet.  Partial thickness.  No full-thickness   Medial Compartment: Complex tear posterior horn medial meniscus extending into the body.  Partial-thickness chondromalacia but no full-thickness findings.   Intercondylar notch: ACL and PCL intact.   Lateral Compartment: Tear of the posterior horn body junction radial.        NOTE/INDICATIONS: Audrey Ricks is a 64 year old year-old  who presented to my clinic for knee pain.  Clinical and radiographical studies confirmed the preoperative diagnosis.  Subsequent, given the pain and impact on quality of life the patient opted proceed with surgery as described.  All risks, benefits, complications, alternatives, limitations, and anticipated postop course were discussed at length. Risks that included infection, bleeding, blood clots, scar, scar tenderness, stiffness, skin problems with healing, and failure to relieve all symptoms were discussed at length.  The postoperative timeframe for recovery was also discussed. They agreed to proceed.       DETAILS OF PROCEDURE: Audrey Ricks was met in the preop care unit. Again, informed consent was verified and the extremity was marked and the  consent was signed. They were wheeled back to an operative suite at [facility]. The patient was transferred off the cart to the OR table without incident. All bony prominences were padded and the patient was secured to the table.    When deemed appropriate by anesthesia after the patient had adequate sedation a timeout was performed.  The portal sites and intra-articular was injected with a mixture of 30 mL of 0.25% bupivacaine with epi/20 mL of 1% lidocaine with epi.    The  extremity was prepped and draped in normal manner.  Prior to incision a secondary timeout was performed confirming the proper patient, surgery, laterality.  Preoperative antibiotics have been administered.  A lateral portal was established and the trocar was gradually and easily introduced intra-articular with no significant resistance.  Subsequently, with an outside in technique using a spinal needle a medial portal was established.  The above gross findings changes were noted.    In the patellofemoral compartment the structures were carefully probed.  Chondromalacia is noted however no gross loose flaps    The medial compartment structures were carefully probed and as noted above.  Complex tear medial meniscus.  Using combination of a shaver and a biter this was brought to a stable margin.    The intercondylar notch was noted showing an intact ACL and PCL.  ACL PCL intact with probing    The leg was figure four and visualization lateral compartment was obtained.  No significant chondromalacia however small tear of the posterior horn body junction.  Using a combination of shaver and biter this was brought back to a stable margin.  There is also a flap type tear associated with this.  This was cleaned to a stable margin.    The knee was then subsequently irrigated and suctioned dry.  Instruments were removed.  Portal sites were closed with a nylon suture.  A sterile bandage was applied and the patient is transferred the PACU in stable condition.   They will be discharged home with follow-up appointment, and instructions.    West Ang D.O.

## 2024-06-04 NOTE — INTERVAL H&P NOTE
This H&P has been reviewed and there are no clinically significant changes in the patient s condition.  The patient was evaluated by myself as well as Alie WALDEN prior to surgery. The Patient is approved for the surgery and the stated surgical procedure is still clinically indicated.

## 2024-06-04 NOTE — DISCHARGE INSTRUCTIONS
General Knee Arthroscopy Discharge Instructions                                     [phone]  Bone and Joint Service Line for issues or concerns      General Care:  After surgery you may feel tired/sleepy. This is normal. Please have someone stay with you for 24 hours after surgery. You should avoid driving for 1-2 days after surgery, as your reaction time may be slow. You should not drive at all if you have had surgery on your arms, right leg and/or are taking narcotic pain medications until released by your doctor. If you have any question along the way please contact the office. If you feel it is an issue cannot wait for normal office hours, contact the on-call physician.  Elevate your leg with a couple of pillows placed under your ankle/calf area. Do this for the first couple days frequently.     Bandages:   Change your bandage after the first 72 hours. You may use Band-Aids or sterile gauze with a small amount of tape. It s normal to have some blood-tinged fluid on your bandages, this will usually continue for the first day or two. Keep the area clean and dry. Do not apply any ointments. Use the ACE wrap from your foot to thigh until you are seen at your follow up.     Bathing:  Do not submerge your incision in water such as a bath or pool. It is ok to shower after 1 week uncovered. Once showering is completely dab off the incisions and replace the bandaids. Avoid any excessively hot showers, baths, or hot tubes after surgery.     Follow up:  Your follow up appointment should already be scheduled. If its not, please call the office to verify an appointment 10 days after surgery.     Diet:  Start with non-alcoholic liquids at first, particularly water or sports drinks after surgery. Progress to bland foods such as crackers and bread and finally to your normal diet if you have no problems.     Pain control:  Take your pain medications as prescribed. These medications may make you sleepy. Do not drive, operate  equipment, or drink alcohol when taking these.  You may take Tylenol (Generic name is acetaminophen) or NSAIDs (Motrin, Ibuprofen, Aleve, Naproxen) as directed on the bottle for additional relief or in place of the prescribed pain medications as your pain gets better. If the medications cause a reaction such as nausea or skin rash, stop taking them and contact your doctor. Please plan accordingly, pain medications will not be re-filled on the weekends or at night. Call the office during the day if you need more medications.    Crutches:  Use crutches/walker/cane only if needed after surgery. You can stop using these when you feel stable on your feet.     Braces:  Some surgeries will require the use of a brace. Use this brace as directed.         Physical Therapy:  Depending on your surgery, physical therapy may start within a few days or be delayed 4-6 weeks. At your first post-operative visit, your doctor will direct you on your personal therapy program if needed.     Activity:  Unless otherwise instructed, you can weight bear as tolerated. While laying or sitting down you should straighten your knee all the way out and then gently work on bending the knee back. Do not worry at first if your knee feels stiff and will not bend like normal, this will get better.     Normal findings after surgery:  Numbness and tenderness around the incisions is normal.  You may have bruising around the incisions.  Your knee will be swollen for 3 weeks after surgery. It will feel  tight  to move.   Low grade fevers less than 100.5 degrees Fahrenheit are normal.       When to call the Office:  Temperature greater than 101.5 degrees Fahreheit.  Fever, chills, and increasing pain in the knee.  Excessive drainage from the incisions that include bright red blood.  Drainage from the incisions sites that appear yellow, pus-like, or foul smelling.  Increasing pain the knee not relieved by the prescribed pain medications or ice.  Pain or  swelling in your calf area (in back above your ankle)  Any other effects you feel are significant.

## 2024-06-04 NOTE — ANESTHESIA POSTPROCEDURE EVALUATION
Patient: Audrey Ricks    Procedure: Procedure(s):  Left knee arthroscopy with medial and lateral meniscus debridement       Anesthesia Type:  MAC    Note:  Disposition: Outpatient   Postop Pain Control: Uneventful            Sign Out: Well controlled pain   PONV: No   Neuro/Psych: Uneventful            Sign Out: Acceptable/Baseline neuro status   Airway/Respiratory: Uneventful            Sign Out: Acceptable/Baseline resp. status   CV/Hemodynamics: Uneventful            Sign Out: Acceptable CV status   Other NRE: NONE   DID A NON-ROUTINE EVENT OCCUR? No    Event details/Postop Comments:  Pt was happy with anesthesia care.  No complications.  I will follow up with the pt if needed.           Last vitals:  Vitals Value Taken Time   /109 06/04/24 1440   Temp 96.3  F (35.7  C) 06/04/24 1410   Pulse 80 06/04/24 1440   Resp 16 06/04/24 1440   SpO2 99 % 06/04/24 1440       Electronically Signed By: GENOVEVA Patricio CRNA  June 4, 2024  4:12 PM

## 2024-06-04 NOTE — ANESTHESIA PREPROCEDURE EVALUATION
Anesthesia Pre-Procedure Evaluation    Patient: Julisa Gupta   MRN: [id number] : 1960        Procedure : Procedure(s):  Left knee arthroscopy with medial and lateral meniscus debridement          Past Medical History:   Diagnosis Date     Depressive disorder      Hypertension      NSTEMI (non-ST elevated myocardial infarction) (H) 2022     SVT (supraventricular tachycardia) (H24)       Past Surgical History:   Procedure Laterality Date     EP ABLATION SVT Bilateral 2022    Procedure: Ablation Supraventricular Tachycardia;  Surgeon: Donna Wolfe MD;  Location:  [facility]      Allergies   Allergen Reactions     Amlodipine Shortness Of Breath and Swelling      Social History     Tobacco Use     Smoking status: Never     Smokeless tobacco: Never   Substance Use Topics     Alcohol use: Yes     Comment: rare      Wt Readings from Last 1 Encounters:   24 87.5 kg (193 lb)        Anesthesia Evaluation   Pt has had prior anesthetic. Type: General and MAC.    No history of anesthetic complications       ROS/MED HX  ENT/Pulmonary:    (-) tobacco use   Neurologic:  - neg neurologic ROS     Cardiovascular:     (+) Dyslipidemia hypertension- -   -  - -                        dysrhythmias (SVT, ablation 22),         Previous cardiac testing   Echo: Date: 22 Results:  Reading Physician Reading Date Result Priority  Glenn Salcedo MD  [phone] 2022     Narrative & Impression  [id number]  [id number]  [id number]  [id number]^BARTHELL^TONO^EDWARD FIGUEROA     [facility]  Echocardiography Laboratory  [address] [place] [address]     Name: JULISA GUPTA  MRN: [id number]  : 1960  Study Date: 2022 01:41 PM  Age: 62 yrs  Gender: Female  Patient Location: [facility]  Reason For Study: Chest Pain, Chest Tightness, Chest Pressure, Paroxsysmal SVT  Ordering Physician: BARTHELL, JOANNA KERSTEN ULMEN  Referring Physician: NONE  Performed By:  Nicolasa Quezada RDCS     BSA: 1.9 m2  Height: 62 in  Weight: 188 lb  HR: 72  BP: 128/90 mmHg  ______________________________________________________________________________  Procedure  Complete Portable Echo Adult.  ______________________________________________________________________________  Interpretation Summary     Left ventricular size, global systolic function, and wall motion are normal,  estimated LVEF 55-60%.  Right ventricular global function is normal.  No significant valvular abnormalities.     There are no prior studies available for comparison.    Stress Test:  Date: Results:    ECG Reviewed:  Date: 5-29-24 Results:  SR  Cath:  Date: 8-8-22 Results:  Procedure: CTA ANGIOGRAM CORONARY ARTERY   Examination Date: 8/8/2022 1:05 PM      Indication:  Positive troponin.      Clinical Information: Resident troponin   Ordering Physician: Oanh Coy      Overall quality of the study: Good.      PROCEDURE:The patient was positioned in the scanner gantry and an IV  was started using an 18 gauge IV in the right antecubital fossa.   Utilizing 110 cc  Isovue 370, wasted 0 cc, multi-slice computed  tomography was performed with a Siemens Dual Source Flash scanner  without incident. Beta-blockers were required to optimize heart rate,  patient was given Metoprolol 0 mg Oral, Metoprolol 0 mg  IV. The  patient was given pre-medication of sublingual Nitrostat 0.4 mg prior  to scanning. Coronary artery calcium score was performed using the  Flash scanner protocol. CTA was performed in the sequential mode at a  heart rate of 54 bpm with 100 kVp. Images were reconstructed and  analyzed on a [employer] workstation. Scan protocol was optimized to  minimize radiation exposure. The total radiation exposure including  calcium score was calculated to be 213 DLP, and 2.9 mSv.                                                                      IMPRESSION:     1. Total Agatston score 0.      2. Normal coronary anatomy with no  "detectable stenosis or plaque.     3.  Please review Radiology report for incidental noncardiac findings  that  will follow separately.      METS/Exercise Tolerance:     Hematologic:     (+)      anemia (hx),          Musculoskeletal: Comment: Left knee pain    Right knee pain and right plantar fasciitis pain      GI/Hepatic:     (+) GERD, Asymptomatic on medication,                  Renal/Genitourinary:  - neg Renal ROS     Endo:     (+)               Obesity,       Psychiatric/Substance Use:     (+) psychiatric history bipolar       Infectious Disease:  - neg infectious disease ROS     Malignancy:  - neg malignancy ROS     Other:  - neg other ROS          Physical Exam    Airway        Mallampati: II   TM distance: > 3 FB   Neck ROM: full   Mouth opening: > 3 cm    Respiratory Devices and Support         Dental       (+) Multiple crowns, permanant bridges      Cardiovascular   cardiovascular exam normal       Rhythm and rate: regular and normal     Pulmonary   pulmonary exam normal        breath sounds clear to auscultation       OUTSIDE LABS:  CBC:   Lab Results   Component Value Date    WBC 6.8 05/29/2024    WBC 5.2 02/07/2024    HGB 13.9 05/29/2024    HGB 13.5 02/07/2024    HCT 41.3 05/29/2024    HCT 40.6 02/07/2024     05/29/2024     02/07/2024     BMP:   Lab Results   Component Value Date     05/29/2024     02/07/2024    POTASSIUM 4.1 05/29/2024    POTASSIUM 3.9 02/07/2024    CHLORIDE 102 05/29/2024    CHLORIDE 103 02/07/2024    CO2 26 05/29/2024    CO2 24 02/07/2024    BUN 17.4 05/29/2024    BUN 21.8 02/07/2024    CR 0.89 05/29/2024    CR 1.00 (H) 02/07/2024     (H) 05/29/2024     (H) 02/07/2024     COAGS: No results found for: \"PTT\", \"INR\", \"FIBR\"  POC: No results found for: \"BGM\", \"HCG\", \"HCGS\"  HEPATIC: No results found for: \"ALBUMIN\", \"PROTTOTAL\", \"ALT\", \"AST\", \"GGT\", \"ALKPHOS\", \"BILITOTAL\", \"BILIDIRECT\", \"SEAMUS\"  OTHER:   Lab Results   Component Value Date    A1C " "5.9 (H) 08/07/2022    YAJAIRA 9.2 05/29/2024    MAG 2.3 08/06/2022    TSH 3.15 08/07/2022       Anesthesia Plan    ASA Status:  2    NPO Status:  NPO Appropriate    Anesthesia Type: MAC.     - Reason for MAC: straight local not clinically adequate, immobility needed   Induction: Intravenous, Propofol.   Maintenance: TIVA.        Consents    Anesthesia Plan(s) and associated risks, benefits, and realistic alternatives discussed. Questions answered and patient/representative(s) expressed understanding.     - Discussed:     - Discussed with:  Patient      - Extended Intubation/Ventilatory Support Discussed: No.      - Patient is DNR/DNI Status: No     Use of blood products discussed: Yes.     - Discussed with: Patient.     - Consented: consented to blood products     Postoperative Care    Pain management: IV analgesics, Oral pain medications.   PONV prophylaxis: Ondansetron (or other 5HT-3), Meclizine or Dimenhydrinate, Background Propofol Infusion     Comments:    Other Comments: The risks and benefits of anesthesia, and the alternatives where applicable, have been discussed with the patient, and they wish to proceed.              GENOVEVA Patricio CRNA    I have reviewed the pertinent notes and labs in the chart from the past 30 days and (re)examined the patient.  Any updates or changes from those notes are reflected in this note.              # Obesity: Estimated body mass index is 35.07 kg/m  as calculated from the following:    Height as of 5/29/24: 1.58 m (5' 2.21\").    Weight as of 5/29/24: 87.5 kg (193 lb).      "

## 2024-06-05 ASSESSMENT — ACTIVITIES OF DAILY LIVING (ADL)
ADLS_ACUITY_SCORE: 37

## 2024-06-06 ASSESSMENT — ACTIVITIES OF DAILY LIVING (ADL)
ADLS_ACUITY_SCORE: 37

## 2024-06-07 ASSESSMENT — ACTIVITIES OF DAILY LIVING (ADL)
ADLS_ACUITY_SCORE: 37

## 2024-06-08 ASSESSMENT — ACTIVITIES OF DAILY LIVING (ADL)
ADLS_ACUITY_SCORE: 37

## 2024-06-13 ENCOUNTER — OFFICE VISIT (OUTPATIENT)
Dept: ORTHOPEDICS | Facility: CLINIC | Age: 64
End: 2024-06-13
Payer: COMMERCIAL

## 2024-06-13 VITALS
SYSTOLIC BLOOD PRESSURE: 141 MMHG | DIASTOLIC BLOOD PRESSURE: 109 MMHG | WEIGHT: 193 LBS | TEMPERATURE: 96.7 F | BODY MASS INDEX: 35.51 KG/M2 | HEIGHT: 62 IN

## 2024-06-13 DIAGNOSIS — Z98.890 S/P ARTHROSCOPY OF LEFT KNEE: Primary | ICD-10-CM

## 2024-06-13 PROCEDURE — 99024 POSTOP FOLLOW-UP VISIT: CPT | Performed by: ORTHOPAEDIC SURGERY

## 2024-06-13 NOTE — LETTER
6/13/2024      Audrey Ricks  [id number] [address]      Dear Colleague,    Thank you for referring your patient, Audrey Ricks, to the [facility]. Please see a copy of my visit note below.    Orthopedic Clinic Post-Operative Note    CHIEF COMPLAINT:   Chief Complaint   Patient presents with     Left Knee - Surgical Followup     Left knee arthroscopy with medial menisectomy, DOS: 6/4/2024 (8d)       HISTORY OF PRESENT ILLNESS  9 days post-op. Doing very well. Pain much improved since pre-op.  Had very little pain post-op. Did not take any narcotic.  Very happy with progress. No incision concerns.     June 4, 2024 OR:  PREOPERATIVE DIAGNOSIS: left knee complex multidirectional medial meniscus tear, lateral meniscus tear, chondromalacia      POSTOPERATIVE DIAGNOSIS: left knee medial lateral meniscus tear with chondromalacia.      PROCEDURE:  left knee arthroscopy partial medial lateral meniscectomy.      GROSS FINDINGS:               Patellofemoral Compartment: Chondromalacia along the apex and medial patellar facet.  Partial thickness.  No full-thickness              Medial Compartment: Complex tear posterior horn medial meniscus extending into the body.  Partial-thickness chondromalacia but no full-thickness findings.              Intercondylar notch: ACL and PCL intact.              Lateral Compartment: Tear of the posterior horn body junction radial.     Patient's past medical, surgical, social and family histories reviewed.     Past Medical History:   Diagnosis Date     Depressive disorder 2011     Hypertension      NSTEMI (non-ST elevated myocardial infarction) (H) 08/06/2022     SVT (supraventricular tachycardia) (H24)        Past Surgical History:   Procedure Laterality Date     ARTHROSCOPY KNEE WITH DEBRIDEMENT JOINT, COMBINED Left 6/4/2024    Procedure: Left knee arthroscopy with medial and lateral meniscus debridement;  Surgeon: Alvaro Ang DO;   Location: [facility] OR     EP ABLATION SVT Bilateral 9/23/2022    Procedure: Ablation Supraventricular Tachycardia;  Surgeon: Donna Wolfe MD;  Location:  [facility]       Medications:  Current Outpatient Medications   Medication Sig Dispense Refill     acetaminophen (TYLENOL) 500 MG tablet Take 650 mg by mouth every 6 hours as needed for mild pain       B Complex Vitamins (VITAMIN-B COMPLEX PO) Take 1 tablet by mouth daily Unknown tablet strength       CALCIUM PO Take 1 tablet by mouth daily Unknown tablet strength       estradiol-norethindrone (COMBIPATCH) 0.05-0.14 MG/DAY bi-weekly patch REMOVE OLD PATCH AND PLACE 1 PATCH ONTO THE SKIN TWICE A WEEK 24 patch 3     ibuprofen (ADVIL/MOTRIN) 600 MG tablet Take 1 tablet (600 mg) by mouth every 6 hours as needed for moderate pain 120 tablet 1     magnesium 250 MG tablet Take 1 tablet by mouth daily       metroNIDAZOLE (METROGEL) 1 % external gel APPLY TOPICALLY DAILY 60 g 1     naproxen (NAPROSYN) 500 MG tablet Take 1 tablet (500 mg) by mouth 2 times daily (with meals) 60 tablet 3     OMEGA-3 FATTY ACIDS PO Take 1 capsule by mouth daily Unknown capsule strength       omeprazole (PRILOSEC) 20 MG DR capsule TAKE 1 CAPSULE BY MOUTH ONE TIME A DAY. TAKES AS NEEDED FOR HEARTBURN 90 capsule 3     oxyCODONE (ROXICODONE) 5 MG tablet Take 1-2 tablets (5-10 mg) by mouth every 4 hours as needed for moderate to severe pain (Patient not taking: Reported on 6/13/2024) 18 tablet 0     potassium chloride ER (KLOR-CON M) 10 MEQ CR tablet Take 1 tablet (10 mEq) by mouth daily 90 tablet 1     POTASSIUM PO Take 1 tablet by mouth daily Unknown tablet strength       triamcinolone (KENALOG) 0.1 % external cream Apply topically 2 times daily as needed for irritation 30 g 1     zolpidem (AMBIEN) 5 MG tablet TAKE 1/2-1 TABLET BY MOUTH AT BEDTIME AS NEEDED FOR SLEEP. 30 tablet 1     Current Facility-Administered Medications   Medication Dose Route Frequency Provider Last Rate Last Admin  "    2.0 mL bupivacaine (MARCAINE) 0.5% injection (50 mL vial)  2 mL      2 mL at 04/18/24 0901     2.0 mL bupivacaine (MARCAINE) 0.5% injection (50 mL vial)  2 mL   Hany Lebron, DO   2 mL at 01/16/24 1545     2.0 mL bupivacaine (MARCAINE) 0.5% injection (50 mL vial)  2 mL   Hany Lebron, DO   2 mL at 08/30/23 0820     lidocaine 1 % injection 2 mL  2 mL      2 mL at 04/18/24 0901     lidocaine 1 % injection 2 mL  2 mL   Hany Lebron, DO   2 mL at 01/16/24 1545     lidocaine 1 % injection 2 mL  2 mL   Hany Lebron, DO   2 mL at 08/30/23 0820     triamcinolone (KENALOG-40) injection 40 mg  40 mg      40 mg at 04/18/24 0901     triamcinolone (KENALOG-40) injection 40 mg  40 mg   Hany Lebron,    40 mg at 01/16/24 1545     triamcinolone (KENALOG-40) injection 40 mg  40 mg   Hany Lebron, DO   40 mg at 08/30/23 0820       Allergies   Allergen Reactions     Amlodipine Shortness Of Breath and Swelling       Social History     Occupational History     Not on file   Tobacco Use     Smoking status: Never     Smokeless tobacco: Never   Vaping Use     Vaping status: Never Used   Substance and Sexual Activity     Alcohol use: Yes     Comment: rare     Drug use: Never     Sexual activity: Not on file       Family History   Problem Relation Age of Onset     Diabetes Mother        REVIEW OF SYSTEMS  General: negative for, night sweats, dizziness, fatigue  Resp: No shortness of breath and no cough  CV: negative for chest pain, syncope or near-syncope  GI: negative for nausea, vomiting and diarrhea  : negative for dysuria and hematuria  Musculoskeletal: as above  Neurologic: negative for syncope   Hematologic: negative for bleeding disorder    Physical Exam:  Vitals: BP (!) 141/109   Temp (!) 96.7  F (35.9  C)   Ht 1.58 m (5' 2.21\")   Wt 87.5 kg (193 lb)   BMI 35.06 kg/m    BMI= Body mass index is 35.06 kg/m .  Constitutional: healthy, alert and no acute distress   Psychiatric: mentation appears normal and affect " normal/bright  NEURO: no focal deficits  SKIN:  2 x port sites both healing well, well approximated skin edges, without signs of infection including no erythema, incision breakdown or purlent drainage  JOINT/EXTREMITIES: left knee: mild swelling with some bruising. Full motion. No pain with motion. Non-tender. Calf soft non-tender. No instability. Extensor intact.   GAIT: not tested     Diagnostic Modalities:  None today.  Independent visualization of the images was performed.      Impression:   Chief Complaint   Patient presents with     Left Knee - Surgical Followup     Left knee arthroscopy with medial menisectomy, DOS: 6/4/2024 (8d)     Plan:   Discussed the underlying chondromalacia.   Activity: slow increase as tolerated over the next 2 weeks.  Staples/Sutures out: Yes.  Pain controlled: Yes. Continue to use: rest, icing,elevation, OTC pain meds  Immobilzation: No  Physical Therapy: none at this time.     Return to clinic PRN, or sooner as needed for changes.    Re-x-ray on return: No    Dr. Ang was present in the office.  He personally discussed the care plan and reviewed all appropriate imaging.    GENOVEVA Higgins, CNP  Orthopedic Surgery      Again, thank you for allowing me to participate in the care of your patient.        Sincerely,        Alvaro Ang, DO

## 2024-06-13 NOTE — PROGRESS NOTES
Orthopedic Clinic Post-Operative Note    CHIEF COMPLAINT:   Chief Complaint   Patient presents with    Left Knee - Surgical Followup     Left knee arthroscopy with medial menisectomy, DOS: 6/4/2024 (8d)       HISTORY OF PRESENT ILLNESS  9 days post-op. Doing very well. Pain much improved since pre-op.  Had very little pain post-op. Did not take any narcotic.  Very happy with progress. No incision concerns.     June 4, 2024 OR:  PREOPERATIVE DIAGNOSIS: left knee complex multidirectional medial meniscus tear, lateral meniscus tear, chondromalacia      POSTOPERATIVE DIAGNOSIS: left knee medial lateral meniscus tear with chondromalacia.      PROCEDURE:  left knee arthroscopy partial medial lateral meniscectomy.      GROSS FINDINGS:               Patellofemoral Compartment: Chondromalacia along the apex and medial patellar facet.  Partial thickness.  No full-thickness              Medial Compartment: Complex tear posterior horn medial meniscus extending into the body.  Partial-thickness chondromalacia but no full-thickness findings.              Intercondylar notch: ACL and PCL intact.              Lateral Compartment: Tear of the posterior horn body junction radial.     Patient's past medical, surgical, social and family histories reviewed.     Past Medical History:   Diagnosis Date    Depressive disorder 2011    Hypertension     NSTEMI (non-ST elevated myocardial infarction) (H) 08/06/2022    SVT (supraventricular tachycardia) (H24)        Past Surgical History:   Procedure Laterality Date    ARTHROSCOPY KNEE WITH DEBRIDEMENT JOINT, COMBINED Left 6/4/2024    Procedure: Left knee arthroscopy with medial and lateral meniscus debridement;  Surgeon: Alvaro Ang DO;  Location: [facility]    EP ABLATION SVT Bilateral 9/23/2022    Procedure: Ablation Supraventricular Tachycardia;  Surgeon: Donna Wolfe MD;  [facility]:  [facility]       Medications:  Current Outpatient Medications   Medication Sig Dispense  Refill    acetaminophen (TYLENOL) 500 MG tablet Take 650 mg by mouth every 6 hours as needed for mild pain      B Complex Vitamins (VITAMIN-B COMPLEX PO) Take 1 tablet by mouth daily Unknown tablet strength      CALCIUM PO Take 1 tablet by mouth daily Unknown tablet strength      estradiol-norethindrone (COMBIPATCH) 0.05-0.14 MG/DAY bi-weekly patch REMOVE OLD PATCH AND PLACE 1 PATCH ONTO THE SKIN TWICE A WEEK 24 patch 3    ibuprofen (ADVIL/MOTRIN) 600 MG tablet Take 1 tablet (600 mg) by mouth every 6 hours as needed for moderate pain 120 tablet 1    magnesium 250 MG tablet Take 1 tablet by mouth daily      metroNIDAZOLE (METROGEL) 1 % external gel APPLY TOPICALLY DAILY 60 g 1    naproxen (NAPROSYN) 500 MG tablet Take 1 tablet (500 mg) by mouth 2 times daily (with meals) 60 tablet 3    OMEGA-3 FATTY ACIDS PO Take 1 capsule by mouth daily Unknown capsule strength      omeprazole (PRILOSEC) 20 MG DR capsule TAKE 1 CAPSULE BY MOUTH ONE TIME A DAY. TAKES AS NEEDED FOR HEARTBURN 90 capsule 3    oxyCODONE (ROXICODONE) 5 MG tablet Take 1-2 tablets (5-10 mg) by mouth every 4 hours as needed for moderate to severe pain (Patient not taking: Reported on 6/13/2024) 18 tablet 0    potassium chloride ER (KLOR-CON M) 10 MEQ CR tablet Take 1 tablet (10 mEq) by mouth daily 90 tablet 1    POTASSIUM PO Take 1 tablet by mouth daily Unknown tablet strength      triamcinolone (KENALOG) 0.1 % external cream Apply topically 2 times daily as needed for irritation 30 g 1    zolpidem (AMBIEN) 5 MG tablet TAKE 1/2-1 TABLET BY MOUTH AT BEDTIME AS NEEDED FOR SLEEP. 30 tablet 1     Current Facility-Administered Medications   Medication Dose Route Frequency Provider Last Rate Last Admin    2.0 mL bupivacaine (MARCAINE) 0.5% injection (50 mL vial)  2 mL      2 mL at 04/18/24 0901    2.0 mL bupivacaine (MARCAINE) 0.5% injection (50 mL vial)  2 mL   Hany Lebron DO   2 mL at 01/16/24 1545    2.0 mL bupivacaine (MARCAINE) 0.5% injection (50 mL vial)  " 2 mL   Hany Lebron, DO   2 mL at 08/30/23 0820    lidocaine 1 % injection 2 mL  2 mL      2 mL at 04/18/24 0901    lidocaine 1 % injection 2 mL  2 mL   Hany Lebron, DO   2 mL at 01/16/24 1545    lidocaine 1 % injection 2 mL  2 mL   Hany Lebron, DO   2 mL at 08/30/23 0820    triamcinolone (KENALOG-40) injection 40 mg  40 mg      40 mg at 04/18/24 0901    triamcinolone (KENALOG-40) injection 40 mg  40 mg   Hany Lebron, DO   40 mg at 01/16/24 1545    triamcinolone (KENALOG-40) injection 40 mg  40 mg   Hany Lebron, DO   40 mg at 08/30/23 0820       Allergies   Allergen Reactions    Amlodipine Shortness Of Breath and Swelling       Social History     Occupational History    Not on file   Tobacco Use    Smoking status: Never    Smokeless tobacco: Never   Vaping Use    Vaping status: Never Used   Substance and Sexual Activity    Alcohol use: Yes     Comment: rare    Drug use: Never    Sexual activity: Not on file       Family History   Problem Relation Age of Onset    Diabetes Mother        REVIEW OF SYSTEMS  General: negative for, night sweats, dizziness, fatigue  Resp: No shortness of breath and no cough  CV: negative for chest pain, syncope or near-syncope  GI: negative for nausea, vomiting and diarrhea  : negative for dysuria and hematuria  Musculoskeletal: as above  Neurologic: negative for syncope   Hematologic: negative for bleeding disorder    Physical Exam:  Vitals: BP (!) 141/109   Temp (!) 96.7  F (35.9  C)   Ht 1.58 m (5' 2.21\")   Wt 87.5 kg (193 lb)   BMI 35.06 kg/m    BMI= Body mass index is 35.06 kg/m .  Constitutional: healthy, alert and no acute distress   Psychiatric: mentation appears normal and affect normal/bright  NEURO: no focal deficits  SKIN:  2 x port sites both healing well, well approximated skin edges, without signs of infection including no erythema, incision breakdown or purlent drainage  JOINT/EXTREMITIES: left knee: mild swelling with some bruising. Full motion. No " pain with motion. Non-tender. Calf soft non-tender. No instability. Extensor intact.   GAIT: not tested     Diagnostic Modalities:  None today.  Independent visualization of the images was performed.      Impression:   Chief Complaint   Patient presents with    Left Knee - Surgical Followup     Left knee arthroscopy with medial menisectomy, DOS: 6/4/2024 (8d)     Plan:   Discussed the underlying chondromalacia.   Activity: slow increase as tolerated over the next 2 weeks.  Staples/Sutures out: Yes.  Pain controlled: Yes. Continue to use: rest, icing,elevation, OTC pain meds  Immobilzation: No  Physical Therapy: none at this time.     Return to clinic PRN, or sooner as needed for changes.    Re-x-ray on return: No    Dr. Ang was present in the office.  He personally discussed the care plan and reviewed all appropriate imaging.    GENOVEVA Higgins, CNP  Orthopedic Surgery

## 2024-06-26 ENCOUNTER — TELEPHONE (OUTPATIENT)
Dept: FAMILY MEDICINE | Facility: CLINIC | Age: 64
End: 2024-06-26
Payer: COMMERCIAL

## 2024-06-26 NOTE — LETTER
July 8, 2024      Audrey Ricks  [address] [place] [id number]        To Whom It May Concern,     Audrey Ricks is a patient who has been under my care since 2021. Please note that she has been diagnosed with Bipolar 2 Disorder along with a history of having an NSTEMI/coronary artery disease. For these reasons it is recommended that she continue with COMBIPATCH 0.05-0.14 MG/DAY TD PTTW in order to decrease total estrogen levels and avoid any adverse effects with her mental health.           Sincerely,        Alie De La Cruz PA-C

## 2024-06-26 NOTE — TELEPHONE ENCOUNTER
Prior Authorization Retail Medication Request    Medication/Dose: Combipatch 0.05-0.14mg/day patch  Diagnosis and ICD code (if different than what is on RX):    New/renewal/insurance change PA/secondary ins. PA:  Previously Tried and Failed:    Rationale:      Insurance   Primary: [facility]  Insurance ID:  [id number]    Pharmacy Information (if different than what is on RX)  Name:  [facility]  Phone:  [phone]  Fax:[phone]    Thank you,   Oanh Díaz, Pharmacy [facility]

## 2024-06-29 NOTE — TELEPHONE ENCOUNTER
PA Initiation    Medication: COMBIPATCH 0.05-0.14 MG/DAY TD PTTW  Insurance Company: [employer] - Phone [phone] Fax [phone]  Pharmacy Filling the Rx: [address]   Filling Pharmacy Phone: [phone]  Filling Pharmacy Fax: [phone]  Start Date: 6/29/2024

## 2024-07-01 NOTE — TELEPHONE ENCOUNTER
PRIOR AUTHORIZATION DENIED    Medication: COMBIPATCH 0.05-0.14 MG/DAY TD PTTW    Insurance Company: [employer] - Phone [phone] Fax [phone]    Denial Date: 6/30/2024    Denial Reason(s): Patient needs to try and fail the preferred medications:       Appeal Information:

## 2024-07-09 NOTE — TELEPHONE ENCOUNTER
Medication Appeal Initiation    Medication: COMBIPATCH 0.05-0.14 MG/DAY TD PTTW  Appeal Start Date:  7/8/2024  Insurance Company: [employer] - Phone [phone] Fax [phone]   Insurance Phone: [phone]   Insurance Fax: [phone]   Comments:

## 2024-07-11 NOTE — TELEPHONE ENCOUNTER
MEDICATION APPEAL APPROVED    Medication: COMBIPATCH 0.05-0.14 MG/DAY TD PTTW  Authorization Effective Date: 7/9/2024  Authorization Expiration Date: 7/9/2025  Approved Dose/Quantity:   Reference #:     Appeal Insurance Company: [employer] - Phone [phone] Fax [phone]   Expected CoPay: $       CoPay Card Available:    Financial Assistance Needed:   Filling Pharmacy: [address]   Patient Notified: **Instructed pharmacy to notify patient when script is ready to /ship.**  Comments:       Contacted insurance plan to follow up on request.  Per rep case is approved, received approval info verbally. Rep will refax letter to PA team.

## 2024-07-29 ENCOUNTER — MYC MEDICAL ADVICE (OUTPATIENT)
Dept: FAMILY MEDICINE | Facility: CLINIC | Age: 64
End: 2024-07-29
Payer: COMMERCIAL

## 2024-08-01 NOTE — TELEPHONE ENCOUNTER
Patient needs to see podiatry.  She should be able to schedule without referral.    Will have staff notify patient.     Drew Harris MD

## 2024-08-07 DIAGNOSIS — F31.81 BIPOLAR 2 DISORDER (H): ICD-10-CM

## 2024-08-07 RX ORDER — ZOLPIDEM TARTRATE 5 MG/1
TABLET ORAL
Qty: 30 TABLET | Refills: 1 | Status: SHIPPED | OUTPATIENT
Start: 2024-08-07

## 2024-08-08 ENCOUNTER — ANCILLARY PROCEDURE (OUTPATIENT)
Dept: GENERAL RADIOLOGY | Facility: CLINIC | Age: 64
End: 2024-08-08
Attending: PODIATRIST
Payer: COMMERCIAL

## 2024-08-08 ENCOUNTER — OFFICE VISIT (OUTPATIENT)
Dept: PODIATRY | Facility: CLINIC | Age: 64
End: 2024-08-08
Payer: COMMERCIAL

## 2024-08-08 VITALS
HEIGHT: 62 IN | WEIGHT: 197 LBS | SYSTOLIC BLOOD PRESSURE: 150 MMHG | DIASTOLIC BLOOD PRESSURE: 90 MMHG | BODY MASS INDEX: 36.25 KG/M2 | TEMPERATURE: 97.7 F

## 2024-08-08 DIAGNOSIS — M72.2 PLANTAR FASCIITIS, RIGHT: Primary | ICD-10-CM

## 2024-08-08 DIAGNOSIS — M72.2 PLANTAR FASCIITIS, RIGHT: ICD-10-CM

## 2024-08-08 PROCEDURE — 99203 OFFICE O/P NEW LOW 30 MIN: CPT | Mod: 24 | Performed by: PODIATRIST

## 2024-08-08 PROCEDURE — 73630 X-RAY EXAM OF FOOT: CPT | Mod: TC | Performed by: RADIOLOGY

## 2024-08-08 ASSESSMENT — PAIN SCALES - GENERAL: PAINLEVEL: MODERATE PAIN (5)

## 2024-08-08 NOTE — LETTER
8/8/2024      Audrey Ricks  [address] [facility] [address]      Dear Colleague,    Thank you for referring your patient, Audrey Ricks, to the [facility]. Please see a copy of my visit note below.    HPI:  Audrey Ricks is a 64 year old female who is seen in consultation at the request of .    Referred Self     Pt presents for eval of:   (Onset, Location, L/R, Character, Treatments, Injury if yes)     Right heel pain x 2 months bottom pain guarding hopping started about June 1,2024.  Got left knee meniscus repair.      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    ROS: 10 point ROS neg other than the symptoms noted above in the HPI.    Patient Active Problem List   Diagnosis     Bipolar 2 disorder (H)     Cervical radiculopathy at C7     BMI 33.0-33.9,adult     GERD (gastroesophageal reflux disease)     Glaucoma     Morbid obesity (H)     Hyperlipidemia LDL goal <130     S/P arthroscopy of left knee       PAST MEDICAL HISTORY:   Past Medical History:   Diagnosis Date     Depressive disorder 2011     Hypertension      NSTEMI (non-ST elevated myocardial infarction) (H) 08/06/2022     SVT (supraventricular tachycardia) (H24)      PAST SURGICAL HISTORY:   Past Surgical History:   Procedure Laterality Date     ARTHROSCOPY KNEE WITH DEBRIDEMENT JOINT, COMBINED Left 6/4/2024    Procedure: Left knee arthroscopy with medial and lateral meniscus debridement;  Surgeon: Alvaro Ang DO;  Location: [facility]     EP ABLATION SVT Bilateral 9/23/2022    Procedure: Ablation Supraventricular Tachycardia;  Surgeon: Donna Wolfe MD;  Location:  [facility]     MEDICATIONS:   Current Outpatient Medications:      B Complex Vitamins (VITAMIN-B COMPLEX PO), Take 1 tablet by mouth daily Unknown tablet strength, Disp: , Rfl:      CALCIUM PO, Take 1 tablet by mouth daily Unknown tablet strength, Disp: , Rfl:      estradiol-norethindrone (COMBIPATCH) 0.05-0.14 MG/DAY bi-weekly patch, REMOVE OLD  PATCH AND PLACE 1 PATCH ONTO THE SKIN TWICE A WEEK, Disp: 24 patch, Rfl: 3     ibuprofen (ADVIL/MOTRIN) 600 MG tablet, Take 1 tablet (600 mg) by mouth every 6 hours as needed for moderate pain, Disp: 120 tablet, Rfl: 1     magnesium 250 MG tablet, Take 1 tablet by mouth daily, Disp: , Rfl:      metroNIDAZOLE (METROGEL) 1 % external gel, APPLY TOPICALLY DAILY, Disp: 60 g, Rfl: 1     naproxen (NAPROSYN) 500 MG tablet, Take 1 tablet (500 mg) by mouth 2 times daily (with meals), Disp: 60 tablet, Rfl: 3     OMEGA-3 FATTY ACIDS PO, Take 1 capsule by mouth daily Unknown capsule strength, Disp: , Rfl:      omeprazole (PRILOSEC) 20 MG DR capsule, TAKE 1 CAPSULE BY MOUTH ONE TIME A DAY. TAKES AS NEEDED FOR HEARTBURN, Disp: 90 capsule, Rfl: 3     potassium chloride ER (KLOR-CON M) 10 MEQ CR tablet, Take 1 tablet (10 mEq) by mouth daily, Disp: 90 tablet, Rfl: 1     zolpidem (AMBIEN) 5 MG tablet, TAKE ONE-HALF TO ONE TABLET EVERY NIGHT AT BEDTIME AS NEEDED FOR SLEEP, Disp: 30 tablet, Rfl: 1     acetaminophen (TYLENOL) 500 MG tablet, Take 650 mg by mouth every 6 hours as needed for mild pain (Patient not taking: Reported on 8/8/2024), Disp: , Rfl:      oxyCODONE (ROXICODONE) 5 MG tablet, Take 1-2 tablets (5-10 mg) by mouth every 4 hours as needed for moderate to severe pain (Patient not taking: Reported on 6/13/2024), Disp: 18 tablet, Rfl: 0     POTASSIUM PO, Take 1 tablet by mouth daily Unknown tablet strength (Patient not taking: Reported on 8/8/2024), Disp: , Rfl:      triamcinolone (KENALOG) 0.1 % external cream, Apply topically 2 times daily as needed for irritation (Patient not taking: Reported on 8/8/2024), Disp: 30 g, Rfl: 1    Current Facility-Administered Medications:      2.0 mL bupivacaine (MARCAINE) 0.5% injection (50 mL vial), 2 mL, , , , 2 mL at 04/18/24 0901     2.0 mL bupivacaine (MARCAINE) 0.5% injection (50 mL vial), 2 mL, , , Hany Lebron, , 2 mL at 01/16/24 1545     2.0 mL bupivacaine (MARCAINE) 0.5%  injection (50 mL vial), 2 mL, , , Hany Lebron, DO, 2 mL at 08/30/23 0820     lidocaine 1 % injection 2 mL, 2 mL, , , , 2 mL at 04/18/24 0901     lidocaine 1 % injection 2 mL, 2 mL, , , Bernardino, Hany, DO, 2 mL at 01/16/24 1545     lidocaine 1 % injection 2 mL, 2 mL, , , Bernardino, Hany, DO, 2 mL at 08/30/23 0820     triamcinolone (KENALOG-40) injection 40 mg, 40 mg, , , , 40 mg at 04/18/24 0901     triamcinolone (KENALOG-40) injection 40 mg, 40 mg, , , Bernardino, Hany, DO, 40 mg at 01/16/24 1545     triamcinolone (KENALOG-40) injection 40 mg, 40 mg, , , Bernardino, Hany, DO, 40 mg at 08/30/23 0820  ALLERGIES:    Allergies   Allergen Reactions     Amlodipine Shortness Of Breath and Swelling     SOCIAL HISTORY:   Social History     Socioeconomic History     Marital status: Single     Spouse name: Not on file     Number of children: Not on file     Years of education: Not on file     Highest education level: Not on file   Occupational History     Not on file   Tobacco Use     Smoking status: Never     Smokeless tobacco: Never   Vaping Use     Vaping status: Never Used   Substance and Sexual Activity     Alcohol use: Yes     Comment: rare     Drug use: Never     Sexual activity: Not on file   Other Topics Concern     Parent/sibling w/ CABG, MI or angioplasty before 65F 55M? No   Social History Narrative     Not on file     Social Determinants of Health     Financial Resource Strain: Low Risk  (2/7/2024)    Financial Resource Strain      Within the past 12 months, have you or your family members you live with been unable to get utilities (heat, electricity) when it was really needed?: No   Food Insecurity: Low Risk  (2/7/2024)    Food Insecurity      Within the past 12 months, did you worry that your food would run out before you got money to buy more?: No      Within the past 12 months, did the food you bought just not last and you didn t have money to get more?: No   Transportation Needs: Low Risk  (2/7/2024)     "Transportation Needs      Within the past 12 months, has lack of transportation kept you from medical appointments, getting your medicines, non-medical meetings or appointments, work, or from getting things that you need?: No   Physical Activity: Unknown (2/7/2024)    Exercise Vital Sign      Days of Exercise per Week: 6 days      Minutes of Exercise per Session: Not on file   Stress: No Stress Concern Present (2/7/2024)    [employer] [place] [employer] - Occupational Stress Questionnaire      Feeling of Stress : Only a little   Social Connections: Unknown (2/7/2024)    Social Connection and Isolation Panel [NHANES]      Frequency of Communication with Friends and Family: Not on file      Frequency of Social Gatherings with Friends and Family: More than three times a week      Attends Taoism Services: Not on file      Active Member of Clubs or Organizations: Not on file      Attends Club or Organization Meetings: Not on file      Marital Status: Not on file   Interpersonal Safety: Low Risk  (2/7/2024)    Interpersonal Safety      Do you feel physically and emotionally safe where you currently live?: Yes      Within the past 12 months, have you been hit, slapped, kicked or otherwise physically hurt by someone?: No      Within the past 12 months, have you been humiliated or emotionally abused in other ways by your partner or ex-partner?: No   Housing Stability: High Risk (2/7/2024)    Housing Stability      Do you have housing? : Yes      Are you worried about losing your housing?: Yes     FAMILY HISTORY:   Family History   Problem Relation Age of Onset     Diabetes Mother        EXAM:Vitals: BP (!) 150/90 (BP Location: Left arm, Cuff Size: Adult Large)   Temp 97.7  F (36.5  C) (Temporal)   Ht 1.58 m (5' 2.21\")   Wt 89.4 kg (197 lb)   BMI 35.79 kg/m    BMI= Body mass index is 35.79 kg/m .    General appearance: Patient is alert and fully cooperative with history & exam.  No sign of distress is noted " during the visit.     Psychiatric: Affect is pleasant & appropriate.  Patient appears motivated to improve health.     Respiratory: Breathing is regular & unlabored while sitting.     HEENT: Hearing is intact to spoken word.  Speech is clear.  No gross evidence of visual impairment that would impact ambulation.     Vascular: DP & PT 2/4 & regular bilaterally.  No significant edema, rubor or varicosities noted.  CFT and skin temperature is normal to both lower extremities.       Neurologic: Lower extremity sensation is intact to light touch.  No evidence of weakness in the lower extremities.  No evidence of neuropathy and negative tinel sign.     Dermatologic: Skin is intact to both lower extremities without significant lesions, rash or abrasion.  Normal texture turgor and tone. No paronychia or evidence of soft tissue infection is noted.    Musculoskeletal: Patient is ambulatory without assistive device or brace. Pain is noted with firm palpation along the medial band of the plantar fascia right foot most notably at the origination upon the calcaneus not through the arch.  No pain with compression of the calcaneus medial to lateral or with palpation of the achilles, peroneal or posterior tibial tendons.  Slightly more than 0  of ankle joint dorsiflexion without crepitus or pain throughout the ankle, subtalar or midtarsal joints.  No pain or limitations throughout manual muscle strength testing plus 5/5 to all four quadrants bilateral.  No palpable edema noted.      Radiographs:  Osteophyte noted about the plantar calcaneus consistent with plantar fasciitis. Diminished calcaneal inclination angle consistent with pes valgus.     ASSESSMENT:       ICD-10-CM    1. Plantar fasciitis, right  M72.2 XR Foot Right G/E 3 Views     Physical Therapy  Referral     Orthotics, Mastectomy and Custom Compression Orders     Ankle/Foot Bracing Supplies Order Other (comments) (night splint)        PLAN:  Reviewed patient's  chart in epic and discussed etiology and treatment options.      Treatments:  8/8/2024     Obtained and interpreted radiographs.   Discontinue barefoot walking or unsupported walking in shoes without shank.  Dispensed written instructions to obtain appropriate shoe gear and/or OTC inserts.  Dispensed anterior night splint to use all night every night.    Order to begin physical therapy evaluate and progress as tolerated.  Is already using naprosyn 500 mg   Prescription for custom molded orthotics 8/8/2024  Follow up in 4-5 weeks   Had left knee meniscus sx       Matt Drew DPM            Again, thank you for allowing me to participate in the care of your patient.        Sincerely,        Matt Drew DPM

## 2024-08-08 NOTE — PROGRESS NOTES
HPI:  Audrey Ricks is a 64 year old female who is seen in consultation at the request of .    Referred Self     Pt presents for eval of:   (Onset, Location, L/R, Character, Treatments, Injury if yes)     Right heel pain x 2 months bottom pain guarding hopping started about June 1,2024.  Got left knee meniscus repair.      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    ROS: 10 point ROS neg other than the symptoms noted above in the HPI.    Patient Active Problem List   Diagnosis    Bipolar 2 disorder (H)    Cervical radiculopathy at C7    BMI 33.0-33.9,adult    GERD (gastroesophageal reflux disease)    Glaucoma    Morbid obesity (H)    Hyperlipidemia LDL goal <130    S/P arthroscopy of left knee       PAST MEDICAL HISTORY:   Past Medical History:   Diagnosis Date    Depressive disorder 2011    Hypertension     NSTEMI (non-ST elevated myocardial infarction) (H) 08/06/2022    SVT (supraventricular tachycardia) (H24)      PAST SURGICAL HISTORY:   Past Surgical History:   Procedure Laterality Date    ARTHROSCOPY KNEE WITH DEBRIDEMENT JOINT, COMBINED Left 6/4/2024    Procedure: Left knee arthroscopy with medial and lateral meniscus debridement;  Surgeon: Alvaro Ang DO;  Location: [facility]    EP ABLATION SVT Bilateral 9/23/2022    Procedure: Ablation Supraventricular Tachycardia;  Surgeon: Donna Wolfe MD;  Location:  [facility]     MEDICATIONS:   Current Outpatient Medications:     B Complex Vitamins (VITAMIN-B COMPLEX PO), Take 1 tablet by mouth daily Unknown tablet strength, Disp: , Rfl:     CALCIUM PO, Take 1 tablet by mouth daily Unknown tablet strength, Disp: , Rfl:     estradiol-norethindrone (COMBIPATCH) 0.05-0.14 MG/DAY bi-weekly patch, REMOVE OLD PATCH AND PLACE 1 PATCH ONTO THE SKIN TWICE A WEEK, Disp: 24 patch, Rfl: 3    ibuprofen (ADVIL/MOTRIN) 600 MG tablet, Take 1 tablet (600 mg) by mouth every 6 hours as needed for moderate pain, Disp: 120 tablet, Rfl: 1    magnesium 250 MG tablet, Take  1 tablet by mouth daily, Disp: , Rfl:     metroNIDAZOLE (METROGEL) 1 % external gel, APPLY TOPICALLY DAILY, Disp: 60 g, Rfl: 1    naproxen (NAPROSYN) 500 MG tablet, Take 1 tablet (500 mg) by mouth 2 times daily (with meals), Disp: 60 tablet, Rfl: 3    OMEGA-3 FATTY ACIDS PO, Take 1 capsule by mouth daily Unknown capsule strength, Disp: , Rfl:     omeprazole (PRILOSEC) 20 MG DR capsule, TAKE 1 CAPSULE BY MOUTH ONE TIME A DAY. TAKES AS NEEDED FOR HEARTBURN, Disp: 90 capsule, Rfl: 3    potassium chloride ER (KLOR-CON M) 10 MEQ CR tablet, Take 1 tablet (10 mEq) by mouth daily, Disp: 90 tablet, Rfl: 1    zolpidem (AMBIEN) 5 MG tablet, TAKE ONE-HALF TO ONE TABLET EVERY NIGHT AT BEDTIME AS NEEDED FOR SLEEP, Disp: 30 tablet, Rfl: 1    acetaminophen (TYLENOL) 500 MG tablet, Take 650 mg by mouth every 6 hours as needed for mild pain (Patient not taking: Reported on 8/8/2024), Disp: , Rfl:     oxyCODONE (ROXICODONE) 5 MG tablet, Take 1-2 tablets (5-10 mg) by mouth every 4 hours as needed for moderate to severe pain (Patient not taking: Reported on 6/13/2024), Disp: 18 tablet, Rfl: 0    POTASSIUM PO, Take 1 tablet by mouth daily Unknown tablet strength (Patient not taking: Reported on 8/8/2024), Disp: , Rfl:     triamcinolone (KENALOG) 0.1 % external cream, Apply topically 2 times daily as needed for irritation (Patient not taking: Reported on 8/8/2024), Disp: 30 g, Rfl: 1    Current Facility-Administered Medications:     2.0 mL bupivacaine (MARCAINE) 0.5% injection (50 mL vial), 2 mL, , , , 2 mL at 04/18/24 0901    2.0 mL bupivacaine (MARCAINE) 0.5% injection (50 mL vial), 2 mL, , , Hany Lebron, , 2 mL at 01/16/24 1545    2.0 mL bupivacaine (MARCAINE) 0.5% injection (50 mL vial), 2 mL, , , Hany Lebron, DO, 2 mL at 08/30/23 0820    lidocaine 1 % injection 2 mL, 2 mL, , , , 2 mL at 04/18/24 0901    lidocaine 1 % injection 2 mL, 2 mL, , , Hany Lebron, , 2 mL at 01/16/24 1545    lidocaine 1 % injection 2 mL, 2 mL, , ,  Hany Lebron, , 2 mL at 08/30/23 0820    triamcinolone (KENALOG-40) injection 40 mg, 40 mg, , , , 40 mg at 04/18/24 0901    triamcinolone (KENALOG-40) injection 40 mg, 40 mg, , , Hany Lebron, , 40 mg at 01/16/24 1545    triamcinolone (KENALOG-40) injection 40 mg, 40 mg, , , Hany Lebron, DO, 40 mg at 08/30/23 0820  ALLERGIES:    Allergies   Allergen Reactions    Amlodipine Shortness Of Breath and Swelling     SOCIAL HISTORY:   Social History     Socioeconomic History    Marital status: Single     Spouse name: Not on file    Number of children: Not on file    Years of education: Not on file    Highest education level: Not on file   Occupational History    Not on file   Tobacco Use    Smoking status: Never    Smokeless tobacco: Never   Vaping Use    Vaping status: Never Used   Substance and Sexual Activity    Alcohol use: Yes     Comment: rare    Drug use: Never    Sexual activity: Not on file   Other Topics Concern    Parent/sibling w/ CABG, MI or angioplasty before 65F 55M? No   Social History Narrative    Not on file     Social Determinants of Health     Financial Resource Strain: Low Risk  (2/7/2024)    Financial Resource Strain     Within the past 12 months, have you or your family members you live with been unable to get utilities (heat, electricity) when it was really needed?: No   Food Insecurity: Low Risk  (2/7/2024)    Food Insecurity     Within the past 12 months, did you worry that your food would run out before you got money to buy more?: No     Within the past 12 months, did the food you bought just not last and you didn t have money to get more?: No   Transportation Needs: Low Risk  (2/7/2024)    Transportation Needs     Within the past 12 months, has lack of transportation kept you from medical appointments, getting your medicines, non-medical meetings or appointments, work, or from getting things that you need?: No   Physical Activity: Unknown (2/7/2024)    Exercise Vital Sign     Days of  "Exercise per Week: 6 days     Minutes of Exercise per Session: Not on file   Stress: No Stress Concern Present (2/7/2024)    [employer] [place] [employer] - Occupational Stress Questionnaire     Feeling of Stress : Only a little   Social Connections: Unknown (2/7/2024)    Social Connection and Isolation Panel [NHANES]     Frequency of Communication with Friends and Family: Not on file     Frequency of Social Gatherings with Friends and Family: More than three times a week     Attends Yazidism Services: Not on file     Active Member of Clubs or Organizations: Not on file     Attends Club or Organization Meetings: Not on file     Marital Status: Not on file   Interpersonal Safety: Low Risk  (2/7/2024)    Interpersonal Safety     Do you feel physically and emotionally safe where you currently live?: Yes     Within the past 12 months, have you been hit, slapped, kicked or otherwise physically hurt by someone?: No     Within the past 12 months, have you been humiliated or emotionally abused in other ways by your partner or ex-partner?: No   Housing Stability: High Risk (2/7/2024)    Housing Stability     Do you have housing? : Yes     Are you worried about losing your housing?: Yes     FAMILY HISTORY:   Family History   Problem Relation Age of Onset    Diabetes Mother        EXAM:Vitals: BP (!) 150/90 (BP Location: Left arm, Cuff Size: Adult Large)   Temp 97.7  F (36.5  C) (Temporal)   Ht 1.58 m (5' 2.21\")   Wt 89.4 kg (197 lb)   BMI 35.79 kg/m    BMI= Body mass index is 35.79 kg/m .    General appearance: Patient is alert and fully cooperative with history & exam.  No sign of distress is noted during the visit.     Psychiatric: Affect is pleasant & appropriate.  Patient appears motivated to improve health.     Respiratory: Breathing is regular & unlabored while sitting.     HEENT: Hearing is intact to spoken word.  Speech is clear.  No gross evidence of visual impairment that would impact ambulation.   "   Vascular: DP & PT 2/4 & regular bilaterally.  No significant edema, rubor or varicosities noted.  CFT and skin temperature is normal to both lower extremities.       Neurologic: Lower extremity sensation is intact to light touch.  No evidence of weakness in the lower extremities.  No evidence of neuropathy and negative tinel sign.     Dermatologic: Skin is intact to both lower extremities without significant lesions, rash or abrasion.  Normal texture turgor and tone. No paronychia or evidence of soft tissue infection is noted.    Musculoskeletal: Patient is ambulatory without assistive device or brace. Pain is noted with firm palpation along the medial band of the plantar fascia right foot most notably at the origination upon the calcaneus not through the arch.  No pain with compression of the calcaneus medial to lateral or with palpation of the achilles, peroneal or posterior tibial tendons.  Slightly more than 0  of ankle joint dorsiflexion without crepitus or pain throughout the ankle, subtalar or midtarsal joints.  No pain or limitations throughout manual muscle strength testing plus 5/5 to all four quadrants bilateral.  No palpable edema noted.      Radiographs:  Osteophyte noted about the plantar calcaneus consistent with plantar fasciitis. Diminished calcaneal inclination angle consistent with pes valgus.     ASSESSMENT:       ICD-10-CM    1. Plantar fasciitis, right  M72.2 XR Foot Right G/E 3 Views     Physical Therapy  Referral     Orthotics, Mastectomy and Custom Compression Orders     Ankle/Foot Bracing Supplies Order Other (comments) (night splint)        PLAN:  Reviewed patient's chart in [facility] and discussed etiology and treatment options.      Treatments:  8/8/2024     Obtained and interpreted radiographs.   Discontinue barefoot walking or unsupported walking in shoes without shank.  Dispensed written instructions to obtain appropriate shoe gear and/or OTC inserts.  Dispensed anterior night  splint to use all night every night.    Order to begin physical therapy evaluate and progress as tolerated.  Is already using naprosyn 500 mg   Prescription for custom molded orthotics 8/8/2024  Follow up in 4-5 weeks   Had left knee meniscus sx       Matt Drew DPM

## 2024-08-08 NOTE — PATIENT INSTRUCTIONS
PLANTAR FASCIITIS  The  plantar fascia  is a thick fibrous layer of tissue that covers the bones on the bottom of your foot. It supports the foot bones in an arched position.  Plantar fasciitis  is a painful inflammation of the plantar fascia due to overuse. This can develop gradually or suddenly. It usually affects one foot at a time but can affect both feet. Heel pain can be sharp and feel like a knife sticking in the bottom of your foot. Pain may occur after exercising, long distance jogging, stair climbing, long periods of standing, or after getting up from a seated position.  Risk factors include arthritis, diabetes, obesity or recent weight gain, flat-foot, high arch, wearing high heels or loose shoes or shoes with poor arch support.  Sudden changes in activity or shoe gear may contribute to symptoms.  Foot pain from this condition is usually worse in the morning and improves with walking. By the end of the day there may be a dull aching. Treatment requires improved support of feet, short-term rest and controlling inflammation. It may take up to nine months before all symptoms go away with the measures described below.  A steroid injection into the foot, or surgery may be needed if this is becomes long standing or severe.  HOME CARE  If you are overweight, lose weight to promote healing.  Choose supportive shoes (stiff through the shank) with good arch support and shock absorbency. Replace athletic shoes when they become worn out. Don t walk or run barefoot.  Shoe inserts are an important part of treatment. You can buy off-the-shelf shoe inserts inexpensively such as [employer].  The best ones are custom molded to your foot with a prescription.  Night splints keep the plantar fascia gently stretched while you sleep and will eliminate morning pain. Wear it ALL NIGHT EVERY NIGHT, or any time you sit for a long time.  Reduce by 10% or more the activities that stress the feet: jogging, prolonged standing or  walking, high impact sports, etc.  Stretch your feet. Gently flex your ankle by leaning into a wall or counter or drop your heel from a step.  Stretch two minutes of every hour you are awake.  Icing or massage may help heel pain. Apply an ice pack or frozen water or Coke bottle to the heel for 10-20 minutes as a preventive or after an acute flare of symptoms. You may repeat this as needed.   Follow up with your Doctor in 3 weeks as instructed.          Reliable shoe stores: To maximize your experience and provide the best possible fit.  Be sure to show them your foot concerns and tell them Dr. Drew sent you.      Stores listed in bold have only athletic shoes, and stores that are not bold are mostly casual or variety of shoes    [employer]  [address] [place] [address]  [phone]     [employer] [facility]  [address] [place] [address]  [phone]     [employer] [place]  [address] [place] [address]  [phone]    [employer]  [address] [facility] [address]  [phone]    [employer]  [address] [place], [place] [address] [phone]    [employer]  [address] [place] [address]  [phone]                         [employer] Locations:     [address] [place] [address]   [phone]     [address] [place] [address] [address] [place] [address]   [phone]     [address] [place] [address]   [phone]     [address] [facility] [place] [address]   [phone]     [address] [place] [address]   [phone]     [address] [place] [address]   [phone]     [address]  [facility] [address]   [place] [address]   [phone]     [address]   [place], [place] [id number] [phone]             How to find reasonable shoes          The correct width    Correct Fitting    Correct Length      Foot Distortion    Posture Distortion                           Torsional Rigidity      Grasp behind the heel and underneath the foot and twist      Bad    Excessive torsion/twist in midfoot     Less torsion/twist in midfoot is better                   Heel Counter Rigidity      Grasp just above   midsole and squeeze      Bad    Soft heel counter      Good    Rigid Heel Counter      Flexion Rigidity      Grasp shoe and bend from forefoot to rearfoot

## 2024-08-09 ENCOUNTER — THERAPY VISIT (OUTPATIENT)
Dept: PHYSICAL THERAPY | Facility: CLINIC | Age: 64
End: 2024-08-09
Attending: PODIATRIST
Payer: COMMERCIAL

## 2024-08-09 DIAGNOSIS — M72.2 PLANTAR FASCIITIS, RIGHT: ICD-10-CM

## 2024-08-09 PROCEDURE — 97161 PT EVAL LOW COMPLEX 20 MIN: CPT | Mod: GP | Performed by: PHYSICAL THERAPIST

## 2024-08-09 PROCEDURE — 97140 MANUAL THERAPY 1/> REGIONS: CPT | Mod: GP | Performed by: PHYSICAL THERAPIST

## 2024-08-09 PROCEDURE — 97110 THERAPEUTIC EXERCISES: CPT | Mod: GP | Performed by: PHYSICAL THERAPIST

## 2024-08-09 ASSESSMENT — ACTIVITIES OF DAILY LIVING (ADL)
PLEASE_INDICATE_YOR_PRIMARY_REASON_FOR_REFERRAL_TO_THERAPY:: FOOT AND/OR ANKLE
LEFS_SCORE(%): INCOMPLETE
LEFS_RAW_SCORE: INCOMPLETE
ANY_OF_YOUR_USUAL_WORK,_HOUSEWORK_OR_SCHOOL_ACTIVITIES: A LITTLE BIT OF DIFFICULTY

## 2024-08-09 NOTE — PROGRESS NOTES
PHYSICAL THERAPY EVALUATION  Type of Visit: Evaluation       Fall Risk Screen:  Fall screen completed by: PT  Have you fallen 2 or more times in the past year?: No  Have you fallen and had an injury in the past year?: No  Is patient a fall risk?: No    Subjective     Pt has had ongoing R heel pain for over a year.  Pt reports having knee surgery on her L knee  2 months ago with symptoms increasing on R heel.  Pt reports stepping in the morning is miserable.  Pt reports pain with going down stairs, and driving.  Night splint started last night with very good relief of symptoms.  Wearing orthotics is helpful.        Presenting condition or subjective complaint: planter facitis  Date of onset: 06/09/24    Relevant medical history:     Dates & types of surgery: heart 2022  knee 2024    Prior diagnostic imaging/testing results: X-ray     Prior therapy history for the same diagnosis, illness or injury:        Living Environment  Social support: Alone   Type of home: Apartment/condo   Stairs to enter the home:         Ramp: Yes   Stairs inside the home: Yes 18 Is there a railing: Yes     Help at home: None  Equipment owned:       Employment: Yes sub teacher  Hobbies/Interests: swim  bike  walk puzzles  cards    Patient goals for therapy: walk with normal gate    Pain assessment: Pain present     Objective   FOOT/ANKLE EVALUATION  PAIN: Pain Level at Rest: 0/10  Pain Level with Use: 8/10  Pain Location: foot   Pain Quality: Aching and Burning  Pain Frequency: intermittent  Pain is Worst: daytime  Pain is Exacerbated By: walking, stairs, driving.  Pain is Relieved By: orthotic, night splint.    Pain Progression: Improved  INTEGUMENTARY (edema, incisions): WNL  POSTURE: WNL  GAIT:   Weightbearing Status: WBAT  Assistive Device(s): None  Gait Deviations: WNL  BALANCE/PROPRIOCEPTION:  independent with standing.   WEIGHT BEARING ALIGNMENT: WNL  NON-WEIGHTBEARING ALIGNMENT: WNL   ROM:   STRENGTH: WNL  FLEXIBILITY:  Tight gastroc,  soleus, plantar fascia.   SPECIAL TESTS:  Windlass effect observed.   FUNCTIONAL TESTS:  Sit to stand WNL.   PALPATION:  R plantar fascia.   JOINT MOBILITY:  Restricted R mid foot joints.      Assessment & Plan   CLINICAL IMPRESSIONS  Medical Diagnosis: R plantar fasciitis    Treatment Diagnosis: R plantar fasciitis   Impression/Assessment: Patient is a 64 year old female with R foot complaints.  The following significant findings have been identified: Pain, Decreased ROM/flexibility, Decreased joint mobility, Decreased strength, Impaired balance, Decreased proprioception, Impaired muscle performance, and Decreased activity tolerance. These impairments interfere with their ability to perform recreational activities, driving , household mobility, and community mobility as compared to previous level of function.     Clinical Decision Making (Complexity):  Clinical Presentation: Stable/Uncomplicated  Clinical Presentation Rationale: based on medical and personal factors listed in PT evaluation  Clinical Decision Making (Complexity): Low complexity    PLAN OF CARE  Treatment Interventions:  Modalities: Cryotherapy  Interventions: Gait Training, Manual Therapy, Neuromuscular Re-education, Therapeutic Activity, Therapeutic Exercise    Long Term Goals     PT Goal 1  Goal Identifier: HEP  Goal Description: Pt will be independent with HEP in order to improve flexibility and strength of LE.  Target Date: 09/20/24  PT Goal 2  Goal Identifier: Stairs  Goal Description: Pt will report no increase in pain with descending stairs in order to tolerate tasks such as laundry at home.  Target Date: 10/18/24      Frequency of Treatment: 1x/week  Duration of Treatment: 10 weeks    Recommended Referrals to Other Professionals: none.  Education Assessment:   Learner/Method: Patient  Education Comments: HEP    Risks and benefits of evaluation/treatment have been explained.   Patient/Family/caregiver agrees with Plan of Care.     Evaluation  Time:     PT Eval, Low Complexity Minutes (86823): 15     Signing Clinician: MANNIE Shepard [facility]                                                                                   OUTPATIENT PHYSICAL THERAPY      PLAN OF TREATMENT FOR OUTPATIENT REHABILITATION   Patient's Last Name, First Name, Audrey Arroyo YOB: 1960   Provider's Name   [facility]   Medical Record No.  [id number]     Onset Date: 06/09/24  Start of Care Date: 08/09/24     Medical Diagnosis:  R plantar fasciitis      PT Treatment Diagnosis:  R plantar fasciitis Plan of Treatment  Frequency/Duration: 1x/week/ 10 weeks    Certification date from 08/09/24 to 10/18/24         See note for plan of treatment details and functional goals     Gt Montoya, PT                         I CERTIFY THE NEED FOR THESE SERVICES FURNISHED UNDER        THIS PLAN OF TREATMENT AND WHILE UNDER MY CARE .             Physician Signature               Date    X_____________________________________________________                  Referring Provider:  Matt Drew    Initial Assessment  See Epic Evaluation- Start of Care Date: 08/09/24

## 2024-08-29 DIAGNOSIS — E87.6 HYPOKALEMIA: ICD-10-CM

## 2024-08-30 RX ORDER — POTASSIUM CHLORIDE 750 MG/1
10 TABLET, EXTENDED RELEASE ORAL DAILY
Qty: 90 TABLET | Refills: 2 | Status: SHIPPED | OUTPATIENT
Start: 2024-08-30

## 2024-09-09 ENCOUNTER — OFFICE VISIT (OUTPATIENT)
Dept: PODIATRY | Facility: CLINIC | Age: 64
End: 2024-09-09
Payer: COMMERCIAL

## 2024-09-09 VITALS
HEIGHT: 62 IN | SYSTOLIC BLOOD PRESSURE: 152 MMHG | BODY MASS INDEX: 36.07 KG/M2 | WEIGHT: 196 LBS | DIASTOLIC BLOOD PRESSURE: 94 MMHG

## 2024-09-09 DIAGNOSIS — M72.2 PLANTAR FASCIITIS, LEFT: ICD-10-CM

## 2024-09-09 DIAGNOSIS — M72.2 PLANTAR FASCIITIS, RIGHT: Primary | ICD-10-CM

## 2024-09-09 PROCEDURE — 99213 OFFICE O/P EST LOW 20 MIN: CPT | Performed by: PODIATRIST

## 2024-09-09 RX ORDER — NAPROXEN 500 MG/1
500 TABLET ORAL 2 TIMES DAILY WITH MEALS
Qty: 60 TABLET | Refills: 0 | Status: SHIPPED | OUTPATIENT
Start: 2024-09-09

## 2024-09-09 ASSESSMENT — PAIN SCALES - GENERAL: PAINLEVEL: MILD PAIN (2)

## 2024-09-09 NOTE — PROGRESS NOTES
Chief Complaint   Patient presents with    RECHECK     PT,  orthotics 10/5/2024, NS, Right plantar fasciitis, onset June 2024; XR R foot 8/8/2024; LOV 8/8/2024    Consult     Left heel pain, onset late Aug 2024; new issue     HPI:  Audrey Ricks is a 64 year old female who is seen in consultation at the request of .    Referred Self     Pt presents for eval of:   (Onset, Location, L/R, Character, Treatments, Injury if yes)     Right heel pain x 2 months bottom pain guarding hopping started about June 1,2024.  Got left knee meniscus repair.      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    Pain improved 50% and now minimal pain upon WB but average 5/10.     ROS: 10 point ROS neg other than the symptoms noted above in the HPI.    Patient Active Problem List   Diagnosis    Bipolar 2 disorder (H)    Cervical radiculopathy at C7    BMI 33.0-33.9,adult    GERD (gastroesophageal reflux disease)    Glaucoma    Morbid obesity (H)    Hyperlipidemia LDL goal <130    S/P arthroscopy of left knee    Plantar fasciitis, right       PAST MEDICAL HISTORY:   Past Medical History:   Diagnosis Date    Depressive disorder 2011    Hypertension     NSTEMI (non-ST elevated myocardial infarction) (H) 08/06/2022    SVT (supraventricular tachycardia) (H24)      PAST SURGICAL HISTORY:   Past Surgical History:   Procedure Laterality Date    ARTHROSCOPY KNEE WITH DEBRIDEMENT JOINT, COMBINED Left 6/4/2024    Procedure: Left knee arthroscopy with medial and lateral meniscus debridement;  Surgeon: Alvaro Ang DO;  [facility]: [facility]    EP ABLATION SVT Bilateral 9/23/2022    Procedure: Ablation Supraventricular Tachycardia;  Surgeon: Donna Wolfe MD;  Location:  [facility]     MEDICATIONS:   Current Outpatient Medications:     acetaminophen (TYLENOL) 500 MG tablet, Take 650 mg by mouth every 6 hours as needed for mild pain., Disp: , Rfl:     B Complex Vitamins (VITAMIN-B COMPLEX PO), Take 1 tablet by mouth daily Unknown  tablet strength, Disp: , Rfl:     CALCIUM PO, Take 1 tablet by mouth daily Unknown tablet strength, Disp: , Rfl:     estradiol-norethindrone (COMBIPATCH) 0.05-0.14 MG/DAY bi-weekly patch, REMOVE OLD PATCH AND PLACE 1 PATCH ONTO THE SKIN TWICE A WEEK, Disp: 24 patch, Rfl: 3    ibuprofen (ADVIL/MOTRIN) 600 MG tablet, Take 1 tablet (600 mg) by mouth every 6 hours as needed for moderate pain, Disp: 120 tablet, Rfl: 1    magnesium 250 MG tablet, Take 1 tablet by mouth daily, Disp: , Rfl:     metroNIDAZOLE (METROGEL) 1 % external gel, APPLY TOPICALLY DAILY, Disp: 60 g, Rfl: 1    naproxen (NAPROSYN) 500 MG tablet, Take 1 tablet (500 mg) by mouth 2 times daily (with meals)., Disp: 60 tablet, Rfl: 0    OMEGA-3 FATTY ACIDS PO, Take 1 capsule by mouth daily Unknown capsule strength, Disp: , Rfl:     omeprazole (PRILOSEC) 20 MG DR capsule, TAKE 1 CAPSULE BY MOUTH ONE TIME A DAY. TAKES AS NEEDED FOR HEARTBURN, Disp: 90 capsule, Rfl: 3    potassium chloride shanae ER (KLOR-CON M10) 10 MEQ CR tablet, TAKE 1 TABLET BY MOUTH ONCE DAILY, Disp: 90 tablet, Rfl: 2    zolpidem (AMBIEN) 5 MG tablet, TAKE ONE-HALF TO ONE TABLET EVERY NIGHT AT BEDTIME AS NEEDED FOR SLEEP, Disp: 30 tablet, Rfl: 1    oxyCODONE (ROXICODONE) 5 MG tablet, Take 1-2 tablets (5-10 mg) by mouth every 4 hours as needed for moderate to severe pain (Patient not taking: Reported on 6/13/2024), Disp: 18 tablet, Rfl: 0    POTASSIUM PO, Take 1 tablet by mouth daily Unknown tablet strength (Patient not taking: Reported on 8/8/2024), Disp: , Rfl:     triamcinolone (KENALOG) 0.1 % external cream, Apply topically 2 times daily as needed for irritation (Patient not taking: Reported on 8/8/2024), Disp: 30 g, Rfl: 1    Current Facility-Administered Medications:     2.0 mL bupivacaine (MARCAINE) 0.5% injection (50 mL vial), 2 mL, , , , 2 mL at 04/18/24 0901    2.0 mL bupivacaine (MARCAINE) 0.5% injection (50 mL vial), 2 mL, , , Hany Lebron DO, 2 mL at 01/16/24 1545    2.0 mL  bupivacaine (MARCAINE) 0.5% injection (50 mL vial), 2 mL, , , Hany Lebron, DO, 2 mL at 08/30/23 0820    lidocaine 1 % injection 2 mL, 2 mL, , , , 2 mL at 04/18/24 0901    lidocaine 1 % injection 2 mL, 2 mL, , , Bernardino, Hany, DO, 2 mL at 01/16/24 1545    lidocaine 1 % injection 2 mL, 2 mL, , , Bernardino, Hany, DO, 2 mL at 08/30/23 0820    triamcinolone (KENALOG-40) injection 40 mg, 40 mg, , , , 40 mg at 04/18/24 0901    triamcinolone (KENALOG-40) injection 40 mg, 40 mg, , , Bernardino, Hany, , 40 mg at 01/16/24 1545    triamcinolone (KENALOG-40) injection 40 mg, 40 mg, , , Bernardino, Hany, DO, 40 mg at 08/30/23 0820  ALLERGIES:    Allergies   Allergen Reactions    Amlodipine Shortness Of Breath and Swelling     SOCIAL HISTORY:   Social History     Socioeconomic History    Marital status: Single     Spouse name: Not on file    Number of children: Not on file    Years of education: Not on file    Highest education level: Not on file   Occupational History    Not on file   Tobacco Use    Smoking status: Never    Smokeless tobacco: Never   Vaping Use    Vaping status: Never Used   Substance and Sexual Activity    Alcohol use: Yes     Comment: rare    Drug use: Never    Sexual activity: Not on file   Other Topics Concern    Parent/sibling w/ CABG, MI or angioplasty before 65F 55M? No   Social History Narrative    Not on file     Social Determinants of Health     Financial Resource Strain: Low Risk  (2/7/2024)    Financial Resource Strain     Within the past 12 months, have you or your family members you live with been unable to get utilities (heat, electricity) when it was really needed?: No   Food Insecurity: Low Risk  (2/7/2024)    Food Insecurity     Within the past 12 months, did you worry that your food would run out before you got money to buy more?: No     Within the past 12 months, did the food you bought just not last and you didn t have money to get more?: No   Transportation Needs: Low Risk  (2/7/2024)     "Transportation Needs     Within the past 12 months, has lack of transportation kept you from medical appointments, getting your medicines, non-medical meetings or appointments, work, or from getting things that you need?: No   Physical Activity: Unknown (2/7/2024)    Exercise Vital Sign     Days of Exercise per Week: 6 days     Minutes of Exercise per Session: Not on file   Stress: No Stress Concern Present (2/7/2024)    [employer] [place] [place] [employer] - Occupational Stress Questionnaire     Feeling of Stress : Only a little   Social Connections: Unknown (2/7/2024)    Social Connection and Isolation Panel [NHANES]     Frequency of Communication with Friends and Family: Not on file     Frequency of Social Gatherings with Friends and Family: More than three times a week     Attends [employer] Services: Not on file     Active Member of Clubs or Organizations: Not on file     Attends Club or Organization Meetings: Not on file     Marital Status: Not on file   Interpersonal Safety: Low Risk  (2/7/2024)    Interpersonal Safety     Do you feel physically and emotionally safe where you currently live?: Yes     Within the past 12 months, have you been hit, slapped, kicked or otherwise physically hurt by someone?: No     Within the past 12 months, have you been humiliated or emotionally abused in other ways by your partner or ex-partner?: No   Housing Stability: High Risk (2/7/2024)    Housing Stability     Do you have housing? : Yes     Are you worried about losing your housing?: Yes     FAMILY HISTORY:   Family History   Problem Relation Age of Onset    Diabetes Mother        EXAM:Vitals: BP (!) 152/94 (BP Location: Left arm, Patient Position: Sitting, Cuff Size: Adult Large)   Ht 1.58 m (5' 2.21\")   Wt 88.9 kg (196 lb)   BMI 35.61 kg/m    BMI= Body mass index is 35.61 kg/m .    General appearance: Patient is alert and fully cooperative with history & exam.  No sign of distress is noted during the visit.   "   Psychiatric: Affect is pleasant & appropriate.  Patient appears motivated to improve health.     Respiratory: Breathing is regular & unlabored while sitting.     HEENT: Hearing is intact to spoken word.  Speech is clear.  No gross evidence of visual impairment that would impact ambulation.     Vascular: DP & PT 2/4 & regular bilaterally.  No significant edema, rubor or varicosities noted.  CFT and skin temperature is normal to both lower extremities.       Neurologic: Lower extremity sensation is intact to light touch.  No evidence of weakness in the lower extremities.  No evidence of neuropathy and negative tinel sign.     Dermatologic: Skin is intact to both lower extremities without significant lesions, rash or abrasion.  Normal texture turgor and tone. No paronychia or evidence of soft tissue infection is noted.    Musculoskeletal: Patient is ambulatory without assistive device or brace. Pain is noted with firm palpation along the medial band of the plantar fascia right foot most notably at the origination upon the calcaneus not through the arch.  No pain with compression of the calcaneus medial to lateral or with palpation of the achilles, peroneal or posterior tibial tendons.  Slightly more than 0  of ankle joint dorsiflexion without crepitus or pain throughout the ankle, subtalar or midtarsal joints.  No pain or limitations throughout manual muscle strength testing plus 5/5 to all four quadrants bilateral.  No palpable edema noted.      Radiographs:  Osteophyte noted about the plantar calcaneus consistent with plantar fasciitis. Diminished calcaneal inclination angle consistent with pes valgus.     ASSESSMENT:       ICD-10-CM    1. Plantar fasciitis, right  M72.2           PLAN:  Reviewed patient's chart in [facility] and discussed etiology and treatment options.      Treatments:  8/8/2024     Obtained and interpreted radiographs.   Discontinue barefoot walking or unsupported walking in shoes without  leah.  Dispensed written instructions to obtain appropriate shoe gear and/or OTC inserts.  Dispensed anterior night splint to use all night every night.    Order to begin physical therapy evaluate and progress as tolerated.  Is already using naprosyn 500 mg   Prescription for custom molded orthotics 8/8/2024  Follow up in 4-5 weeks   Had left knee meniscus sx     9/9/2024  Refilled naprosyn for one fill.  For any further refills she should return to her originally prescribing provider for this as she was taking this off and on for cervical spine pain  Provided another night splint for bilateral   Is doing PT, progress as tolerated.   May consider injection if this remains symptomatic otherwise follow-up in a couple months if this remains symptomatic.      Matt Drew DPM

## 2024-09-09 NOTE — NURSING NOTE
Dispensed 1 Dorsal (Anterior) Night Splint, Size S/M, with [facility] agreement signed by patient. Makenna Sands CMA, September 9, 2024

## 2024-09-09 NOTE — LETTER
9/9/2024      Audrey Ricks  [id number] [facility] [address]      Dear Colleague,    Thank you for referring your patient, Audrey Ricks, to the [facility]. Please see a copy of my visit note below.    Chief Complaint   Patient presents with     RECHECK     PT,  orthotics 10/5/2024, NS, Right plantar fasciitis, onset June 2024; XR R foot 8/8/2024; LOV 8/8/2024     Consult     Left heel pain, onset late Aug 2024; new issue     HPI:  Audrey Ricks is a 64 year old female who is seen in consultation at the request of .    Referred Self     Pt presents for eval of:   (Onset, Location, L/R, Character, Treatments, Injury if yes)     Right heel pain x 2 months bottom pain guarding hopping started about June 1,2024.  Got left knee meniscus repair.      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    Pain improved 50% and now minimal pain upon WB but average 5/10.     ROS: 10 point ROS neg other than the symptoms noted above in the HPI.    Patient Active Problem List   Diagnosis     Bipolar 2 disorder (H)     Cervical radiculopathy at C7     BMI 33.0-33.9,adult     GERD (gastroesophageal reflux disease)     Glaucoma     Morbid obesity (H)     Hyperlipidemia LDL goal <130     S/P arthroscopy of left knee     Plantar fasciitis, right       PAST MEDICAL HISTORY:   Past Medical History:   Diagnosis Date     Depressive disorder 2011     Hypertension      NSTEMI (non-ST elevated myocardial infarction) (H) 08/06/2022     SVT (supraventricular tachycardia) (H24)      PAST SURGICAL HISTORY:   Past Surgical History:   Procedure Laterality Date     ARTHROSCOPY KNEE WITH DEBRIDEMENT JOINT, COMBINED Left 6/4/2024    Procedure: Left knee arthroscopy with medial and lateral meniscus debridement;  Surgeon: Alvaro Ang DO;  Location[facility]     EP ABLATION SVT Bilateral 9/23/2022    Procedure: Ablation Supraventricular Tachycardia;  Surgeon: Donna Wolfe MD;  Location:  [facility]      MEDICATIONS:   Current Outpatient Medications:      acetaminophen (TYLENOL) 500 MG tablet, Take 650 mg by mouth every 6 hours as needed for mild pain., Disp: , Rfl:      B Complex Vitamins (VITAMIN-B COMPLEX PO), Take 1 tablet by mouth daily Unknown tablet strength, Disp: , Rfl:      CALCIUM PO, Take 1 tablet by mouth daily Unknown tablet strength, Disp: , Rfl:      estradiol-norethindrone (COMBIPATCH) 0.05-0.14 MG/DAY bi-weekly patch, REMOVE OLD PATCH AND PLACE 1 PATCH ONTO THE SKIN TWICE A WEEK, Disp: 24 patch, Rfl: 3     ibuprofen (ADVIL/MOTRIN) 600 MG tablet, Take 1 tablet (600 mg) by mouth every 6 hours as needed for moderate pain, Disp: 120 tablet, Rfl: 1     magnesium 250 MG tablet, Take 1 tablet by mouth daily, Disp: , Rfl:      metroNIDAZOLE (METROGEL) 1 % external gel, APPLY TOPICALLY DAILY, Disp: 60 g, Rfl: 1     naproxen (NAPROSYN) 500 MG tablet, Take 1 tablet (500 mg) by mouth 2 times daily (with meals)., Disp: 60 tablet, Rfl: 0     OMEGA-3 FATTY ACIDS PO, Take 1 capsule by mouth daily Unknown capsule strength, Disp: , Rfl:      omeprazole (PRILOSEC) 20 MG DR capsule, TAKE 1 CAPSULE BY MOUTH ONE TIME A DAY. TAKES AS NEEDED FOR HEARTBURN, Disp: 90 capsule, Rfl: 3     potassium chloride shanae ER (KLOR-CON M10) 10 MEQ CR tablet, TAKE 1 TABLET BY MOUTH ONCE DAILY, Disp: 90 tablet, Rfl: 2     zolpidem (AMBIEN) 5 MG tablet, TAKE ONE-HALF TO ONE TABLET EVERY NIGHT AT BEDTIME AS NEEDED FOR SLEEP, Disp: 30 tablet, Rfl: 1     oxyCODONE (ROXICODONE) 5 MG tablet, Take 1-2 tablets (5-10 mg) by mouth every 4 hours as needed for moderate to severe pain (Patient not taking: Reported on 6/13/2024), Disp: 18 tablet, Rfl: 0     POTASSIUM PO, Take 1 tablet by mouth daily Unknown tablet strength (Patient not taking: Reported on 8/8/2024), Disp: , Rfl:      triamcinolone (KENALOG) 0.1 % external cream, Apply topically 2 times daily as needed for irritation (Patient not taking: Reported on 8/8/2024), Disp: 30 g, Rfl:  1    Current Facility-Administered Medications:      2.0 mL bupivacaine (MARCAINE) 0.5% injection (50 mL vial), 2 mL, , , , 2 mL at 04/18/24 0901     2.0 mL bupivacaine (MARCAINE) 0.5% injection (50 mL vial), 2 mL, , , Bernardino, Hany, DO, 2 mL at 01/16/24 1545     2.0 mL bupivacaine (MARCAINE) 0.5% injection (50 mL vial), 2 mL, , , Bernardino, Hany, DO, 2 mL at 08/30/23 0820     lidocaine 1 % injection 2 mL, 2 mL, , , , 2 mL at 04/18/24 0901     lidocaine 1 % injection 2 mL, 2 mL, , , Bernardino, Hany, DO, 2 mL at 01/16/24 1545     lidocaine 1 % injection 2 mL, 2 mL, , , Bernardino, Hany, DO, 2 mL at 08/30/23 0820     triamcinolone (KENALOG-40) injection 40 mg, 40 mg, , , , 40 mg at 04/18/24 0901     triamcinolone (KENALOG-40) injection 40 mg, 40 mg, , , Bernardino, Hany, DO, 40 mg at 01/16/24 1545     triamcinolone (KENALOG-40) injection 40 mg, 40 mg, , , Bernardino, Hany, DO, 40 mg at 08/30/23 0820  ALLERGIES:    Allergies   Allergen Reactions     Amlodipine Shortness Of Breath and Swelling     SOCIAL HISTORY:   Social History     Socioeconomic History     Marital status: Single     Spouse name: Not on file     Number of children: Not on file     Years of education: Not on file     Highest education level: Not on file   Occupational History     Not on file   Tobacco Use     Smoking status: Never     Smokeless tobacco: Never   Vaping Use     Vaping status: Never Used   Substance and Sexual Activity     Alcohol use: Yes     Comment: rare     Drug use: Never     Sexual activity: Not on file   Other Topics Concern     Parent/sibling w/ CABG, MI or angioplasty before 65F 55M? No   Social History Narrative     Not on file     Social Determinants of Health     Financial Resource Strain: Low Risk  (2/7/2024)    Financial Resource Strain      Within the past 12 months, have you or your family members you live with been unable to get utilities (heat, electricity) when it was really needed?: No   Food Insecurity: Low Risk  (2/7/2024)     Food Insecurity      Within the past 12 months, did you worry that your food would run out before you got money to buy more?: No      Within the past 12 months, did the food you bought just not last and you didn t have money to get more?: No   Transportation Needs: Low Risk  (2/7/2024)    Transportation Needs      Within the past 12 months, has lack of transportation kept you from medical appointments, getting your medicines, non-medical meetings or appointments, work, or from getting things that you need?: No   Physical Activity: Unknown (2/7/2024)    Exercise Vital Sign      Days of Exercise per Week: 6 days      Minutes of Exercise per Session: Not on file   Stress: No Stress Concern Present (2/7/2024)    [employer] [place] [employer] - Occupational Stress Questionnaire      Feeling of Stress : Only a little   Social Connections: Unknown (2/7/2024)    Social Connection and Isolation Panel [NHANES]      Frequency of Communication with Friends and Family: Not on file      Frequency of Social Gatherings with Friends and Family: More than three times a week      Attends [employer] Services: Not on file      Active Member of Clubs or Organizations: Not on file      Attends Club or Organization Meetings: Not on file      Marital Status: Not on file   Interpersonal Safety: Low Risk  (2/7/2024)    Interpersonal Safety      Do you feel physically and emotionally safe where you currently live?: Yes      Within the past 12 months, have you been hit, slapped, kicked or otherwise physically hurt by someone?: No      Within the past 12 months, have you been humiliated or emotionally abused in other ways by your partner or ex-partner?: No   Housing Stability: High Risk (2/7/2024)    Housing Stability      Do you have housing? : Yes      Are you worried about losing your housing?: Yes     FAMILY HISTORY:   Family History   Problem Relation Age of Onset     Diabetes Mother        EXAM:Vitals: BP (!) 152/94 (BP  "Location: Left arm, Patient Position: Sitting, Cuff Size: Adult Large)   Ht 1.58 m (5' 2.21\")   Wt 88.9 kg (196 lb)   BMI 35.61 kg/m    BMI= Body mass index is 35.61 kg/m .    General appearance: Patient is alert and fully cooperative with history & exam.  No sign of distress is noted during the visit.     Psychiatric: Affect is pleasant & appropriate.  Patient appears motivated to improve health.     Respiratory: Breathing is regular & unlabored while sitting.     HEENT: Hearing is intact to spoken word.  Speech is clear.  No gross evidence of visual impairment that would impact ambulation.     Vascular: DP & PT 2/4 & regular bilaterally.  No significant edema, rubor or varicosities noted.  CFT and skin temperature is normal to both lower extremities.       Neurologic: Lower extremity sensation is intact to light touch.  No evidence of weakness in the lower extremities.  No evidence of neuropathy and negative tinel sign.     Dermatologic: Skin is intact to both lower extremities without significant lesions, rash or abrasion.  Normal texture turgor and tone. No paronychia or evidence of soft tissue infection is noted.    Musculoskeletal: Patient is ambulatory without assistive device or brace. Pain is noted with firm palpation along the medial band of the plantar fascia right foot most notably at the origination upon the calcaneus not through the arch.  No pain with compression of the calcaneus medial to lateral or with palpation of the achilles, peroneal or posterior tibial tendons.  Slightly more than 0  of ankle joint dorsiflexion without crepitus or pain throughout the ankle, subtalar or midtarsal joints.  No pain or limitations throughout manual muscle strength testing plus 5/5 to all four quadrants bilateral.  No palpable edema noted.      Radiographs:  Osteophyte noted about the plantar calcaneus consistent with plantar fasciitis. Diminished calcaneal inclination angle consistent with pes valgus.   "   ASSESSMENT:       ICD-10-CM    1. Plantar fasciitis, right  M72.2           PLAN:  Reviewed patient's chart in [facility] and discussed etiology and treatment options.      Treatments:  8/8/2024     Obtained and interpreted radiographs.   Discontinue barefoot walking or unsupported walking in shoes without shank.  Dispensed written instructions to obtain appropriate shoe gear and/or OTC inserts.  Dispensed anterior night splint to use all night every night.    Order to begin physical therapy evaluate and progress as tolerated.  Is already using naprosyn 500 mg   Prescription for custom molded orthotics 8/8/2024  Follow up in 4-5 weeks   Had left knee meniscus sx     9/9/2024  Refilled naprosyn for one fill.  For any further refills she should return to her originally prescribing provider for this as she was taking this off and on for cervical spine pain  Provided another night splint for bilateral   Is doing PT, progress as tolerated.   May consider injection if this remains symptomatic otherwise follow-up in a couple months if this remains symptomatic.      Matt Drew DPM                Again, thank you for allowing me to participate in the care of your patient.        Sincerely,        Matt Drew DPM

## 2024-09-28 ENCOUNTER — MYC REFILL (OUTPATIENT)
Dept: FAMILY MEDICINE | Facility: CLINIC | Age: 64
End: 2024-09-28
Payer: COMMERCIAL

## 2024-09-28 DIAGNOSIS — L71.9 ROSACEA: ICD-10-CM

## 2024-09-30 RX ORDER — METRONIDAZOLE 10 MG/G
GEL TOPICAL DAILY
Qty: 60 G | Refills: 1 | Status: SHIPPED | OUTPATIENT
Start: 2024-09-30

## 2024-10-14 DIAGNOSIS — M72.2 PLANTAR FASCIITIS, RIGHT: Primary | ICD-10-CM

## 2024-10-15 RX ORDER — NAPROXEN 500 MG/1
500 TABLET ORAL 2 TIMES DAILY WITH MEALS
Qty: 60 TABLET | Refills: 0 | Status: SHIPPED | OUTPATIENT
Start: 2024-10-15

## 2024-10-15 NOTE — TELEPHONE ENCOUNTER
"Per Dr. Drew's office visit note from 9/9/24:     \"Refilled naprosyn for one fill.  For any further refills she should return to her originally prescribing provider for this as she was taking this off and on for cervical spine pain.\"     Will route request to Alie De La Cruz PA-C as it appears that that she has prescribed this medication in the past.     Colleen Crawford RN   [facility]  "

## 2024-10-28 DIAGNOSIS — F31.81 BIPOLAR 2 DISORDER (H): ICD-10-CM

## 2024-10-28 RX ORDER — ZOLPIDEM TARTRATE 5 MG/1
TABLET ORAL
Qty: 30 TABLET | Refills: 1 | Status: SHIPPED | OUTPATIENT
Start: 2024-10-28 | End: 2024-10-29

## 2024-10-29 ENCOUNTER — OFFICE VISIT (OUTPATIENT)
Dept: FAMILY MEDICINE | Facility: CLINIC | Age: 64
End: 2024-10-29
Payer: COMMERCIAL

## 2024-10-29 ENCOUNTER — HOSPITAL ENCOUNTER (OUTPATIENT)
Dept: MAMMOGRAPHY | Facility: CLINIC | Age: 64
Discharge: HOME | End: 2024-10-29
Attending: PHYSICIAN ASSISTANT
Payer: COMMERCIAL

## 2024-10-29 ENCOUNTER — HOSPITAL ENCOUNTER (OUTPATIENT)
Dept: GENERAL RADIOLOGY | Facility: CLINIC | Age: 64
Discharge: HOME | End: 2024-10-29
Attending: PHYSICIAN ASSISTANT
Payer: COMMERCIAL

## 2024-10-29 VITALS
BODY MASS INDEX: 34.55 KG/M2 | HEART RATE: 94 BPM | SYSTOLIC BLOOD PRESSURE: 154 MMHG | DIASTOLIC BLOOD PRESSURE: 96 MMHG | OXYGEN SATURATION: 98 % | WEIGHT: 195 LBS | HEIGHT: 63 IN | TEMPERATURE: 98.5 F

## 2024-10-29 DIAGNOSIS — I47.10 SVT (SUPRAVENTRICULAR TACHYCARDIA) (H): ICD-10-CM

## 2024-10-29 DIAGNOSIS — L98.9 SKIN LESION: ICD-10-CM

## 2024-10-29 DIAGNOSIS — E66.01 MORBID OBESITY (H): ICD-10-CM

## 2024-10-29 DIAGNOSIS — I10 ESSENTIAL HYPERTENSION: ICD-10-CM

## 2024-10-29 DIAGNOSIS — M72.2 PLANTAR FASCIITIS, BILATERAL: Primary | ICD-10-CM

## 2024-10-29 DIAGNOSIS — G89.29 CHRONIC LEFT SHOULDER PAIN: ICD-10-CM

## 2024-10-29 DIAGNOSIS — M25.512 CHRONIC LEFT SHOULDER PAIN: ICD-10-CM

## 2024-10-29 DIAGNOSIS — Z12.31 ENCOUNTER FOR SCREENING MAMMOGRAM FOR BREAST CANCER: ICD-10-CM

## 2024-10-29 DIAGNOSIS — F31.81 BIPOLAR 2 DISORDER (H): ICD-10-CM

## 2024-10-29 PROCEDURE — 77063 BREAST TOMOSYNTHESIS BI: CPT

## 2024-10-29 PROCEDURE — 73030 X-RAY EXAM OF SHOULDER: CPT | Mod: LT

## 2024-10-29 PROCEDURE — 99214 OFFICE O/P EST MOD 30 MIN: CPT | Performed by: PHYSICIAN ASSISTANT

## 2024-10-29 PROCEDURE — G2211 COMPLEX E/M VISIT ADD ON: HCPCS | Performed by: PHYSICIAN ASSISTANT

## 2024-10-29 RX ORDER — ZOLPIDEM TARTRATE 5 MG/1
5-10 TABLET ORAL
Qty: 60 TABLET | Refills: 1 | Status: SHIPPED | OUTPATIENT
Start: 2024-10-29 | End: 2024-10-29

## 2024-10-29 RX ORDER — ZOLPIDEM TARTRATE 5 MG/1
5-10 TABLET ORAL
Qty: 60 TABLET | Refills: 1 | Status: SHIPPED | OUTPATIENT
Start: 2024-10-29

## 2024-10-29 ASSESSMENT — PAIN SCALES - GENERAL: PAINLEVEL_OUTOF10: SEVERE PAIN (6)

## 2024-10-29 NOTE — PROGRESS NOTES
"  Damian Ventura is a 64 year old, presenting for the following health issues:  Weight Check, Pain, and Sleep Problem      10/29/2024    10:12 AM   Additional Questions   Roomed by Alie Medina    History of Present Illness       Reason for visit:  Pain, sleep, weight management   She is taking medications regularly.     Has been struggle with plantar fasciitis. She has been seeing podiatry for this. She has been wearing good fitting tennis shoes continuously during the day and wearing boots at night. She is really frustrated as if she is on her feet for even more than an hour it can trigger symptoms to worsen again. She has been very diligent and still things are not improving. She reports this has been impacting her sleep and stress levels.     Really worries about her weight with not being able to be as active as she would like. She does swim still and tries to do yoga which does help.     Blood pressure elevated but she reports she slept poorly last night and is in pain. She monitors this at home and it is much better. She reports SVT symptoms well controlled since her ablation.     Has been having left shoulder pain since January. She thought it was getting better but then moved a few boxes and this started up again. She reports it is most bothersome when rolling over at night.     Has a skin pigmentation on her right cheek but also a small lesion above. She had this removed in the past but now going back. She would like to see dermatology.       Review of Systems  Constitutional, HEENT, cardiovascular, pulmonary, GI, , musculoskeletal, neuro, skin, endocrine and psych systems are negative, except as otherwise noted.      Objective    BP (!) 154/96   Pulse 94   Temp 98.5  F (36.9  C) (Temporal)   Ht 1.59 m (5' 2.6\")   Wt 88.5 kg (195 lb)   SpO2 98%   BMI 34.99 kg/m    Body mass index is 34.99 kg/m .  Physical Exam   GENERAL: alert and no distress  NECK: no adenopathy, no asymmetry, masses, or " scars  RESP: lungs clear to auscultation - no rales, rhonchi or wheezes  CV: regular rate and rhythm, normal S1 S2, no S3 or S4, no murmur, click or rub, no peripheral edema   MS: no gross musculoskeletal defects noted, no edema  SKIN: melasma on right cheek, small scabbed area above this  PSYCH: mentation appears normal, affect normal/bright, and anxious      Assessment & Plan     Plantar fasciitis, bilateral  Patient has been very diligent with the care recommendations provided by podiatry but still not having substantial relief. She is very worried about how long this might go on and how it has been impacting her mental and physical health. I did recommend that she see podiatry again for follow-up. She will reach out to schedule.     Morbid obesity (H)  Encouraged continuation of diet modifications as able. She has been swimming yet and doing yoga. Encouraged continuation of this.     Essential hypertension  Patient has been monitoring at home and numbers have been much better controlled overall.     SVT (supraventricular tachycardia) (H)  Stable     Bipolar 2 disorder (H)  Doing OK. See notes above.  - zolpidem (AMBIEN) 5 MG tablet; Take 1-2 tablets (5-10 mg) by mouth nightly as needed for sleep.    Chronic left shoulder pain  Will obtain xray today. Further follow-up pending these results.   - XR Shoulder Left G/E 3 Views; Future    Skin lesion  Question small SK starting. Patient unsure what this was in the past when removed. Will refer to dermatology for further eval.   - Adult Dermatology  Referral; Future    The longitudinal plan of care for the diagnosis(es)/condition(s) as documented were addressed during this visit. Due to the added complexity in care, I will continue to support Audrey in the subsequent management and with ongoing continuity of care.    The patient indicates understanding of these issues and agrees with the plan.    Signed Electronically by: Alie De La Cruz PA-C

## 2024-10-29 NOTE — LETTER
10/29/2024    Audrey Ricks   1960        To Whom it May Concern;    Please note that Audrey Ricks has been dealing with chronic plantar fasciitis. Unfortunately due to this she cannot stand or walk or longer than 10-15 minutes at a time. She needs to be allowed to sit/take breaks as needed.       Sincerely,        Alie De La Cruz PA-C

## 2024-10-31 ENCOUNTER — OFFICE VISIT (OUTPATIENT)
Dept: PODIATRY | Facility: CLINIC | Age: 64
End: 2024-10-31
Payer: COMMERCIAL

## 2024-10-31 VITALS
SYSTOLIC BLOOD PRESSURE: 146 MMHG | DIASTOLIC BLOOD PRESSURE: 94 MMHG | HEIGHT: 63 IN | BODY MASS INDEX: 34.55 KG/M2 | WEIGHT: 195 LBS

## 2024-10-31 DIAGNOSIS — M72.2 PLANTAR FASCIITIS, LEFT: Primary | ICD-10-CM

## 2024-10-31 PROCEDURE — 20550 NJX 1 TENDON SHEATH/LIGAMENT: CPT | Mod: LT | Performed by: PODIATRIST

## 2024-10-31 ASSESSMENT — PAIN SCALES - GENERAL: PAINLEVEL_OUTOF10: SEVERE PAIN (7)

## 2024-10-31 NOTE — LETTER
10/31/2024      Audrey Ricks  [address] [facility] [place] [address]      Dear Colleague,    Thank you for referring your patient, Audrey Ricks, to the [facility]. Please see a copy of my visit note below.    Chief Complaint   Patient presents with     RECHECK     2 NS, orthotics, PT evaluation, discuss injection @ 10/31/2024, Right plantar fasciitis, onset June 2024, Left plantar fasciitis, onset Aug 2024; LOV 9/9/2024     HPI:  Audrey Ricks is a 64 year old female who is seen in consultation at the request of .    Referred Self     Pt presents for eval of:   (Onset, Location, L/R, Character, Treatments, Injury if yes)     Right and left heel pain bottom of heel pain guarding hopping started about June 1,2024.  Got left knee meniscus repair.      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    Pain improved 50% and now minimal pain upon WB but average 5/10.     ROS: 10 point ROS neg other than the symptoms noted above in the HPI.    Patient Active Problem List   Diagnosis     Bipolar 2 disorder (H)     Cervical radiculopathy at C7     BMI 33.0-33.9,adult     GERD (gastroesophageal reflux disease)     Glaucoma     Morbid obesity (H)     Hyperlipidemia LDL goal <130     S/P arthroscopy of left knee     Plantar fasciitis, right     SVT (supraventricular tachycardia) (H)     Essential hypertension       PAST MEDICAL HISTORY:   Past Medical History:   Diagnosis Date     Depressive disorder 2011     Hypertension      NSTEMI (non-ST elevated myocardial infarction) (H) 08/06/2022     SVT (supraventricular tachycardia) (H)      PAST SURGICAL HISTORY:   Past Surgical History:   Procedure Laterality Date     ARTHROSCOPY KNEE WITH DEBRIDEMENT JOINT, COMBINED Left 6/4/2024    Procedure: Left knee arthroscopy with medial and lateral meniscus debridement;  Surgeon: Alvaro Ang DO;  Location: [facility] OR     EP ABLATION SVT Bilateral 9/23/2022    Procedure: Ablation Supraventricular Tachycardia;   Surgeon: Donna Wolfe MD;  Location:  [facility]     MEDICATIONS:   Current Outpatient Medications:      acetaminophen (TYLENOL) 500 MG tablet, Take 650 mg by mouth every 6 hours as needed for mild pain., Disp: , Rfl:      B Complex Vitamins (VITAMIN-B COMPLEX PO), Take 1 tablet by mouth daily Unknown tablet strength, Disp: , Rfl:      CALCIUM PO, Take 1 tablet by mouth daily Unknown tablet strength, Disp: , Rfl:      estradiol-norethindrone (COMBIPATCH) 0.05-0.14 MG/DAY bi-weekly patch, REMOVE OLD PATCH AND PLACE 1 PATCH ONTO THE SKIN TWICE A WEEK, Disp: 24 patch, Rfl: 3     ibuprofen (ADVIL/MOTRIN) 600 MG tablet, Take 1 tablet (600 mg) by mouth every 6 hours as needed for moderate pain, Disp: 120 tablet, Rfl: 1     magnesium 250 MG tablet, Take 1 tablet by mouth daily, Disp: , Rfl:      metroNIDAZOLE (METROGEL) 1 % external gel, Apply topically daily., Disp: 60 g, Rfl: 1     naproxen (NAPROSYN) 500 MG tablet, Take 1 tablet (500 mg) by mouth 2 times daily (with meals)., Disp: 60 tablet, Rfl: 0     OMEGA-3 FATTY ACIDS PO, Take 1 capsule by mouth daily Unknown capsule strength, Disp: , Rfl:      omeprazole (PRILOSEC) 20 MG DR capsule, TAKE 1 CAPSULE BY MOUTH ONE TIME A DAY. TAKES AS NEEDED FOR HEARTBURN, Disp: 90 capsule, Rfl: 3     potassium chloride shanae ER (KLOR-CON M10) 10 MEQ CR tablet, TAKE 1 TABLET BY MOUTH ONCE DAILY, Disp: 90 tablet, Rfl: 2     POTASSIUM PO, Take 1 tablet by mouth daily. Unknown tablet strength, Disp: , Rfl:      triamcinolone (KENALOG) 0.1 % external cream, Apply topically 2 times daily as needed for irritation, Disp: 30 g, Rfl: 1     zolpidem (AMBIEN) 5 MG tablet, Take 1-2 tablets (5-10 mg) by mouth nightly as needed for sleep., Disp: 60 tablet, Rfl: 1    Current Facility-Administered Medications:      2.0 mL bupivacaine (MARCAINE) 0.5% injection (50 mL vial), 2 mL, , , , 2 mL at 04/18/24 0901     2.0 mL bupivacaine (MARCAINE) 0.5% injection (50 mL vial), 2 mL, , , Bernardino,  DO Hany, 2 mL at 01/16/24 1545     2.0 mL bupivacaine (MARCAINE) 0.5% injection (50 mL vial), 2 mL, , , Hany Lebron, , 2 mL at 08/30/23 0820     lidocaine 1 % injection 2 mL, 2 mL, , , , 2 mL at 04/18/24 0901     lidocaine 1 % injection 2 mL, 2 mL, , , Hany Lebron, , 2 mL at 01/16/24 1545     lidocaine 1 % injection 2 mL, 2 mL, , , Hany Lebron, , 2 mL at 08/30/23 0820     triamcinolone (KENALOG-40) injection 40 mg, 40 mg, , , , 40 mg at 04/18/24 0901     triamcinolone (KENALOG-40) injection 40 mg, 40 mg, , , Hany Lebron, , 40 mg at 01/16/24 1545     triamcinolone (KENALOG-40) injection 40 mg, 40 mg, , , Hany Lebron, , 40 mg at 08/30/23 0820     triamcinolone acetonide (KENALOG-10) injection 20 mg, 20 mg, INTRA-ARTICULAR, Once,   ALLERGIES:    Allergies   Allergen Reactions     Amlodipine Shortness Of Breath and Swelling     SOCIAL HISTORY:   Social History     Socioeconomic History     Marital status: Single     Spouse name: Not on file     Number of children: Not on file     Years of education: Not on file     Highest education level: Not on file   Occupational History     Not on file   Tobacco Use     Smoking status: Never     Smokeless tobacco: Never   Vaping Use     Vaping status: Never Used   Substance and Sexual Activity     Alcohol use: Yes     Comment: rare     Drug use: Never     Sexual activity: Not on file   Other Topics Concern     Parent/sibling w/ CABG, MI or angioplasty before 65F 55M? No   Social History Narrative     Not on file     Social Drivers of Health     Financial Resource Strain: Low Risk  (2/7/2024)    Financial Resource Strain      Within the past 12 months, have you or your family members you live with been unable to get utilities (heat, electricity) when it was really needed?: No   Food Insecurity: Low Risk  (2/7/2024)    Food Insecurity      Within the past 12 months, did you worry that your food would run out before you got money to buy more?: No      Within  "the past 12 months, did the food you bought just not last and you didn t have money to get more?: No   Transportation Needs: Low Risk  (2/7/2024)    Transportation Needs      Within the past 12 months, has lack of transportation kept you from medical appointments, getting your medicines, non-medical meetings or appointments, work, or from getting things that you need?: No   Physical Activity: Unknown (2/7/2024)    Exercise Vital Sign      Days of Exercise per Week: 6 days      Minutes of Exercise per Session: Not on file   Stress: No Stress Concern Present (2/7/2024)    [place] [employer] - Occupational Stress Questionnaire      Feeling of Stress : Only a little   Social Connections: Unknown (2/7/2024)    Social Connection and Isolation Panel [NHANES]      Frequency of Communication with Friends and Family: Not on file      Frequency of Social Gatherings with Friends and Family: More than three times a week      Attends [employer] Services: Not on file      Active Member of Clubs or Organizations: Not on file      Attends Club or Organization Meetings: Not on file      Marital Status: Not on file   Interpersonal Safety: Low Risk  (10/29/2024)    Interpersonal Safety      Do you feel physically and emotionally safe where you currently live?: Yes      Within the past 12 months, have you been hit, slapped, kicked or otherwise physically hurt by someone?: No      Within the past 12 months, have you been humiliated or emotionally abused in other ways by your partner or ex-partner?: No   Housing Stability: High Risk (2/7/2024)    Housing Stability      Do you have housing? : Yes      Are you worried about losing your housing?: Yes     FAMILY HISTORY:   Family History   Problem Relation Age of Onset     Diabetes Mother        EXAM:Vitals: BP (!) 146/94 (BP Location: Left arm, Patient Position: Sitting, Cuff Size: Adult Large)   Ht 1.59 m (5' 2.6\")   Wt 88.5 kg (195 lb)   BMI 34.99 kg/m    BMI= Body " mass index is 34.99 kg/m .    General appearance: Patient is alert and fully cooperative with history & exam.  No sign of distress is noted during the visit.     Psychiatric: Affect is pleasant & appropriate.  Patient appears motivated to improve health.     Respiratory: Breathing is regular & unlabored while sitting.     HEENT: Hearing is intact to spoken word.  Speech is clear.  No gross evidence of visual impairment that would impact ambulation.     Vascular: DP & PT 2/4 & regular bilaterally.  No significant edema, rubor or varicosities noted.  CFT and skin temperature is normal to both lower extremities.       Neurologic: Lower extremity sensation is intact to light touch.  No evidence of weakness in the lower extremities.  No evidence of neuropathy and negative tinel sign.     Dermatologic: Skin is intact to both lower extremities without significant lesions, rash or abrasion.  Normal texture turgor and tone. No paronychia or evidence of soft tissue infection is noted.    Musculoskeletal: Patient is ambulatory without assistive device or brace. Pain is noted with firm palpation along the medial band of the plantar fascia right and left foot most notably at the origination upon the calcaneus not through the arch.  No pain with compression of the calcaneus medial to lateral or with palpation of the achilles, peroneal or posterior tibial tendons.  Slightly more than 0  of ankle joint dorsiflexion without crepitus or pain throughout the ankle, subtalar or midtarsal joints.  No pain or limitations throughout manual muscle strength testing plus 5/5 to all four quadrants bilateral.  No palpable edema noted.      Radiographs:  Osteophyte noted about the plantar calcaneus consistent with plantar fasciitis. Diminished calcaneal inclination angle consistent with pes valgus.     ASSESSMENT:       ICD-10-CM    1. Plantar fasciitis, left  M72.2 Injection Single Tendon Sheeth/Ligament     triamcinolone acetonide (KENALOG-10)  injection 20 mg        PLAN:  Reviewed patient's chart in [facility] and discussed etiology and treatment options.      Treatments:  8/8/2024     Obtained and interpreted radiographs.   Discontinue barefoot walking or unsupported walking in shoes without shank.  Dispensed written instructions to obtain appropriate shoe gear and/or OTC inserts.  Dispensed anterior night splint to use all night every night.    Order to begin physical therapy evaluate and progress as tolerated.  Is already using naprosyn 500 mg   Prescription for custom molded orthotics 8/8/2024  Follow up in 4-5 weeks   Had left knee meniscus sx     9/9/2024  Refilled naprosyn for one fill.  For any further refills she should return to her originally prescribing provider for this as she was taking this off and on for cervical spine pain  Provided another night splint for bilateral   Is doing PT, progress as tolerated.   May consider injection if this remains symptomatic otherwise follow-up in a couple months if this remains symptomatic.    10/31/2024  Patient is requesting an injection.  Recommend continuing the night splint, custom molded orthotics, and physical therapy    I obtained informed consent, discussed risks and alternative treatments, prepped with ETOH, and injected 20 mg Kenalog and lidocaine about the left medial band of the plantar fascia as it originates upon the calcaneus.  Sterile dressing applied.       Matt Drew DPM                Again, thank you for allowing me to participate in the care of your patient.        Sincerely,        Matt Drew DPM

## 2024-10-31 NOTE — PROGRESS NOTES
Chief Complaint   Patient presents with    RECHECK     2 NS, orthotics, PT evaluation, discuss injection @ 10/31/2024, Right plantar fasciitis, onset June 2024, Left plantar fasciitis, onset Aug 2024; LOV 9/9/2024     HPI:  Audrey Ricks is a 64 year old female who is seen in consultation at the request of .    Referred Self     Pt presents for eval of:   (Onset, Location, L/R, Character, Treatments, Injury if yes)     Right and left heel pain bottom of heel pain guarding hopping started about June 1,2024.  Got left knee meniscus repair.      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    Pain improved 50% and now minimal pain upon WB but average 5/10.     ROS: 10 point ROS neg other than the symptoms noted above in the HPI.    Patient Active Problem List   Diagnosis    Bipolar 2 disorder (H)    Cervical radiculopathy at C7    BMI 33.0-33.9,adult    GERD (gastroesophageal reflux disease)    Glaucoma    Morbid obesity (H)    Hyperlipidemia LDL goal <130    S/P arthroscopy of left knee    Plantar fasciitis, right    SVT (supraventricular tachycardia) (H)    Essential hypertension       PAST MEDICAL HISTORY:   Past Medical History:   Diagnosis Date    Depressive disorder 2011    Hypertension     NSTEMI (non-ST elevated myocardial infarction) (H) 08/06/2022    SVT (supraventricular tachycardia) (H)      PAST SURGICAL HISTORY:   Past Surgical History:   Procedure Laterality Date    ARTHROSCOPY KNEE WITH DEBRIDEMENT JOINT, COMBINED Left 6/4/2024    Procedure: Left knee arthroscopy with medial and lateral meniscus debridement;  Surgeon: Alvaro Ang DO;  Location: [facility]    EP ABLATION SVT Bilateral 9/23/2022    Procedure: Ablation Supraventricular Tachycardia;  Surgeon: Donna Wolfe MD;  Location:  [facility]     MEDICATIONS:   Current Outpatient Medications:     acetaminophen (TYLENOL) 500 MG tablet, Take 650 mg by mouth every 6 hours as needed for mild pain., Disp: , Rfl:     B Complex Vitamins  (VITAMIN-B COMPLEX PO), Take 1 tablet by mouth daily Unknown tablet strength, Disp: , Rfl:     CALCIUM PO, Take 1 tablet by mouth daily Unknown tablet strength, Disp: , Rfl:     estradiol-norethindrone (COMBIPATCH) 0.05-0.14 MG/DAY bi-weekly patch, REMOVE OLD PATCH AND PLACE 1 PATCH ONTO THE SKIN TWICE A WEEK, Disp: 24 patch, Rfl: 3    ibuprofen (ADVIL/MOTRIN) 600 MG tablet, Take 1 tablet (600 mg) by mouth every 6 hours as needed for moderate pain, Disp: 120 tablet, Rfl: 1    magnesium 250 MG tablet, Take 1 tablet by mouth daily, Disp: , Rfl:     metroNIDAZOLE (METROGEL) 1 % external gel, Apply topically daily., Disp: 60 g, Rfl: 1    naproxen (NAPROSYN) 500 MG tablet, Take 1 tablet (500 mg) by mouth 2 times daily (with meals)., Disp: 60 tablet, Rfl: 0    OMEGA-3 FATTY ACIDS PO, Take 1 capsule by mouth daily Unknown capsule strength, Disp: , Rfl:     omeprazole (PRILOSEC) 20 MG DR capsule, TAKE 1 CAPSULE BY MOUTH ONE TIME A DAY. TAKES AS NEEDED FOR HEARTBURN, Disp: 90 capsule, Rfl: 3    potassium chloride shanae ER (KLOR-CON M10) 10 MEQ CR tablet, TAKE 1 TABLET BY MOUTH ONCE DAILY, Disp: 90 tablet, Rfl: 2    POTASSIUM PO, Take 1 tablet by mouth daily. Unknown tablet strength, Disp: , Rfl:     triamcinolone (KENALOG) 0.1 % external cream, Apply topically 2 times daily as needed for irritation, Disp: 30 g, Rfl: 1    zolpidem (AMBIEN) 5 MG tablet, Take 1-2 tablets (5-10 mg) by mouth nightly as needed for sleep., Disp: 60 tablet, Rfl: 1    Current Facility-Administered Medications:     2.0 mL bupivacaine (MARCAINE) 0.5% injection (50 mL vial), 2 mL, , , , 2 mL at 04/18/24 0901    2.0 mL bupivacaine (MARCAINE) 0.5% injection (50 mL vial), 2 mL, , , Hany Lebron, DO, 2 mL at 01/16/24 1545    2.0 mL bupivacaine (MARCAINE) 0.5% injection (50 mL vial), 2 mL, , , Hany Lebron DO, 2 mL at 08/30/23 0820    lidocaine 1 % injection 2 mL, 2 mL, , , , 2 mL at 04/18/24 0901    lidocaine 1 % injection 2 mL, 2 mL, , , Bernardino,  Hany, , 2 mL at 01/16/24 1545    lidocaine 1 % injection 2 mL, 2 mL, , , Hany Lebron, DO, 2 mL at 08/30/23 0820    triamcinolone (KENALOG-40) injection 40 mg, 40 mg, , , , 40 mg at 04/18/24 0901    triamcinolone (KENALOG-40) injection 40 mg, 40 mg, , , Hany Lebron, DO, 40 mg at 01/16/24 1545    triamcinolone (KENALOG-40) injection 40 mg, 40 mg, , , Hany Lebron, DO, 40 mg at 08/30/23 0820    triamcinolone acetonide (KENALOG-10) injection 20 mg, 20 mg, INTRA-ARTICULAR, Once,   ALLERGIES:    Allergies   Allergen Reactions    Amlodipine Shortness Of Breath and Swelling     SOCIAL HISTORY:   Social History     Socioeconomic History    Marital status: Single     Spouse name: Not on file    Number of children: Not on file    Years of education: Not on file    Highest education level: Not on file   Occupational History    Not on file   Tobacco Use    Smoking status: Never    Smokeless tobacco: Never   Vaping Use    Vaping status: Never Used   Substance and Sexual Activity    Alcohol use: Yes     Comment: rare    Drug use: Never    Sexual activity: Not on file   Other Topics Concern    Parent/sibling w/ CABG, MI or angioplasty before 65F 55M? No   Social History Narrative    Not on file     Social Drivers of Health     Financial Resource Strain: Low Risk  (2/7/2024)    Financial Resource Strain     Within the past 12 months, have you or your family members you live with been unable to get utilities (heat, electricity) when it was really needed?: No   Food Insecurity: Low Risk  (2/7/2024)    Food Insecurity     Within the past 12 months, did you worry that your food would run out before you got money to buy more?: No     Within the past 12 months, did the food you bought just not last and you didn t have money to get more?: No   Transportation Needs: Low Risk  (2/7/2024)    Transportation Needs     Within the past 12 months, has lack of transportation kept you from medical appointments, getting your medicines,  "non-medical meetings or appointments, work, or from getting things that you need?: No   Physical Activity: Unknown (2/7/2024)    Exercise Vital Sign     Days of Exercise per Week: 6 days     Minutes of Exercise per Session: Not on file   Stress: No Stress Concern Present (2/7/2024)    [employer] - Occupational Stress Questionnaire     Feeling of Stress : Only a little   Social Connections: Unknown (2/7/2024)    Social Connection and Isolation Panel [NHANES]     Frequency of Communication with Friends and Family: Not on file     Frequency of Social Gatherings with Friends and Family: More than three times a week     Attends Jehovah's witness Services: Not on file     Active Member of Clubs or Organizations: Not on file     Attends Club or Organization Meetings: Not on file     Marital Status: Not on file   Interpersonal Safety: Low Risk  (10/29/2024)    Interpersonal Safety     Do you feel physically and emotionally safe where you currently live?: Yes     Within the past 12 months, have you been hit, slapped, kicked or otherwise physically hurt by someone?: No     Within the past 12 months, have you been humiliated or emotionally abused in other ways by your partner or ex-partner?: No   Housing Stability: High Risk (2/7/2024)    Housing Stability     Do you have housing? : Yes     Are you worried about losing your housing?: Yes     FAMILY HISTORY:   Family History   Problem Relation Age of Onset    Diabetes Mother        EXAM:Vitals: BP (!) 146/94 (BP Location: Left arm, Patient Position: Sitting, Cuff Size: Adult Large)   Ht 1.59 m (5' 2.6\")   Wt 88.5 kg (195 lb)   BMI 34.99 kg/m    BMI= Body mass index is 34.99 kg/m .    General appearance: Patient is alert and fully cooperative with history & exam.  No sign of distress is noted during the visit.     Psychiatric: Affect is pleasant & appropriate.  Patient appears motivated to improve health.     Respiratory: Breathing is regular & unlabored " while sitting.     HEENT: Hearing is intact to spoken word.  Speech is clear.  No gross evidence of visual impairment that would impact ambulation.     Vascular: DP & PT 2/4 & regular bilaterally.  No significant edema, rubor or varicosities noted.  CFT and skin temperature is normal to both lower extremities.       Neurologic: Lower extremity sensation is intact to light touch.  No evidence of weakness in the lower extremities.  No evidence of neuropathy and negative tinel sign.     Dermatologic: Skin is intact to both lower extremities without significant lesions, rash or abrasion.  Normal texture turgor and tone. No paronychia or evidence of soft tissue infection is noted.    Musculoskeletal: Patient is ambulatory without assistive device or brace. Pain is noted with firm palpation along the medial band of the plantar fascia right and left foot most notably at the origination upon the calcaneus not through the arch.  No pain with compression of the calcaneus medial to lateral or with palpation of the achilles, peroneal or posterior tibial tendons.  Slightly more than 0  of ankle joint dorsiflexion without crepitus or pain throughout the ankle, subtalar or midtarsal joints.  No pain or limitations throughout manual muscle strength testing plus 5/5 to all four quadrants bilateral.  No palpable edema noted.      Radiographs:  Osteophyte noted about the plantar calcaneus consistent with plantar fasciitis. Diminished calcaneal inclination angle consistent with pes valgus.     ASSESSMENT:       ICD-10-CM    1. Plantar fasciitis, left  M72.2 Injection Single Tendon Sheeth/Ligament     triamcinolone acetonide (KENALOG-10) injection 20 mg        PLAN:  Reviewed patient's chart in [facility] and discussed etiology and treatment options.      Treatments:  8/8/2024     Obtained and interpreted radiographs.   Discontinue barefoot walking or unsupported walking in shoes without shank.  Dispensed written instructions to obtain  appropriate shoe gear and/or OTC inserts.  Dispensed anterior night splint to use all night every night.    Order to begin physical therapy evaluate and progress as tolerated.  Is already using naprosyn 500 mg   Prescription for custom molded orthotics 8/8/2024  Follow up in 4-5 weeks   Had left knee meniscus sx     9/9/2024  Refilled naprosyn for one fill.  For any further refills she should return to her originally prescribing provider for this as she was taking this off and on for cervical spine pain  Provided another night splint for bilateral   Is doing PT, progress as tolerated.   May consider injection if this remains symptomatic otherwise follow-up in a couple months if this remains symptomatic.    10/31/2024  Patient is requesting an injection.  Recommend continuing the night splint, custom molded orthotics, and physical therapy    I obtained informed consent, discussed risks and alternative treatments, prepped with ETOH, and injected 20 mg Kenalog and lidocaine about the left medial band of the plantar fascia as it originates upon the calcaneus.  Sterile dressing applied.       Matt Drew DPM

## 2024-11-05 ENCOUNTER — OFFICE VISIT (OUTPATIENT)
Dept: CARDIOLOGY | Facility: CLINIC | Age: 64
End: 2024-11-05
Payer: COMMERCIAL

## 2024-11-05 VITALS
SYSTOLIC BLOOD PRESSURE: 154 MMHG | BODY MASS INDEX: 35.08 KG/M2 | OXYGEN SATURATION: 97 % | WEIGHT: 198 LBS | DIASTOLIC BLOOD PRESSURE: 96 MMHG | HEIGHT: 63 IN | HEART RATE: 91 BPM | RESPIRATION RATE: 16 BRPM

## 2024-11-05 DIAGNOSIS — I10 BENIGN ESSENTIAL HYPERTENSION: ICD-10-CM

## 2024-11-05 DIAGNOSIS — I47.10 SVT (SUPRAVENTRICULAR TACHYCARDIA) (H): Primary | ICD-10-CM

## 2024-11-05 RX ORDER — LISINOPRIL 5 MG/1
5 TABLET ORAL DAILY
Qty: 30 TABLET | Refills: 3 | Status: SHIPPED | OUTPATIENT
Start: 2024-11-05

## 2024-11-05 ASSESSMENT — PAIN SCALES - GENERAL: PAINLEVEL_OUTOF10: MILD PAIN (2)

## 2024-11-05 NOTE — LETTER
"11/5/2024    Alie De La Cruz PA-C  [address] [place] [address]    RE: Audrey Ricks       Dear Colleague,     I had the [facility].              ~Cardiology Clinic Visit~    Audrey Ricks MRN# [id number]   YOB: 1960 Age: 64 year old   Primary Cardiologist: Dr. Cuellar            Assessment and Plan:   Audrey Ricks is a very pleasant 64 year old female who is here today for annual follow up      SVT  S/p AVNRT ablation on 9/23/20222 with Dr. Yaneth Hernandez with intermittent and brief palpitations     HTN  Sub optimal control today   Patient hesitant about starting blood pressure medication as she states her blood pressure is quite labile ranging from 120-150 systolic and she has had fatigue and swelling secondary to blood pressure medications (likely due to amlodipine and metoprolol).  I informed her that a systolic reading in the 120's is normal and preferred. However, patient reports if her systolic pressure gets \"too low\" in the 120s it can affect her mood and make her depressed.   Patient willing to try lisinopril 5 mg daily   Encouraged her to start checking blood pressure at home   Will have nursing call patient in one week to see how she is tolerating the medication and to review her at home blood pressure readings. If patient is tolerating the medication, will obtain a BMP   Discussed lifestyle modifications of a balanced diet and regular exercise         Follow up with [facility] in two months for blood pressure recheck       Jesusita Farmer PA-C  Physician Assistant   [facility]  Pager: [phone]          History of Presenting Illness:    Audrey Ricks is a very pleasant 64 year old female with a history of SVT (s/p SVT ablation 9/2022), GERD, and hyperlipidemia.     In brief, Ms. Ricks  has a history of SVT dating back approximately 10 years ago for which she presented to the emergency department and " received adenosine.  She was doing well up until February 2022 when she suffered severe back pain and sciatica with associated heart racing.  She presented to the emergency department at [facility] and was found to be back in SVT that was terminated with adenosine x2.     She was evaluated by Dr. Cuellar in June 2022 who recommended that she meet with electrophysiology to discuss a potential ablation.  Prior to meeting with the EP she presented to the emergency department with concerns of chest pain and palpitations.  Again she was found to be in SVT that was terminated with adenosine.  Due to troponin elevation in the setting of chest pain and SVT she was transferred to [facility] where she was evaluated by cardiology who recommended a CT coronary angiogram with a calcium score of 0 with normal coronary anatomy with no detectable stenosis or plaque.  An echocardiogram showed LVEF of 55 to 60% with no significant wall motion or valvular abnormalities.  During the admission she was started on bisoprolol.      She then met with Dr. Wolfe on 8/18/2022 and reported significant side effects from the bisoprolol as well as metoprolol.  He recommended EP study and ablation for SVT.  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    She was last seen in clinic October 2023 for annual follow-up.  She was doing well from a cardiovascular standpoint.  No medication changes were made.  She was to follow-up in 1 year.    Patient reports she has had a stressful year. She tore her meniscus last year and had to have surgery. While recovering she got plantar fascitis. These injuries reduced her ability to exercise and she unfortunately has gained more weight than she would have preferred. However, she is slowly recovering and has started to attend her [employer] classes and doing yoga. She experiences random palpitations very seldomly that  are brief in presentation. While recovering from the above injuries she experienced some chest tightness while mobilizing up the stairs. She has not had any recurrent episodes. Her blood pressure at home is around 139 systolic but she has not been checking it regularly. She is going to [place] from the middle of November to December.     Denies shortness of breath, orthopnea and PND. Denies chest pain, lightheadedness, dizziness, near syncope and syncope.     Taking medications daily as prescribed.     Blood pressure 154/96 and HR 91 in clinic today.        Social History       Social History     Socioeconomic History     Marital status: Single     Spouse name: Not on file     Number of children: Not on file     Years of education: Not on file     Highest education level: Not on file   Occupational History     Not on file   Tobacco Use     Smoking status: Never     Smokeless tobacco: Never   Vaping Use     Vaping status: Never Used   Substance and Sexual Activity     Alcohol use: Yes     Comment: rare     Drug use: Never     Sexual activity: Not on file   Other Topics Concern     Parent/sibling w/ CABG, MI or angioplasty before 65F 55M? No   Social History Narrative     Not on file     Social Drivers of Health     Financial Resource Strain: Low Risk  (2/7/2024)    Financial Resource Strain      Within the past 12 months, have you or your family members you live with been unable to get utilities (heat, electricity) when it was really needed?: No   Food Insecurity: Low Risk  (2/7/2024)    Food Insecurity      Within the past 12 months, did you worry that your food would run out before you got money to buy more?: No      Within the past 12 months, did the food you bought just not last and you didn t have money to get more?: No   Transportation Needs: Low Risk  (2/7/2024)    Transportation Needs      Within the past 12 months, has lack of transportation kept you from medical appointments, getting your medicines,  "non-medical meetings or appointments, work, or from getting things that you need?: No   Physical Activity: Unknown (2/7/2024)    Exercise Vital Sign      Days of Exercise per Week: 6 days      Minutes of Exercise per Session: Not on file   Stress: No Stress Concern Present (2/7/2024)    [employer] - Occupational Stress Questionnaire      Feeling of Stress : Only a little   Social Connections: Unknown (2/7/2024)    Social Connection and Isolation Panel [NHANES]      Frequency of Communication with Friends and Family: Not on file      Frequency of Social Gatherings with Friends and Family: More than three times a week      Attends Yarsanism Services: Not on file      Active Member of Clubs or Organizations: Not on file      Attends Club or Organization Meetings: Not on file      Marital Status: Not on file   Interpersonal Safety: Low Risk  (10/29/2024)    Interpersonal Safety      Do you feel physically and emotionally safe where you currently live?: Yes      Within the past 12 months, have you been hit, slapped, kicked or otherwise physically hurt by someone?: No      Within the past 12 months, have you been humiliated or emotionally abused in other ways by your partner or ex-partner?: No   Housing Stability: High Risk (2/7/2024)    Housing Stability      Do you have housing? : Yes      Are you worried about losing your housing?: Yes            Review of Systems:   Please see HPI         Physical Exam:   Vitals: BP (!) 154/96 (BP Location: Right arm, Patient Position: Sitting, Cuff Size: Adult Large)   Pulse 91   Resp 16   Ht 1.59 m (5' 2.6\")   Wt 89.8 kg (198 lb)   SpO2 97%   BMI 35.52 kg/m     Wt Readings from Last 4 Encounters:   10/31/24 88.5 kg (195 lb)   10/29/24 88.5 kg (195 lb)   09/09/24 88.9 kg (196 lb)   08/08/24 89.4 kg (197 lb)     GEN: well nourished, in no acute distress.  NECK: Supple. JVP was not appreciated.   C/V:  Regular rate and rhythm, no murmur, rub or " "gallop.    RESP: Respirations are unlabored. Clear to auscultation bilaterally without wheezing, rales, or rhonchi.  EXTREM: Bilateral lower extremities with no edema.   SKIN: Warm and dry.        Data:   LIPID RESULTS:  Lab Results   Component Value Date    CHOL 257 (H) 02/07/2024    CHOL 227 (H) 03/08/2021    HDL 51 02/07/2024    HDL 53 03/08/2021     (H) 02/07/2024     (H) 03/08/2021    TRIG 215 (H) 02/07/2024    TRIG 103 03/08/2021     LIVER ENZYME RESULTS:  No results found for: \"AST\", \"ALT\"  CBC RESULTS:  Lab Results   Component Value Date    WBC 6.8 05/29/2024    RBC 4.43 05/29/2024    HGB 13.9 05/29/2024    HCT 41.3 05/29/2024    MCV 93 05/29/2024    MCH 31.4 05/29/2024    MCHC 33.7 05/29/2024    RDW 13.4 05/29/2024     05/29/2024     BMP RESULTS:  Lab Results   Component Value Date     05/29/2024     03/08/2021    POTASSIUM 4.1 05/29/2024    POTASSIUM 4.1 09/23/2022    POTASSIUM 4.1 03/08/2021    CHLORIDE 102 05/29/2024    CHLORIDE 109 09/23/2022    CHLORIDE 108 03/08/2021    CO2 26 05/29/2024    CO2 27 09/23/2022    CO2 27 03/08/2021    ANIONGAP 10 05/29/2024    ANIONGAP 2 (L) 09/23/2022    ANIONGAP 5 03/08/2021     (H) 05/29/2024     (H) 09/23/2022    GLC 89 03/08/2021    BUN 17.4 05/29/2024    BUN 20 09/23/2022    BUN 17 03/08/2021    CR 0.89 05/29/2024    CR 0.99 03/08/2021    GFRESTIMATED 73 05/29/2024    GFRESTIMATED 62 03/08/2021    GFRESTBLACK 72 03/08/2021    YAJAIRA 9.2 05/29/2024    YAJAIRA 8.7 03/08/2021      A1C RESULTS:  Lab Results   Component Value Date    A1C 5.9 (H) 08/07/2022     INR RESULTS:  No results found for: \"INR\"         Medications     Current Outpatient Medications   Medication Sig Dispense Refill     acetaminophen (TYLENOL) 500 MG tablet Take 650 mg by mouth every 6 hours as needed for mild pain.       B Complex Vitamins (VITAMIN-B COMPLEX PO) Take 1 tablet by mouth daily Unknown tablet strength       CALCIUM PO Take 1 tablet by mouth " daily Unknown tablet strength       estradiol-norethindrone (COMBIPATCH) 0.05-0.14 MG/DAY bi-weekly patch REMOVE OLD PATCH AND PLACE 1 PATCH ONTO THE SKIN TWICE A WEEK 24 patch 3     ibuprofen (ADVIL/MOTRIN) 600 MG tablet Take 1 tablet (600 mg) by mouth every 6 hours as needed for moderate pain 120 tablet 1     magnesium 250 MG tablet Take 1 tablet by mouth daily       metroNIDAZOLE (METROGEL) 1 % external gel Apply topically daily. 60 g 1     naproxen (NAPROSYN) 500 MG tablet Take 1 tablet (500 mg) by mouth 2 times daily (with meals). 60 tablet 0     OMEGA-3 FATTY ACIDS PO Take 1 capsule by mouth daily Unknown capsule strength       omeprazole (PRILOSEC) 20 MG DR capsule TAKE 1 CAPSULE BY MOUTH ONE TIME A DAY. TAKES AS NEEDED FOR HEARTBURN 90 capsule 3     potassium chloride shanae ER (KLOR-CON M10) 10 MEQ CR tablet TAKE 1 TABLET BY MOUTH ONCE DAILY 90 tablet 2     POTASSIUM PO Take 1 tablet by mouth daily. Unknown tablet strength       triamcinolone (KENALOG) 0.1 % external cream Apply topically 2 times daily as needed for irritation 30 g 1     zolpidem (AMBIEN) 5 MG tablet Take 1-2 tablets (5-10 mg) by mouth nightly as needed for sleep. 60 tablet 1          Past Medical History     Past Medical History:   Diagnosis Date     Depressive disorder 2011     Hypertension      NSTEMI (non-ST elevated myocardial infarction) (H) 08/06/2022     SVT (supraventricular tachycardia) (H)      Past Surgical History:   Procedure Laterality Date     ARTHROSCOPY KNEE WITH DEBRIDEMENT JOINT, COMBINED Left 6/4/2024    Procedure: Left knee arthroscopy with medial and lateral meniscus debridement;  Surgeon: Alvaro Ang DO;  Location: [facility]     EP ABLATION SVT Bilateral 9/23/2022    Procedure: Ablation Supraventricular Tachycardia;  Surgeon: Donna Wolfe MD;  Location:  [facility]     Family History   Problem Relation Age of Onset     Diabetes Mother             Allergies   Amlodipine      This note was  completed in part using dictation via the Dragon voice recognition software. Some word and grammatical errors may occur and must be interpreted in the appropriate clinical context.  If there are any questions pertaining to this issue, please contact me for further clarification.      Thank you for allowing me to participate in the care of your patient.      Sincerely,     Jesusita Farmer PA-C     [facility]  cc:   Referred Self, MD  No address on file

## 2024-11-05 NOTE — PROGRESS NOTES
"            ~Cardiology Clinic Visit~    Audrey Ricks MRN# [id number]   YOB: 1960 Age: 64 year old   Primary Cardiologist: Dr. Cuellar            Assessment and Plan:   Audrey Ricks is a very pleasant 64 year old female who is here today for annual follow up      SVT  S/p AVNRT ablation on 9/23/20222 with Dr. Yaneth Hernandez with intermittent and brief palpitations     HTN  Sub optimal control today   Patient hesitant about starting blood pressure medication as she states her blood pressure is quite labile ranging from 120-150 systolic and she has had fatigue and swelling secondary to blood pressure medications (likely due to amlodipine and metoprolol).  I informed her that a systolic reading in the 120's is normal and preferred. However, patient reports if her systolic pressure gets \"too low\" in the 120s it can affect her mood and make her depressed.   Patient willing to try lisinopril 5 mg daily   Encouraged her to start checking blood pressure at home   Will have nursing call patient in one week to see how she is tolerating the medication and to review her at home blood pressure readings. If patient is tolerating the medication, will obtain a BMP   Discussed lifestyle modifications of a balanced diet and regular exercise         Follow up with [facility] in two months for blood pressure recheck       Jesusita Farmer PA-C  Physician Assistant   [facility]  Pager: [phone]          History of Presenting Illness:    Audrey Ricks is a very pleasant 64 year old female with a history of SVT (s/p SVT ablation 9/2022), GERD, and hyperlipidemia.     In brief, Ms. Ricks  has a history of SVT dating back approximately 10 years ago for which she presented to the emergency department and received adenosine.  She was doing well up until February 2022 when she suffered severe back pain and sciatica with associated heart racing.  She presented to the emergency department at [facility] and was found to be back in SVT that was terminated with adenosine x2.     She was evaluated by Dr. Cuellar in June 2022 who recommended that she meet with electrophysiology to discuss a potential ablation.  Prior to meeting with the EP she presented to the emergency department with concerns of chest pain and palpitations.  Again she was found to be in SVT that was terminated with adenosine.  Due to troponin elevation in the setting of chest pain and SVT she was transferred to [facility] where she was evaluated by cardiology who recommended a CT coronary angiogram with a calcium score of 0 with normal coronary anatomy with no detectable stenosis or plaque.  An echocardiogram showed LVEF of 55 to 60% with no significant wall motion or valvular abnormalities.  During the admission she was started on bisoprolol.      She then met with Dr. Wolfe on 8/18/2022 and reported significant side effects from the bisoprolol as well as metoprolol.  He recommended EP study and ablation for SVT.  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    She was last seen in clinic October 2023 for annual follow-up.  She was doing well from a cardiovascular standpoint.  No medication changes were made.  She was to follow-up in 1 year.    Patient reports she has had a stressful year. She tore her meniscus last year and had to have surgery. While recovering she got plantar fascitis. These injuries reduced her ability to exercise and she unfortunately has gained more weight than she would have preferred. However, she is slowly recovering and has started to attend her [employer] classes and doing yoga. She experiences random palpitations very seldomly that are brief in presentation. While recovering from the above injuries she experienced some chest tightness while mobilizing up the stairs. She has not had any recurrent episodes. Her blood pressure at home is  around 139 systolic but she has not been checking it regularly. She is going to [place] from the middle of November to December.     Denies shortness of breath, orthopnea and PND. Denies chest pain, lightheadedness, dizziness, near syncope and syncope.     Taking medications daily as prescribed.     Blood pressure 154/96 and HR 91 in clinic today.        Social History       Social History     Socioeconomic History    Marital status: Single     Spouse name: Not on file    Number of children: Not on file    Years of education: Not on file    Highest education level: Not on file   Occupational History    Not on file   Tobacco Use    Smoking status: Never    Smokeless tobacco: Never   Vaping Use    Vaping status: Never Used   Substance and Sexual Activity    Alcohol use: Yes     Comment: rare    Drug use: Never    Sexual activity: Not on file   Other Topics Concern    Parent/sibling w/ CABG, MI or angioplasty before 65F 55M? No   Social History Narrative    Not on file     Social Drivers of Health     Financial Resource Strain: Low Risk  (2/7/2024)    Financial Resource Strain     Within the past 12 months, have you or your family members you live with been unable to get utilities (heat, electricity) when it was really needed?: No   Food Insecurity: Low Risk  (2/7/2024)    Food Insecurity     Within the past 12 months, did you worry that your food would run out before you got money to buy more?: No     Within the past 12 months, did the food you bought just not last and you didn t have money to get more?: No   Transportation Needs: Low Risk  (2/7/2024)    Transportation Needs     Within the past 12 months, has lack of transportation kept you from medical appointments, getting your medicines, non-medical meetings or appointments, work, or from getting things that you need?: No   Physical Activity: Unknown (2/7/2024)    Exercise Vital Sign     Days of Exercise per Week: 6 days     Minutes of Exercise per Session: Not on  "file   Stress: No Stress Concern Present (2/7/2024)    [employer] - Occupational Stress Questionnaire     Feeling of Stress : Only a little   Social Connections: Unknown (2/7/2024)    Social Connection and Isolation Panel [NHANES]     Frequency of Communication with Friends and Family: Not on file     Frequency of Social Gatherings with Friends and Family: More than three times a week     Attends Cheondoism Services: Not on file     Active Member of Clubs or Organizations: Not on file     Attends Club or Organization Meetings: Not on file     Marital Status: Not on file   Interpersonal Safety: Low Risk  (10/29/2024)    Interpersonal Safety     Do you feel physically and emotionally safe where you currently live?: Yes     Within the past 12 months, have you been hit, slapped, kicked or otherwise physically hurt by someone?: No     Within the past 12 months, have you been humiliated or emotionally abused in other ways by your partner or ex-partner?: No   Housing Stability: High Risk (2/7/2024)    Housing Stability     Do you have housing? : Yes     Are you worried about losing your housing?: Yes            Review of Systems:   Please see HPI         Physical Exam:   Vitals: BP (!) 154/96 (BP Location: Right arm, Patient Position: Sitting, Cuff Size: Adult Large)   Pulse 91   Resp 16   Ht 1.59 m (5' 2.6\")   Wt 89.8 kg (198 lb)   SpO2 97%   BMI 35.52 kg/m     Wt Readings from Last 4 Encounters:   10/31/24 88.5 kg (195 lb)   10/29/24 88.5 kg (195 lb)   09/09/24 88.9 kg (196 lb)   08/08/24 89.4 kg (197 lb)     GEN: well nourished, in no acute distress.  NECK: Supple. JVP was not appreciated.   C/V:  Regular rate and rhythm, no murmur, rub or gallop.    RESP: Respirations are unlabored. Clear to auscultation bilaterally without wheezing, rales, or rhonchi.  EXTREM: Bilateral lower extremities with no edema.   SKIN: Warm and dry.        Data:   LIPID RESULTS:  Lab Results   Component Value " "Date    CHOL 257 (H) 02/07/2024    CHOL 227 (H) 03/08/2021    HDL 51 02/07/2024    HDL 53 03/08/2021     (H) 02/07/2024     (H) 03/08/2021    TRIG 215 (H) 02/07/2024    TRIG 103 03/08/2021     LIVER ENZYME RESULTS:  No results found for: \"AST\", \"ALT\"  CBC RESULTS:  Lab Results   Component Value Date    WBC 6.8 05/29/2024    RBC 4.43 05/29/2024    HGB 13.9 05/29/2024    HCT 41.3 05/29/2024    MCV 93 05/29/2024    MCH 31.4 05/29/2024    MCHC 33.7 05/29/2024    RDW 13.4 05/29/2024     05/29/2024     BMP RESULTS:  Lab Results   Component Value Date     05/29/2024     03/08/2021    POTASSIUM 4.1 05/29/2024    POTASSIUM 4.1 09/23/2022    POTASSIUM 4.1 03/08/2021    CHLORIDE 102 05/29/2024    CHLORIDE 109 09/23/2022    CHLORIDE 108 03/08/2021    CO2 26 05/29/2024    CO2 27 09/23/2022    CO2 27 03/08/2021    ANIONGAP 10 05/29/2024    ANIONGAP 2 (L) 09/23/2022    ANIONGAP 5 03/08/2021     (H) 05/29/2024     (H) 09/23/2022    GLC 89 03/08/2021    BUN 17.4 05/29/2024    BUN 20 09/23/2022    BUN 17 03/08/2021    CR 0.89 05/29/2024    CR 0.99 03/08/2021    GFRESTIMATED 73 05/29/2024    GFRESTIMATED 62 03/08/2021    GFRESTBLACK 72 03/08/2021    YAJAIRA 9.2 05/29/2024    YAJAIRA 8.7 03/08/2021      A1C RESULTS:  Lab Results   Component Value Date    A1C 5.9 (H) 08/07/2022     INR RESULTS:  No results found for: \"INR\"         Medications     Current Outpatient Medications   Medication Sig Dispense Refill    acetaminophen (TYLENOL) 500 MG tablet Take 650 mg by mouth every 6 hours as needed for mild pain.      B Complex Vitamins (VITAMIN-B COMPLEX PO) Take 1 tablet by mouth daily Unknown tablet strength      CALCIUM PO Take 1 tablet by mouth daily Unknown tablet strength      estradiol-norethindrone (COMBIPATCH) 0.05-0.14 MG/DAY bi-weekly patch REMOVE OLD PATCH AND PLACE 1 PATCH ONTO THE SKIN TWICE A WEEK 24 patch 3    ibuprofen (ADVIL/MOTRIN) 600 MG tablet Take 1 tablet (600 mg) by mouth every " 6 hours as needed for moderate pain 120 tablet 1    magnesium 250 MG tablet Take 1 tablet by mouth daily      metroNIDAZOLE (METROGEL) 1 % external gel Apply topically daily. 60 g 1    naproxen (NAPROSYN) 500 MG tablet Take 1 tablet (500 mg) by mouth 2 times daily (with meals). 60 tablet 0    OMEGA-3 FATTY ACIDS PO Take 1 capsule by mouth daily Unknown capsule strength      omeprazole (PRILOSEC) 20 MG DR capsule TAKE 1 CAPSULE BY MOUTH ONE TIME A DAY. TAKES AS NEEDED FOR HEARTBURN 90 capsule 3    potassium chloride shanae ER (KLOR-CON M10) 10 MEQ CR tablet TAKE 1 TABLET BY MOUTH ONCE DAILY 90 tablet 2    POTASSIUM PO Take 1 tablet by mouth daily. Unknown tablet strength      triamcinolone (KENALOG) 0.1 % external cream Apply topically 2 times daily as needed for irritation 30 g 1    zolpidem (AMBIEN) 5 MG tablet Take 1-2 tablets (5-10 mg) by mouth nightly as needed for sleep. 60 tablet 1          Past Medical History     Past Medical History:   Diagnosis Date    Depressive disorder 2011    Hypertension     NSTEMI (non-ST elevated myocardial infarction) (H) 08/06/2022    SVT (supraventricular tachycardia) (H)      Past Surgical History:   Procedure Laterality Date    ARTHROSCOPY KNEE WITH DEBRIDEMENT JOINT, COMBINED Left 6/4/2024    Procedure: Left knee arthroscopy with medial and lateral meniscus debridement;  Surgeon: Alvaro Ang DO;  Location: [facility] OR    EP ABLATION SVT Bilateral 9/23/2022    Procedure: Ablation Supraventricular Tachycardia;  Surgeon: Donna Wolfe MD;  Location:  [facility]     Family History   Problem Relation Age of Onset    Diabetes Mother             Allergies   Amlodipine      This note was completed in part using dictation via the Dragon voice recognition software. Some word and grammatical errors may occur and must be interpreted in the appropriate clinical context.  If there are any questions pertaining to this issue, please contact me for further clarification.

## 2024-11-05 NOTE — PATIENT INSTRUCTIONS
Thank you for your visit with the [facility] today.    Today's plan:   Medication changes:   Start lisinopril 5 mg daily   Check your blood pressure at home. If blood pressure remains greater than 130/80 please call and update the clinic.   My nursing team will call you in 1 week to see how you are doing on the medication and to review your at home blood pressure readings   Follow up with [facility] in January to recheck blood pressure     If you have questions or concerns please call the nurse team at [phone] or send a [employer] message.     Scheduling phone number: [phone]    On Fridays call: [phone] as the [facility] office is closed     It was a pleasure seeing you today!     Jesusita Farmer PA-C   Physician Assistant   [facility]

## 2024-11-12 ENCOUNTER — MYC MEDICAL ADVICE (OUTPATIENT)
Dept: CARDIOLOGY | Facility: CLINIC | Age: 64
End: 2024-11-12
Payer: COMMERCIAL

## 2024-11-12 ENCOUNTER — TELEPHONE (OUTPATIENT)
Dept: CARDIOLOGY | Facility: CLINIC | Age: 64
End: 2024-11-12
Payer: COMMERCIAL

## 2024-11-12 NOTE — TELEPHONE ENCOUNTER
----- Message from Jesusita Farmer sent at 11/5/2024 10:27 AM CST -----  Please call patient in one week to review at home blood pressure readings and see if she is tolerating the lisinopril 5 mg daily.     If she is tolerating the lisinopril, will need to order a BMP.     Thanks!    [employer] message sent to patient.

## 2024-11-15 DIAGNOSIS — M72.2 PLANTAR FASCIITIS, RIGHT: ICD-10-CM

## 2024-11-15 NOTE — TELEPHONE ENCOUNTER
Audrey [facility] Heart Core Nurse (supporting Clarissa Goldstein RN)12 minutes ago (1:57 PM)       I also was in bed last night from 10:00 p.m. to 10:00 a.m. this morning       Audrey [facility] Heart Core Nurse (supporting Clarissa Goldstein RN)13 minutes ago (1:56 PM)       How hazardous is it for me to go off and on this pill because I'm driving again for a couple hours this weekend and I've been on it for 2 days and I feel light-headed for driving. I just drove to [place] and I don't like my ability to concentrate. I feel very tired and a little light-headed       Jesusita Farmer PA-C Koch, Brenna M, RN2 days ago     KF  Thanks for the update and for following up.     Clarissa Goldstein RN Friedlund, Kaia, PA-C2 days ago     CARLOS HUSSEINI - see [employer] thread. You wanted me to check in with patient to see how she was tolerating the lisinopril and what her home blood pressures were. Thanks - SKYE Romo RN Laura A Englund3 days ago     CARLOS Ventura,  Thank you for your message back. Once you are able to try the lisinopril let us know how you do on it after about week and keep track of your blood pressures as well.   Thank you,  SKYE Romo Tiffany M, RN routed conversation to  Cardiology Adult [place]3 days ago     Audrey [facility] Heart Core Nurse (supporting Clarissa Goldstein RN)3 days ago       Hi this is Audrey. I haven't had a chance to try the medicine yet because I've spent most of the past week traveling and I don't want to start it when I'm going to be driving and I don't know how I'm going to react, but hopefully I'll have some time to try it soon. Thank you!       SKYE Goff3 days ago     CARLOS Ventura,   How have you been feeling on the lisinopril 5mg daily that WIN Wasserman put you on at your last visit with her on 11/5/24? Have you been able to check your blood pressure at home? If so what have your blood pressure readings  been? Please send us an update.   Thank you so much,   SKYE Romo

## 2024-11-15 NOTE — TELEPHONE ENCOUNTER
Jesusita Farmer PA-C  [facility] Team 238 minutes ago (2:29 PM)     It is okay to not take a dose of medication so you are more comfortable driving.    However, your blood pressure was quite elevated when you were seen last in clinic so the elevated blood pressure could be causing your symptoms too.    Recommend checking blood pressure at home now that you have started the lisinopril to have a better understanding of what your blood pressure is so we can adequately treat it and help you feel better.    Thanks!     ======================================================    Above message communicated to patient via expressor software.

## 2024-11-16 RX ORDER — NAPROXEN 500 MG/1
500 TABLET ORAL 2 TIMES DAILY WITH MEALS
Qty: 60 TABLET | Refills: 0 | Status: SHIPPED | OUTPATIENT
Start: 2024-11-16

## 2024-12-16 DIAGNOSIS — M72.2 PLANTAR FASCIITIS, RIGHT: ICD-10-CM

## 2024-12-16 RX ORDER — NAPROXEN 500 MG/1
500 TABLET ORAL 2 TIMES DAILY WITH MEALS
Qty: 60 TABLET | Refills: 5 | Status: SHIPPED | OUTPATIENT
Start: 2024-12-16

## 2025-01-27 DIAGNOSIS — L71.9 ROSACEA: ICD-10-CM

## 2025-01-28 RX ORDER — METRONIDAZOLE 10 MG/G
GEL TOPICAL DAILY
Qty: 60 G | Refills: 0 | Status: SHIPPED | OUTPATIENT
Start: 2025-01-28

## 2025-02-13 ENCOUNTER — OFFICE VISIT (OUTPATIENT)
Dept: FAMILY MEDICINE | Facility: CLINIC | Age: 65
End: 2025-02-13
Attending: PHYSICIAN ASSISTANT
Payer: COMMERCIAL

## 2025-02-13 VITALS
BODY MASS INDEX: 32.66 KG/M2 | HEIGHT: 62 IN | WEIGHT: 177.5 LBS | TEMPERATURE: 97.2 F | OXYGEN SATURATION: 100 % | HEART RATE: 88 BPM | RESPIRATION RATE: 16 BRPM | SYSTOLIC BLOOD PRESSURE: 134 MMHG | DIASTOLIC BLOOD PRESSURE: 82 MMHG

## 2025-02-13 DIAGNOSIS — F31.81 BIPOLAR 2 DISORDER (H): ICD-10-CM

## 2025-02-13 DIAGNOSIS — E87.6 HYPOKALEMIA: ICD-10-CM

## 2025-02-13 DIAGNOSIS — Z13.29 SCREENING FOR THYROID DISORDER: ICD-10-CM

## 2025-02-13 DIAGNOSIS — I47.10 SVT (SUPRAVENTRICULAR TACHYCARDIA): ICD-10-CM

## 2025-02-13 DIAGNOSIS — K21.9 GASTROESOPHAGEAL REFLUX DISEASE WITHOUT ESOPHAGITIS: ICD-10-CM

## 2025-02-13 DIAGNOSIS — Z00.00 ENCOUNTER FOR MEDICARE ANNUAL WELLNESS EXAM: Primary | ICD-10-CM

## 2025-02-13 DIAGNOSIS — N95.1 MENOPAUSAL SYNDROME (HOT FLASHES): ICD-10-CM

## 2025-02-13 DIAGNOSIS — E66.01 MORBID OBESITY (H): ICD-10-CM

## 2025-02-13 DIAGNOSIS — E78.5 HYPERLIPIDEMIA LDL GOAL <130: ICD-10-CM

## 2025-02-13 DIAGNOSIS — I10 BENIGN ESSENTIAL HYPERTENSION: ICD-10-CM

## 2025-02-13 DIAGNOSIS — M72.2 PLANTAR FASCIITIS, RIGHT: ICD-10-CM

## 2025-02-13 LAB
ALBUMIN SERPL BCG-MCNC: 4.3 G/DL (ref 3.5–5.2)
ALP SERPL-CCNC: 72 U/L (ref 40–150)
ALT SERPL W P-5'-P-CCNC: 17 U/L (ref 0–50)
ANION GAP SERPL CALCULATED.3IONS-SCNC: 15 MMOL/L (ref 7–15)
AST SERPL W P-5'-P-CCNC: 17 U/L (ref 0–45)
BILIRUB SERPL-MCNC: 0.4 MG/DL
BUN SERPL-MCNC: 19.6 MG/DL (ref 8–23)
CALCIUM SERPL-MCNC: 9.6 MG/DL (ref 8.8–10.4)
CHLORIDE SERPL-SCNC: 101 MMOL/L (ref 98–107)
CHOLEST SERPL-MCNC: 257 MG/DL
CREAT SERPL-MCNC: 0.94 MG/DL (ref 0.51–0.95)
EGFRCR SERPLBLD CKD-EPI 2021: 68 ML/MIN/1.73M2
FASTING STATUS PATIENT QL REPORTED: YES
FASTING STATUS PATIENT QL REPORTED: YES
GLUCOSE SERPL-MCNC: 93 MG/DL (ref 70–99)
HCO3 SERPL-SCNC: 24 MMOL/L (ref 22–29)
HDLC SERPL-MCNC: 50 MG/DL
LDLC SERPL CALC-MCNC: 167 MG/DL
NONHDLC SERPL-MCNC: 207 MG/DL
POTASSIUM SERPL-SCNC: 4.3 MMOL/L (ref 3.4–5.3)
PROT SERPL-MCNC: 7 G/DL (ref 6.4–8.3)
SODIUM SERPL-SCNC: 140 MMOL/L (ref 135–145)
TRIGL SERPL-MCNC: 198 MG/DL
TSH SERPL DL<=0.005 MIU/L-ACNC: 2.3 UIU/ML (ref 0.3–4.2)

## 2025-02-13 RX ORDER — ATORVASTATIN CALCIUM 20 MG/1
20 TABLET, FILM COATED ORAL DAILY
Qty: 90 TABLET | Refills: 3 | Status: SHIPPED | OUTPATIENT
Start: 2025-02-13

## 2025-02-13 RX ORDER — NAPROXEN 500 MG/1
500 TABLET ORAL 2 TIMES DAILY WITH MEALS
Qty: 60 TABLET | Refills: 5 | Status: SHIPPED | OUTPATIENT
Start: 2025-02-13

## 2025-02-13 RX ORDER — OMEPRAZOLE 20 MG/1
CAPSULE, DELAYED RELEASE ORAL
Qty: 90 CAPSULE | Refills: 3 | Status: SHIPPED | OUTPATIENT
Start: 2025-02-13

## 2025-02-13 RX ORDER — ZOLPIDEM TARTRATE 5 MG/1
5-10 TABLET ORAL
Qty: 60 TABLET | Refills: 2 | Status: SHIPPED | OUTPATIENT
Start: 2025-02-13

## 2025-02-13 RX ORDER — ESTRADIOL/NORETHINDRONE ACETATE TRANSDERMAL SYSTEM .05; .14 MG/D; MG/D
PATCH, EXTENDED RELEASE TRANSDERMAL
Qty: 24 PATCH | Refills: 3 | Status: SHIPPED | OUTPATIENT
Start: 2025-02-13

## 2025-02-13 RX ORDER — ASPIRIN 81 MG/1
81 TABLET ORAL DAILY
Qty: 90 TABLET | Refills: 3 | Status: SHIPPED | OUTPATIENT
Start: 2025-02-13

## 2025-02-13 RX ORDER — POTASSIUM CHLORIDE 750 MG/1
10 TABLET, EXTENDED RELEASE ORAL DAILY
Qty: 90 TABLET | Refills: 2 | Status: SHIPPED | OUTPATIENT
Start: 2025-02-13

## 2025-02-13 SDOH — HEALTH STABILITY: PHYSICAL HEALTH: ON AVERAGE, HOW MANY DAYS PER WEEK DO YOU ENGAGE IN MODERATE TO STRENUOUS EXERCISE (LIKE A BRISK WALK)?: 6 DAYS

## 2025-02-13 SDOH — HEALTH STABILITY: PHYSICAL HEALTH: ON AVERAGE, HOW MANY MINUTES DO YOU ENGAGE IN EXERCISE AT THIS LEVEL?: 40 MIN

## 2025-02-13 ASSESSMENT — SOCIAL DETERMINANTS OF HEALTH (SDOH): HOW OFTEN DO YOU GET TOGETHER WITH FRIENDS OR RELATIVES?: MORE THAN THREE TIMES A WEEK

## 2025-02-13 ASSESSMENT — PATIENT HEALTH QUESTIONNAIRE - PHQ9
SUM OF ALL RESPONSES TO PHQ QUESTIONS 1-9: 0
SUM OF ALL RESPONSES TO PHQ QUESTIONS 1-9: 0
10. IF YOU CHECKED OFF ANY PROBLEMS, HOW DIFFICULT HAVE THESE PROBLEMS MADE IT FOR YOU TO DO YOUR WORK, TAKE CARE OF THINGS AT HOME, OR GET ALONG WITH OTHER PEOPLE: NOT DIFFICULT AT ALL

## 2025-02-13 NOTE — PROGRESS NOTES
Preventive Care Visit  Formerly [facility]  Alie De La Cruz PA-C, Family Medicine  Feb 13, 2025        Subjective   Audrey is a 65 year old, presenting for the following:  Wellness Visit        2/13/2025     7:32 AM   Additional Questions   Roomed by Ariadne URBANO MA           HPI    Overall doing really well. She has made dietary changes since I saw her last and has lost 20 pounds. Feeling really good about this. Had a great trip to [place]. Sad her best friend at the apartment complex (91 year old Lan) is moving down there.     Patient presents today for follow-up of blood pressure. Numbers have been well controlled. Tolerating medications well without side effects. Monitoring salt in diet. Patient denies headaches, vision changes, chest pain, shortness of breath or paresthesias.    Moods continue to be stable.     She has had some pain in her left knee after falling but does feel it is improving.     Health Care Directive  Patient does not have a Health Care Directive: Discussed advance care planning with patient; information given to patient to review.      2/13/2025   General Health   How would you rate your overall physical health? Good   Feel stress (tense, anxious, or unable to sleep) Only a little   (!) STRESS CONCERN      2/13/2025   Nutrition   Diet: Carbohydrate counting         2/13/2025   Exercise   Days per week of moderate/strenous exercise 6 days   Average minutes spent exercising at this level 40 min         2/13/2025   Social Factors   Frequency of gathering with friends or relatives More than three times a week   Worry food won't last until get money to buy more No   Food not last or not have enough money for food? No   Do you have housing? (Housing is defined as stable permanent housing and does not include staying ouside in a car, in a tent, in an abandoned building, in an overnight shelter, or couch-surfing.) Yes   Are you worried about losing your housing? No   Lack of  transportation? No   Unable to get utilities (heat,electricity)? No         2/13/2025   Fall Risk   Fallen 2 or more times in the past year? No   Trouble with walking or balance? No          2/13/2025   Activities of Daily Living- Home Safety   Needs help with the following daily activites None of the above   Safety concerns in the home None of the above         2/13/2025   Dental   Dentist two times every year? (!) NO         2/13/2025   Hearing Screening   Hearing concerns? None of the above         2/13/2025   Driving Risk Screening   Patient/family members have concerns about driving No         2/13/2025   General Alertness/Fatigue Screening   Have you been more tired than usual lately? (!) YES         2/13/2025   Urinary Incontinence Screening   Bothered by leaking urine in past 6 months No          Today's PHQ-9 Score:       2/13/2025     7:07 AM   PHQ-9 SCORE   PHQ-9 Total Score MyChart 0   PHQ-9 Total Score 0        Patient-reported         2/13/2025   Substance Use   Alcohol more than 3/day or more than 7/wk Not Applicable   Do you have a current opioid prescription? No   How severe/bad is pain from 1 to 10? 5/10   Do you use any other substances recreationally? No     Social History     Tobacco Use    Smoking status: Never    Smokeless tobacco: Never   Vaping Use    Vaping status: Never Used   Substance Use Topics    Alcohol use: Yes     Comment: rare    Drug use: Never           10/29/2024   LAST FHS-7 RESULTS   1st degree relative breast or ovarian cancer No   Any relative bilateral breast cancer No   Any male have breast cancer No   Any ONE woman have BOTH breast AND ovarian cancer No   Any woman with breast cancer before 50yrs No   2 or more relatives with breast AND/OR ovarian cancer No   2 or more relatives with breast AND/OR bowel cancer No        Mammogram Screening - Mammogram every 1-2 years updated in Health Maintenance based on mutual decision making      History of abnormal Pap smear: No - age  65 or older with adequate negative prior screening test results (3 consecutive negative cytology results, 2 consecutive negative cotesting results, or 2 consecutive negative HrHPV test results within 10 years, with the most recent test occurring within the recommended screening interval for the test used)        Latest Ref Rng & Units 2/7/2024     8:08 AM 6/13/2018    12:00 AM   PAP / HPV   PAP  Negative for Intraepithelial Lesion or Malignancy (NILM)     HPV 16 DNA Negative Negative     HPV 18 DNA Negative Negative     Other HR HPV Negative Negative     PAP-ABSTRACT   See Scanned Document           This result is from an external source.     ASCVD Risk   The ASCVD Risk score (Rae RAMOS, et al., 2019) failed to calculate for the following reasons:    Risk score cannot be calculated because patient has a medical history suggesting prior/existing ASCVD          Reviewed and updated as needed this visit by Provider                  Current providers sharing in care for this patient include:  Patient Care Team:  Alie De La Cruz PA-C as PCP - General (Family Medicine)  Alie De La Cruz PA-C as Assigned PCP  Jose Rafael Cuellar MD as MD (Cardiovascular Disease)  Lan Carcamo, PhD as Assigned Behavioral Health Provider  Hany Lebron DO as Assigned Neuroscience Provider  Alvaro Agn DO as Assigned Musculoskeletal Provider  Matt Drew DPM as Assigned Surgical Provider  Jesusita Farmer PA-C as Assigned Heart and Vascular Provider    The following health maintenance items are reviewed in Epic and correct as of today:  Health Maintenance   Topic Date Due    RSV VACCINE (1 - Risk 60-74 years 1-dose series) Never done    DTAP/TDAP/TD IMMUNIZATION (5 - Td or Tdap) 09/30/2024    LIPID  02/07/2025    MEDICARE ANNUAL WELLNESS VISIT  02/07/2025    ANNUAL REVIEW OF HM ORDERS  05/29/2025    BMP  05/29/2025    MAMMO SCREENING  10/29/2026    COLORECTAL CANCER SCREENING  02/26/2027    GLUCOSE   "05/29/2027    HPV TEST  02/07/2029    PAP  02/07/2029    ADVANCE CARE PLANNING  05/29/2029    INFLUENZA VACCINE  Completed    Pneumococcal Vaccine: 50+ Years  Completed    ZOSTER IMMUNIZATION  Completed    COVID-19 Vaccine  Completed    HPV IMMUNIZATION  Aged Out    MENINGITIS IMMUNIZATION  Aged Out    HEPATITIS C SCREENING  Discontinued    HIV SCREENING  Discontinued         Review of Systems  Constitutional, HEENT, cardiovascular, pulmonary, GI, , musculoskeletal, neuro, skin, endocrine and psych systems are negative, except as otherwise noted.     Objective    Exam  BP (!) 152/98   Pulse 88   Temp 97.2  F (36.2  C) (Temporal)   Resp 16   Ht 1.565 m (5' 1.61\")   Wt 80.5 kg (177 lb 8 oz)   LMP  (LMP Unknown)   SpO2 100%   BMI 32.87 kg/m     Estimated body mass index is 32.87 kg/m  as calculated from the following:    Height as of this encounter: 1.565 m (5' 1.61\").    Weight as of this encounter: 80.5 kg (177 lb 8 oz).    Physical Exam  GENERAL: alert and no distress  EYES: Eyes grossly normal to inspection, PERRL and conjunctivae and sclerae normal  HENT: ear canals and TM's normal, nose and mouth without ulcers or lesions  NECK: no adenopathy, no asymmetry, masses, or scars  RESP: lungs clear to auscultation - no rales, rhonchi or wheezes  CV: regular rate and rhythm, normal S1 S2, no S3 or S4, no murmur, click or rub, no peripheral edema  ABDOMEN: soft, nontender, no hepatosplenomegaly, no masses and bowel sounds normal  MS: no gross musculoskeletal defects noted, no edema  SKIN: no suspicious lesions or rashes  NEURO: Normal strength and tone, mentation intact and speech normal  PSYCH: mentation appears normal, affect normal/bright        2/13/2025   Mini Cog   Clock Draw Score 2 Normal   3 Item Recall 3 objects recalled   Mini Cog Total Score 5          Assessment & Plan     Encounter for Medicare annual wellness exam    Benign essential hypertension  Stable. Continue current treatment without " "change.   - Comprehensive metabolic panel (BMP + Alb, Alk Phos, ALT, AST, Total. Bili, TP); Future  - Comprehensive metabolic panel (BMP + Alb, Alk Phos, ALT, AST, Total. Bili, TP)    Hyperlipidemia LDL goal <130  - Lipid panel reflex to direct LDL Non-fasting; Future  - Lipid panel reflex to direct LDL Non-fasting    Bipolar 2 disorder (H)  Stable. Continue current treatment without change.   - zolpidem (AMBIEN) 5 MG tablet; Take 1-2 tablets (5-10 mg) by mouth nightly as needed for sleep.    Morbid obesity (H)  Really working on weight loss through diet changes and has lost 21 pounds since her last visit. Congratulated patient on these efforts.     SVT (supraventricular tachycardia)  - aspirin (ASPIRIN LOW DOSE) 81 MG EC tablet; Take 1 tablet (81 mg) by mouth daily.    Screening for thyroid disorder  - TSH with free T4 reflex; Future  - TSH with free T4 reflex    Menopausal syndrome (hot flashes)  - estradiol-norethindrone (COMBIPATCH) 0.05-0.14 MG/DAY bi-weekly patch; REMOVE OLD PATCH AND PLACE 1 PATCH ONTO THE SKIN TWICE A WEEK    Plantar fasciitis, right  - naproxen (NAPROSYN) 500 MG tablet; Take 1 tablet (500 mg) by mouth 2 times daily (with meals).    Gastroesophageal reflux disease without esophagitis  - omeprazole (PRILOSEC) 20 MG DR capsule; TAKE 1 CAPSULE BY MOUTH ONE TIME A DAY. TAKES AS NEEDED FOR HEARTBURN    Hypokalemia  - potassium chloride shanae ER (KLOR-CON M10) 10 MEQ CR tablet; Take 1 tablet (10 mEq) by mouth daily.    Patient has been advised of split billing requirements and indicates understanding: Yes        BMI  Estimated body mass index is 32.87 kg/m  as calculated from the following:    Height as of this encounter: 1.565 m (5' 1.61\").    Weight as of this encounter: 80.5 kg (177 lb 8 oz).   Weight management plan: Discussed healthy diet and exercise guidelines    Counseling  Appropriate preventive services were addressed with this patient via screening, questionnaire, or discussion as " appropriate for fall prevention, nutrition, physical activity, Tobacco-use cessation, social engagement, weight loss and cognition.  Checklist reviewing preventive services available has been given to the patient.  Reviewed patient's diet, addressing concerns and/or questions.   The patient was instructed to see the dentist every 6 months.   Discussed possible causes of fatigue.     Signed Electronically by: Alie De La Cruz PA-C    Answers submitted by the patient for this visit:  Patient Health Questionnaire (Submitted on 2/13/2025)  If you checked off any problems, how difficult have these problems made it for you to do your work, take care of things at home, or get along with other people?: Not difficult at all  PHQ9 TOTAL SCORE: 0

## 2025-02-13 NOTE — PATIENT INSTRUCTIONS
Patient Education   Preventive Care Advice   This is general advice given by our system to help you stay healthy. However, your care team may have specific advice just for you. Please talk to your care team about your preventive care needs.  Nutrition  Eat 5 or more servings of fruits and vegetables each day.  Try wheat bread, brown rice and whole grain pasta (instead of white bread, rice, and pasta).  Get enough calcium and vitamin D. Check the label on foods and aim for 100% of the RDA (recommended daily allowance).  Lifestyle  Exercise at least 150 minutes each week  (30 minutes a day, 5 days a week).  Do muscle strengthening activities 2 days a week. These help control your weight and prevent disease.  No smoking.  Wear sunscreen to prevent skin cancer.  Have a dental exam and cleaning every 6 months.  Yearly exams  See your health care team every year to talk about:  Any changes in your health.  Any medicines your care team has prescribed.  Preventive care, family planning, and ways to prevent chronic diseases.  Shots (vaccines)   HPV shots (up to age 26), if you've never had them before.  Hepatitis B shots (up to age 59), if you've never had them before.  COVID-19 shot: Get this shot when it's due.  Flu shot: Get a flu shot every year.  Tetanus shot: Get a tetanus shot every 10 years.  Pneumococcal, hepatitis A, and RSV shots: Ask your care team if you need these based on your risk.  Shingles shot (for age 50 and up)  General health tests  Diabetes screening:  Starting at age 35, Get screened for diabetes at least every 3 years.  If you are younger than age 35, ask your care team if you should be screened for diabetes.  Cholesterol test: At age 39, start having a cholesterol test every 5 years, or more often if advised.  Bone density scan (DEXA): At age 50, ask your care team if you should have this scan for osteoporosis (brittle bones).  Hepatitis C: Get tested at least once in your life.  STIs (sexually  transmitted infections)  Before age 24: Ask your care team if you should be screened for STIs.  After age 24: Get screened for STIs if you're at risk. You are at risk for STIs (including HIV) if:  You are sexually active with more than one person.  You don't use condoms every time.  You or a partner was diagnosed with a sexually transmitted infection.  If you are at risk for HIV, ask about PrEP medicine to prevent HIV.  Get tested for HIV at least once in your life, whether you are at risk for HIV or not.  Cancer screening tests  Cervical cancer screening: If you have a cervix, begin getting regular cervical cancer screening tests starting at age 21.  Breast cancer scan (mammogram): If you've ever had breasts, begin having regular mammograms starting at age 40. This is a scan to check for breast cancer.  Colon cancer screening: It is important to start screening for colon cancer at age 45.  Have a colonoscopy test every 10 years (or more often if you're at risk) Or, ask your provider about stool tests like a FIT test every year or Cologuard test every 3 years.  To learn more about your testing options, visit:   .  For help making a decision, visit:   https://bit.ly/ky08529.  Prostate cancer screening test: If you have a prostate, ask your care team if a prostate cancer screening test (PSA) at age 55 is right for you.  Lung cancer screening: If you are a current or former smoker ages 50 to 80, ask your care team if ongoing lung cancer screenings are right for you.  For informational purposes only. Not to replace the advice of your health care provider. Copyright   2023 [facility]. All rights reserved. Clinically reviewed by the [facility]. [employer] 825141 - REV 01/24.  Learning About Sleeping Well  What does sleeping well mean?     Sleeping well means getting enough sleep to feel good and stay healthy. How much sleep is enough varies among people.  The number of hours you  sleep and how you feel when you wake up are both important. If you do not feel refreshed, you probably need more sleep. Another sign of not getting enough sleep is feeling tired during the day.  Experts recommend that adults get at least 7 or more hours of sleep per day. Children and older adults need more sleep.  Why is getting enough sleep important?  Getting enough quality sleep is a basic part of good health. When your sleep suffers, your physical health, mood, and your thoughts can suffer too. You may find yourself feeling more grumpy or stressed. Not getting enough sleep also can lead to serious problems, including injury, accidents, anxiety, and depression.  What might cause poor sleeping?  Many things can cause sleep problems, including:  Changes to your sleep schedule.  Stress. Stress can be caused by fear about a single event, such as giving a speech. Or you may have ongoing stress, such as worry about work or school.  Depression, anxiety, and other mental or emotional conditions.  Changes in your sleep habits or surroundings. This includes changes that happen where you sleep, such as noise, light, or sleeping in a different bed. It also includes changes in your sleep pattern, such as having jet lag or working a late shift.  Health problems, such as pain, breathing problems, and restless legs syndrome.  Lack of regular exercise.  Using alcohol, nicotine, or caffeine before bed.  How can you help yourself?  Here are some tips that may help you sleep more soundly and wake up feeling more refreshed.  Your sleeping area   Use your bedroom only for sleeping and sex. A bit of light reading may help you fall asleep. But if it doesn't, do your reading elsewhere in the house. Try not to use your TV, computer, smartphone, or tablet while you are in bed.  Be sure your bed is big enough to stretch out comfortably, especially if you have a sleep partner.  Keep your bedroom quiet, dark, and cool. Use curtains, blinds,  "or a sleep mask to block out light. To block out noise, use earplugs, soothing music, or a \"white noise\" machine.  Your evening and bedtime routine   Create a relaxing bedtime routine. You might want to take a warm shower or bath, or listen to soothing music.  Go to bed at the same time every night. And get up at the same time every morning, even if you feel tired.  What to avoid   Limit caffeine (coffee, tea, caffeinated sodas) during the day, and don't have any for at least 6 hours before bedtime.  Avoid drinking alcohol before bedtime. Alcohol can cause you to wake up more often during the night.  Try not to smoke or use tobacco, especially in the evening. Nicotine can keep you awake.  Limit naps during the day, especially close to bedtime.  Avoid lying in bed awake for too long. If you can't fall asleep or if you wake up in the middle of the night and can't get back to sleep within about 20 minutes, get out of bed and go to another room until you feel sleepy.  Avoid taking medicine right before bed that may keep you awake or make you feel hyper or energized. Your doctor can tell you if your medicine may do this and if you can take it earlier in the day.  If you can't sleep   Imagine yourself in a peaceful, pleasant scene. Focus on the details and feelings of being in a place that is relaxing.  Get up and do a quiet or boring activity until you feel sleepy.  Avoid drinking any liquids before going to bed to help prevent waking up often to use the bathroom.  Where can you learn more?  Go to https://www.[employer].net/patiented  Enter J942 in the search box to learn more about \"Learning About Sleeping Well.\"  Current as of: July 31, 2024  Content Version: 14.3    2024 [employer].   Care instructions adapted under license by your healthcare professional. If you have questions about a medical condition or this instruction, always ask your healthcare professional. [employer] disclaims any " warranty or liability for your use of this information.

## 2025-05-07 DIAGNOSIS — L71.9 ROSACEA: ICD-10-CM

## 2025-05-07 RX ORDER — METRONIDAZOLE 10 MG/G
GEL TOPICAL DAILY
Qty: 60 G | Refills: 0 | Status: SHIPPED | OUTPATIENT
Start: 2025-05-07

## 2025-07-12 DIAGNOSIS — F31.81 BIPOLAR 2 DISORDER (H): ICD-10-CM

## 2025-07-14 RX ORDER — ZOLPIDEM TARTRATE 5 MG/1
5-10 TABLET ORAL
Qty: 60 TABLET | Refills: 2 | Status: SHIPPED | OUTPATIENT
Start: 2025-07-14

## 2025-07-28 ENCOUNTER — OFFICE VISIT (OUTPATIENT)
Dept: FAMILY MEDICINE | Facility: CLINIC | Age: 65
End: 2025-07-28
Payer: COMMERCIAL

## 2025-07-28 VITALS
BODY MASS INDEX: 35.3 KG/M2 | SYSTOLIC BLOOD PRESSURE: 158 MMHG | WEIGHT: 191.8 LBS | HEART RATE: 97 BPM | RESPIRATION RATE: 20 BRPM | TEMPERATURE: 97.1 F | DIASTOLIC BLOOD PRESSURE: 109 MMHG | HEIGHT: 62 IN | OXYGEN SATURATION: 97 %

## 2025-07-28 DIAGNOSIS — M54.12 CERVICAL RADICULOPATHY AT C7: ICD-10-CM

## 2025-07-28 DIAGNOSIS — M54.2 NECK PAIN: Primary | ICD-10-CM

## 2025-07-28 PROCEDURE — 99214 OFFICE O/P EST MOD 30 MIN: CPT

## 2025-07-28 PROCEDURE — 3077F SYST BP >= 140 MM HG: CPT

## 2025-07-28 PROCEDURE — 1125F AMNT PAIN NOTED PAIN PRSNT: CPT

## 2025-07-28 PROCEDURE — 3080F DIAST BP >= 90 MM HG: CPT

## 2025-07-28 ASSESSMENT — PAIN SCALES - GENERAL: PAINLEVEL_OUTOF10: MILD PAIN (2)

## 2025-07-28 NOTE — LETTER
July 28, 2025      Audrey Ricks  [address] [place] [address]    [facility] [address]        To Whom It May Concern:    Audrey Ricks was seen in our clinic. She may return to work with the following: limited to light duty - lifting no greater than 4 lbs pounds on or about 10/28/2025.      Sincerely,      Carolann Najera      Electronically signed

## 2025-07-28 NOTE — LETTER
July 28, 2025      Audrey PECK Rambo  [address] [place] [id number]  [facility] [address]        To Whom It May Concern:    Audrey PECK Rambo  was seen on 7/28/25.  Please excuse her  until September 8th, 20205 due to injury.        Sincerely,        Craolann Najera PA-C    Electronically signed

## 2025-07-28 NOTE — PATIENT INSTRUCTIONS
Based on our discussion, I have outlined the following instructions for you:      - You have a note to take a break from work until September so you can rest and get better.  - It's recommended to get an MRI scan of your neck to see how things are going.  - You have the contact details to set up this MRI scan.  - You have a referral for physical therapy that you can use anytime within the next year.  - Keep doing your exercises with the rubber band and enjoy some activities in the pool.  - You can start taking gabapentin at bedtime to help with nerve pain.  - Decide whether to take gabapentin or Ambien at night, but don't take both at the same time.  - Be careful with lifting heavy things; a specific weight limit will be decided for you.  - You can see a spinal specialist for more help and advice if you want.    Thank you again for your visit, and we look forward to supporting you in your journey to better health.      MRI of the neck, call and schedule    Follow up with physical therapy    Follow up with a spinal specialist     Lidocaine patches for 12 hours on, 12 hours off    Gabapentin at bedtime as needed     Tylenol as needed     Diclofenac gel 4 times a day scheduled     Heat pack for 10 minutes followed by exercises 4-5 times a day     Follow up with any new, worsening, or persistent symptoms     Go to the ER in the case of an emergency

## 2025-07-28 NOTE — PROGRESS NOTES
Assessment & Plan     Neck pain  - MR Cervical Spine w/o Contrast; Future  - Spine  Referral; Future  - Physical Therapy  Referral; Future    Cervical radiculopathy at C7  - MR Cervical Spine w/o Contrast; Future  - Spine  Referral; Future  - Physical Therapy  Referral; Future    Assessment & Plan  Assessment  Chronic neck pain likely due to a previous disc injury, with potential exacerbation from recent physical activities involving lifting and repetitive neck movements. The patient has a history of a bulging disc, with the last MRI conducted in 2016, and there is a concern for possible changes in the condition. The pain is described as sharp and burning, particularly affecting the scapular region and exacerbated by certain movements. There is also a history of nerve pain managed previously with gabapentin, which the patient is hesitant to resume due to side effects. The possibility of a recurrent or aggravated disc bulge is considered, warranting further imaging to assess the current state of the cervical spine.    Plan  A note has been provided to excuse from work until September to allow for rest and recovery. An MRI of the neck is recommended to assess the current condition, given the history of a bulging disc and recent exacerbation of symptoms. The patient has been given the contact information to schedule this imaging.    A referral to physical therapy is provided, valid for one year, to assist with exercises and potentially traction if deemed necessary after reviewing the MRI results. The patient is encouraged to continue with current exercises using a rubber band and pool activities, but may seek further guidance from a physical therapist if symptoms persist or worsen.    Gabapentin may be started at bedtime to manage nerve pain, with the understanding that it can cause drowsiness. The patient is advised to choose between gabapentin and Ambien, but not to use both  simultaneously, to avoid excessive side effects.    A lifting restriction is advised, with a specific weight limit to be determined, to prevent further aggravation of the neck condition. A referral to a spinal specialist is also offered for further evaluation and management, should the patient choose to pursue this option.      Follow-up       Subjective   Audrey is a 65 year old, presenting for the following health issues:  Neck Pain      7/28/2025     6:48 AM   Additional Questions   Roomed by Ladonna         7/28/2025     6:48 AM   Patient Reported Additional Medications   Patient reports taking the following new medications none     History of Present Illness       Back Pain:  She presents for follow up of back pain. Patient's back pain is a chronic problem.  Location of back pain:  Left middle of back, left upper back, left side of neck and left shoulder  Description of back pain: burning, sharp and stabbing  Back pain spreads: left shoulder and left side of neck    Since patient first noticed back pain, pain is: gradually improving  Does back pain interfere with her job:  Yes       She eats 4 or more servings of fruits and vegetables daily.She consumes 0 sweetened beverage(s) daily.She exercises with enough effort to increase her heart rate 30 to 60 minutes per day.  She exercises with enough effort to increase her heart rate 7 days per week.   She is taking medications regularly.      History of Present Illness-  Audrey Ricks, a 65-year-old female, reports experiencing neck pain related to an old disc injury. She describes a history of managing the pain through careful activity modification and physical therapy, which previously allowed her to work without aggravating her condition. 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 She mentions a past lifting restriction of  "4 pounds following the initial injury and expresses concern about her ability to continue her current work duties.    Audrey recalls undergoing an MRI in 2016, which showed a bulging disc. She believes that her efforts in physical therapy and rest had reduced the bulge by 2018, at which point she stopped taking gabapentin due to its side effects, including fatigue and memory issues. She notes that gabapentin was effective for her nerve pain but prefers not to resume it unless necessary. Instead, she has been using Ambien to manage pain at night, as it helps her sleep without the side effects she experienced with gabapentin.    She describes her pain as a burning sensation in the scapula area, which worsens at night and with certain activities, such as reaching or carrying heavy items. Audrey has been cautious in her daily activities, using a wagon for groceries and avoiding carrying heavy items. She has also been substituting bicycle riding with swimming, as the latter alleviates weight on her neck and shoulders.    Audrey expresses emotional distress about the return of her symptoms, fearing it may impact her ability to work as a  in September. She recalls a previous emergency room visit two years ago due to severe pain, which she managed to alleviate through rest and physical therapy. She is determined to avoid reaching that level of pain again and is considering taking time off work to focus on recovery.                  Objective    BP (!) 158/109 (BP Location: Right arm, Patient Position: Sitting, Cuff Size: Adult Large)   Pulse 97   Temp 97.1  F (36.2  C) (Temporal)   Resp 20   Ht 1.565 m (5' 1.61\")   Wt 87 kg (191 lb 12.8 oz)   LMP  (LMP Unknown)   SpO2 97%   BMI 35.52 kg/m    Body mass index is 35.52 kg/m .  Physical Exam   GENERAL: alert and no distress  EYES: Eyes grossly normal to inspection, PERRL and conjunctivae and sclerae normal  MS: no gross musculoskeletal defects noted, no " edema  SKIN: no suspicious lesions or rashes  NEURO: Normal strength and tone, mentation intact and speech normal  PSYCH: mentation appears normal, affect normal/bright    Office Visit on 02/13/2025   Component Date Value Ref Range Status    Cholesterol 02/13/2025 257 (H)  <200 mg/dL Final    Triglycerides 02/13/2025 198 (H)  <150 mg/dL Final    Direct Measure HDL 02/13/2025 50  >=50 mg/dL Final    LDL Cholesterol Calculated 02/13/2025 167 (H)  <100 mg/dL Final    Non HDL Cholesterol 02/13/2025 207 (H)  <130 mg/dL Final    Patient Fasting > 8hrs? 02/13/2025 Yes   Final    Sodium 02/13/2025 140  135 - 145 mmol/L Final    Potassium 02/13/2025 4.3  3.4 - 5.3 mmol/L Final    Carbon Dioxide (CO2) 02/13/2025 24  22 - 29 mmol/L Final    Anion Gap 02/13/2025 15  7 - 15 mmol/L Final    Urea Nitrogen 02/13/2025 19.6  8.0 - 23.0 mg/dL Final    Creatinine 02/13/2025 0.94  0.51 - 0.95 mg/dL Final    GFR Estimate 02/13/2025 68  >60 mL/min/1.73m2 Final    eGFR calculated using 2021 CKD-EPI equation.    Calcium 02/13/2025 9.6  8.8 - 10.4 mg/dL Final    Chloride 02/13/2025 101  98 - 107 mmol/L Final    Glucose 02/13/2025 93  70 - 99 mg/dL Final    Alkaline Phosphatase 02/13/2025 72  40 - 150 U/L Final    AST 02/13/2025 17  0 - 45 U/L Final    ALT 02/13/2025 17  0 - 50 U/L Final    Protein Total 02/13/2025 7.0  6.4 - 8.3 g/dL Final    Albumin 02/13/2025 4.3  3.5 - 5.2 g/dL Final    Bilirubin Total 02/13/2025 0.4  <=1.2 mg/dL Final    Patient Fasting > 8hrs? 02/13/2025 Yes   Final    TSH 02/13/2025 2.30  0.30 - 4.20 uIU/mL Final     No results found for any visits on 07/28/25.  No results found.        Signed Electronically by: Carolann Najera PA-C

## 2025-07-29 ENCOUNTER — PATIENT OUTREACH (OUTPATIENT)
Dept: CARE COORDINATION | Facility: CLINIC | Age: 65
End: 2025-07-29
Payer: COMMERCIAL

## 2025-07-31 ENCOUNTER — PATIENT OUTREACH (OUTPATIENT)
Dept: CARE COORDINATION | Facility: CLINIC | Age: 65
End: 2025-07-31
Payer: COMMERCIAL

## 2025-07-31 DIAGNOSIS — L71.9 ROSACEA: ICD-10-CM

## 2025-07-31 RX ORDER — METRONIDAZOLE 10 MG/G
GEL TOPICAL DAILY
Qty: 60 G | Refills: 0 | Status: SHIPPED | OUTPATIENT
Start: 2025-07-31

## 2025-08-04 ENCOUNTER — HOSPITAL ENCOUNTER (OUTPATIENT)
Dept: MRI IMAGING | Facility: CLINIC | Age: 65
End: 2025-08-04
Payer: COMMERCIAL

## 2025-08-04 DIAGNOSIS — M54.12 CERVICAL RADICULOPATHY AT C7: ICD-10-CM

## 2025-08-04 DIAGNOSIS — M54.2 NECK PAIN: ICD-10-CM

## 2025-08-04 PROCEDURE — 72141 MRI NECK SPINE W/O DYE: CPT

## 2025-08-20 ENCOUNTER — TELEPHONE (OUTPATIENT)
Dept: FAMILY MEDICINE | Facility: CLINIC | Age: 65
End: 2025-08-20
Payer: COMMERCIAL

## 2025-08-27 ENCOUNTER — OFFICE VISIT (OUTPATIENT)
Dept: PHYSICAL MEDICINE AND REHAB | Facility: CLINIC | Age: 65
End: 2025-08-27
Payer: COMMERCIAL

## 2025-08-27 VITALS — DIASTOLIC BLOOD PRESSURE: 115 MMHG | HEART RATE: 95 BPM | SYSTOLIC BLOOD PRESSURE: 195 MMHG | OXYGEN SATURATION: 99 %

## 2025-08-27 DIAGNOSIS — M54.2 NECK PAIN: ICD-10-CM

## 2025-08-27 DIAGNOSIS — M47.812 CERVICAL SPONDYLOSIS WITHOUT MYELOPATHY: ICD-10-CM

## 2025-08-27 DIAGNOSIS — M54.12 CERVICAL RADICULOPATHY AT C7: Primary | ICD-10-CM

## 2025-08-27 DIAGNOSIS — G44.209 TENSION HEADACHE: ICD-10-CM

## 2025-08-27 PROCEDURE — 3077F SYST BP >= 140 MM HG: CPT | Performed by: PAIN MEDICINE

## 2025-08-27 PROCEDURE — 99204 OFFICE O/P NEW MOD 45 MIN: CPT | Performed by: PAIN MEDICINE

## 2025-08-27 PROCEDURE — 3080F DIAST BP >= 90 MM HG: CPT | Performed by: PAIN MEDICINE

## 2025-08-27 RX ORDER — PREGABALIN 25 MG/1
25 CAPSULE ORAL 2 TIMES DAILY
Qty: 60 CAPSULE | Refills: 0 | Status: SHIPPED | OUTPATIENT
Start: 2025-08-27

## 2025-08-28 ENCOUNTER — PATIENT OUTREACH (OUTPATIENT)
Dept: CARE COORDINATION | Facility: CLINIC | Age: 65
End: 2025-08-28
Payer: COMMERCIAL

## 2025-09-01 ENCOUNTER — PATIENT OUTREACH (OUTPATIENT)
Dept: CARE COORDINATION | Facility: CLINIC | Age: 65
End: 2025-09-01
Payer: COMMERCIAL

## 2025-09-02 ENCOUNTER — PRE VISIT (OUTPATIENT)
Dept: NEUROLOGY | Facility: CLINIC | Age: 65
End: 2025-09-02

## 2025-09-02 ENCOUNTER — OFFICE VISIT (OUTPATIENT)
Dept: NEUROLOGY | Facility: CLINIC | Age: 65
End: 2025-09-02
Attending: PAIN MEDICINE
Payer: COMMERCIAL

## 2025-09-02 ENCOUNTER — DOCUMENTATION ONLY (OUTPATIENT)
Dept: OTHER | Facility: CLINIC | Age: 65
End: 2025-09-02

## 2025-09-02 VITALS — OXYGEN SATURATION: 97 % | HEART RATE: 99 BPM | DIASTOLIC BLOOD PRESSURE: 115 MMHG | SYSTOLIC BLOOD PRESSURE: 158 MMHG

## 2025-09-02 DIAGNOSIS — R20.2 NUMBNESS AND TINGLING IN BOTH HANDS: ICD-10-CM

## 2025-09-02 DIAGNOSIS — G89.4 CHRONIC PAIN SYNDROME: ICD-10-CM

## 2025-09-02 DIAGNOSIS — Z87.828 HISTORY OF TRAUMATIC HEAD INJURY: ICD-10-CM

## 2025-09-02 DIAGNOSIS — R20.0 NUMBNESS AND TINGLING IN BOTH HANDS: ICD-10-CM

## 2025-09-02 DIAGNOSIS — G44.209 TENSION HEADACHE: ICD-10-CM

## 2025-09-02 DIAGNOSIS — R42 DIZZINESS: ICD-10-CM

## 2025-09-02 DIAGNOSIS — F41.9 ANXIETY: ICD-10-CM

## 2025-09-02 DIAGNOSIS — R51.0 POSITIONAL HEADACHE: Primary | ICD-10-CM

## 2025-09-02 PROCEDURE — 99205 OFFICE O/P NEW HI 60 MIN: CPT | Performed by: NURSE PRACTITIONER

## 2025-09-02 PROCEDURE — 3077F SYST BP >= 140 MM HG: CPT | Performed by: NURSE PRACTITIONER

## 2025-09-02 PROCEDURE — 3080F DIAST BP >= 90 MM HG: CPT | Performed by: NURSE PRACTITIONER

## 2025-09-03 ENCOUNTER — PATIENT OUTREACH (OUTPATIENT)
Dept: CARE COORDINATION | Facility: CLINIC | Age: 65
End: 2025-09-03
Payer: COMMERCIAL

## (undated) DEVICE — PACK ARTHROSCOPY KNEE LATEX FREE SOP32CAFCN

## (undated) DEVICE — GLOVE BIOGEL INDICATOR 7.5 LF 41675

## (undated) DEVICE — CAST PADDING 6" WEBRIL UNSTERILE 3489

## (undated) DEVICE — BNDG COBAN 6"X5YDS STERILE

## (undated) DEVICE — INTRODUCER SHEATH GREEN 6.5FRX11CM .038IN PSI-6F-11-038ACT

## (undated) DEVICE — INTRO CATH 12CM 8.5FR FST-CATH

## (undated) DEVICE — SOL NACL 0.9% IRRIG 3000ML BAG 07972-08

## (undated) DEVICE — PACK EP SRG PROC LF DISP SAN32EPFSR

## (undated) DEVICE — SYR 50ML LL W/O NDL 309653

## (undated) DEVICE — CATH EP CATH EP REPRO DAIG RSPN FX CRV DX EP C

## (undated) DEVICE — PATCH CARTO 3 EXTERNAL REFERENCE 3D MAPPING CREFP6

## (undated) DEVICE — GLOVE BIOGEL PI ULTRATOUCH G SZ 7.5 42175

## (undated) DEVICE — GLOVE BIOGEL PI ULTRATOUCH G SZ 7.0 42170

## (undated) DEVICE — DRAPE STERI U 1015

## (undated) DEVICE — TUBING SUCTION 12"X1/4" N612

## (undated) DEVICE — DRAPE EXTREMITY W/ARMBOARD 29405

## (undated) DEVICE — CATH EP 60CM 5FR 2-8-2MM SPC-

## (undated) DEVICE — DRSG GAUZE 4X4" TRAY

## (undated) DEVICE — GLOVE BIOGEL PI INDICATOR 8.0 LF 41680

## (undated) DEVICE — CATH EP EZ STEER NAV 4MMX115CM 1-7-4 J-J CURVE BN7TCJJ4L

## (undated) DEVICE — TUBING ARTHROSCOPY PUMP ARTHREX AR-6410

## (undated) DEVICE — CATH EP 120CM 6FR RSPN 2-5-2MM 401261

## (undated) DEVICE — BUR ARTHREX COOLCUT EXCALIBUR 4.0MMX13CM AR-8400EX

## (undated) RX ORDER — DIMENHYDRINATE 50 MG/ML
INJECTION, SOLUTION INTRAMUSCULAR; INTRAVENOUS
Status: DISPENSED
Start: 2024-06-04

## (undated) RX ORDER — LIDOCAINE HYDROCHLORIDE 10 MG/ML
INJECTION, SOLUTION EPIDURAL; INFILTRATION; INTRACAUDAL; PERINEURAL
Status: DISPENSED
Start: 2024-06-04

## (undated) RX ORDER — ONDANSETRON 2 MG/ML
INJECTION INTRAMUSCULAR; INTRAVENOUS
Status: DISPENSED
Start: 2024-06-04

## (undated) RX ORDER — FENTANYL CITRATE 50 UG/ML
INJECTION, SOLUTION INTRAMUSCULAR; INTRAVENOUS
Status: DISPENSED
Start: 2024-06-04

## (undated) RX ORDER — FENTANYL CITRATE 50 UG/ML
INJECTION, SOLUTION INTRAMUSCULAR; INTRAVENOUS
Status: DISPENSED
Start: 2022-09-23